# Patient Record
Sex: FEMALE | Race: WHITE | NOT HISPANIC OR LATINO | Employment: FULL TIME | URBAN - METROPOLITAN AREA
[De-identification: names, ages, dates, MRNs, and addresses within clinical notes are randomized per-mention and may not be internally consistent; named-entity substitution may affect disease eponyms.]

---

## 2017-03-01 DIAGNOSIS — K21.9 GASTROESOPHAGEAL REFLUX DISEASE, ESOPHAGITIS PRESENCE NOT SPECIFIED: ICD-10-CM

## 2017-03-01 NOTE — TELEPHONE ENCOUNTER
omeprazole 20 MG tablet      Last Written Prescription Date: 8/26/16  Last Fill Quantity: 90,  # refills: 1   Last Office Visit with [facility], [facility] or [facility] prescribing provider: 3/22/16

## 2017-03-02 RX ORDER — NICOTINE POLACRILEX 4 MG/1
20 GUM, CHEWING ORAL DAILY
Qty: 30 TABLET | Refills: 0 | Status: SHIPPED
Start: 2017-03-02 | End: 2017-03-20

## 2017-03-02 NOTE — TELEPHONE ENCOUNTER
Medication is being filled for 1 time refill only due to:  Patient needs to be seen because it has been more than one year since last visit.     Signed Prescriptions:                        Disp   Refills    omeprazole 20 MG tablet                    30 tab*0        Sig: Take 1 tablet (20 mg) by mouth daily Take 30-60           minutes before a meal.  PLEASE SCHEDULE AN           APPOINTMENT TO RECEIVE FUTURE REFILLS           [phone]  Authorizing Provider: MEAGAN SANDOVAL  Ordering User: TOI GILES      Routing to  Reception - due for annual office visit      Thank you,  Toi Giles RN

## 2017-03-07 ENCOUNTER — OFFICE VISIT (OUTPATIENT)
Dept: FAMILY MEDICINE | Facility: CLINIC | Age: 62
End: 2017-03-07
Payer: COMMERCIAL

## 2017-03-07 VITALS
TEMPERATURE: 98.8 F | HEIGHT: 62 IN | SYSTOLIC BLOOD PRESSURE: 137 MMHG | OXYGEN SATURATION: 99 % | DIASTOLIC BLOOD PRESSURE: 72 MMHG | HEART RATE: 54 BPM | WEIGHT: 199.75 LBS | BODY MASS INDEX: 36.76 KG/M2

## 2017-03-07 DIAGNOSIS — J01.00 ACUTE NON-RECURRENT MAXILLARY SINUSITIS: Primary | ICD-10-CM

## 2017-03-07 PROCEDURE — 99213 OFFICE O/P EST LOW 20 MIN: CPT | Performed by: FAMILY MEDICINE

## 2017-03-07 RX ORDER — AMOXICILLIN AND CLAVULANATE POTASSIUM 500; 125 MG/1; MG/1
1 TABLET, FILM COATED ORAL 3 TIMES DAILY
Qty: 30 TABLET | Refills: 0 | Status: SHIPPED | OUTPATIENT
Start: 2017-03-07 | End: 2017-03-20

## 2017-03-07 NOTE — PATIENT INSTRUCTIONS
?augmentin  Try below , if not better 4 days try augmentin antibiotic given   Continue flonase 1 spray each nostril daily 2 weeks  Continue Zyrtec 10 mg daily 2 week  mucinex 600 mg twice a day 1 week  netti pot   Humidifier in room may help  supportive care as below  Go to the Er if worse  Follow up with primary if symptoms persist   Consider Hep c screen, colon cancer screen, BMP. Due for preventive visit with Primary Dr Graf   Your symptoms and exam today indicate that you have a viral upper respiratory illness.  This includes viral rhinosinusitis and viral bronchitis.  Antibiotics do not help viral illnesses; the best remedies treat the symptoms (see below).  The typical course of a viral illness is that you feel rather miserable for the first few days - with sore throat, runny nose/nasal congestion, cough, and sometimes fever and body aches.  You should start to feel better after about 5-7 days and much better by 10-14 days.  If you develop sudden worsening of symptoms or fever after the first 5-7 days, or if you have persistence of your symptoms beyond 14 days, let us know as you may have developed a secondary bacterial infection.      For symptom relief I suggest tryin. Steam.  Take a long, hot shower.  Or if you don't want to get in the shower just run it with the bathroom door shut for a few minutes and breathe the steam.  2. Drink hot liquids frequently such as tea or hot water with honey and lemon.  3. Acetaminophen (Tylenol) and ibuprofen (Motrin or Advil) as needed for headache, sore throat, body aches, or fever.  4. For loosening phlegm and sputum try guaifenesin (available in many combination products and alone as plain Robitussin or plain Mucinex) and for cough suppression you can try dextromethorphan (Delsym or combined in other products).  5. For nasal congestion try:    An oral decongestant.  The only decongestant I recommend is pseudoephedrine. Ask the pharmacist for the over the counter  (but real) pseudoephedrine - not phenylephrine.  This can raise your blood pressure and heart rate so do not use this if you have hypertension.       Afrin spray for 3 days.  (Never use afrin nasal spray for more than 3 days as there is a risk of developing tolerance and rebound/worsening nasal congestion if used longer than this.)    Nealmed sinus rinses.    Nasal steroid spray such as nasacort or flonase, which are over-the-counter.  6. And most importantly: plenty of rest and sleep  especially while you have a fever.     Stop smoking and avoid secondhand smoke    Drink lots of fluids such as water and clear soups. Fluids help loosen mucus. Fluids are also important because they help prevent dehydration.    Gargle with warm salt water a few times a day to relieve a sore throat. Throat sprays or lozenges may also help relieve the pain.    Avoid alcohol.    Use saline (salt water) nose drops to help loosen mucus and moisten the tender skin in your nose.  Encouraged mucinex, warm salt water gargles, cepacol spray, soothers/lozenges, sinus rinses (neilmed), flonase (2 sprays per nostril daily x 2 weeks), vitamin c, fluids and rest.  May alternate tylenol and NSAIDS (ibuprofen, advil, aleve type products) every 4-6 hours for the next few days as needed.   No need for oral antibiotic at this time.

## 2017-03-07 NOTE — MR AVS SNAPSHOT
After Visit Summary   3/7/2017    Adilia Camacho    MRN: [id number]           Patient Information     Date Of Birth          1955        Visit Information        Provider Department      3/7/2017 3:40 PM Minna Pardo MD [facility]        Today's Diagnoses     Acute sinusitis, recurrence not specified, unspecified location    -  1      Care Instructions    ?augmentin  Try below , if not better 4 days try augmentin antibiotic given   Continue flonase 1 spray each nostril daily 2 weeks  Continue Zyrtec 10 mg daily 2 week  mucinex 600 mg twice a day 1 week  netti pot   Humidifier in room may help  supportive care as below  Go to the Er if worse  Follow up with primary if symptoms persist   Consider Hep c screen, colon cancer screen, BMP. Due for preventive visit with Primary Dr Graf   Your symptoms and exam today indicate that you have a viral upper respiratory illness.  This includes viral rhinosinusitis and viral bronchitis.  Antibiotics do not help viral illnesses; the best remedies treat the symptoms (see below).  The typical course of a viral illness is that you feel rather miserable for the first few days - with sore throat, runny nose/nasal congestion, cough, and sometimes fever and body aches.  You should start to feel better after about 5-7 days and much better by 10-14 days.  If you develop sudden worsening of symptoms or fever after the first 5-7 days, or if you have persistence of your symptoms beyond 14 days, let us know as you may have developed a secondary bacterial infection.      For symptom relief I suggest tryin. Steam.  Take a long, hot shower.  Or if you don't want to get in the shower just run it with the bathroom door shut for a few minutes and breathe the steam.  2. Drink hot liquids frequently such as tea or hot water with honey and lemon.  3. Acetaminophen (Tylenol) and ibuprofen (Motrin or Advil) as needed for headache, sore throat, body aches, or  fever.  4. For loosening phlegm and sputum try guaifenesin (available in many combination products and alone as plain Robitussin or plain Mucinex) and for cough suppression you can try dextromethorphan (Delsym or combined in other products).  5. For nasal congestion try:    An oral decongestant.  The only decongestant I recommend is pseudoephedrine. Ask the pharmacist for the over the counter (but real) pseudoephedrine - not phenylephrine.  This can raise your blood pressure and heart rate so do not use this if you have hypertension.       Afrin spray for 3 days.  (Never use afrin nasal spray for more than 3 days as there is a risk of developing tolerance and rebound/worsening nasal congestion if used longer than this.)    Nealmed sinus rinses.    Nasal steroid spray such as nasacort or flonase, which are over-the-counter.  6. And most importantly: plenty of rest and sleep  especially while you have a fever.     Stop smoking and avoid secondhand smoke    Drink lots of fluids such as water and clear soups. Fluids help loosen mucus. Fluids are also important because they help prevent dehydration.    Gargle with warm salt water a few times a day to relieve a sore throat. Throat sprays or lozenges may also help relieve the pain.    Avoid alcohol.    Use saline (salt water) nose drops to help loosen mucus and moisten the tender skin in your nose.  Encouraged mucinex, warm salt water gargles, cepacol spray, soothers/lozenges, sinus rinses (neilmed), flonase (2 sprays per nostril daily x 2 weeks), vitamin c, fluids and rest.  May alternate tylenol and NSAIDS (ibuprofen, advil, aleve type products) every 4-6 hours for the next few days as needed.   No need for oral antibiotic at this time.          Follow-ups after your visit        Your next 10 appointments already scheduled     Mar 20, 2017  9:20 AM CDT   [place] [facility] with Sheree Graf MD   [facility] [place] [place] ([facility] [place]    [id number]  "[facility] [address] [phone]              Who to contact     If you have questions or need follow up information about today's clinic visit or your schedule please contact [facility] directly at [phone].  Normal or non-critical lab and imaging results will be communicated to you by MyChart, letter or phone within 4 business days after the clinic has received the results. If you do not hear from us within 7 days, please contact the clinic through Ya[employer] or phone. If you have a critical or abnormal lab result, we will notify you by phone as soon as possible.  Submit refill requests through Grow or call your pharmacy and they will forward the refill request to us. Please allow 3 business days for your refill to be completed.          Additional Information About Your Visit        [employer] Information     Grow gives you secure access to your electronic health record. If you see a primary care provider, you can also send messages to your care team and make appointments. If you have questions, please call your primary care clinic.  If you do not have a primary care provider, please call [phone] and they will assist you.        Care EveryWhere ID     This is your Care EveryWhere ID. This could be used by other organizations to access your [facility] medical records  [id number]        Your Vitals Were     Pulse Temperature Height Pulse Oximetry BMI (Body Mass Index)       54 98.8  F (37.1  C) (Tympanic) 5' 2\" (1.575 m) 99% 36.53 kg/m2        Blood Pressure from Last 3 Encounters:   03/07/17 137/72   03/22/16 117/82   03/08/16 116/78    Weight from Last 3 Encounters:   03/07/17 199 lb 12 oz (90.6 kg)   03/22/16 207 lb 8 oz (94.1 kg)   03/08/16 208 lb 12 oz (94.7 kg)              Today, you had the following     No orders found for display         Today's Medication Changes          These changes are accurate as of: 3/7/17  4:06 PM.  If you have any questions, ask your " nurse or doctor.               Start taking these medicines.        Dose/Directions    amoxicillin-clavulanate 500-125 MG per tablet   Commonly known as:  AUGMENTIN   Used for:  Acute sinusitis, recurrence not specified, unspecified location   Started by:  Minna Pardo MD        Dose:  1 tablet   Take 1 tablet by mouth 3 times daily   Quantity:  30 tablet   Refills:  0            Where to get your medicines      Some of these will need a paper prescription and others can be bought over the counter.  Ask your nurse if you have questions.     Bring a paper prescription for each of these medications     amoxicillin-clavulanate 500-125 MG per tablet                Primary Care Provider Office Phone # Fax #    Sheree Graf [phone]       [address] [address]        Thank you!     Thank you for choosing [facility]  for your care. Our goal is always to provide you with excellent care. Hearing back from our patients is one way we can continue to improve our services. Please take a few minutes to complete the written survey that you may receive in the mail after your visit with us. Thank you!             Your Updated Medication List - Protect others around you: Learn how to safely use, store and throw away your medicines at www.disposemymeds.org.          This list is accurate as of: 3/7/17  4:06 PM.  Always use your most recent med list.                   Brand Name Dispense Instructions for use    acyclovir 400 MG tablet    ZOVIRAX    30 tablet    Take 1 tablet (400 mg) by mouth 3 times daily       ADVIL PO      Take 200 mg by mouth as needed for moderate pain       amoxicillin-clavulanate 500-125 MG per tablet    AUGMENTIN    30 tablet    Take 1 tablet by mouth 3 times daily       atenolol 25 MG tablet    TENORMIN    90 tablet    Take 1 tablet (25 mg) by mouth daily       cetirizine 10 MG tablet    zyrTEC     Take 10 mg by mouth daily        diclofenac 1 % Gel topical gel    VOLTAREN    100 g    Apply 4 grams to left calf twice a day       fluticasone 50 MCG/ACT spray    FLONASE    48 g    Spray 2 sprays into both nostrils 2 times daily       loperamide 2 MG capsule    IMODIUM     Take 2 mg by mouth 4 times daily as needed for diarrhea       omeprazole 20 MG tablet     30 tablet    Take 1 tablet (20 mg) by mouth daily Take 30-60 minutes before a meal.  PLEASE SCHEDULE AN APPOINTMENT TO RECEIVE FUTURE REFILLS [phone]

## 2017-03-07 NOTE — PROGRESS NOTES
SUBJECTIVE:                                                    Adilia Camacho is a 61 year old female who presents to clinic today for the following health issues:    RESPIRATORY SYMPTOMS  Hx of allergy in winter but never get thi skind of pressure, left maxillary area and teeth up into eyes sensitive     Duration: x Sunday ( 3 days )     Description  facial pain/pressure  No fever or chills  Has runny nose from winter when furnace is on nothing ore than normal  Teeth sensitive then pressure in left cheek  No tooth issues goes to dentist regularly   Takes flonase daily and zyrtec too   Nose mildly sensitive on left felt a bit scabby  No nose bleed  No chest pain, trouble breathing or palpitations   No nausea vomiting, diarrhea constipation, bloody or black stool  Had low back pain briefly day before, no tingling, numbness or trouble walking  A week prior had returned from trip of snowmobiling but had no pain in week till yesterday or since yesterday  No urinary complaints.   Definitely not her teeth, knows it is her sinus    Severity: moderate    Accompanying signs and symptoms: patient does have allergies to dust and dogs; patient does take allergy medications to help with that. Patient does notice her nose gets drippy if she doesn't take her allergy medication     History (predisposing factors):  Allergies     Therapies tried and outcome:  Suphedrine , Zyrtec at night     Problem list and histories reviewed & adjusted, as indicated.  Additional history: as documented    Patient Active Problem List   Diagnosis     Hypertension goal BP (blood pressure) < 140/90     CARDIOVASCULAR SCREENING; LDL GOAL LESS THAN 130     Allergic rhinitis due to dogs     Contraceptive management     Recurrent herpes labialis     Rosacea     Female stress incontinence     GERD (gastroesophageal reflux disease)     Advanced directives, counseling/discussion     Past Surgical History   Procedure Laterality Date     No history of surgery          Social History   Substance Use Topics     Smoking status: Former Smoker     Years: 10.00     Types: Cigarettes     Smokeless tobacco: Never Used     Alcohol use 1.0 oz/week     2 Cans of beer per week      Comment: occassionally     Family History   Problem Relation Age of Onset     Hypertension Father      Hypertension Brother      Hypertension Sister      DIABETES Sister      and brother     KIDNEY DISEASE Sister      DIABETES Brother      Cancer - colorectal No family hx of      Breast Cancer No family hx of      Thyroid Disease No family hx of          Current Outpatient Prescriptions   Medication Sig Dispense Refill     omeprazole 20 MG tablet Take 1 tablet (20 mg) by mouth daily Take 30-60 minutes before a meal.  PLEASE SCHEDULE AN APPOINTMENT TO RECEIVE FUTURE REFILLS [phone] 30 tablet 0     fluticasone (FLONASE) 50 MCG/ACT nasal spray Spray 2 sprays into both nostrils 2 times daily 48 G 1     atenolol (TENORMIN) 25 MG tablet Take 1 tablet (25 mg) by mouth daily 90 tablet 1     acyclovir (ZOVIRAX) 400 MG tablet Take 1 tablet (400 mg) by mouth 3 times daily 30 tablet 2     Ibuprofen (ADVIL PO) Take 200 mg by mouth as needed for moderate pain       cetirizine (ZYRTEC) 10 MG tablet Take 10 mg by mouth daily       loperamide (IMODIUM) 2 MG capsule Take 2 mg by mouth 4 times daily as needed for diarrhea       diclofenac (VOLTAREN) 1 % GEL Apply 4 grams to left calf twice a day (Patient not taking: Reported on 3/7/2017) 100 G 1     No Known Allergies  Recent Labs   Lab Test  03/22/16   0924  07/27/15   1016   02/15/13   1135  10/28/11   0955  02/21/11   1644   LDL  107*   --    --   95  96   --    HDL  50   --    --   43*  46*   --    TRIG  182*   --    --   119  136   --    ALT   --    --    --   22   --    --    CR  0.84  0.77   < >  0.76   --    --    GFRESTIMATED  69  78   < >  79   --    --    GFRESTBLACK  84  >90   GFR Calc     < >  >90   --    --    POTASSIUM  4.2  4.5   < >  4.2   " --    --    TSH   --    --    --    --   1.71  1.13    < > = values in this interval not displayed.      BP Readings from Last 3 Encounters:   03/07/17 137/72   03/22/16 117/82   03/08/16 116/78    Wt Readings from Last 3 Encounters:   03/07/17 199 lb 12 oz (90.6 kg)   03/22/16 207 lb 8 oz (94.1 kg)   03/08/16 208 lb 12 oz (94.7 kg)                  Labs reviewed in EPIC    Reviewed and updated as needed this visit by clinical staff  Tobacco  Allergies  Meds  Med Hx  Surg Hx  Fam Hx  Soc Hx      Reviewed and updated as needed this visit by Provider         ROS:  Constitutional, HEENT, cardiovascular, pulmonary, GI, , musculoskeletal, neuro, skin, endocrine and psych systems are negative, except as otherwise noted.    OBJECTIVE:                                                    /72  Pulse 54  Temp 98.8  F (37.1  C) (Tympanic)  Ht 5' 2\" (1.575 m)  Wt 199 lb 12 oz (90.6 kg)  SpO2 99%  BMI 36.53 kg/m2  Body mass index is 36.53 kg/(m^2).  GENERAL: healthy, alert and no distress  EYES: Eyes grossly normal to inspection, PERRL and conjunctivae and sclerae normal  HENT: normal cephalic/atraumatic, ear canals and TM's normal, nose and mouth without ulcers or lesions, oropharynx clear, oral mucous membranes moist and sinuses: maxillary tenderness on left  NECK: no adenopathy, no asymmetry, masses, or scars and thyroid normal to palpation  RESP: lungs clear to auscultation - no rales, rhonchi or wheezes  CV: regular rate and rhythm, normal S1 S2, no S3 or S4, no murmur, click or rub, no peripheral edema and peripheral pulses strong  ABDOMEN: soft, non tender, no hepatosplenomegaly, no masses and bowel sounds normal  MS: no gross musculoskeletal defects noted, no edema  SKIN: no suspicious lesions or rashes  NEURO: Normal strength and tone, mentation intact and speech normal  PSYCH: mentation appears normal, affect normal/bright    Diagnostic Test Results:  none      ASSESSMENT/PLAN:                              "                         1. Acute non-recurrent maxillary sinusitis  Hx of GERD on Prilosec, HTN on atenolol, Rosacea, stress incontinence, recurrent herpes labialis on zovirax, allergic rhinitis to dogs on Flonase & zyrtec, former smoker, with plantar fasciitis on Voltaren gel prn, seen by PCP Dr Graf for preventive 3/22/16. Here today for sinus infection. Likely viral. Advised to Try below , if not better in 4 days try Augmentin antibiotic given. Continue Flonase 1 spray each nostril daily 2 weeks. Continue Zyrtec 10 mg daily 2 week. mucinex 600 mg twice a day 1 week. eduard pot. Humidifier in room may help. supportive care as below  Go to the Er if worse. Follow up with primary if symptoms persist. Consider Hep C screen, colon cancer screen, BMP. Due for preventive visit with Primary Dr Graf   - amoxicillin-clavulanate (AUGMENTIN) 500-125 MG per tablet; Take 1 tablet by mouth 3 times daily  Dispense: 30 tablet; Refill: 0    See Patient Instructions  Patient Instructions   ?augmentin  Try below , if not better 4 days try augmentin antibiotic given   Continue flonase 1 spray each nostril daily 2 weeks  Continue Zyrtec 10 mg daily 2 week  mucinex 600 mg twice a day 1 week  netti pot   Humidifier in room may help  supportive care as below  Go to the Er if worse  Follow up with primary if symptoms persist   Consider Hep c screen, colon cancer screen, BMP. Due for preventive visit with Primary Dr Graf   Your symptoms and exam today indicate that you have a viral upper respiratory illness.  This includes viral rhinosinusitis and viral bronchitis.  Antibiotics do not help viral illnesses; the best remedies treat the symptoms (see below).  The typical course of a viral illness is that you feel rather miserable for the first few days - with sore throat, runny nose/nasal congestion, cough, and sometimes fever and body aches.  You should start to feel better after about 5-7 days and much better by 10-14 days.  If you  develop sudden worsening of symptoms or fever after the first 5-7 days, or if you have persistence of your symptoms beyond 14 days, let us know as you may have developed a secondary bacterial infection.      For symptom relief I suggest tryin. Steam.  Take a long, hot shower.  Or if you don't want to get in the shower just run it with the bathroom door shut for a few minutes and breathe the steam.  2. Drink hot liquids frequently such as tea or hot water with honey and lemon.  3. Acetaminophen (Tylenol) and ibuprofen (Motrin or Advil) as needed for headache, sore throat, body aches, or fever.  4. For loosening phlegm and sputum try guaifenesin (available in many combination products and alone as plain Robitussin or plain Mucinex) and for cough suppression you can try dextromethorphan (Delsym or combined in other products).  5. For nasal congestion try:    An oral decongestant.  The only decongestant I recommend is pseudoephedrine. Ask the pharmacist for the over the counter (but real) pseudoephedrine - not phenylephrine.  This can raise your blood pressure and heart rate so do not use this if you have hypertension.       Afrin spray for 3 days.  (Never use afrin nasal spray for more than 3 days as there is a risk of developing tolerance and rebound/worsening nasal congestion if used longer than this.)    Nealmed sinus rinses.    Nasal steroid spray such as nasacort or flonase, which are over-the-counter.  6. And most importantly: plenty of rest and sleep  especially while you have a fever.     Stop smoking and avoid secondhand smoke    Drink lots of fluids such as water and clear soups. Fluids help loosen mucus. Fluids are also important because they help prevent dehydration.    Gargle with warm salt water a few times a day to relieve a sore throat. Throat sprays or lozenges may also help relieve the pain.    Avoid alcohol.    Use saline (salt water) nose drops to help loosen mucus and moisten the tender skin  in your nose.  Encouraged mucinex, warm salt water gargles, cepacol spray, soothers/lozenges, sinus rinses (neilmed), flonase (2 sprays per nostril daily x 2 weeks), vitamin c, fluids and rest.  May alternate tylenol and NSAIDS (ibuprofen, advil, aleve type products) every 4-6 hours for the next few days as needed.   No need for oral antibiotic at this time.        Minna Pardo MD  [facility]

## 2017-03-17 DIAGNOSIS — I10 HYPERTENSION GOAL BP (BLOOD PRESSURE) < 140/90: ICD-10-CM

## 2017-03-17 RX ORDER — ATENOLOL 25 MG/1
25 TABLET ORAL DAILY
Qty: 90 TABLET | Refills: 0 | Status: SHIPPED | OUTPATIENT
Start: 2017-03-17 | End: 2017-03-20

## 2017-03-17 NOTE — TELEPHONE ENCOUNTER
Medication Detail      Disp Refills Start End DARRYL   atenolol (TENORMIN) 25 MG tablet 90 tablet 1 9/15/2016  No   Sig: Take 1 tablet (25 mg) by mouth daily   Class: E-Prescribe   Route: Oral        Last Office Visit with [facility], [facility] or [facility] prescribing provider:  3/7/2017   Future Office Visit:    Next 5 appointments (look out 90 days)     Mar 20, 2017  9:20 AM CDT   MyChart Physical Adult with Sheree Graf MD   [facility] ([facility])    [address] [place] [id number] [phone]                    BP Readings from Last 3 Encounters:   03/07/17 137/72   03/22/16 117/82   03/08/16 116/78

## 2017-03-20 ENCOUNTER — OFFICE VISIT (OUTPATIENT)
Dept: FAMILY MEDICINE | Facility: CLINIC | Age: 62
End: 2017-03-20
Payer: COMMERCIAL

## 2017-03-20 VITALS
WEIGHT: 196 LBS | DIASTOLIC BLOOD PRESSURE: 78 MMHG | SYSTOLIC BLOOD PRESSURE: 110 MMHG | HEART RATE: 72 BPM | RESPIRATION RATE: 14 BRPM | OXYGEN SATURATION: 98 % | TEMPERATURE: 97.8 F | HEIGHT: 62 IN | BODY MASS INDEX: 36.07 KG/M2

## 2017-03-20 DIAGNOSIS — I10 HYPERTENSION GOAL BP (BLOOD PRESSURE) < 140/90: ICD-10-CM

## 2017-03-20 DIAGNOSIS — Z13.220 LIPID SCREENING: ICD-10-CM

## 2017-03-20 DIAGNOSIS — Z12.31 VISIT FOR SCREENING MAMMOGRAM: ICD-10-CM

## 2017-03-20 DIAGNOSIS — Z11.59 NEED FOR HEPATITIS C SCREENING TEST: ICD-10-CM

## 2017-03-20 DIAGNOSIS — K21.9 GASTROESOPHAGEAL REFLUX DISEASE, ESOPHAGITIS PRESENCE NOT SPECIFIED: ICD-10-CM

## 2017-03-20 DIAGNOSIS — Z00.00 ENCOUNTER FOR ROUTINE ADULT HEALTH EXAMINATION WITHOUT ABNORMAL FINDINGS: Primary | ICD-10-CM

## 2017-03-20 DIAGNOSIS — Z12.11 SCREEN FOR COLON CANCER: ICD-10-CM

## 2017-03-20 DIAGNOSIS — Z13.6 CARDIOVASCULAR SCREENING; LDL GOAL LESS THAN 130: ICD-10-CM

## 2017-03-20 PROCEDURE — 99396 PREV VISIT EST AGE 40-64: CPT | Performed by: FAMILY MEDICINE

## 2017-03-20 RX ORDER — NICOTINE POLACRILEX 4 MG/1
20 GUM, CHEWING ORAL DAILY
Qty: 90 TABLET | Refills: 3 | Status: SHIPPED | OUTPATIENT
Start: 2017-03-20 | End: 2018-05-01

## 2017-03-20 RX ORDER — ATENOLOL 25 MG/1
25 TABLET ORAL DAILY
Qty: 90 TABLET | Refills: 3 | Status: SHIPPED | OUTPATIENT
Start: 2017-03-20 | End: 2018-02-13 | Stop reason: ALTCHOICE

## 2017-03-20 NOTE — PROGRESS NOTES
SUBJECTIVE:     CC: Adilia Camacho is a 61 year old woman who presents for preventive health visit.     Physical   Annual:     Getting at least 3 servings of Calcium per day::  Yes    Bi-annual eye exam::  Yes    Dental care twice a year::  Yes    Sleep apnea or symptoms of sleep apnea::  None    Diet::  Regular (no restrictions)    Frequency of exercise::  2-3 days/week    Duration of exercise::  Other    Taking medications regularly::  Yes    Medication side effects::  None    Additional concerns today::  YES        Today's PHQ-2 Score:   PHQ-2 ( 1999 Pfizer) 3/17/2017   Little interest or pleasure in doing things Not at all   Feeling down, depressed or hopeless Not at all   PHQ-2 Score 0       Abuse: Current or Past(Physical, Sexual or Emotional)- No  Do you feel safe in your environment - Yes    Social History   Substance Use Topics     Smoking status: Former Smoker     Years: 10.00     Types: Cigarettes     Smokeless tobacco: Never Used     Alcohol use 1.0 oz/week     2 Cans of beer per week      Comment: occassionally     The patient does not drink >3 drinks per day nor >7 drinks per week.    Recent Labs   Lab Test  03/22/16   0924  02/15/13   1135  10/28/11   0955   CHOL  193  161  170   HDL  50  43*  46*   LDL  107*  95  96   TRIG  182*  119  136   CHOLHDLRATIO   --   3.8  3.7   NHDL  143*   --    --        Reviewed orders with patient.  Reviewed health maintenance and updated orders accordingly - Yes    Mammo Decision Support:  Patient over age 50, mutual decision to screen reflected in health maintenance.    Pertinent mammograms are reviewed under the imaging tab.  History of abnormal Pap smear: NO - age 30-65 PAP every 5 years with negative HPV co-testing recommended    Reviewed and updated as needed this visit by clinical staff  Tobacco  Allergies  Med Hx  Surg Hx  Fam Hx  Soc Hx        Reviewed and updated as needed this visit by Provider        Past Medical History   Diagnosis Date     Allergic  rhinitis due to dogs      CARDIOVASCULAR SCREENING; LDL GOAL LESS THAN 130      Contraceptive management      Hypertension goal BP (blood pressure) < 140/90      Recurrent herpes labialis      Rosacea       Past Surgical History   Procedure Laterality Date     No history of surgery       Obstetric History     No data available          ROS:  10 point ROS is negative except as noted above.    BP Readings from Last 3 Encounters:   03/20/17 110/78   03/07/17 137/72   03/22/16 117/82    Wt Readings from Last 3 Encounters:   03/20/17 88.9 kg (196 lb)   03/07/17 90.6 kg (199 lb 12 oz)   03/22/16 94.1 kg (207 lb 8 oz)                  Patient Active Problem List   Diagnosis     Hypertension goal BP (blood pressure) < 140/90     CARDIOVASCULAR SCREENING; LDL GOAL LESS THAN 130     Allergic rhinitis due to dogs     Contraceptive management     Recurrent herpes labialis     Rosacea     Female stress incontinence     GERD (gastroesophageal reflux disease)     Advanced directives, counseling/discussion     Past Surgical History   Procedure Laterality Date     No history of surgery         Social History   Substance Use Topics     Smoking status: Former Smoker     Years: 10.00     Types: Cigarettes     Smokeless tobacco: Never Used     Alcohol use 1.0 oz/week     2 Cans of beer per week      Comment: occassionally     Family History   Problem Relation Age of Onset     Hypertension Father      Hypertension Brother      Hypertension Sister      DIABETES Sister      and brother     KIDNEY DISEASE Sister      DIABETES Brother      Cancer - colorectal No family hx of      Breast Cancer No family hx of      Thyroid Disease No family hx of          Current Outpatient Prescriptions   Medication Sig Dispense Refill     atenolol (TENORMIN) 25 MG tablet Take 1 tablet (25 mg) by mouth daily 90 tablet 0     omeprazole 20 MG tablet Take 1 tablet (20 mg) by mouth daily Take 30-60 minutes before a meal.  PLEASE SCHEDULE AN APPOINTMENT TO  "RECEIVE FUTURE REFILLS [phone] 30 tablet 0     fluticasone (FLONASE) 50 MCG/ACT nasal spray Spray 2 sprays into both nostrils 2 times daily 48 g 1     acyclovir (ZOVIRAX) 400 MG tablet Take 1 tablet (400 mg) by mouth 3 times daily 30 tablet 2     Ibuprofen (ADVIL PO) Take 200 mg by mouth as needed for moderate pain       cetirizine (ZYRTEC) 10 MG tablet Take 10 mg by mouth daily       loperamide (IMODIUM) 2 MG capsule Take 2 mg by mouth 4 times daily as needed for diarrhea       No Known Allergies  Recent Labs   Lab Test  03/22/16   0924  07/27/15   1016   02/15/13   1135  10/28/11   0955  02/21/11   1644   LDL  107*   --    --   95  96   --    HDL  50   --    --   43*  46*   --    TRIG  182*   --    --   119  136   --    ALT   --    --    --   22   --    --    CR  0.84  0.77   < >  0.76   --    --    GFRESTIMATED  69  78   < >  79   --    --    GFRESTBLACK  84  >90   GFR Calc     < >  >90   --    --    POTASSIUM  4.2  4.5   < >  4.2   --    --    TSH   --    --    --    --   1.71  1.13    < > = values in this interval not displayed.      This document serves as a record of the services and decisions personally performed and made by Sheree Graf MD. It was created on her behalf by Hiral Ca, a trained medical scribe. The creation of this document is based on the provider's statements to the medical scribe.  Hiral Ca March 20, 2017 9:48 AM    OBJECTIVE:     /78  Pulse 72  Temp 97.8  F (36.6  C) (Oral)  Resp 14  Ht 1.575 m (5' 2\")  Wt 88.9 kg (196 lb)  SpO2 98%  BMI 35.85 kg/m2  EXAM:  GENERAL: healthy, alert and no distress  EYES: Eyes grossly normal to inspection, PERRL and conjunctivae and sclerae normal  HENT: ear canals and TM's normal, nose and mouth without ulcers or lesions  NECK: no adenopathy, no asymmetry, masses, or scars and thyroid normal to palpation  RESP: lungs clear to auscultation - no rales, rhonchi or wheezes  BREAST: normal without masses, tenderness or " "nipple discharge and no palpable axillary masses or adenopathy  CV: regular rate and rhythm, normal S1 S2, no S3 or S4, no murmur, click or rub, no peripheral edema and peripheral pulses strong  ABDOMEN: soft, nontender, no hepatosplenomegaly, no masses and bowel sounds normal  NEURO: Normal strength and tone, mentation intact and speech normal  PSYCH: mentation appears normal, affect normal/bright    ASSESSMENT/PLAN:     1. Encounter for routine adult health examination without abnormal findings  Pap due in 2019    2. Screen for colon cancer  Will schedule colonoscopy this year.  - GASTROENTEROLOGY ADULT REF PROCEDURE ONLY    3. Visit for screening mammogram  - MA SCREENING DIGITAL BILAT - Future  (s+30); Future    4. Need for hepatitis C screening test  - Hepatitis C Screen Reflex to HCV RNA Quant and Genotype; Future    5. Hypertension goal BP (blood pressure) < 140/90  Controlled on current regimen.  - BASIC METABOLIC PANEL; Future  - atenolol (TENORMIN) 25 MG tablet; Take 1 tablet (25 mg) by mouth daily  Dispense: 90 tablet; Refill: 3    6. CARDIOVASCULAR SCREENING; LDL GOAL LESS THAN 130  Labs scheduled.    7. Gastroesophageal reflux disease, esophagitis presence not specified  Controlled on current regimen.  - omeprazole 20 MG tablet; Take 1 tablet (20 mg) by mouth daily Take 30-60 minutes before a meal.  Dispense: 90 tablet; Refill: 3         COUNSELING:  Reviewed preventive health counseling, as reflected in patient instructions         reports that she has quit smoking. Her smoking use included Cigarettes. She quit after 10.00 years of use. She has never used smokeless tobacco.    Estimated body mass index is 35.85 kg/(m^2) as calculated from the following:    Height as of this encounter: 1.575 m (5' 2\").    Weight as of this encounter: 88.9 kg (196 lb).   Weight management plan: Diet and exercise    Counseling Resources:  ATP IV Guidelines  Pooled Cohorts Equation Calculator  Breast Cancer Risk " Calculator  FRAX Risk Assessment  ICSI Preventive Guidelines  Dietary Guidelines for Americans, 2010  USDA's MyPlate  ASA Prophylaxis  Lung CA Screening    The information in this document, created by the medical scribe for me, accurately reflects the services I personally performed and the decisions made by me. I have reviewed and approved this document for accuracy prior to leaving the patient care area.  3/20/2017 9:48 AM           Sheree Graf MD  Formerly named [facility]  Answers for HPI/ROS submitted by the patient on 3/17/2017   Q1: Little interest or pleasure in doing things: 0=Not at all  Q2: Feeling down, depressed or hopeless: 0=Not at all  PHQ-2 Score: 0

## 2017-03-20 NOTE — MR AVS SNAPSHOT
After Visit Summary   3/20/2017    Adilia Camacho    MRN: [id number]           Patient Information     Date Of Birth          1955        Visit Information        Provider Department      3/20/2017 9:20 AM Sheree Graf MD [facility]        Today's Diagnoses     Encounter for routine adult health examination without abnormal findings    -  1    Screen for colon cancer        Visit for screening mammogram        Need for hepatitis C screening test        Hypertension goal BP (blood pressure) < 140/90        CARDIOVASCULAR SCREENING; LDL GOAL LESS THAN 130        Gastroesophageal reflux disease, esophagitis presence not specified        Lipid screening          Care Instructions      Preventive Health Recommendations  Female Ages 50 - 64    Yearly exam: See your health care provider every year in order to  o Review health changes.   o Discuss preventive care.    o Review your medicines if your doctor has prescribed any.      Get a Pap test every three years (unless you have an abnormal result and your provider advises testing more often).    If you get Pap tests with HPV test, you only need to test every 5 years, unless you have an abnormal result.     You do not need a Pap test if your uterus was removed (hysterectomy) and you have not had cancer.    You should be tested each year for STDs (sexually transmitted diseases) if you're at risk.     Have a mammogram every 1 to 2 years.    Have a colonoscopy at age 50, or have a yearly FIT test (stool test). These exams screen for colon cancer.      Have a cholesterol test every 5 years, or more often if advised.    Have a diabetes test (fasting glucose) every three years. If you are at risk for diabetes, you should have this test more often.     If you are at risk for osteoporosis (brittle bone disease), think about having a bone density scan (DEXA).    Shots: Get a flu shot each year. Get a tetanus shot every 10 years.    Nutrition:      Eat at least 5 servings of fruits and vegetables each day.    Eat whole-grain bread, whole-wheat pasta and brown rice instead of white grains and rice.    Talk to your provider about Calcium and Vitamin D.     Lifestyle    Exercise at least 150 minutes a week (30 minutes a day, 5 days a week). This will help you control your weight and prevent disease.    Limit alcohol to one drink per day.    No smoking.     Wear sunscreen to prevent skin cancer.     See your dentist every six months for an exam and cleaning.    See your eye doctor every 1 to 2 years.          Follow-ups after your visit        Additional Services     GASTROENTEROLOGY ADULT REF PROCEDURE ONLY       Last Lab Result: Creatinine (mg/dL)       Date                     Value                 03/22/2016               0.84             ----------  Body mass index is 35.85 kg/(m^2).     Needed:  No  Language:  English    Patient will be contacted to schedule procedure.     Please be aware that coverage of these services is subject to the terms and limitations of your health insurance plan.  Call member services at your health plan with any benefit or coverage questions.  Any procedures must be performed at a [facility] OR coordinated by your clinic's referral office.    Please bring the following with you to your appointment:    (1) Any X-Rays, CTs or MRIs which have been performed.  Contact the facility where they were done to arrange for  prior to your scheduled appointment.    (2) List of current medications   (3) This referral request   (4) Any documents/labs given to you for this referral                  Your next 10 appointments already scheduled     Mar 24, 2017  7:30 AM CDT   LAB with  LAB   [facility] ([facility])    [address] [place] [address] [phone]           Patient must bring picture ID.  Patient should be prepared to give a urine specimen  Please do not  eat 10-12 hours before your appointment if you are coming in fasting for labs on lipids, cholesterol, or glucose (sugar).  Pregnant women should follow their Care Team instructions. Water with medications is okay. Do not drink coffee or other fluids.   If you have concerns about taking  your medications, please ask at office or if scheduling via [employer], send a message by clicking on Secure Messaging, Message Your Care Team.            Mar 28, 2017  8:00 AM CDT   MA SCREENING DIGITAL BILATERAL with [id number]   [facility] ([facility])    [address] [place] [address], [address]  [facility] [address] [phone]           Do not use any powder, lotion or deodorant under your arms or on your breast. If you do, we will ask you to remove it before your exam.  Wear comfortable, two-piece clothing.  If you have any allergies, tell your care team.  Bring any previous mammograms from other facilities or have them mailed to the breast center.              Future tests that were ordered for you today     Open Future Orders        Priority Expected Expires Ordered    MA SCREENING DIGITAL BILAT - Future  (s+30) Routine  3/20/2018 3/20/2017    Hepatitis C Screen Reflex to HCV RNA Quant and Genotype Routine  3/20/2018 3/20/2017    BASIC METABOLIC PANEL Routine  3/20/2018 3/20/2017    Lipid panel reflex to direct LDL Routine  3/20/2018 3/20/2017            Who to contact     If you have questions or need follow up information about today's clinic visit or your schedule please contact [facility] directly at [phone].  Normal or non-critical lab and imaging results will be communicated to you by MyChart, letter or phone within 4 business days after the clinic has received the results. If you do not hear from us within 7 days, please contact the clinic through MyChart or phone. If you have a critical or abnormal lab result, we will notify you by phone as soon as possible.  Submit refill requests  "through TRAFI or call your pharmacy and they will forward the refill request to us. Please allow 3 business days for your refill to be completed.          Additional Information About Your Visit        [employer] Information     [employer] gives you secure access to your electronic health record. If you see a primary care provider, you can also send messages to your care team and make appointments. If you have questions, please call your primary care clinic.  If you do not have a primary care provider, please call [phone] and they will assist you.        Care EveryWhere ID     This is your Care EveryWhere ID. This could be used by other organizations to access your [place] medical records  [id number]        Your Vitals Were     Pulse Temperature Respirations Height Pulse Oximetry BMI (Body Mass Index)    72 97.8  F (36.6  C) (Oral) 14 5' 2\" (1.575 m) 98% 35.85 kg/m2       Blood Pressure from Last 3 Encounters:   03/20/17 110/78   03/07/17 137/72   03/22/16 117/82    Weight from Last 3 Encounters:   03/20/17 196 lb (88.9 kg)   03/07/17 199 lb 12 oz (90.6 kg)   03/22/16 207 lb 8 oz (94.1 kg)              We Performed the Following     GASTROENTEROLOGY ADULT REF PROCEDURE ONLY          Today's Medication Changes          These changes are accurate as of: 3/20/17 10:22 AM.  If you have any questions, ask your nurse or doctor.               These medicines have changed or have updated prescriptions.        Dose/Directions    omeprazole 20 MG tablet   This may have changed:  additional instructions   Used for:  Gastroesophageal reflux disease, esophagitis presence not specified   Changed by:  Sheree Graf MD        Dose:  20 mg   Take 1 tablet (20 mg) by mouth daily Take 30-60 minutes before a meal.   Quantity:  90 tablet   Refills:  3            Where to get your medicines      These medications were sent to [facility] [address] [place] [address] AVE AT [place] [address] " [facility] [address]    Hours:  24-hours Phone:  [phone]     atenolol 25 MG tablet    omeprazole 20 MG tablet                Primary Care Provider Office Phone # Fax #    Sheree Graf [phone]       [facility] [address]  [place] [address]        Thank you!     Thank you for choosing [facility]  for your care. Our goal is always to provide you with excellent care. Hearing back from our patients is one way we can continue to improve our services. Please take a few minutes to complete the written survey that you may receive in the mail after your visit with us. Thank you!             Your Updated Medication List - Protect others around you: Learn how to safely use, store and throw away your medicines at www.disposemymeds.org.          This list is accurate as of: 3/20/17 10:22 AM.  Always use your most recent med list.                   Brand Name Dispense Instructions for use    acyclovir 400 MG tablet    ZOVIRAX    30 tablet    Take 1 tablet (400 mg) by mouth 3 times daily       ADVIL PO      Take 200 mg by mouth as needed for moderate pain       atenolol 25 MG tablet    TENORMIN    90 tablet    Take 1 tablet (25 mg) by mouth daily       cetirizine 10 MG tablet    zyrTEC     Take 10 mg by mouth daily       fluticasone 50 MCG/ACT spray    FLONASE    48 g    Spray 2 sprays into both nostrils 2 times daily       loperamide 2 MG capsule    IMODIUM     Take 2 mg by mouth 4 times daily as needed for diarrhea       omeprazole 20 MG tablet     90 tablet    Take 1 tablet (20 mg) by mouth daily Take 30-60 minutes before a meal.

## 2017-03-20 NOTE — PROGRESS NOTES
"   SUBJECTIVE:     CC: Adilia Camacho is an 61 year old woman who presents for preventive health visit.     Physical   Annual:     Getting at least 3 servings of Calcium per day::  Yes    Bi-annual eye exam::  Yes    Dental care twice a year::  Yes    Sleep apnea or symptoms of sleep apnea::  None    Diet::  Regular (no restrictions)    Frequency of exercise::  2-3 days/week    Duration of exercise::  Other    Taking medications regularly::  Yes    Medication side effects::  None    Additional concerns today::  YES    {Outside tests to abstract? :250702}    {additional problems to add:219626}    Today's PHQ-2 Score:   PHQ-2 ( 1999 Pfizer) 3/17/2017   Little interest or pleasure in doing things Not at all   Feeling down, depressed or hopeless Not at all   PHQ-2 Score 0       Abuse: Current or Past(Physical, Sexual or Emotional)- {YES/NO/NA:333969}  Do you feel safe in your environment - {YES/NO/NA:434060}    Social History   Substance Use Topics     Smoking status: Former Smoker     Years: 10.00     Types: Cigarettes     Smokeless tobacco: Never Used     Alcohol use 1.0 oz/week     2 Cans of beer per week      Comment: occassionally     {ETOH AUDIT:357440}    Recent Labs   Lab Test  03/22/16   0924  02/15/13   1135  10/28/11   0955   CHOL  193  161  170   HDL  50  43*  46*   LDL  107*  95  96   TRIG  182*  119  136   CHOLHDLRATIO   --   3.8  3.7   NHDL  143*   --    --        Reviewed orders with patient.  Reviewed health maintenance and updated orders accordingly - {Yes/No:483082::\"Yes\"}    Mammo Decision Support:  {Mammo:415185}    Pertinent mammograms are reviewed under the imaging tab.  History of abnormal Pap smear: {PAP HX:610257}    Reviewed and updated as needed this visit by clinical staff         Reviewed and updated as needed this visit by Provider        {HISTORY OPTIONS:999481}    ROS:  {FEMALE PREVENTATIVE ROS:974093}    {CHRONICPROBDATA:512980}  OBJECTIVE:     There were no vitals taken for this " "visit.  EXAM:  {Exam Choices:155432}    ASSESSMENT/PLAN:     {Diag Picklist:009937}    COUNSELING:  {FEMALE COUNSELING MESSAGES:831755::\"Reviewed preventive health counseling, as reflected in patient instructions\"}    {Blood Pressure Screenin}     reports that she has quit smoking. Her smoking use included Cigarettes. She quit after 10.00 years of use. She has never used smokeless tobacco.  {Tobacco Cessation needed for ACO -- Delete if patient is a non-smoker:123083}  Estimated body mass index is 36.53 kg/(m^2) as calculated from the following:    Height as of 3/7/17: 5' 2\" (1.575 m).    Weight as of 3/7/17: 199 lb 12 oz (90.6 kg).   {Weight Management Plan needed for ACO:312079}    Counseling Resources:  ATP IV Guidelines  Pooled Cohorts Equation Calculator  Breast Cancer Risk Calculator  FRAX Risk Assessment  ICSI Preventive Guidelines  Dietary Guidelines for Americans,   [employer]'s MyPlate  ASA Prophylaxis  Lung CA Screening    Sheree Graf MD, MD  [facility]  Answers for HPI/ROS submitted by the patient on 3/17/2017   Q1: Little interest or pleasure in doing things: 0=Not at all  Q2: Feeling down, depressed or hopeless: 0=Not at all  PHQ-2 Score: 0    "

## 2017-03-24 DIAGNOSIS — I10 HYPERTENSION GOAL BP (BLOOD PRESSURE) < 140/90: ICD-10-CM

## 2017-03-24 DIAGNOSIS — R73.9 ELEVATED BLOOD SUGAR: ICD-10-CM

## 2017-03-24 DIAGNOSIS — Z11.59 NEED FOR HEPATITIS C SCREENING TEST: ICD-10-CM

## 2017-03-24 DIAGNOSIS — Z13.220 LIPID SCREENING: ICD-10-CM

## 2017-03-24 LAB
ANION GAP SERPL CALCULATED.3IONS-SCNC: 6 MMOL/L (ref 3–14)
BUN SERPL-MCNC: 16 MG/DL (ref 7–30)
CALCIUM SERPL-MCNC: 8.9 MG/DL (ref 8.5–10.1)
CHLORIDE SERPL-SCNC: 108 MMOL/L (ref 94–109)
CHOLEST SERPL-MCNC: 156 MG/DL
CO2 SERPL-SCNC: 26 MMOL/L (ref 20–32)
CREAT SERPL-MCNC: 0.88 MG/DL (ref 0.52–1.04)
GFR SERPL CREATININE-BSD FRML MDRD: 66 ML/MIN/1.7M2
GLUCOSE SERPL-MCNC: 120 MG/DL (ref 70–99)
HBA1C MFR BLD: 6.3 % (ref 4.3–6)
HCV AB SERPL QL IA: NORMAL
HDLC SERPL-MCNC: 42 MG/DL
LDLC SERPL CALC-MCNC: 91 MG/DL
NONHDLC SERPL-MCNC: 114 MG/DL
POTASSIUM SERPL-SCNC: 3.9 MMOL/L (ref 3.4–5.3)
SODIUM SERPL-SCNC: 140 MMOL/L (ref 133–144)
TRIGL SERPL-MCNC: 115 MG/DL

## 2017-03-24 PROCEDURE — 80061 LIPID PANEL: CPT | Performed by: FAMILY MEDICINE

## 2017-03-24 PROCEDURE — 36415 COLL VENOUS BLD VENIPUNCTURE: CPT | Performed by: FAMILY MEDICINE

## 2017-03-24 PROCEDURE — 86803 HEPATITIS C AB TEST: CPT | Performed by: FAMILY MEDICINE

## 2017-03-24 PROCEDURE — 80048 BASIC METABOLIC PNL TOTAL CA: CPT | Performed by: FAMILY MEDICINE

## 2017-03-24 PROCEDURE — 83036 HEMOGLOBIN GLYCOSYLATED A1C: CPT | Performed by: FAMILY MEDICINE

## 2017-03-28 ENCOUNTER — RADIANT APPOINTMENT (OUTPATIENT)
Dept: MAMMOGRAPHY | Facility: CLINIC | Age: 62
End: 2017-03-28
Attending: FAMILY MEDICINE
Payer: COMMERCIAL

## 2017-03-28 DIAGNOSIS — Z12.31 VISIT FOR SCREENING MAMMOGRAM: ICD-10-CM

## 2017-03-28 PROCEDURE — G0202 SCR MAMMO BI INCL CAD: HCPCS

## 2017-04-05 NOTE — PROGRESS NOTES
The results of your recent lipid (cholesterol) profile were abnormal.    Here are the results:  Lab Results       Component                Value               Date                       CHOL                     156                 03/24/2017            Lab Results       Component                Value               Date                       HDL                      42                  03/24/2017            Lab Results       Component                Value               Date                       LDL                      91                  03/24/2017            Lab Results       Component                Value               Date                       TRIG                     115                 03/24/2017            Lab Results       Component                Value               Date                       CHOLHDLRDEBO             3.8                 02/15/2013              Desired or goal levels are:  CHOLESTEROL: Desirable is less than 200.   HDL (Good Cholesterol): Desirable is greater than 40 (for men) greater than 50 (for women).  LDL (Bad Cholesterol): Desirable is less than 130 (or less than 100 if you have heart disease or diabetes). Borderline 130-160.  TRIGLYCERIDES: Desirable is less than 150.  Borderline is 150-200.    Your HDL (the good cholesterol) level is low, no medication is required at this point. Reducing your total fat intake, increasing exercise level, reducing weight, adding olive oil to your diet, etc, may help to increase this level..    As you may know, an elevated cholesterol is one factor that increases your risk for heart disease and stroke. You can improve your cholesterol by controlling the amount and type of fat you eat and by increasing your daily activity level.    Here are some ways to improve your nutrition:  Eat less fat (especially butter, Crisco and other saturated fats)  Buy lean cuts of meat, reduce your portions of red meat or substitute poultry or fish  Use skim milk and  low-fat dairy products  Eat no more than 4 egg yolks per week  Avoid fried or fast foods that are high in fat  Eat more fruits and vegetables      Also consider starting or increasing your aerobic activity. Aerobic activity is the best way to improve HDL (good) cholesterol. If this would be new to you, please talk with me first about what activities are safe for you.      Other lab results:    Your blood glucose (blood sugar) and hemoglobin A1C (three month measure of your blood sugar) were also slightly elevated. You do not have diabetes yet but we consider this a pre-diabetic state.  I would recommend rechecking your blood glucose in one year. Improving lifestyle habits such as regular exercise, good diet and weight loss will hopefully slow or stop the progression towards diabetes.     The testing of your kidney function, and electrolytes was otherwise normal.      Your hepatitis C test was normal.     Please feel free to contact us with any questions or if you would like more information.        Sheree Graf M.D.

## 2017-05-10 DIAGNOSIS — J30.81 ALLERGIC RHINITIS DUE TO DOGS: ICD-10-CM

## 2017-05-10 NOTE — TELEPHONE ENCOUNTER
Medication Detail      Disp Refills Start End DARRYL   fluticasone (FLONASE) 50 MCG/ACT nasal spray 48 g 1 10/6/2016  No   Sig: Spray 2 sprays into both nostrils 2 times daily       Last Office Visit with [facility], [facility] or [facility] prescribing provider: 3/20/17

## 2017-05-11 RX ORDER — FLUTICASONE PROPIONATE 50 MCG
2 SPRAY, SUSPENSION (ML) NASAL 2 TIMES DAILY
Qty: 48 G | Refills: 1 | Status: SHIPPED | OUTPATIENT
Start: 2017-05-11 | End: 2018-02-20

## 2017-05-23 DIAGNOSIS — K21.9 GASTROESOPHAGEAL REFLUX DISEASE, ESOPHAGITIS PRESENCE NOT SPECIFIED: ICD-10-CM

## 2017-06-19 ENCOUNTER — OFFICE VISIT (OUTPATIENT)
Dept: FAMILY MEDICINE | Facility: CLINIC | Age: 62
End: 2017-06-19
Payer: COMMERCIAL

## 2017-06-19 VITALS
BODY MASS INDEX: 33.26 KG/M2 | HEART RATE: 65 BPM | DIASTOLIC BLOOD PRESSURE: 70 MMHG | SYSTOLIC BLOOD PRESSURE: 132 MMHG | RESPIRATION RATE: 16 BRPM | HEIGHT: 62 IN | TEMPERATURE: 98.8 F | WEIGHT: 180.75 LBS | OXYGEN SATURATION: 99 %

## 2017-06-19 DIAGNOSIS — M54.6 ACUTE RIGHT-SIDED THORACIC BACK PAIN: Primary | ICD-10-CM

## 2017-06-19 DIAGNOSIS — J06.9 ACUTE URI: ICD-10-CM

## 2017-06-19 PROCEDURE — 99213 OFFICE O/P EST LOW 20 MIN: CPT | Performed by: FAMILY MEDICINE

## 2017-06-19 NOTE — MR AVS SNAPSHOT
After Visit Summary   6/19/2017    Adilia Camacho    MRN: [id number]           Patient Information     Date Of Birth          1955        Visit Information        Provider Department      6/19/2017 10:00 AM Sheree Martines MD [facility]        Today's Diagnoses     Acute right-sided thoracic back pain    -  1      Care Instructions    Check into ergonomic assessment of your workstation.    Try ice/heat to the painful area.    Try chiropractic treatment.          Follow-ups after your visit        Additional Services     [facility] PT, HAND, AND CHIROPRACTIC REFERRAL       **This order will print in the [facility] Scheduling Office**    Physical Therapy, Hand Therapy and Chiropractic Care are available through:    *[facility]  *[facility]  *[facility]    Call one number to schedule at any of the above locations: [phone].    Your provider has referred you to: Chiropractic at [facility] or [facility] [place] [place]    Indication/Reason for Referral: right thoracic (scapular) back pain  Onset of Illness: 3 weeks  Therapy Orders: Evaluate and Treat  Special Programs: None  Special Request: None    Alec Michael      Additional Comments for the Therapist or Chiropractor: none    Please be aware that coverage of these services is subject to the terms and limitations of your health insurance plan.  Call member services at your health plan with any benefit or coverage questions.      Please bring the following to your appointment:    *Your personal calendar for scheduling future appointments  *Comfortable clothing                  Who to contact     If you have questions or need follow up information about today's clinic visit or your schedule please contact [facility] directly at [phone].  Normal or non-critical lab and imaging results will be communicated to you by MyChart, letter or phone within 4 business days after the clinic has received the  "results. If you do not hear from us within 7 days, please contact the clinic through [employer] or phone. If you have a critical or abnormal lab result, we will notify you by phone as soon as possible.  Submit refill requests through [employer] or call your pharmacy and they will forward the refill request to us. Please allow 3 business days for your refill to be completed.          Additional Information About Your Visit        [email or web address] company Information     [employer] gives you secure access to your electronic health record. If you see a primary care provider, you can also send messages to your care team and make appointments. If you have questions, please call your primary care clinic.  If you do not have a primary care provider, please call [phone] and they will assist you.        Care EveryWhere ID     This is your Care EveryWhere ID. This could be used by other organizations to access your [facility] medical records  [id number]        Your Vitals Were     Pulse Temperature Respirations Height Pulse Oximetry Breastfeeding?    65 98.8  F (37.1  C) (Oral) 16 5' 2\" (1.575 m) 99% No    BMI (Body Mass Index)                   33.06 kg/m2            Blood Pressure from Last 3 Encounters:   06/19/17 132/70   03/20/17 110/78   03/07/17 137/72    Weight from Last 3 Encounters:   06/19/17 180 lb 12 oz (82 kg)   03/20/17 196 lb (88.9 kg)   03/07/17 199 lb 12 oz (90.6 kg)              We Performed the Following     [facility] PT, HAND, AND CHIROPRACTIC REFERRAL        Primary Care Provider Office Phone # Fax #    Sheree Graf [phone]       [facility] [address]  [facility] [address]        Thank you!     Thank you for choosing [facility]  for your care. Our goal is always to provide you with excellent care. Hearing back from our patients is one way we can continue to improve our services. Please take a few minutes to complete the written survey that you may receive in the mail after your " visit with us. Thank you!             Your Updated Medication List - Protect others around you: Learn how to safely use, store and throw away your medicines at www.disposemymeds.org.          This list is accurate as of: 6/19/17 10:37 AM.  Always use your most recent med list.                   Brand Name Dispense Instructions for use    acyclovir 400 MG tablet    ZOVIRAX    30 tablet    Take 1 tablet (400 mg) by mouth 3 times daily       ADVIL PO      Take 200 mg by mouth as needed for moderate pain       atenolol 25 MG tablet    TENORMIN    90 tablet    Take 1 tablet (25 mg) by mouth daily       cetirizine 10 MG tablet    zyrTEC     Take 10 mg by mouth daily       fluticasone 50 MCG/ACT spray    FLONASE    48 g    Spray 2 sprays into both nostrils 2 times daily       loperamide 2 MG capsule    IMODIUM     Take 2 mg by mouth 4 times daily as needed for diarrhea       omeprazole 20 MG tablet     90 tablet    Take 1 tablet (20 mg) by mouth daily Take 30-60 minutes before a meal.

## 2017-06-19 NOTE — PATIENT INSTRUCTIONS
Check into ergonomic assessment of your workstation.    Try ice/heat to the painful area.    Try chiropractic treatment.

## 2017-06-19 NOTE — PROGRESS NOTES
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 (both prior to onset of pain)    Description:   Location of pain: upper back right - above bra-line on right  Character of pain: dull ache and constant  Pain radiation: none  New numbness or weakness in legs or arms, not attributed to pain:  no     Intensity: Currently 5/10    History:   Pain interferes with job: YES  History of back problems: no prior back problems  Any previous MRI or X-rays: None  Sees a specialist for back pain:  No  Therapies tried without relief: none    Alleviating factors:   Improved by: hasn't tried anything      Precipitating factors:  Worsened by: at work reaching out to the keyboard and worse to sleep on her right side.    Functional and Psychosocial Screen (Alec STarT Back):      Not performed today        Got a chest cold with cough last week.  No fever or runny nose.   Coughs a lot at night.      Problem list and histories reviewed & adjusted, as indicated.  Additional history: as documented    Patient Active Problem List   Diagnosis     Hypertension goal BP (blood pressure) < 140/90     CARDIOVASCULAR SCREENING; LDL GOAL LESS THAN 130     Allergic rhinitis due to dogs     Contraceptive management     Recurrent herpes labialis     Rosacea     Female stress incontinence     GERD (gastroesophageal reflux disease)     Advanced directives, counseling/discussion     Past Surgical History:   Procedure Laterality Date     NO HISTORY OF SURGERY         Social History   Substance Use Topics     Smoking status: Former Smoker     Years: 10.00     Types: Cigarettes     Smokeless tobacco: Never Used     Alcohol use 1.0 oz/week     2 Cans of beer per week      Comment: occassionally     Family History   Problem Relation Age  "of Onset     Hypertension Father      Hypertension Brother      Hypertension Sister      DIABETES Sister      and brother     KIDNEY DISEASE Sister      DIABETES Brother      Cancer - colorectal No family hx of      Breast Cancer No family hx of      Thyroid Disease No family hx of          BP Readings from Last 3 Encounters:   06/19/17 132/70   03/20/17 110/78   03/07/17 137/72    Wt Readings from Last 3 Encounters:   06/19/17 180 lb 12 oz (82 kg)   03/20/17 196 lb (88.9 kg)   03/07/17 199 lb 12 oz (90.6 kg)               Reviewed and updated as needed this visit by clinical staff  Tobacco  Allergies  Meds  Med Hx  Surg Hx  Fam Hx  Soc Hx      Reviewed and updated as needed this visit by Provider  Meds         ROS:  CONST: NEGATIVE for fever   MS: NEGATIVE for neck pain or low back pain  SKIN: NEGATIVE for rashes     OBJECTIVE:                                                    /70 (BP Location: Right arm, Patient Position: Chair, Cuff Size: Adult Large)  Pulse 65  Temp 98.8  F (37.1  C) (Oral)  Resp 16  Ht 5' 2\" (1.575 m)  Wt 180 lb 12 oz (82 kg)  SpO2 99%  Breastfeeding? No  BMI 33.06 kg/m2  Body mass index is 33.06 kg/(m^2).  GEN:  no apparent distress   NECK:  No tenderness to palpation over the spinous processes or paraspinal muscles  LUNGS:  normal respiratory effort, and lungs clear to auscultation bilaterally - no rales, rhonchi or wheezes  CV: regular rate and rhythm, normal S1 S2, no S3 or S4 and no murmur, click or rub  BACK:  No focal tenderness to palpation over the thoracic spinous processes.  There is focal tenderness to palpation over the right rhomboids and along medial border of right scapula  SHOULDER:ROM is full and painless.    SKIN:  normal to inspection and palpation, no rashes or abnormal-appearing lesions on back     ASSESSMENT/PLAN:                                                        1. Acute right-sided thoracic back pain  Likely muscular in etiology.  Recommended " ergonomic eval of workspace, ice/heat, and physical modality such as Physical Therapy, massage, or chiropractic.  She'd prefer to see chirporactor and I referred her for that.   - [facility] PT, HAND, AND CHIROPRACTIC REFERRAL    2. Acute URI  Continue symptomatic cares.  Recommended Robitussin DM OTC.       Patient instructed to contact clinic if symptoms persist, worsen, or do not resolve as anticipated.      Sheree Martines MD  [facility]

## 2017-06-19 NOTE — NURSING NOTE
"Chief Complaint   Patient presents with     Musculoskeletal Problem       Initial /70 (BP Location: Right arm, Patient Position: Chair, Cuff Size: Adult Large)  Pulse 65  Temp 98.8  F (37.1  C) (Oral)  Resp 16  Ht 5' 2\" (1.575 m)  Wt 180 lb 12 oz (82 kg)  SpO2 99%  Breastfeeding? No  BMI 33.06 kg/m2 Estimated body mass index is 33.06 kg/(m^2) as calculated from the following:    Height as of this encounter: 5' 2\" (1.575 m).    Weight as of this encounter: 180 lb 12 oz (82 kg).  Medication Reconciliation: complete     Lorena Fields MA      "

## 2017-06-23 DIAGNOSIS — B00.1 RECURRENT HERPES LABIALIS: ICD-10-CM

## 2017-06-23 NOTE — TELEPHONE ENCOUNTER
Medication Detail      Disp Refills Start End DARRYL   acyclovir (ZOVIRAX) 400 MG tablet 30 tablet 2 6/16/2016  No   Sig: Take 1 tablet (400 mg) by mouth 3 times daily   Class: E-Prescribe   Route: Oral   Order: [id number]       Last Office Visit with [facility], [facility] or [facility] prescribing provider: 6/19/2017        Creatinine   Date Value Ref Range Status   03/24/2017 0.88 0.52 - 1.04 mg/dL Final

## 2017-06-26 RX ORDER — ACYCLOVIR 400 MG/1
TABLET ORAL
Qty: 30 TABLET | Refills: 0 | Status: SHIPPED | OUTPATIENT
Start: 2017-06-26 | End: 2017-08-28

## 2017-06-26 NOTE — TELEPHONE ENCOUNTER
Prescription approved per [facility] Refill Protocol.  KIKA Schafer, BSN, RN

## 2017-08-28 DIAGNOSIS — B00.1 RECURRENT HERPES LABIALIS: ICD-10-CM

## 2017-08-28 RX ORDER — ACYCLOVIR 400 MG/1
TABLET ORAL
Qty: 30 TABLET | Refills: 5 | Status: SHIPPED | OUTPATIENT
Start: 2017-08-28 | End: 2018-12-27

## 2017-09-21 NOTE — TELEPHONE ENCOUNTER
omeprazole 20 MG tablet      Last Written Prescription Date: 3/20/2017  Last Fill Quantity: 90,  # refills: 3   Last Office Visit with [facility], P or [facility] prescribing provider: 6/19/2017                                             DUPLICATE    Refused Prescriptions:                       Disp   Refills    omeprazole (PRILOSEC) 20 MG CR capsule [Ph*0.01 c*0        Sig: TAKE 1 CAPSULE BY MOUTH EVERY DAY 30 TO 60 MINUTES           BEFORE A MEAL  Refused By: TOI GILES  Reason for Refusal: Duplicate      Closing encounter - no further actions needed at this time    Toi Giles RN

## 2017-09-26 ENCOUNTER — TELEPHONE (OUTPATIENT)
Dept: FAMILY MEDICINE | Facility: CLINIC | Age: 62
End: 2017-09-26

## 2017-09-26 DIAGNOSIS — I10 HYPERTENSION GOAL BP (BLOOD PRESSURE) < 140/90: Primary | ICD-10-CM

## 2017-09-26 RX ORDER — METOPROLOL SUCCINATE 25 MG/1
25 TABLET, EXTENDED RELEASE ORAL DAILY
Qty: 30 TABLET | Refills: 0 | Status: SHIPPED | OUTPATIENT
Start: 2017-09-26 | End: 2017-10-18

## 2017-09-26 NOTE — TELEPHONE ENCOUNTER
Return call to patient - discussed provider plan    Signed Prescriptions:                        Disp   Refills    metoprolol (TOPROL-XL) 25 MG 24 hr tablet  30 tab*0        Sig: Take 1 tablet (25 mg) by mouth daily  Authorizing Provider: MEAGAN SANDOVAL  Ordering User: TOI GILES    Patient agrees    Closing encounter - no further actions needed at this time    Toi Giles RN

## 2017-09-26 NOTE — TELEPHONE ENCOUNTER
RN -- please call pt to let her know we can try switching to metoprolol 25mg daily. Med pended. She should schedule a follow up visit with me in 2 weeks to see how her blood pressure and heart rate are responding to the new medication and make adjustment if necessary.  Sheree Graf M.D.

## 2017-09-26 NOTE — TELEPHONE ENCOUNTER
Reason for Call:  Medication or medication refill:    Do you use a [facility]?  Name of the pharmacy and phone number for the current request:  [facility] [address] - [phone]    Name of the medication requested: Needs substitute for her atenolol (nationwide shortage)    Other request: will be going out of town this Thursday, 9-28-17.  Please call when done or if you have any questions    Can we leave a detailed message on this number? YES    Phone number patient can be reached at: Work number on file:  [phone] (work)    Best Time: asap    Call taken on 9/26/2017 at 9:12 AM by Sandra Caldera

## 2017-10-18 ENCOUNTER — MYC MEDICAL ADVICE (OUTPATIENT)
Dept: FAMILY MEDICINE | Facility: CLINIC | Age: 62
End: 2017-10-18

## 2017-10-18 DIAGNOSIS — I10 HYPERTENSION GOAL BP (BLOOD PRESSURE) < 140/90: ICD-10-CM

## 2017-10-18 NOTE — TELEPHONE ENCOUNTER
--Please see patient's MyChart msg in this encounter.  --I responded as below.  --I loaded two week supply for you to authorize if ok.      Last office visit : 6/19/17.    BP Readings from Last 3 Encounters:   06/19/17 132/70   03/20/17 110/78   03/07/17 137/72

## 2017-10-19 RX ORDER — METOPROLOL SUCCINATE 25 MG/1
25 TABLET, EXTENDED RELEASE ORAL DAILY
Qty: 14 TABLET | Refills: 0 | Status: SHIPPED | OUTPATIENT
Start: 2017-10-19 | End: 2017-10-23

## 2017-10-20 NOTE — PROGRESS NOTES
"  SUBJECTIVE:   Adilia Camacho is a 62 year old female who presents to clinic today for the following health issues:    Hyperlipidemia Follow-Up      Rate your low fat/cholesterol diet?: good    Taking statin?  No    Other lipid medications/supplements?:  none    Hypertension Follow-up      Outpatient blood pressures are not being checked.    Low Salt Diet: no added salt    Clinic visit on 3/20/17:  \"1. Encounter for routine adult health examination without abnormal findings  Pap due in 2019   5. Hypertension goal BP (blood pressure) < 140/90  Controlled on current regimen.  - BASIC METABOLIC PANEL; Future  - atenolol (TENORMIN) 25 MG tablet; Take 1 tablet (25 mg) by mouth daily  Dispense: 90 tablet; Refill: 3  7. Gastroesophageal reflux disease, esophagitis presence not specified  Controlled on current regimen.\"      She states that she is responding well to the metoprolol and her blood pressure is currently stable. She would like to continue on metoprolol.    She also states that she is having hair loss general thinning mostly frontal and also states that she has a family hx of hair loss. Her mom and sister had hair loss.         Problem list and histories reviewed & adjusted, as indicated.  Additional history: as documented    Patient Active Problem List   Diagnosis     Hypertension goal BP (blood pressure) < 140/90     CARDIOVASCULAR SCREENING; LDL GOAL LESS THAN 130     Allergic rhinitis due to dogs     Contraceptive management     Recurrent herpes labialis     Rosacea     Female stress incontinence     GERD (gastroesophageal reflux disease)     Advanced directives, counseling/discussion     Past Surgical History:   Procedure Laterality Date     NO HISTORY OF SURGERY         Social History   Substance Use Topics     Smoking status: Former Smoker     Years: 10.00     Types: Cigarettes     Smokeless tobacco: Never Used     Alcohol use 1.0 oz/week     2 Cans of beer per week      Comment: occassionally     Family " History   Problem Relation Age of Onset     Hypertension Father      Hypertension Brother      Hypertension Sister      DIABETES Sister      and brother     KIDNEY DISEASE Sister      DIABETES Brother      Cancer - colorectal No family hx of      Breast Cancer No family hx of      Thyroid Disease No family hx of          Current Outpatient Prescriptions   Medication Sig Dispense Refill     metoprolol (TOPROL-XL) 25 MG 24 hr tablet Take 1 tablet (25 mg) by mouth daily 14 tablet 0     acyclovir (ZOVIRAX) 400 MG tablet TAKE 1 TABLET BY MOUTH THREE TIMES DAILY 30 tablet 5     fluticasone (FLONASE) 50 MCG/ACT spray Spray 2 sprays into both nostrils 2 times daily 48 g 1     omeprazole 20 MG tablet Take 1 tablet (20 mg) by mouth daily Take 30-60 minutes before a meal. 90 tablet 3     Ibuprofen (ADVIL PO) Take 200 mg by mouth as needed for moderate pain       cetirizine (ZYRTEC) 10 MG tablet Take 10 mg by mouth daily       loperamide (IMODIUM) 2 MG capsule Take 2 mg by mouth 4 times daily as needed for diarrhea       atenolol (TENORMIN) 25 MG tablet Take 1 tablet (25 mg) by mouth daily (Patient not taking: Reported on 10/23/2017) 90 tablet 3     No Known Allergies  Recent Labs   Lab Test  03/24/17   0739  03/22/16   0924   02/15/13   1135  10/28/11   0955   02/21/11   1644   A1C  6.3*   --    --    --    --    --    --    LDL  91  107*   --   95  96   < >   --    HDL  42*  50   --   43*  46*   < >   --    TRIG  115  182*   --   119  136   < >   --    ALT   --    --    --   22   --    --    --    CR  0.88  0.84   < >  0.76   --    --    --    GFRESTIMATED  66  69   < >  79   --    --    --    GFRESTBLACK  80  84   < >  >90   --    --    --    POTASSIUM  3.9  4.2   < >  4.2   --    --    --    TSH   --    --    --    --   1.71   --   1.13    < > = values in this interval not displayed.      BP Readings from Last 3 Encounters:   10/23/17 124/86   06/19/17 132/70   03/20/17 110/78    Wt Readings from Last 3 Encounters:  "  10/23/17 83.3 kg (183 lb 12 oz)   06/19/17 82 kg (180 lb 12 oz)   03/20/17 88.9 kg (196 lb)      Reviewed and updated as needed this visit by clinical staffTobacco  Allergies  Meds  Med Hx  Surg Hx  Fam Hx  Soc Hx      Reviewed and updated as needed this visit by Provider       ROS:  No complaints of chest pain or SOB.    This document serves as a record of the services and decisions personally performed and made by Sheree Graf MD. It was created on his/her behalf by Aaron Guido, trained medical scribe. The creation of this document is based the provider's statements to the medical scribes.    Scribe Aaron Guido, October 23, 2017    OBJECTIVE:     /86 (Cuff Size: Adult Regular)  Pulse 64  Temp 97.9  F (36.6  C) (Oral)  Ht 1.575 m (5' 2\")  Wt 83.3 kg (183 lb 12 oz)  SpO2 99%  BMI 33.61 kg/m2  Body mass index is 33.61 kg/(m^2).  GENERAL: healthy, alert and no distress  Skin/hair: frontal hair thinning      Diagnostic Test Results:  none     ASSESSMENT/PLAN:   1. Hypertension goal BP (blood pressure) < 140/90  Controlled, will continue metoprolol 25mg daily (changed from atenolol due to unavailability of atenolol)  - metoprolol (TOPROL-XL) 25 MG 24 hr tablet; Take 1 tablet (25 mg) by mouth daily  Dispense: 90 tablet; Refill: 3    2. Need for prophylactic vaccination and inoculation against influenza     - FLU VAC, SPLIT VIRUS IM > 3 YO (QUADRIVALENT) [21225]  - Vaccine Administration, Initial [42828]    3. Hair loss   family history of hair loss, including sister at around her age. She requests testing today.   - TSH with free T4 reflex  - Ferritin    Patient Instructions   For informational purposes only. Not to replace the advice of your health care provider.  Copyright   2006 [employer]. All rights reserved. [employer] [id number] - REV 12/15.  Coping with Hair Loss  What may happen during hair loss?  Radiation and some medicines, like chemotherapy, can cause hair loss. " "You may start to lose your hair two to three weeks after treatment begins.  Your hair may become brittle and break off at the surface of the scalp, or it may simply fall out from the hair follicles. For many people, the head starts to itch or may be tender to the touch as the hair falls out.  Loss of eyebrows, eyelashes, pubic hair and other body hair may also happen, but this is often less severe. Hair growth is less active in these places than in the scalp.  Some people will lose all their hair. Others have only thinning of the hair. Often it depends on the dose and length of your treatment.  Will my hair grow back?  Hair loss caused by medicine will almost always grow back after treatment ends--and sometimes sooner. It may have a different color or texture than before.  Your hair may not grow back if you receive radiation to the head.  What can I do to cope with hair loss?    For some people, hair loss can cause depression or loss of self-confidence. Talk to your loved ones and care team if you are concerned about your hair loss.    Cut your hair short. A shorter style will make your hair look thicker and ramon. And if hair loss occurs, it will be easier to manage.    Use mild shampoo. You may not need to wash your hair every day.    Use a soft hairbrush.    Avoid the hair dryer, or use only the lowest heat setting.    Don't use brush rollers to set your hair.    Don't dye your hair or get a permanent.    Change your linens and pillowcases often. Some people prefer satin.    Cover your head or use sunscreen (SPF 30) when in sunlight.    Cover your head in the winter to prevent heat loss.  If you choose to wear a wig or hairpiece:    You may want to get fit for one before you lose a lot of hair. This way you can match your hair color or style.    Check with your care team to see if there is a \"Look Good, Feel Better\" program in your area. They are a resource for hats, turbans, scarves, hairpieces, wigs and " make-up.    Your hairpiece may be tax deductible, and insurance may cover part of the cost. Check your policy and get a prescription from your doctor.  When should I call my care team?  Call your care team if:    Emotional distress gets in the way of normal daily living.    You have a rash or small, open sores on your scalp.    You have dry, flaky skin that does not improve with the use of lotion. (Choose alcohol-free lotion.)  Comments:  __________________________________________  __________________________________________  __________________________________________  __________________________________________  __________________________________________  __________________________________________  __________________________________________        The information in this document, created by the medical scribe for me, accurately reflects the services I personally performed and the decisions made by me. I have reviewed and approved this document for accuracy. 10/23/17    Sheree Graf MD  [facility]

## 2017-10-23 ENCOUNTER — OFFICE VISIT (OUTPATIENT)
Dept: FAMILY MEDICINE | Facility: CLINIC | Age: 62
End: 2017-10-23
Payer: COMMERCIAL

## 2017-10-23 VITALS
TEMPERATURE: 97.9 F | WEIGHT: 183.75 LBS | DIASTOLIC BLOOD PRESSURE: 86 MMHG | HEART RATE: 64 BPM | SYSTOLIC BLOOD PRESSURE: 124 MMHG | BODY MASS INDEX: 33.81 KG/M2 | OXYGEN SATURATION: 99 % | HEIGHT: 62 IN

## 2017-10-23 DIAGNOSIS — Z23 NEED FOR PROPHYLACTIC VACCINATION AND INOCULATION AGAINST INFLUENZA: ICD-10-CM

## 2017-10-23 DIAGNOSIS — L65.9 HAIR LOSS: ICD-10-CM

## 2017-10-23 DIAGNOSIS — I10 HYPERTENSION GOAL BP (BLOOD PRESSURE) < 140/90: Primary | ICD-10-CM

## 2017-10-23 PROCEDURE — 90686 IIV4 VACC NO PRSV 0.5 ML IM: CPT | Performed by: FAMILY MEDICINE

## 2017-10-23 PROCEDURE — 90471 IMMUNIZATION ADMIN: CPT | Performed by: FAMILY MEDICINE

## 2017-10-23 PROCEDURE — 84443 ASSAY THYROID STIM HORMONE: CPT | Performed by: FAMILY MEDICINE

## 2017-10-23 PROCEDURE — 36415 COLL VENOUS BLD VENIPUNCTURE: CPT | Performed by: FAMILY MEDICINE

## 2017-10-23 PROCEDURE — 82728 ASSAY OF FERRITIN: CPT | Performed by: FAMILY MEDICINE

## 2017-10-23 PROCEDURE — 99214 OFFICE O/P EST MOD 30 MIN: CPT | Mod: 25 | Performed by: FAMILY MEDICINE

## 2017-10-23 RX ORDER — METOPROLOL SUCCINATE 25 MG/1
25 TABLET, EXTENDED RELEASE ORAL DAILY
Qty: 90 TABLET | Refills: 3 | Status: SHIPPED | OUTPATIENT
Start: 2017-10-23 | End: 2018-11-02

## 2017-10-23 NOTE — MR AVS SNAPSHOT
After Visit Summary   10/23/2017    Adilia Camacho    MRN: [id number]           Patient Information     Date Of Birth          1955        Visit Information        Provider Department      10/23/2017 3:20 PM Sheree Graf MD [facility]        Today's Diagnoses     Hypertension goal BP (blood pressure) < 140/90    -  1    Need for prophylactic vaccination and inoculation against influenza        Hair loss          Care Instructions    For informational purposes only. Not to replace the advice of your health care provider.  Copyright   2006 [facility]. All rights reserved. [employer] 185434 - REV 12/15.  Coping with Hair Loss  What may happen during hair loss?  Radiation and some medicines, like chemotherapy, can cause hair loss. You may start to lose your hair two to three weeks after treatment begins.  Your hair may become brittle and break off at the surface of the scalp, or it may simply fall out from the hair follicles. For many people, the head starts to itch or may be tender to the touch as the hair falls out.  Loss of eyebrows, eyelashes, pubic hair and other body hair may also happen, but this is often less severe. Hair growth is less active in these places than in the scalp.  Some people will lose all their hair. Others have only thinning of the hair. Often it depends on the dose and length of your treatment.  Will my hair grow back?  Hair loss caused by medicine will almost always grow back after treatment ends--and sometimes sooner. It may have a different color or texture than before.  Your hair may not grow back if you receive radiation to the head.  What can I do to cope with hair loss?    For some people, hair loss can cause depression or loss of self-confidence. Talk to your loved ones and care team if you are concerned about your hair loss.    Cut your hair short. A shorter style will make your hair look thicker and ramon. And if hair loss occurs,  "it will be easier to manage.    Use mild shampoo. You may not need to wash your hair every day.    Use a soft hairbrush.    Avoid the hair dryer, or use only the lowest heat setting.    Don't use brush rollers to set your hair.    Don't dye your hair or get a permanent.    Change your linens and pillowcases often. Some people prefer satin.    Cover your head or use sunscreen (SPF 30) when in sunlight.    Cover your head in the winter to prevent heat loss.  If you choose to wear a wig or hairpiece:    You may want to get fit for one before you lose a lot of hair. This way you can match your hair color or style.    Check with your care team to see if there is a \"Look Good, Feel Better\" program in your area. They are a resource for hats, turbans, scarves, hairpieces, wigs and make-up.    Your hairpiece may be tax deductible, and insurance may cover part of the cost. Check your policy and get a prescription from your doctor.  When should I call my care team?  Call your care team if:    Emotional distress gets in the way of normal daily living.    You have a rash or small, open sores on your scalp.    You have dry, flaky skin that does not improve with the use of lotion. (Choose alcohol-free lotion.)  Comments:  __________________________________________  __________________________________________  __________________________________________  __________________________________________  __________________________________________  __________________________________________  __________________________________________            Follow-ups after your visit        Who to contact     If you have questions or need follow up information about today's clinic visit or your schedule please contact [facility] directly at [phone].  Normal or non-critical lab and imaging results will be communicated to you by MyChart, letter or phone within 4 business days after the clinic has received the results. If you do not " "hear from us within 7 days, please contact the clinic through [employer] or phone. If you have a critical or abnormal lab result, we will notify you by phone as soon as possible.  Submit refill requests through [employer] or call your pharmacy and they will forward the refill request to us. Please allow 3 business days for your refill to be completed.          Additional Information About Your Visit        kabukuhart Information     [employer] gives you secure access to your electronic health record. If you see a primary care provider, you can also send messages to your care team and make appointments. If you have questions, please call your primary care clinic.  If you do not have a primary care provider, please call [phone] and they will assist you.        Care EveryWhere ID     This is your Care EveryWhere ID. This could be used by other organizations to access your [place] medical records  [id number]        Your Vitals Were     Pulse Temperature Height Pulse Oximetry BMI (Body Mass Index)       64 97.9  F (36.6  C) (Oral) 5' 2\" (1.575 m) 99% 33.61 kg/m2        Blood Pressure from Last 3 Encounters:   10/23/17 124/86   06/19/17 132/70   03/20/17 110/78    Weight from Last 3 Encounters:   10/23/17 183 lb 12 oz (83.3 kg)   06/19/17 180 lb 12 oz (82 kg)   03/20/17 196 lb (88.9 kg)              We Performed the Following     Ferritin     FLU VAC, SPLIT VIRUS IM > 3 YO (QUADRIVALENT) [02689]     TSH with free T4 reflex     Vaccine Administration, Initial [96884]          Where to get your medicines      These medications were sent to [facility] [address] [facility] [address] AT [facility] [address] [facility] [address]  [address] [place] [id number]    Hours:  24-hours Phone:  [phone]     metoprolol 25 MG 24 hr tablet          Primary Care Provider Office Phone # Fax #    Sheree Graf [phone] [address] [facility] [address]        Equal Access to Services  "    [facility] Hadii aad ku martin Brasher, waaxda luqadaha, qaybta kaalmada adewhit, geovanna angie garocherie wills kearajacinto hernández . So [facility] [phone].    ATENCIÓN: Si habla español, tiene a cheng disposición servicios gratuitos de asistencia lingüística. Llame al [phone].    We comply with applicable federal civil rights laws and [place] laws. We do not discriminate on the basis of race, color, national origin, age, disability, sex, sexual orientation, or gender identity.            Thank you!     Thank you for choosing [facility]  for your care. Our goal is always to provide you with excellent care. Hearing back from our patients is one way we can continue to improve our services. Please take a few minutes to complete the written survey that you may receive in the mail after your visit with us. Thank you!             Your Updated Medication List - Protect others around you: Learn how to safely use, store and throw away your medicines at www.disposemymeds.org.          This list is accurate as of: 10/23/17  3:53 PM.  Always use your most recent med list.                   Brand Name Dispense Instructions for use Diagnosis    acyclovir 400 MG tablet    ZOVIRAX    30 tablet    TAKE 1 TABLET BY MOUTH THREE TIMES DAILY    Recurrent herpes labialis       ADVIL PO      Take 200 mg by mouth as needed for moderate pain        atenolol 25 MG tablet    TENORMIN    90 tablet    Take 1 tablet (25 mg) by mouth daily    Hypertension goal BP (blood pressure) < 140/90       cetirizine 10 MG tablet    zyrTEC     Take 10 mg by mouth daily        fluticasone 50 MCG/ACT spray    FLONASE    48 g    Spray 2 sprays into both nostrils 2 times daily    Allergic rhinitis due to dogs       loperamide 2 MG capsule    IMODIUM     Take 2 mg by mouth 4 times daily as needed for diarrhea        metoprolol 25 MG 24 hr tablet    TOPROL-XL    90 tablet    Take 1 tablet (25 mg) by mouth daily    Hypertension goal BP (blood  pressure) < 140/90       omeprazole 20 MG tablet     90 tablet    Take 1 tablet (20 mg) by mouth daily Take 30-60 minutes before a meal.    Gastroesophageal reflux disease, esophagitis presence not specified

## 2017-10-23 NOTE — NURSING NOTE
"No chief complaint on file.      Initial /86 (Cuff Size: Adult Regular)  Pulse 64  Temp 97.9  F (36.6  C) (Oral)  Ht 5' 2\" (1.575 m)  Wt 183 lb 12 oz (83.3 kg)  SpO2 99%  BMI 33.61 kg/m2 Estimated body mass index is 33.61 kg/(m^2) as calculated from the following:    Height as of this encounter: 5' 2\" (1.575 m).    Weight as of this encounter: 183 lb 12 oz (83.3 kg).  Medication Reconciliation: complete     Cassidy Myers, CMA      "

## 2017-10-23 NOTE — NURSING NOTE
Injectable Influenza Immunization Documentation    1.  Is the person to be vaccinated sick today?   No    2. Does the person to be vaccinated have an allergy to a component   of the vaccine?   No    3. Has the person to be vaccinated ever had a serious reaction   to influenza vaccine in the past?   No    4. Has the person to be vaccinated ever had Guillain-Barré syndrome?   No    Per orders of Dr. Graf, injection of Flu vaccine given by Jared Chawla. Patient instructed to remain in clinic for 15 minutes afterwards, and to report any adverse reaction to me immediately.  Jared Chawla CMA     Prior to injection verified patient identity using patient's name and date of birth.  Jared Chawla CMA       Form completed by Jared Chawla CMA

## 2017-10-23 NOTE — PATIENT INSTRUCTIONS
For informational purposes only. Not to replace the advice of your health care provider.  Copyright   2006 [facility]. All rights reserved. [employer] 426664 - REV 12/15.  Coping with Hair Loss  What may happen during hair loss?  Radiation and some medicines, like chemotherapy, can cause hair loss. You may start to lose your hair two to three weeks after treatment begins.  Your hair may become brittle and break off at the surface of the scalp, or it may simply fall out from the hair follicles. For many people, the head starts to itch or may be tender to the touch as the hair falls out.  Loss of eyebrows, eyelashes, pubic hair and other body hair may also happen, but this is often less severe. Hair growth is less active in these places than in the scalp.  Some people will lose all their hair. Others have only thinning of the hair. Often it depends on the dose and length of your treatment.  Will my hair grow back?  Hair loss caused by medicine will almost always grow back after treatment ends--and sometimes sooner. It may have a different color or texture than before.  Your hair may not grow back if you receive radiation to the head.  What can I do to cope with hair loss?    For some people, hair loss can cause depression or loss of self-confidence. Talk to your loved ones and care team if you are concerned about your hair loss.    Cut your hair short. A shorter style will make your hair look thicker and ramon. And if hair loss occurs, it will be easier to manage.    Use mild shampoo. You may not need to wash your hair every day.    Use a soft hairbrush.    Avoid the hair dryer, or use only the lowest heat setting.    Don't use brush rollers to set your hair.    Don't dye your hair or get a permanent.    Change your linens and pillowcases often. Some people prefer satin.    Cover your head or use sunscreen (SPF 30) when in sunlight.    Cover your head in the winter to prevent heat loss.  If you choose  "to wear a wig or hairpiece:    You may want to get fit for one before you lose a lot of hair. This way you can match your hair color or style.    Check with your care team to see if there is a \"Look Good, Feel Better\" program in your area. They are a resource for hats, turbans, scarves, hairpieces, wigs and make-up.    Your hairpiece may be tax deductible, and insurance may cover part of the cost. Check your policy and get a prescription from your doctor.  When should I call my care team?  Call your care team if:    Emotional distress gets in the way of normal daily living.    You have a rash or small, open sores on your scalp.    You have dry, flaky skin that does not improve with the use of lotion. (Choose alcohol-free lotion.)  Comments:  __________________________________________  __________________________________________  __________________________________________  __________________________________________  __________________________________________  __________________________________________  __________________________________________    "

## 2017-10-23 NOTE — PROGRESS NOTES

## 2017-10-24 LAB
FERRITIN SERPL-MCNC: 44 NG/ML (ref 8–252)
TSH SERPL DL<=0.005 MIU/L-ACNC: 1.92 MU/L (ref 0.4–4)

## 2017-10-24 NOTE — PROGRESS NOTES
Excellent! Please call or sent a [employer] message if you have any questions. Sheree Graf M.D.

## 2018-02-13 ENCOUNTER — OFFICE VISIT (OUTPATIENT)
Dept: FAMILY MEDICINE | Facility: CLINIC | Age: 63
End: 2018-02-13
Payer: COMMERCIAL

## 2018-02-13 VITALS
WEIGHT: 190.5 LBS | OXYGEN SATURATION: 100 % | HEART RATE: 77 BPM | SYSTOLIC BLOOD PRESSURE: 133 MMHG | HEIGHT: 62 IN | RESPIRATION RATE: 14 BRPM | DIASTOLIC BLOOD PRESSURE: 70 MMHG | BODY MASS INDEX: 35.06 KG/M2 | TEMPERATURE: 98.3 F

## 2018-02-13 DIAGNOSIS — M25.512 ACUTE PAIN OF LEFT SHOULDER: Primary | ICD-10-CM

## 2018-02-13 DIAGNOSIS — I10 HYPERTENSION GOAL BP (BLOOD PRESSURE) < 140/90: ICD-10-CM

## 2018-02-13 DIAGNOSIS — Z12.11 SCREENING FOR MALIGNANT NEOPLASM OF COLON: ICD-10-CM

## 2018-02-13 DIAGNOSIS — M79.662 PAIN OF LEFT CALF: ICD-10-CM

## 2018-02-13 DIAGNOSIS — M54.2 NECK PAIN ON LEFT SIDE: ICD-10-CM

## 2018-02-13 LAB
ANION GAP SERPL CALCULATED.3IONS-SCNC: 4 MMOL/L (ref 3–14)
BUN SERPL-MCNC: 15 MG/DL (ref 7–30)
CALCIUM SERPL-MCNC: 8.8 MG/DL (ref 8.5–10.1)
CHLORIDE SERPL-SCNC: 107 MMOL/L (ref 94–109)
CO2 SERPL-SCNC: 30 MMOL/L (ref 20–32)
CREAT SERPL-MCNC: 0.84 MG/DL (ref 0.52–1.04)
GFR SERPL CREATININE-BSD FRML MDRD: 69 ML/MIN/1.7M2
GLUCOSE SERPL-MCNC: 115 MG/DL (ref 70–99)
POTASSIUM SERPL-SCNC: 4.1 MMOL/L (ref 3.4–5.3)
SODIUM SERPL-SCNC: 141 MMOL/L (ref 133–144)

## 2018-02-13 PROCEDURE — 36415 COLL VENOUS BLD VENIPUNCTURE: CPT | Performed by: FAMILY MEDICINE

## 2018-02-13 PROCEDURE — 99214 OFFICE O/P EST MOD 30 MIN: CPT | Performed by: FAMILY MEDICINE

## 2018-02-13 PROCEDURE — 80048 BASIC METABOLIC PNL TOTAL CA: CPT | Performed by: FAMILY MEDICINE

## 2018-02-13 NOTE — MR AVS SNAPSHOT
After Visit Summary   2/13/2018    Adilia Camacho    MRN: [id number]           Patient Information     Date Of Birth          1955        Visit Information        Provider Department      2/13/2018 9:20 AM Sheree Graf MD [facility]        Today's Diagnoses     Acute pain of left shoulder    -  1    Neck pain on left side        Pain of left calf        Hypertension goal BP (blood pressure) < 140/90        Screening for malignant neoplasm of colon          Care Instructions    1. Schedule with sports medicine to further evaluate your left shoulder, neck, and calf pain.   2. Schedule your colonoscopy   3. We will do your metabolic panel today           Follow-ups after your visit        Additional Services     GASTROENTEROLOGY ADULT REF PROCEDURE ONLY [facility] [phone]       Last Lab Result: Creatinine (mg/dL)       Date                     Value                 03/24/2017               0.88             ----------  Body mass index is 35.13 kg/(m^2).      Patient will be contacted to schedule procedure.     Please be aware that coverage of these services is subject to the terms and limitations of your health insurance plan.  Call member services at your health plan with any benefit or coverage questions.  Any procedures must be performed at a [facility] facility OR coordinated by your clinic's referral office.    Please bring the following with you to your appointment:    (1) Any X-Rays, CTs or MRIs which have been performed.  Contact the facility where they were done to arrange for  prior to your scheduled appointment.    (2) List of current medications   (3) This referral request   (4) Any documents/labs given to you for this referral            ORTHO  REFERRAL       [facility] is referring you to the Orthopedic  Services at [facility].       The  Representative will assist you in the  coordination of your Orthopedic and Musculoskeletal Care as prescribed by your physician.    The  Representative will call you within 1 business day to help schedule your appointment, or you may contact the  Representative at:    All areas ~ [phone]     Type of Referral : Non Surgical       Timeframe requested: Routine    Coverage of these services is subject to the terms and limitations of your health insurance plan.  Please call member services at your health plan with any benefit or coverage questions.      If X-rays, CT or MRI's have been performed, please contact the facility where they were done to arrange for , prior to your scheduled appointment.  Please bring this referral request to your appointment and present it to your specialist.                  Who to contact     If you have questions or need follow up information about today's clinic visit or your schedule please contact [facility] directly at [phone].  Normal or non-critical lab and imaging results will be communicated to you by MyChart, letter or phone within 4 business days after the clinic has received the results. If you do not hear from us within 7 days, please contact the clinic through [employer] or phone. If you have a critical or abnormal lab result, we will notify you by phone as soon as possible.  Submit refill requests through [employer] or call your pharmacy and they will forward the refill request to us. Please allow 3 business days for your refill to be completed.          Additional Information About Your Visit        MyChart Information     [employer] gives you secure access to your electronic health record. If you see a primary care provider, you can also send messages to your care team and make appointments. If you have questions, please call your primary care clinic.  If you do not have a primary care provider, please call [phone] and they will assist you.        Care EveryWhere ID  "    This is your Care EveryWhere ID. This could be used by other organizations to access your [facility] medical records  [id number]        Your Vitals Were     Pulse Temperature Respirations Height Pulse Oximetry BMI (Body Mass Index)    77 98.3  F (36.8  C) (Oral) 14 5' 1.75\" (1.568 m) 100% 35.13 kg/m2       Blood Pressure from Last 3 Encounters:   02/13/18 133/70   10/23/17 124/86   06/19/17 132/70    Weight from Last 3 Encounters:   02/13/18 190 lb 8 oz (86.4 kg)   10/23/17 183 lb 12 oz (83.3 kg)   06/19/17 180 lb 12 oz (82 kg)              We Performed the Following     Basic metabolic panel  (Ca, Cl, CO2, Creat, Gluc, K, Na, BUN)     GASTROENTEROLOGY ADULT REF PROCEDURE ONLY [facility] [phone]     ORTHO  REFERRAL          Today's Medication Changes          These changes are accurate as of 2/13/18  9:45 AM.  If you have any questions, ask your nurse or doctor.               Stop taking these medicines if you haven't already. Please contact your care team if you have questions.     atenolol 25 MG tablet   Commonly known as:  TENORMIN   Stopped by:  Sheree Graf MD                    Primary Care Provider Office Phone # Fax #    Sheree Graf [phone] [address]  [facility] [address]        Equal Access to Services     [facility] AH: Hadii sharlene Brasher, waaxda luqadaha, qaybta kaalmada prosper, geovanna crystal. So [facility] [phone].    ATENCIÓN: Si habla español, tiene a cheng disposición servicios gratuitos de asistencia lingüística. Llame al [phone].    We comply with applicable federal civil rights laws and [place] laws. We do not discriminate on the basis of race, color, national origin, age, disability, sex, sexual orientation, or gender identity.            Thank you!     Thank you for choosing [facility]  for your care. Our goal is always to provide you with excellent care. Hearing back " from our patients is one way we can continue to improve our services. Please take a few minutes to complete the written survey that you may receive in the mail after your visit with us. Thank you!             Your Updated Medication List - Protect others around you: Learn how to safely use, store and throw away your medicines at www.disposemymeds.org.          This list is accurate as of 2/13/18  9:45 AM.  Always use your most recent med list.                   Brand Name Dispense Instructions for use Diagnosis    acyclovir 400 MG tablet    ZOVIRAX    30 tablet    TAKE 1 TABLET BY MOUTH THREE TIMES DAILY    Recurrent herpes labialis       ADVIL PO      Take 200 mg by mouth as needed for moderate pain        cetirizine 10 MG tablet    zyrTEC     Take 10 mg by mouth daily        fluticasone 50 MCG/ACT spray    FLONASE    48 g    Spray 2 sprays into both nostrils 2 times daily    Allergic rhinitis due to dogs       loperamide 2 MG capsule    IMODIUM     Take 2 mg by mouth 4 times daily as needed for diarrhea        metoprolol succinate 25 MG 24 hr tablet    TOPROL-XL    90 tablet    Take 1 tablet (25 mg) by mouth daily    Hypertension goal BP (blood pressure) < 140/90       omeprazole 20 MG tablet     90 tablet    Take 1 tablet (20 mg) by mouth daily Take 30-60 minutes before a meal.    Gastroesophageal reflux disease, esophagitis presence not specified

## 2018-02-13 NOTE — PROGRESS NOTES
"  SUBJECTIVE:   Adilia Camacho is a 62 year old female who presents to clinic today for the following health issues:    Left Shoulder Pain     Onset: 4 months     Description:   Location: Left side   Character: Dull ache, numbness and tingling in hand     Intensity: moderate    Progression of Symptoms: worse    Accompanying Signs & Symptoms:  Other symptoms: numbness and tingling    History:   Previous similar pain: no       Precipitating factors:   Trauma or overuse: no     Alleviating factors:  Improved by: nothing  Therapies Tried and outcome: Lidocaine patches, ice       After being seen in clinic on 6/19/17 for right-sided back pain she started seeing a chiropractor, and her pain has resolved.     She was holding her grandchild over he holidays and her left arm felt weak. There was not pain at the time. Patient has been experiencing a lot of pain in her left arm with numbness in her hand for the past four months. Reaching behind her back or tucking in her shirt she has pain. Her left shoulder is even tender to the touch. When driving she has difficulty turning her head and feels like the area around her collarbone is swollen on the left. Her neck feels stiff. She has been seeing her chiropractor for the shoulder pain as well. He recommended she have an MRI if they could not resolve the pain. She has been icing her neck/shoulder and applying lidocaine patches to the shoulder for some relief.     She also has ongoing left calf pain. There is a bulge in her lower calf that feels like \"it is going to explode\" at times. Yesterday after work her calf was very painful. In March 2016 she had a lower extremity US and it was negative for DVT.     At [place] Thursday night her left foot felt heavy and her neck felt like it was going to lock up on the left side.      Problem list and histories reviewed & adjusted, as indicated.  Additional history: as documented  Patient Active Problem List   Diagnosis     Hypertension goal " BP (blood pressure) < 140/90     CARDIOVASCULAR SCREENING; LDL GOAL LESS THAN 130     Allergic rhinitis due to dogs     Contraceptive management     Recurrent herpes labialis     Rosacea     Female stress incontinence     GERD (gastroesophageal reflux disease)     Advanced directives, counseling/discussion     Past Surgical History:   Procedure Laterality Date     NO HISTORY OF SURGERY         Social History   Substance Use Topics     Smoking status: Former Smoker     Years: 10.00     Types: Cigarettes     Smokeless tobacco: Never Used     Alcohol use 1.0 oz/week     2 Cans of beer per week      Comment: occassionally     Family History   Problem Relation Age of Onset     Hypertension Father      Hypertension Brother      Hypertension Sister      DIABETES Sister      and brother     KIDNEY DISEASE Sister      DIABETES Brother      Cancer - colorectal No family hx of      Breast Cancer No family hx of      Thyroid Disease No family hx of          Current Outpatient Prescriptions   Medication Sig Dispense Refill     metoprolol (TOPROL-XL) 25 MG 24 hr tablet Take 1 tablet (25 mg) by mouth daily 90 tablet 3     acyclovir (ZOVIRAX) 400 MG tablet TAKE 1 TABLET BY MOUTH THREE TIMES DAILY 30 tablet 5     fluticasone (FLONASE) 50 MCG/ACT spray Spray 2 sprays into both nostrils 2 times daily 48 g 1     omeprazole 20 MG tablet Take 1 tablet (20 mg) by mouth daily Take 30-60 minutes before a meal. 90 tablet 3     Ibuprofen (ADVIL PO) Take 200 mg by mouth as needed for moderate pain       cetirizine (ZYRTEC) 10 MG tablet Take 10 mg by mouth daily       loperamide (IMODIUM) 2 MG capsule Take 2 mg by mouth 4 times daily as needed for diarrhea       No Known Allergies  Recent Labs   Lab Test  10/23/17   1604  03/24/17   0739  03/22/16   0924   02/15/13   1135  10/28/11   0955   A1C   --   6.3*   --    --    --    --    LDL   --   91  107*   --   95  96   HDL   --   42*  50   --   43*  46*   TRIG   --   115  182*   --   119  136  "  ALT   --    --    --    --   22   --    CR   --   0.88  0.84   < >  0.76   --    GFRESTIMATED   --   66  69   < >  79   --    GFRESTBLACK   --   80  84   < >  >90   --    POTASSIUM   --   3.9  4.2   < >  4.2   --    TSH  1.92   --    --    --    --   1.71    < > = values in this interval not displayed.      BP Readings from Last 3 Encounters:   02/13/18 133/70   10/23/17 124/86   06/19/17 132/70    Wt Readings from Last 3 Encounters:   02/13/18 86.4 kg (190 lb 8 oz)   10/23/17 83.3 kg (183 lb 12 oz)   06/19/17 82 kg (180 lb 12 oz)        Reviewed and updated as needed this visit by clinical staff  Tobacco  Allergies  Meds  Med Hx  Surg Hx  Fam Hx  Soc Hx      Reviewed and updated as needed this visit by Provider         ROS:  See above.    This document serves as a record of the services and decisions personally performed and made by Sheree Graf MD. It was created on his/her behalf by Sheila Moore, trained medical scribe. The creation of this document is based the provider's statements to the medical scribes.    Scribbina Moore, February 13, 2018  OBJECTIVE:     /70  Pulse 77  Temp 98.3  F (36.8  C) (Oral)  Resp 14  Ht 1.568 m (5' 1.75\")  Wt 86.4 kg (190 lb 8 oz)  SpO2 100%  BMI 35.13 kg/m2  Body mass index is 35.13 kg/(m^2).  GENERAL: healthy, alert and no distress  NECK: no adenopathy, no asymmetry, masses, or scars  MS: no gross musculoskeletal defects noted, no edema.  There is no tenderness to palpation of the neck or left shoulder today on exam. ROM of the neck is limited by stiffness turning to the right. ROM is otherwise normal. ROM of the left shoulder is normal, but there is some discomfort throughout range.   Left calf is normal in appearance. No swelling or deformity or bruising or erythema. Gait is normal.   Neuro: BUE and BLE strength is normal and sensation is intact to light touch. DTRs are symmetric.   PSYCH: mentation appears normal, affect " normal/bright    Diagnostic Test Results:  No results found for this or any previous visit (from the past 24 hour(s)).     ASSESSMENT/PLAN:   1. Acute pain of left shoulder  2. Neck pain on left side   Onset 4 months ago. Pt has been seeing a chiropractor with no relief. She will schedule with sports medicine for further eval and treatment.   - ORTHO  REFERRAL    3. Pain of left calf  Unclear etiology. Ongoing intermittent left calf stiffness and intermittent bulge per pt. Left lower extremity US for this issue was negative for DVT  in March 2016.   - ORTHO  REFERRAL    4. Hypertension goal BP (blood pressure) < 140/90  Controlled. Continues on metoprolol 25 mg daily.  - Basic metabolic panel  (Ca, Cl, CO2, Creat, Gluc, K, Na, BUN)    5. Screening for malignant neoplasm of colon    - GASTROENTEROLOGY ADULT REF PROCEDURE ONLY [facility] [phone]      Patient Instructions   1. Schedule with sports medicine to further evaluate your left shoulder, neck, and calf pain.   2. Schedule your colonoscopy   3. We will do your metabolic panel today       The information in this document, created by the medical scribe for me, accurately reflects the services I personally performed and the decisions made by me. I have reviewed and approved this document for accuracy. 02/13/18    Sheree Graf MD  [facility] [place][note 2]

## 2018-02-13 NOTE — PATIENT INSTRUCTIONS
1. Schedule with sports medicine to further evaluate your left shoulder, neck, and calf pain.   2. Schedule your colonoscopy   3. We will do your metabolic panel today

## 2018-02-15 DIAGNOSIS — M25.512 CHRONIC LEFT SHOULDER PAIN: Primary | ICD-10-CM

## 2018-02-15 DIAGNOSIS — G89.29 CHRONIC LEFT SHOULDER PAIN: Primary | ICD-10-CM

## 2018-02-19 ENCOUNTER — OFFICE VISIT (OUTPATIENT)
Dept: ORTHOPEDICS | Facility: CLINIC | Age: 63
End: 2018-02-19
Payer: COMMERCIAL

## 2018-02-19 ENCOUNTER — RADIANT APPOINTMENT (OUTPATIENT)
Dept: GENERAL RADIOLOGY | Facility: CLINIC | Age: 63
End: 2018-02-19
Payer: COMMERCIAL

## 2018-02-19 VITALS
HEART RATE: 81 BPM | BODY MASS INDEX: 35.05 KG/M2 | DIASTOLIC BLOOD PRESSURE: 93 MMHG | HEIGHT: 62 IN | SYSTOLIC BLOOD PRESSURE: 158 MMHG | WEIGHT: 190.48 LBS

## 2018-02-19 DIAGNOSIS — M25.512 LEFT SHOULDER PAIN, UNSPECIFIED CHRONICITY: Primary | ICD-10-CM

## 2018-02-19 DIAGNOSIS — M79.662 PAIN OF LEFT CALF: ICD-10-CM

## 2018-02-19 DIAGNOSIS — M54.12 CERVICAL RADICULOPATHY: Primary | ICD-10-CM

## 2018-02-19 DIAGNOSIS — M25.512 PAIN IN JOINT OF LEFT SHOULDER: ICD-10-CM

## 2018-02-19 DIAGNOSIS — M25.512 LEFT SHOULDER PAIN, UNSPECIFIED CHRONICITY: ICD-10-CM

## 2018-02-19 NOTE — LETTER
2/19/2018      RE: Adilia Camacho  [address]  [facility] [address]        Subjective:   Adilia Camacho is a 62 year old female who is being seen for left Shoulder/arm pain and numbness X 3-4 Months.   She does not recall injury. Worse with turning head in traffic or lifting her granddaughter. Also putting her arm on.  Pain radiates from the L side of the neck to the shoulder and into the forearm and hand. It feels throbbing.  Her neck feels like it could lock up. Light touching will hurt the shoulder and arm.    Also L calf pain for a year, feels tight at the end of the day. She wonders if she has a varicose vein. No injury. Previous U/S 2/16 neg for DVT      Background:   Date of injury: None   Duration of symptoms: 4 months  Mechanism of Injury: Acute; Unknown N/A  Aggravating factors: Movements, turning of neck and holding granddaughter/lifting   Relieving Factors: rest, ice and NSAIDs  Prior Evaluation: Prior Physician Evalutation: Dr. Graf, X-rays, NSAIDS and Chiropractor.     PAST MEDICAL, SOCIAL, SURGICAL AND FAMILY HISTORY: She  has a past medical history of Allergic rhinitis due to dogs; CARDIOVASCULAR SCREENING; LDL GOAL LESS THAN 130; Contraceptive management; Hypertension goal BP (blood pressure) < 140/90; Recurrent herpes labialis; and Rosacea.  She  has a past surgical history that includes no history of surgery.  Her family history includes DIABETES in her brother and sister; Hypertension in her brother, father, and sister; KIDNEY DISEASE in her sister. There is no history of Cancer - colorectal, Breast Cancer, or Thyroid Disease.  She reports that she has quit smoking. Her smoking use included Cigarettes. She quit after 10.00 years of use. She has never used smokeless tobacco. She reports that she drinks about 1.0 oz of alcohol per week  She reports that she does not use illicit drugs.    ALLERGIES: She has No Known Allergies.    CURRENT MEDICATIONS: She has a current medication list  "which includes the following prescription(s): metoprolol succinate, acyclovir, fluticasone, omeprazole, ibuprofen, cetirizine, and loperamide.     REVIEW OF SYSTEMS: 3 point review of systems is negative except as noted above.     Exam:   BP (!) 158/93  Pulse 81  Ht 5' 1.75\" (1.568 m)  Wt 190 lb 7.6 oz (86.4 kg)  BMI 35.12 kg/m2       CONSTITUTIONAL: healthy, alert and no distress  HEAD: Normocephalic. No masses, lesions, tenderness or abnormalities  SKIN: no suspicious lesions or rashes  GAIT: normal  NEUROLOGIC: Non-focal  PSYCHIATRIC: affect normal/bright and mentation appears normal.    MUSCULOSKELETAL:   Cervical spine-L paraspinal tenderness AROM limited L rotation with pain, + spurlings with pain down the L arm  L shoulder nontender ac, tender lateral shoulder to light touch  AROM full with painful arc no scap dyskinesia noted.  5/5 abduction with pain in the forearm and hand. 55 ER at 0 and 90 deg with pain at shoulder forearm and hand. Neg neers, neg lazo, neg yergasons, neg speeds    L leg   nontender gastroc, nontender achilles, heel raises without pain no joint line tenderenss AROM is full, neg varus or valgus stress neg mcmurrays.  1- 2+ LE depedent edema    XR L shoulder shows mild changes at the gh joint. Some calcification of shoulder at cuff insertion.   Assessment/Plan:   L neck, shoulder and arm pain 4 months  --symptoms most consistent with cervical radiculopathy, one could consider NeurogenicTOS independent neck shoulder and peripheral neuropathy. No weakness noted on exam. We discissed there could be co-committent neck and shoulder pathology, but the neck and arm pain below the elbow seemed most bothersome today.  --we discussed PT referral including trial of traction and strengthening  --we discussed options including imaging with cervical radiographs and perhaps MRI to evaluate for DAIN or surgical referral if not improved   --patient prefers to continue chiropractic for now, but will " f/u if considering these options, we reviewed signs of cervical myelopathy and neurologic impariment and if occurring she should f/u immediately for that.    Chronic L calf pain  --we discussed this was not aggravated with calf mm testing today, no radicular symptoms, we discussed repeating an U/S she declined. We reviewed calf stretching for pain relief. Given her 1-2+ depedent edma, we discussed stockings and foot elevation may be helpful. F/u if not improved with these measures.    Dwain Flannery MD CAQ

## 2018-02-19 NOTE — PROGRESS NOTES
Subjective:   Adilia Camacho is a 62 year old female who is being seen for left Shoulder/arm pain and numbness X 3-4 Months.   She does not recall injury. Worse with turning head in traffic or lifting her granddaughter. Also putting her arm on.  Pain radiates from the L side of the neck to the shoulder and into the forearm and hand. It feels throbbing.  Her neck feels like it could lock up. Light touching will hurt the shoulder and arm.    Also L calf pain for a year, feels tight at the end of the day. She wonders if she has a varicose vein. No injury. Previous U/S 2/16 neg for DVT      Background:   Date of injury: None   Duration of symptoms: 4 months  Mechanism of Injury: Acute; Unknown N/A  Aggravating factors: Movements, turning of neck and holding granddaughter/lifting   Relieving Factors: rest, ice and NSAIDs  Prior Evaluation: Prior Physician Evalutation: Dr. Graf, X-rays, NSAIDS and Chiropractor.     PAST MEDICAL, SOCIAL, SURGICAL AND FAMILY HISTORY: She  has a past medical history of Allergic rhinitis due to dogs; CARDIOVASCULAR SCREENING; LDL GOAL LESS THAN 130; Contraceptive management; Hypertension goal BP (blood pressure) < 140/90; Recurrent herpes labialis; and Rosacea.  She  has a past surgical history that includes no history of surgery.  Her family history includes DIABETES in her brother and sister; Hypertension in her brother, father, and sister; KIDNEY DISEASE in her sister. There is no history of Cancer - colorectal, Breast Cancer, or Thyroid Disease.  She reports that she has quit smoking. Her smoking use included Cigarettes. She quit after 10.00 years of use. She has never used smokeless tobacco. She reports that she drinks about 1.0 oz of alcohol per week  She reports that she does not use illicit drugs.    ALLERGIES: She has No Known Allergies.    CURRENT MEDICATIONS: She has a current medication list which includes the following prescription(s): metoprolol succinate, acyclovir,  "fluticasone, omeprazole, ibuprofen, cetirizine, and loperamide.     REVIEW OF SYSTEMS: 3 point review of systems is negative except as noted above.     Exam:   BP (!) 158/93  Pulse 81  Ht 5' 1.75\" (1.568 m)  Wt 190 lb 7.6 oz (86.4 kg)  BMI 35.12 kg/m2       CONSTITUTIONAL: healthy, alert and no distress  HEAD: Normocephalic. No masses, lesions, tenderness or abnormalities  SKIN: no suspicious lesions or rashes  GAIT: normal  NEUROLOGIC: Non-focal  PSYCHIATRIC: affect normal/bright and mentation appears normal.    MUSCULOSKELETAL:   Cervical spine-L paraspinal tenderness AROM limited L rotation with pain, + spurlings with pain down the L arm  L shoulder nontender ac, tender lateral shoulder to light touch  AROM full with painful arc no scap dyskinesia noted.  5/5 abduction with pain in the forearm and hand. 55 ER at 0 and 90 deg with pain at shoulder forearm and hand. Neg neers, neg lazo, neg yergasons, neg speeds    L leg   nontender gastroc, nontender achilles, heel raises without pain no joint line tenderenss AROM is full, neg varus or valgus stress neg mcmurrays.  1- 2+ LE depedent edema    XR L shoulder shows mild changes at the gh joint. Some calcification of shoulder at cuff insertion.   Assessment/Plan:   L neck, shoulder and arm pain 4 months  --symptoms most consistent with cervical radiculopathy, one could consider NeurogenicTOS independent neck shoulder and peripheral neuropathy. No weakness noted on exam. We discissed there could be co-committent neck and shoulder pathology, but the neck and arm pain below the elbow seemed most bothersome today.  --we discussed PT referral including trial of traction and strengthening  --we discussed options including imaging with cervical radiographs and perhaps MRI to evaluate for DAIN or surgical referral if not improved   --patient prefers to continue chiropractic for now, but will f/u if considering these options, we reviewed signs of cervical myelopathy and " neurologic impariment and if occurring she should f/u immediately for that.    Chronic L calf pain  --we discussed this was not aggravated with calf mm testing today, no radicular symptoms, we discussed repeating an U/S she declined. We reviewed calf stretching for pain relief. Given her 1-2+ depedent edma, we discussed stockings and foot elevation may be helpful. F/u if not improved with these measures.    Dwain Flannery MD CAQ

## 2018-02-19 NOTE — MR AVS SNAPSHOT
"              After Visit Summary   2/19/2018    Adilia Camacho    MRN: [id number]           Patient Information     Date Of Birth          1955        Visit Information        Provider Department      2/19/2018 10:00 AM Dwain Flannery MD [facility]        Today's Diagnoses     Cervical radiculopathy    -  1    Pain in joint of left shoulder        Pain of left calf           Follow-ups after your visit        Who to contact     Please call your clinic at [phone] to:    Ask questions about your health    Make or cancel appointments    Discuss your medicines    Learn about your test results    Speak to your doctor            Additional Information About Your Visit        MyChart Information     P[employer]ago gives you secure access to your electronic health record. If you see a primary care provider, you can also send messages to your care team and make appointments. If you have questions, please call your primary care clinic.  If you do not have a primary care provider, please call [phone] and they will assist you.      Pelago is an electronic gateway that provides easy, online access to your medical records. With [facility]ago, you can request a clinic appointment, read your test results, renew a prescription or communicate with your care team.     To access your existing account, please contact your [facility] or call [phone] for assistance.        Care EveryWhere ID     This is your Care EveryWhere ID. This could be used by other organizations to access your [place] medical records  [id number]        Your Vitals Were     Pulse Height BMI (Body Mass Index)             81 5' 1.75\" (1.568 m) 35.12 kg/m2          Blood Pressure from Last 3 Encounters:   02/19/18 (!) 158/93   02/13/18 133/70   10/23/17 124/86    Weight from Last 3 Encounters:   02/19/18 190 lb 7.6 oz (86.4 kg)   02/13/18 190 lb 8 oz (86.4 kg)   10/23/17 183 lb 12 oz (83.3 kg)            "   Today, you had the following     No orders found for display       Primary Care Provider Office Phone # Fax #    Sheree Graf [phone] [address]  [facility] [address]        Equal Access to Services     EMMA SAM : Hadii sharlene ku hadzaidao Soomaali, waaxda luqadaha, qaybta kaalmada adeliborioyada, geovanna wymann johann batista laLexiicammy crystal. So [facility] [phone].    ATENCIÓN: Si habla español, tiene a cheng disposición servicios gratuitos de asistencia lingüística. [facility] [phone].    We comply with applicable federal civil rights laws and [place] laws. We do not discriminate on the basis of race, color, national origin, age, disability, sex, sexual orientation, or gender identity.            Thank you!     Thank you for choosing [facility]  for your care. Our goal is always to provide you with excellent care. Hearing back from our patients is one way we can continue to improve our services. Please take a few minutes to complete the written survey that you may receive in the mail after your visit with us. Thank you!             Your Updated Medication List - Protect others around you: Learn how to safely use, store and throw away your medicines at www.disposemymeds.org.          This list is accurate as of 2/19/18  3:45 PM.  Always use your most recent med list.                   Brand Name Dispense Instructions for use Diagnosis    acyclovir 400 MG tablet    ZOVIRAX    30 tablet    TAKE 1 TABLET BY MOUTH THREE TIMES DAILY    Recurrent herpes labialis       ADVIL PO      Take 200 mg by mouth as needed for moderate pain        cetirizine 10 MG tablet    zyrTEC     Take 10 mg by mouth daily        fluticasone 50 MCG/ACT spray    FLONASE    48 g    Spray 2 sprays into both nostrils 2 times daily    Allergic rhinitis due to dogs       loperamide 2 MG capsule    IMODIUM     Take 2 mg by mouth 4 times daily as needed for diarrhea        metoprolol succinate 25 MG 24 hr  tablet    TOPROL-XL    90 tablet    Take 1 tablet (25 mg) by mouth daily    Hypertension goal BP (blood pressure) < 140/90       omeprazole 20 MG tablet     90 tablet    Take 1 tablet (20 mg) by mouth daily Take 30-60 minutes before a meal.    Gastroesophageal reflux disease, esophagitis presence not specified

## 2018-02-20 DIAGNOSIS — J30.81 ALLERGIC RHINITIS DUE TO DOGS: ICD-10-CM

## 2018-02-21 RX ORDER — FLUTICASONE PROPIONATE 50 MCG
SPRAY, SUSPENSION (ML) NASAL
Qty: 48 ML | Refills: 3 | Status: SHIPPED | OUTPATIENT
Start: 2018-02-21 | End: 2019-05-20

## 2018-02-21 NOTE — TELEPHONE ENCOUNTER
"Routing refill request to provider for review/approval because:  Associated diagnosis of allergic rhinitis due to dogs not and indication for use on refill protocol    Covering providers-Please sign if agree.    Thank you!  ROCK Dobbs, RN            Last Written Prescription Date:  5/11/17  Last Fill Quantity: 48 g,  # refills: 1   Last office visit: 2/13/2018 with prescribing provider:  Dr. Graf   Future Office Visit:        Requested Prescriptions   Pending Prescriptions Disp Refills     fluticasone (FLONASE) 50 MCG/ACT spray [Pharmacy Med Name: FLUTICASONE 50MCG NASAL SP (120) RX] 48 mL 0     Sig: SHAKE LIQUID AND USE 2 SPRAYS IN EACH NOSTRIL TWICE DAILY    Inhaled Steroids Protocol Passed    2/20/2018  5:33 PM       Passed - Patient is age 12 or older       Passed - Recent or future visit with authorizing provider's specialty    Patient had office visit in the last year or has a visit in the next 30 days with authorizing provider.  See \"Patient Info\" tab in inbasket, or \"Choose Columns\" in Meds & Orders section of the refill encounter.               "

## 2018-03-02 ENCOUNTER — MYC MEDICAL ADVICE (OUTPATIENT)
Dept: FAMILY MEDICINE | Facility: CLINIC | Age: 63
End: 2018-03-02

## 2018-03-02 DIAGNOSIS — M79.89 CALF SWELLING: Primary | ICD-10-CM

## 2018-03-02 NOTE — TELEPHONE ENCOUNTER
Dr. Graf -- please review pt message/update regarding calf swelling. This was discussed at last OV 2/13/18.     I've pended vascular referral. Please revise & sign or advise.    Thank you  Brooklynn Dean, KORINN, RN  [facility]

## 2018-03-05 ENCOUNTER — TELEPHONE (OUTPATIENT)
Dept: OTHER | Facility: CLINIC | Age: 63
End: 2018-03-05

## 2018-03-05 NOTE — TELEPHONE ENCOUNTER
Pt referred to [facility] by Dr. Graf for left foot/calf pain/swelling.    Pt needs to be scheduled for a consult with vascular medicine.  Will route to scheduling to coordinate an appointment next available.  Of note, pt was advised by referring to go the ED if acute calf pain, erythema and swelling.    Thania Butts RN BSN

## 2018-03-05 NOTE — TELEPHONE ENCOUNTER
Called and spoke with the patient, stated she hasn't had a chance to talk with Dr. Graf anymore about this yet so she would like to do that and then she will give us a call back later.

## 2018-03-05 NOTE — TELEPHONE ENCOUNTER
If acute calf pain, erythema and swelling, should go to ER to evaluate for possible DVT. I did place vascular referral per her request. Sheree Graf M.D.

## 2018-03-06 NOTE — TELEPHONE ENCOUNTER
Patient called back and said that she is going to check with th [facility] on availability for this first because that is a more convenient location for her.

## 2018-03-26 ASSESSMENT — ENCOUNTER SYMPTOMS
HYPERTENSION: 1
HYPOTENSION: 0
ORTHOPNEA: 0
MUSCLE WEAKNESS: 1
LEG PAIN: 1
NECK PAIN: 1
EXERCISE INTOLERANCE: 1
SLEEP DISTURBANCES DUE TO BREATHING: 0
PALPITATIONS: 0
STIFFNESS: 1
LIGHT-HEADEDNESS: 0
SYNCOPE: 0
JOINT SWELLING: 1
MYALGIAS: 1
BACK PAIN: 0
ARTHRALGIAS: 1
MUSCLE CRAMPS: 1

## 2018-03-27 ENCOUNTER — OFFICE VISIT (OUTPATIENT)
Dept: CARDIOLOGY | Facility: CLINIC | Age: 63
End: 2018-03-27
Attending: INTERNAL MEDICINE
Payer: COMMERCIAL

## 2018-03-27 VITALS
OXYGEN SATURATION: 98 % | DIASTOLIC BLOOD PRESSURE: 84 MMHG | SYSTOLIC BLOOD PRESSURE: 134 MMHG | HEART RATE: 77 BPM | BODY MASS INDEX: 34.96 KG/M2 | HEIGHT: 62 IN | WEIGHT: 190 LBS

## 2018-03-27 DIAGNOSIS — I87.2 VENOUS (PERIPHERAL) INSUFFICIENCY: ICD-10-CM

## 2018-03-27 DIAGNOSIS — I83.90 ASYMPTOMATIC VARICOSE VEINS: Primary | ICD-10-CM

## 2018-03-27 DIAGNOSIS — M79.89 CALF SWELLING: ICD-10-CM

## 2018-03-27 PROCEDURE — G0463 HOSPITAL OUTPT CLINIC VISIT: HCPCS | Mod: ZF

## 2018-03-27 PROCEDURE — 99204 OFFICE O/P NEW MOD 45 MIN: CPT | Mod: GC | Performed by: INTERNAL MEDICINE

## 2018-03-27 ASSESSMENT — PAIN SCALES - GENERAL: PAINLEVEL: NO PAIN (0)

## 2018-03-27 NOTE — NURSING NOTE
Vascular Imaging: Patient given instructions regarding bilateral LE venous competency duplex. Discussed purpose, preparation, procedure and when to expect results reported back to the patient. Patient demonstrated understanding of this information and agreed to call with further questions or concerns.  Med Reconcile: Reviewed and verified all current medications with the patient. The updated medication list was printed and given to the patient.  New Medication: 20-30 mm Hg knee length compression stockings.  Patient was educated regarding newly prescribed medication, including discussion of  the indication, administration, side effects, and when to report to MD or RN. Patient demonstrated understanding of this information and agreed to call with further questions or concerns.  Return Appointment: 1 month with Dr. Obrien.  Patient given instructions regarding scheduling next clinic visit. Patient demonstrated understanding of this information and agreed to call with further questions or concerns.  Patient stated she understood all health information given and agreed to call with further questions or concerns.

## 2018-03-27 NOTE — PROGRESS NOTES
Vascular Cardiology Consultation      CC- left calf pain and swelling    HPI: This is a 61 yo F with a history of HTN who is here for left calf pain and swelling.     She has had intermittent L calf stiffness and an intermittent bulge? She had an ultrasound in 3/16 that was negative for DVT. The pain has been on and off for the past 2 years but significantly worse during the last month. No history of clotting in her family no miscarriages. She is a banker and sits all day. She does notice some varicose veins on that LLE she has only used non Rx compression stockings irregularly.     ROS is positive for L shoulder pain as well she is seeing a chiropractor and will start physical therapy. The pain worsens when she holds her grandchild. No h/o blood clots in family and no miscarriage history.    Cardiac meds  Metoprolol XL 25mg OD         PAST MEDICAL HISTORY:  Past Medical History:   Diagnosis Date     Allergic rhinitis due to dogs      CARDIOVASCULAR SCREENING; LDL GOAL LESS THAN 130      Contraceptive management      Hypertension goal BP (blood pressure) < 140/90      Recurrent herpes labialis      Rosacea        CURRENT MEDICATIONS:  Current Outpatient Prescriptions   Medication Sig Dispense Refill     fluticasone (FLONASE) 50 MCG/ACT spray SHAKE LIQUID AND USE 2 SPRAYS IN EACH NOSTRIL TWICE DAILY 48 mL 3     metoprolol (TOPROL-XL) 25 MG 24 hr tablet Take 1 tablet (25 mg) by mouth daily 90 tablet 3     acyclovir (ZOVIRAX) 400 MG tablet TAKE 1 TABLET BY MOUTH THREE TIMES DAILY 30 tablet 5     omeprazole 20 MG tablet Take 1 tablet (20 mg) by mouth daily Take 30-60 minutes before a meal. 90 tablet 3     Ibuprofen (ADVIL PO) Take 200 mg by mouth as needed for moderate pain       cetirizine (ZYRTEC) 10 MG tablet Take 10 mg by mouth daily       loperamide (IMODIUM) 2 MG capsule Take 2 mg by mouth 4 times daily as needed for diarrhea         PAST SURGICAL HISTORY:  Past Surgical History:   Procedure Laterality  "Date     NO HISTORY OF SURGERY         ALLERGIES   No Known Allergies    FAMILY HISTORY:  Family History   Problem Relation Age of Onset     Hypertension Father      Hypertension Brother      Hypertension Sister      DIABETES Sister      and brother     KIDNEY DISEASE Sister      DIABETES Brother      Cancer - colorectal No family hx of      Breast Cancer No family hx of      Thyroid Disease No family hx of        SOCIAL HISTORY:  Social History     Social History     Marital status:      Spouse name: Harry     Number of children: 1     Years of education: N/A     Occupational History     Business [employer]     Works in [employer]     Social History Main Topics     Smoking status: Former Smoker     Years: 10.00     Types: Cigarettes     Smokeless tobacco: Never Used     Alcohol use 1.0 oz/week     2 Cans of beer per week      Comment: occassionally     Drug use: No     Sexual activity: Yes     Partners: Male     Other Topics Concern     Parent/Sibling W/ Cabg, Mi Or Angioplasty Before 65f 55m? No     Social History Narrative       ROS: As per HPI  Constitutional: No fever, chills, or sweats. No weight gain/loss   ENT: No visual disturbance, ear ache, epistaxis, sore throat  Allergies/Immunologic: Negative.   Respiratory: No cough, hemoptysia  Cardiovascular: As per HPI  GI: No nausea, vomiting, hematemesis, melena, or hematochezia  : No urinary frequency, dysuria, or hematuria  Integument: Negative  Psychiatric: Negative  Neuro: Negative  Endocrinology: Negative   Musculoskeletal: see HPI    EXAM:  /84  Pulse 77  Ht 1.575 m (5' 2\")  Wt 86.2 kg (190 lb)  SpO2 98%  BMI 34.75 kg/m2  In general, the patient is a pleasant female in no apparent distress.    HEENT: NC/AT.  PERRLA.  EOMI.     Neck:  Carotids +4/4 bilaterally without bruits.  No jugular venous distension.   Heart: RRR. Normal S1, S2 splits physiologically. No murmur, rub, click, or gallop.   Lungs: CTA.  No ronchi, wheezes, " rales.  No dullness to percussion.   Abdomen: Soft, nontender, nondistended. No organomegaly.  No bruits.   Extremities:   The pulses are +4/4 at the radial,  DP, and PT sites bilaterally.  No bruits are noted.She has varicose veins in both legs, well hydrated skin with no ulcers  Neurologic: Alert and oriented to person/place/time, normal speech, gait and affect  Skin: No petechiae, purpura or rash.    Labs:  LIPID RESULTS:  Lab Results   Component Value Date    CHOL 156 03/24/2017    HDL 42 (L) 03/24/2017    LDL 91 03/24/2017    TRIG 115 03/24/2017    CHOLHDLRATIO 3.8 02/15/2013    NHDL 114 03/24/2017       LIVER ENZYME RESULTS:  Lab Results   Component Value Date    AST 21 02/15/2013    ALT 22 02/15/2013       CBC RESULTS:  Lab Results   Component Value Date    WBC 14.4 (H) 05/13/2014    RBC 4.71 05/13/2014    HGB 13.5 05/13/2014    HCT 40.4 05/13/2014    MCV 86 05/13/2014    MCH 28.7 05/13/2014    MCHC 33.4 05/13/2014    RDW 12.9 05/13/2014     05/13/2014       BMP RESULTS:  Lab Results   Component Value Date     02/13/2018    POTASSIUM 4.1 02/13/2018    CHLORIDE 107 02/13/2018    CO2 30 02/13/2018    ANIONGAP 4 02/13/2018     (H) 02/13/2018    BUN 15 02/13/2018    CR 0.84 02/13/2018    GFRESTIMATED 69 02/13/2018    GFRESTBLACK 83 02/13/2018    BRODY 8.8 02/13/2018        A1C RESULTS:  Lab Results   Component Value Date    A1C 6.3 (H) 03/24/2017       INR RESULTS:  No results found for: INR    Procedures:  LLE U/S  Findings:     Right leg:  CFV: Thrombus: No, Phasic: Yes     Left leg:  CFV: Thrombus: No, Phasic: Yes  Femoral vein, proximal: Thrombus: No, Phasic: Yes  Femoral vein, mid: Thrombus: No, Phasic: Yes  Femoral vein, distal: Thrombus: No, Phasic: Yes  Popliteal vein: Thrombus: No, Phasic: Yes  PTV: Thrombus: No  Peroneal vein: Thrombus: No  GSV: Thrombus: No         Impression:  Left leg: No DVT of the left lower extremity        Assessment and Plan: 63 yo F with a history of HTN who  is here for left calf pain and swelling.    She has had chronic pain and has some evidence of chronic venous insufficiency with some superficial varicocities. Other possibility is a baker cyst with secondary vein compression and concomitant insufficiency. Do not suspect May Thurners syndrome given normal phasic flow of her left common femoral vein in 2016.    Will do a venous competency study and evaluate for chronic thrombosis or cyst and proximal obstruction.  In the mean time we encouraged compression stockings and lower extremity elevation.     Will follow up the results. If amenable to ablation, will refer to my colleague Dr. Valenzuela in IR for consideration after review of competency studies. She would like addressed prior to summer.    Nadine Ramos  Cardiology fellow, PGY-4    CC  Patient Care Team:  Sheree Sandoval MD as PCP - General (Family Practice)  Dwain Flannery MD as MD (Family Medicine - Sports Medicine)  Tracie Obrien MD as MD (Cardiology)  SHEREE SANDOVAL    Total time spent 60 minutes, of which >50% was spent in face-to-face patient evaluation, reviewing data with patient, and coordination of care.       Tracie Obrien MD MSc  Staff Cardiologist  Vascular Medicine Section  [facility]        Answers for HPI/ROS submitted by the patient on 3/26/2018   General Symptoms: No  Skin Symptoms: No  HENT Symptoms: No  EYE SYMPTOMS: No  HEART SYMPTOMS: Yes  LUNG SYMPTOMS: No  INTESTINAL SYMPTOMS: No  URINARY SYMPTOMS: No  GYNECOLOGIC SYMPTOMS: No  BREAST SYMPTOMS: No  SKELETAL SYMPTOMS: Yes  BLOOD SYMPTOMS: No  NERVOUS SYSTEM SYMPTOMS: No  MENTAL HEALTH SYMPTOMS: No  Chest pain or pressure: No  Fast or irregular heartbeat: No  Pain in legs with walking: Yes  Trouble breathing while lying down: No  Fingers or toes appear blue: No  High blood pressure: Yes  Low blood pressure: No  Fainting: No  Murmurs: No  Pacemaker: No  Varicose veins: Yes  Edema or swelling:  Yes  Wake up at night with shortness of breath: No  Light-headedness: No  Exercise intolerance: Yes  Back pain: No  Muscle aches: Yes  Neck pain: Yes  Swollen joints: Yes  Joint pain: Yes  Bone pain: Yes  Muscle cramps: Yes  Muscle weakness: Yes  Joint stiffness: Yes  Bone fracture: No

## 2018-03-27 NOTE — LETTER
3/27/2018      RE: Adilia Camacho  [address]  [facility] [address]       Dear Colleague,    Thank you for the opportunity to participate in the care of your patient, Adilia Camacho, at the [facility]. Please see a copy of my visit note below.           Vascular Cardiology Consultation      CC- left calf pain and swelling    HPI: This is a 61 yo F with a history of HTN who is here for left calf pain and swelling.     She has had intermittent L calf stiffness and an intermittent bulge? She had an ultrasound in 3/16 that was negative for DVT. The pain has been on and off for the past 2 years but significantly worse during the last month. No history of clotting in her family no miscarriages. She is a banker and sits all day. She does notice some varicose veins on that LLE she has only used non Rx compression stockings irregularly.     ROS is positive for L shoulder pain as well she is seeing a chiropractor and will start physical therapy. The pain worsens when she holds her grandchild. No h/o blood clots in family and no miscarriage history.    Cardiac meds  Metoprolol XL 25mg OD         PAST MEDICAL HISTORY:  Past Medical History:   Diagnosis Date     Allergic rhinitis due to dogs      CARDIOVASCULAR SCREENING; LDL GOAL LESS THAN 130      Contraceptive management      Hypertension goal BP (blood pressure) < 140/90      Recurrent herpes labialis      Rosacea        CURRENT MEDICATIONS:  Current Outpatient Prescriptions   Medication Sig Dispense Refill     fluticasone (FLONASE) 50 MCG/ACT spray SHAKE LIQUID AND USE 2 SPRAYS IN EACH NOSTRIL TWICE DAILY 48 mL 3     metoprolol (TOPROL-XL) 25 MG 24 hr tablet Take 1 tablet (25 mg) by mouth daily 90 tablet 3     acyclovir (ZOVIRAX) 400 MG tablet TAKE 1 TABLET BY MOUTH THREE TIMES DAILY 30 tablet 5     omeprazole 20 MG tablet Take 1 tablet (20 mg) by mouth daily Take 30-60 minutes before a meal. 90 tablet 3  "    Ibuprofen (ADVIL PO) Take 200 mg by mouth as needed for moderate pain       cetirizine (ZYRTEC) 10 MG tablet Take 10 mg by mouth daily       loperamide (IMODIUM) 2 MG capsule Take 2 mg by mouth 4 times daily as needed for diarrhea         PAST SURGICAL HISTORY:  Past Surgical History:   Procedure Laterality Date     NO HISTORY OF SURGERY         ALLERGIES   No Known Allergies    FAMILY HISTORY:  Family History   Problem Relation Age of Onset     Hypertension Father      Hypertension Brother      Hypertension Sister      DIABETES Sister      and brother     KIDNEY DISEASE Sister      DIABETES Brother      Cancer - colorectal No family hx of      Breast Cancer No family hx of      Thyroid Disease No family hx of        SOCIAL HISTORY:  Social History     Social History     Marital status:      Spouse name: Harry     Number of children: 1     Years of education: N/A     Occupational History     Business [employer]     Works in [employer]     Social History Main Topics     Smoking status: Former Smoker     Years: 10.00     Types: Cigarettes     Smokeless tobacco: Never Used     Alcohol use 1.0 oz/week     2 Cans of beer per week      Comment: occassionally     Drug use: No     Sexual activity: Yes     Partners: Male     Other Topics Concern     Parent/Sibling W/ Cabg, Mi Or Angioplasty Before 65f 55m? No     Social History Narrative       ROS: As per HPI  Constitutional: No fever, chills, or sweats. No weight gain/loss   ENT: No visual disturbance, ear ache, epistaxis, sore throat  Allergies/Immunologic: Negative.   Respiratory: No cough, hemoptysia  Cardiovascular: As per HPI  GI: No nausea, vomiting, hematemesis, melena, or hematochezia  : No urinary frequency, dysuria, or hematuria  Integument: Negative  Psychiatric: Negative  Neuro: Negative  Endocrinology: Negative   Musculoskeletal: see HPI    EXAM:  /84  Pulse 77  Ht 1.575 m (5' 2\")  Wt 86.2 kg (190 lb)  SpO2 98%  BMI 34.75 " kg/m2  In general, the patient is a pleasant female in no apparent distress.    HEENT: NC/AT.  CARMINA.  EOMI.     Neck:  Carotids +4/4 bilaterally without bruits.  No jugular venous distension.   Heart: RRR. Normal S1, S2 splits physiologically. No murmur, rub, click, or gallop.   Lungs: CTA.  No ronchi, wheezes, rales.  No dullness to percussion.   Abdomen: Soft, nontender, nondistended. No organomegaly.  No bruits.   Extremities:   The pulses are +4/4 at the radial,  DP, and PT sites bilaterally.  No bruits are noted.She has varicose veins in both legs, well hydrated skin with no ulcers  Neurologic: Alert and oriented to person/place/time, normal speech, gait and affect  Skin: No petechiae, purpura or rash.    Labs:  LIPID RESULTS:  Lab Results   Component Value Date    CHOL 156 03/24/2017    HDL 42 (L) 03/24/2017    LDL 91 03/24/2017    TRIG 115 03/24/2017    CHOLHDLRATIO 3.8 02/15/2013    NHDL 114 03/24/2017       LIVER ENZYME RESULTS:  Lab Results   Component Value Date    AST 21 02/15/2013    ALT 22 02/15/2013       CBC RESULTS:  Lab Results   Component Value Date    WBC 14.4 (H) 05/13/2014    RBC 4.71 05/13/2014    HGB 13.5 05/13/2014    HCT 40.4 05/13/2014    MCV 86 05/13/2014    MCH 28.7 05/13/2014    MCHC 33.4 05/13/2014    RDW 12.9 05/13/2014     05/13/2014       BMP RESULTS:  Lab Results   Component Value Date     02/13/2018    POTASSIUM 4.1 02/13/2018    CHLORIDE 107 02/13/2018    CO2 30 02/13/2018    ANIONGAP 4 02/13/2018     (H) 02/13/2018    BUN 15 02/13/2018    CR 0.84 02/13/2018    GFRESTIMATED 69 02/13/2018    GFRESTBLACK 83 02/13/2018    BRODY 8.8 02/13/2018        A1C RESULTS:  Lab Results   Component Value Date    A1C 6.3 (H) 03/24/2017       INR RESULTS:  No results found for: INR    Procedures:  LLE U/S  Findings:     Right leg:  CFV: Thrombus: No, Phasic: Yes     Left leg:  CFV: Thrombus: No, Phasic: Yes  Femoral vein, proximal: Thrombus: No, Phasic: Yes  Femoral vein, mid:  Thrombus: No, Phasic: Yes  Femoral vein, distal: Thrombus: No, Phasic: Yes  Popliteal vein: Thrombus: No, Phasic: Yes  PTV: Thrombus: No  Peroneal vein: Thrombus: No  GSV: Thrombus: No         Impression:  Left leg: No DVT of the left lower extremity        Assessment and Plan: 63 yo F with a history of HTN who is here for left calf pain and swelling.    She has had chronic pain and has some evidence of chronic venous insufficiency with some superficial varicocities. Other possibility is a baker cyst with secondary vein compression and concomitant insufficiency. Do not suspect May Thurners syndrome given normal phasic flow of her left common femoral vein in 2016.    Will do a venous competency study and evaluate for chronic thrombosis or cyst and proximal obstruction.  In the mean time we encouraged compression stockings and lower extremity elevation.     Will follow up the results. If amenable to ablation, will refer to my colleague Dr. Valenzuela in IR for consideration after review of competency studies. She would like addressed prior to summer.    Nadine Ramos  Cardiology fellow, PGY-4    CC  Patient Care Team:  Sheree Sandoval MD as PCP - General (Family Practice)  Dwain Flannery MD as MD (Family Medicine - Sports Medicine)  Tracie Obrien MD as MD (Cardiology)  SHEREE SANDOVAL    Total time spent 60 minutes, of which >50% was spent in face-to-face patient evaluation, reviewing data with patient, and coordination of care.       Tracie Obrien MD MSc  Staff Cardiologist  Vascular Medicine Section  [facility]

## 2018-03-27 NOTE — NURSING NOTE
Chief Complaint   Patient presents with     New Patient     63 y/o female for evaluation of left calf pain and swelling     Vitals were taken and medications were reconciled.     Eloisa BENJAMINA  2:03 PM

## 2018-03-27 NOTE — PATIENT INSTRUCTIONS
You were seen today in the Cardiovascular Clinic at the [facility].      Cardiology Providers you saw during your visit:  Dr. Obrien    Diagnosis:  Left calf swelling and pain    Results:  None today    Recommendations:   Bilateral lower extremity venous competency duplex at your convenience.     Compression stockings daily.  Can fill at [facility] in [place] if not at your friends store.    Follow-up:  1 month with Dr. Obrien.      For emergencies call 911.    For any scheduling needs, please call [phone]. Option 1 then option 3    Thank you for your visit today!     Please call if you have any questions or concerns.  Bebo Jacques RN

## 2018-03-27 NOTE — MR AVS SNAPSHOT
After Visit Summary   3/27/2018    Adilia Camacho    MRN: [id number]           Patient Information     Date Of Birth          1955        Visit Information        Provider Department      3/27/2018 2:30 PM Tracie Obrien MD [facility] CARDIOVASCULAR      Today's Diagnoses     Asymptomatic varicose veins    -  1    Calf swelling        Venous (peripheral) insufficiency          Care Instructions    You were seen today in the Cardiovascular Clinic at the [facility].      Cardiology Providers you saw during your visit:  Dr. Obrien    Diagnosis:  Left calf swelling and pain    Results:  None today    Recommendations:   Bilateral lower extremity venous competency duplex at your convenience.     Compression stockings daily.  Can fill at [facility] in [place] if not at your friends store.    Follow-up:  1 month with Dr. Obrien.      For emergencies call 911.    For any scheduling needs, please call [phone]. Option 1 then option 3    Thank you for your visit today!     Please call if you have any questions or concerns.  Bebo Jacques RN              Follow-ups after your visit        Your next 10 appointments already scheduled     Apr 06, 2018  7:00 AM CDT   (Arrive by 6:45 AM)   US LOWER EXTREMITY VENOUS COMPETENCY BILATERAL with UCUSV1   [facility] US ([facility])    [address] [place] [address] [phone]           Please bring a list of your medicines (including vitamins, minerals and over-the-counter drugs). Also, tell your doctor about any allergies you may have. Wear comfortable clothes and leave your valuables at home.  You do not need to do anything special to prepare for your exam.  Please call the Imaging Department at your exam site with any questions.            Apr 24, 2018  5:30 PM CDT   (Arrive by 5:15 PM)   Return Vascular Visit with Tracie Obrien MD   [facility]  "Care ([facility])    [address]  [address]  [facility] [address] [phone]              Future tests that were ordered for you today     Open Future Orders        Priority Expected Expires Ordered    US Venous Competency Bilateral Routine 3/27/2018 3/27/2019 3/27/2018            Who to contact     If you have questions or need follow up information about today's clinic visit or your schedule please contact [facility] directly at [phone].  Normal or non-critical lab and imaging results will be communicated to you by Senseghart, letter or phone within 4 business days after the clinic has received the results. If you do not hear from us within 7 days, please contact the clinic through VaxCaret or phone. If you have a critical or abnormal lab result, we will notify you by phone as soon as possible.  Submit refill requests through 8digits or call your pharmacy and they will forward the refill request to us. Please allow 3 business days for your refill to be completed.          Additional Information About Your Visit        8digits Information     8digits gives you secure access to your electronic health record. If you see a primary care provider, you can also send messages to your care team and make appointments. If you have questions, please call your primary care clinic.  If you do not have a primary care provider, please call [phone] and they will assist you.        Care EveryWhere ID     This is your Care EveryWhere ID. This could be used by other organizations to access your [place] medical records  [id number]        Your Vitals Were     Pulse Height Pulse Oximetry BMI (Body Mass Index)          77 1.575 m (5' 2\") 98% 34.75 kg/m2         Blood Pressure from Last 3 Encounters:   03/27/18 134/84   02/19/18 (!) 158/93   02/13/18 133/70    Weight from Last 3 Encounters:   03/27/18 86.2 kg (190 lb)   02/19/18 86.4 kg (190 lb 7.6 oz)   02/13/18 86.4 kg (190 lb " 8 oz)                 Today's Medication Changes          These changes are accurate as of 3/27/18  3:01 PM.  If you have any questions, ask your nurse or doctor.               Start taking these medicines.        Dose/Directions    COMPRESSION STOCKINGS   Used for:  Calf swelling   Started by:  Tracie Obrien MD        Dose:  1 each   1 each daily Lower Extremity:   Knee High;  bilateral;  20-30 mm Hg.  Measure and fit.  Style and color per patient preference.  Doff n Catie per patient need.   Quantity:  6 each   Refills:  11            Where to get your medicines      Some of these will need a paper prescription and others can be bought over the counter.  Ask your nurse if you have questions.     Bring a paper prescription for each of these medications     COMPRESSION STOCKINGS                Primary Care Provider Office Phone # Fax #    Sheree Graf [phone] [address]  [facility] [address]        Equal Access to Services     [facility]: Hadii sharlene coker hadasho Soomaali, waaxda luqadaha, qaybta kaalmada adeegyada, geovanna adams haycammy hernández . So [facility] [phone].    ATENCIÓN: Si habla español, tiene a cheng disposición servicios gratuitos de asistencia lingüística. Llame al [phone].    We comply with applicable federal civil rights laws and [place] laws. We do not discriminate on the basis of race, color, national origin, age, disability, sex, sexual orientation, or gender identity.            Thank you!     Thank you for choosing [facility]  for your care. Our goal is always to provide you with excellent care. Hearing back from our patients is one way we can continue to improve our services. Please take a few minutes to complete the written survey that you may receive in the mail after your visit with us. Thank you!             Your Updated Medication List - Protect others around you: Learn how to safely use, store and throw away your medicines  at www.disposemymeds.org.          This list is accurate as of 3/27/18  3:01 PM.  Always use your most recent med list.                   Brand Name Dispense Instructions for use Diagnosis    acyclovir 400 MG tablet    ZOVIRAX    30 tablet    TAKE 1 TABLET BY MOUTH THREE TIMES DAILY    Recurrent herpes labialis       ADVIL PO      Take 200 mg by mouth as needed for moderate pain        cetirizine 10 MG tablet    zyrTEC     Take 10 mg by mouth daily        COMPRESSION STOCKINGS     6 each    1 each daily Lower Extremity:   Knee High;  bilateral;  20-30 mm Hg.  Measure and fit.  Style and color per patient preference.  Doff n Catie per patient need.    Calf swelling       fluticasone 50 MCG/ACT spray    FLONASE    48 mL    SHAKE LIQUID AND USE 2 SPRAYS IN EACH NOSTRIL TWICE DAILY    Allergic rhinitis due to dogs       loperamide 2 MG capsule    IMODIUM     Take 2 mg by mouth 4 times daily as needed for diarrhea        metoprolol succinate 25 MG 24 hr tablet    TOPROL-XL    90 tablet    Take 1 tablet (25 mg) by mouth daily    Hypertension goal BP (blood pressure) < 140/90       omeprazole 20 MG tablet     90 tablet    Take 1 tablet (20 mg) by mouth daily Take 30-60 minutes before a meal.    Gastroesophageal reflux disease, esophagitis presence not specified

## 2018-04-06 ENCOUNTER — RADIANT APPOINTMENT (OUTPATIENT)
Dept: ULTRASOUND IMAGING | Facility: CLINIC | Age: 63
End: 2018-04-06
Payer: COMMERCIAL

## 2018-04-06 DIAGNOSIS — M79.89 CALF SWELLING: ICD-10-CM

## 2018-04-10 ENCOUNTER — THERAPY VISIT (OUTPATIENT)
Dept: PHYSICAL THERAPY | Facility: CLINIC | Age: 63
End: 2018-04-10
Payer: COMMERCIAL

## 2018-04-10 DIAGNOSIS — M54.12 LEFT CERVICAL RADICULOPATHY: Primary | ICD-10-CM

## 2018-04-10 PROCEDURE — 97112 NEUROMUSCULAR REEDUCATION: CPT | Mod: GP | Performed by: PHYSICAL THERAPIST

## 2018-04-10 PROCEDURE — 97530 THERAPEUTIC ACTIVITIES: CPT | Mod: GP | Performed by: PHYSICAL THERAPIST

## 2018-04-10 PROCEDURE — 97161 PT EVAL LOW COMPLEX 20 MIN: CPT | Mod: GP | Performed by: PHYSICAL THERAPIST

## 2018-04-10 PROCEDURE — 97110 THERAPEUTIC EXERCISES: CPT | Mod: GP | Performed by: PHYSICAL THERAPIST

## 2018-04-10 NOTE — MR AVS SNAPSHOT
After Visit Summary   4/10/2018    Adilia Camacho    MRN: [id number]           Patient Information     Date Of Birth          1955        Visit Information        Provider Department      4/10/2018 8:10 AM Marcio Dee, [facility]        Today's Diagnoses     Left cervical radiculopathy    -  1       Follow-ups after your visit        Your next 10 appointments already scheduled     Apr 17, 2018  7:40 AM CDT   [facility] Spine with [facility] Torrez [facility] ([facility])    [address]  [facility] [address] [phone]            Apr 24, 2018  5:30 PM CDT   (Arrive by 5:15 PM)   Return Vascular Visit with Tracie Obrien MD   [facility] ([facility])    [address] [place] [address]  [address]  [facility] [id number] [phone]              Who to contact     If you have questions or need follow up information about today's clinic visit or your schedule please contact [facility] directly at [phone].  Normal or non-critical lab and imaging results will be communicated to you by Live Current Mediahart, letter or phone within 4 business days after the clinic has received the results. If you do not hear from us within 7 days, please contact the clinic through Live Current Mediahart or phone. If you have a critical or abnormal lab result, we will notify you by phone as soon as possible.  Submit refill requests through [employer] or call your pharmacy and they will forward the refill request to us. Please allow 3 business days for your refill to be completed.          Additional Information About Your Visit        Live Current Mediahart Information     [employer] gives you secure access to your electronic health record. If you see a primary care provider, you can also send messages to your care team and make appointments. If you have questions, please call your primary care clinic.  If you do not  have a primary care provider, please call [phone] and they will assist you.        Care EveryWhere ID     This is your Care EveryWhere ID. This could be used by other organizations to access your [place] medical records  [id number]         Blood Pressure from Last 3 Encounters:   03/27/18 134/84   02/19/18 (!) 158/93   02/13/18 133/70    Weight from Last 3 Encounters:   03/27/18 86.2 kg (190 lb)   02/19/18 86.4 kg (190 lb 7.6 oz)   02/13/18 86.4 kg (190 lb 8 oz)              We Performed the Following     Neuromuscular Re-Education     PT Eval, Low Complexity (70047)     Therapeutic Activities     Therapeutic Exercises        Primary Care Provider Office Phone # Fax #    Sheree Graf [phone] [address]  [facility] [address]        Equal Access to Services     [facility]: Hadii aad ku hadasho Sojose luisali, waaxda luqadaha, qaybta kaalmada adeegyada, waxay tylerin hayaan johann hernández . So [facility] [phone].    ATENCIÓN: Si habla español, tiene a cheng disposición servicios gratuitos de asistencia lingüística. Frederic al [phone].    We comply with applicable federal civil rights laws and [place] laws. We do not discriminate on the basis of race, color, national origin, age, disability, sex, sexual orientation, or gender identity.            Thank you!     Thank you for choosing [facility] PHYSICAL THERAPY [facility]  for your care. Our goal is always to provide you with excellent care. Hearing back from our patients is one way we can continue to improve our services. Please take a few minutes to complete the written survey that you may receive in the mail after your visit with us. Thank you!             Your Updated Medication List - Protect others around you: Learn how to safely use, store and throw away your medicines at www.disposemymeds.org.          This list is accurate as of 4/10/18 11:59 PM.  Always use your most recent med list.                   Brand Name Dispense  Instructions for use Diagnosis    acyclovir 400 MG tablet    ZOVIRAX    30 tablet    TAKE 1 TABLET BY MOUTH THREE TIMES DAILY    Recurrent herpes labialis       ADVIL PO      Take 200 mg by mouth as needed for moderate pain        cetirizine 10 MG tablet    zyrTEC     Take 10 mg by mouth daily        COMPRESSION STOCKINGS     6 each    1 each daily Lower Extremity:   Knee High;  bilateral;  20-30 mm Hg.  Measure and fit.  Style and color per patient preference.  Doff n Catie per patient need.    Calf swelling       fluticasone 50 MCG/ACT spray    FLONASE    48 mL    SHAKE LIQUID AND USE 2 SPRAYS IN EACH NOSTRIL TWICE DAILY    Allergic rhinitis due to dogs       loperamide 2 MG capsule    IMODIUM     Take 2 mg by mouth 4 times daily as needed for diarrhea        metoprolol succinate 25 MG 24 hr tablet    TOPROL-XL    90 tablet    Take 1 tablet (25 mg) by mouth daily    Hypertension goal BP (blood pressure) < 140/90       omeprazole 20 MG tablet     90 tablet    Take 1 tablet (20 mg) by mouth daily Take 30-60 minutes before a meal.    Gastroesophageal reflux disease, esophagitis presence not specified

## 2018-04-10 NOTE — PROGRESS NOTES
[facility] Initial Evaluation  Subjective:   Adilia Camacho is a 62 year old right handed female presenting with complaints of left neck and shoulder pain. She is referred to PT by Dr Flannery. Her symptoms started in November of 2017 as a result of lifting her grandchild.  She complains of pain, weakness, numbness and activity limitations  She has been seeing a Chiropractor 2 x week with her last visit 2 weeks ago. She does plan to continue that course of care as she feels it has been of benefit. She reports her symptoms have eased over the last couple weeks, mostly due to changes in activity levels.  At home she uses ibuprofen and ice packs.  Her pain today is 4/10. At best it is 3/10 and at worst, 10/10. Pain is aggravated by lifting, Amy    She is employed full time in the banking profession. She reports her job is extremely stressful    Patient is a 62 year old female presenting with rehab cervical spine hpi.   She exhibits FHON, internal rotation of shoulders, and scapular protraction.                    Objective:  AROM flex WNL, extension 35%, R Rot 50%, L Rot 35%, B SB 50%  Cervical MMT 5/5 throughout except flexion (3+/5)  Upper traps 5/5, mid traps 4-/5, lower traps 3/5  U/E myotomal testing unremarkable  U/E dermatomal testing unremarkable  System    Physical Exam    General     ROS    Assessment/Plan:    Patient is a 62 year old female with cervical complaints.    Patient has the following significant findings with corresponding treatment plan.                Diagnosis 1:  Cervical radiculopathy left, possible underlying rotator cuff component    Pain -  hot/cold therapy, self management, education and home program  Decreased ROM/flexibility - therapeutic exercise  Decreased joint mobility - manual therapy  Decreased strength - therapeutic exercise  Decreased function - therapeutic activities  Impaired posture - neuro re-education    Therapy Evaluation Codes:   1) History comprised  of:   Personal factors that impact the plan of care:      None.    Comorbidity factors that impact the plan of care are:      None.     Medications impacting care: None.  2) Examination of Body Systems comprised of:   Body structures and functions that impact the plan of care:      Cervical spine.   Activity limitations that impact the plan of care are:      Lifting, Reading/Computer work, Working and Sleeping.  3) Clinical presentation characteristics are:   Stable/Uncomplicated.  4) Decision-Making    Low complexity using standardized patient assessment instrument and/or measureable assessment of functional outcome.  Cumulative Therapy Evaluation is: Low complexity.    Previous and current functional limitations:  (See Goal Flow Sheet for this information)    Short term and Long term goals: (See Goal Flow Sheet for this information)     Communication ability:  Patient appears to be able to clearly communicate and understand verbal and written communication and follow directions correctly.  Treatment Explanation - The following has been discussed with the patient:   RX ordered/plan of care  Anticipated outcomes  Possible risks and side effects  This patient would benefit from PT intervention to resume normal activities.   Rehab potential is good.    Frequency:  1 X week, once daily  Duration:  for 6 weeks  Discharge Plan:  Achieve all LTG.  Independent in home treatment program.  Reach maximal therapeutic benefit.    Please refer to the daily flowsheet for treatment today, total treatment time and time spent performing 1:1 timed codes.

## 2018-04-15 PROBLEM — M54.12 LEFT CERVICAL RADICULOPATHY: Status: ACTIVE | Noted: 2018-04-15

## 2018-04-17 ENCOUNTER — THERAPY VISIT (OUTPATIENT)
Dept: PHYSICAL THERAPY | Facility: CLINIC | Age: 63
End: 2018-04-17
Payer: COMMERCIAL

## 2018-04-17 DIAGNOSIS — M54.12 LEFT CERVICAL RADICULOPATHY: ICD-10-CM

## 2018-04-17 PROCEDURE — 97110 THERAPEUTIC EXERCISES: CPT | Mod: GP | Performed by: PHYSICAL THERAPIST

## 2018-04-17 PROCEDURE — 97140 MANUAL THERAPY 1/> REGIONS: CPT | Mod: GP | Performed by: PHYSICAL THERAPIST

## 2018-04-24 ENCOUNTER — OFFICE VISIT (OUTPATIENT)
Dept: CARDIOLOGY | Facility: CLINIC | Age: 63
End: 2018-04-24
Attending: INTERNAL MEDICINE
Payer: COMMERCIAL

## 2018-04-24 VITALS
DIASTOLIC BLOOD PRESSURE: 83 MMHG | SYSTOLIC BLOOD PRESSURE: 137 MMHG | OXYGEN SATURATION: 98 % | BODY MASS INDEX: 35.41 KG/M2 | HEIGHT: 62 IN | WEIGHT: 192.4 LBS | HEART RATE: 66 BPM

## 2018-04-24 DIAGNOSIS — I83.92 VARICOSE VEINS OF LEFT LOWER EXTREMITY: Primary | ICD-10-CM

## 2018-04-24 PROCEDURE — 99215 OFFICE O/P EST HI 40 MIN: CPT | Mod: ZP | Performed by: INTERNAL MEDICINE

## 2018-04-24 PROCEDURE — G0463 HOSPITAL OUTPT CLINIC VISIT: HCPCS

## 2018-04-24 ASSESSMENT — PAIN SCALES - GENERAL: PAINLEVEL: NO PAIN (0)

## 2018-04-24 NOTE — NURSING NOTE
Med Reconcile: Reviewed and verified all current medications with the patient. The updated medication list was printed and given to the patient.  Return Appointment: as needed with Dr. Obrien. Patient given instructions regarding scheduling next clinic visit. Patient demonstrated understanding of this information and agreed to call with further questions or concerns.  Patient stated she understood all health information given and agreed to call with further questions or concerns.

## 2018-04-24 NOTE — LETTER
4/24/2018      RE: Adilia Camacho  [address]  [facility] [address]       Dear Colleague,    Thank you for the opportunity to participate in the care of your patient, Adilia Camacho, at the [facility]. Please see a copy of my visit note below.             Vascular Cardiology Consultation Follow Up      CC- left calf pain and swelling    HPI: This is a 61 yo F with a history of HTN who is here for left calf pain and swelling.     She has had intermittent L calf stiffness and an intermittent bulge? She had an ultrasound in 3/16 that was negative for DVT. The pain has been on and off for the past 2 years but significantly worse during the last month. No history of clotting in her family no miscarriages. She is a banker and sits all day. She does notice some varicose veins on that LLE she has only used non Rx compression stockings irregularly.     ROS is positive for L shoulder pain as well she is seeing a chiropractor and will start physical therapy. The pain worsens when she holds her grandchild. No h/o blood clots in family and no miscarriage history.    Cardiac meds  Metoprolol XL 25mg OD         PAST MEDICAL HISTORY:  Past Medical History:   Diagnosis Date     Allergic rhinitis due to dogs      CARDIOVASCULAR SCREENING; LDL GOAL LESS THAN 130      Contraceptive management      Hypertension goal BP (blood pressure) < 140/90      Recurrent herpes labialis      Rosacea        CURRENT MEDICATIONS:  Current Outpatient Prescriptions   Medication Sig Dispense Refill     acyclovir (ZOVIRAX) 400 MG tablet TAKE 1 TABLET BY MOUTH THREE TIMES DAILY 30 tablet 5     cetirizine (ZYRTEC) 10 MG tablet Take 10 mg by mouth daily       COMPRESSION STOCKINGS 1 each daily Lower Extremity:   Knee High;  bilateral;  20-30 mm Hg.  Measure and fit.  Style and color per patient preference.  Michael Haddad per patient need. 6 each 11     fluticasone (FLONASE) 50 MCG/ACT  spray SHAKE LIQUID AND USE 2 SPRAYS IN EACH NOSTRIL TWICE DAILY 48 mL 3     Ibuprofen (ADVIL PO) Take 200 mg by mouth as needed for moderate pain       loperamide (IMODIUM) 2 MG capsule Take 2 mg by mouth 4 times daily as needed for diarrhea       metoprolol (TOPROL-XL) 25 MG 24 hr tablet Take 1 tablet (25 mg) by mouth daily 90 tablet 3     omeprazole 20 MG tablet Take 1 tablet (20 mg) by mouth daily Take 30-60 minutes before a meal. 90 tablet 3       PAST SURGICAL HISTORY:  Past Surgical History:   Procedure Laterality Date     NO HISTORY OF SURGERY         ALLERGIES   No Known Allergies    FAMILY HISTORY:  Family History   Problem Relation Age of Onset     Hypertension Father      Hypertension Brother      Hypertension Sister      DIABETES Sister      and brother     KIDNEY DISEASE Sister      DIABETES Brother      Cancer - colorectal No family hx of      Breast Cancer No family hx of      Thyroid Disease No family hx of        SOCIAL HISTORY:  Social History     Social History     Marital status:      Spouse name: Harry     Number of children: 1     Years of education: N/A     Occupational History     [employer]     Works in [employer]     Social History Main Topics     Smoking status: Former Smoker     Years: 10.00     Types: Cigarettes     Smokeless tobacco: Never Used     Alcohol use 1.0 oz/week     2 Cans of beer per week      Comment: occassionally     Drug use: No     Sexual activity: Yes     Partners: Male     Other Topics Concern     Parent/Sibling W/ Cabg, Mi Or Angioplasty Before 65f 55m? No     Social History Narrative       ROS: As per HPI  Constitutional: No fever, chills, or sweats. No weight gain/loss   ENT: No visual disturbance, ear ache, epistaxis, sore throat  Allergies/Immunologic: Negative.   Respiratory: No cough, hemoptysia  Cardiovascular: As per HPI  GI: No nausea, vomiting, hematemesis, melena, or hematochezia  : No urinary frequency, dysuria, or  "hematuria  Integument: Negative  Psychiatric: Negative  Neuro: Negative  Endocrinology: Negative   Musculoskeletal: see HPI    EXAM:  /83  Pulse 66  Ht 1.562 m (5' 1.5\")  Wt 87.3 kg (192 lb 6.4 oz)  SpO2 98%  BMI 35.76 kg/m2  In general, the patient is a pleasant female in no apparent distress.    HEENT: NC/AT.  PERRLA.  EOMI.     Neck:  Carotids +4/4 bilaterally without bruits.  No jugular venous distension.   Heart: RRR. Normal S1, S2 splits physiologically. No murmur, rub, click, or gallop.   Lungs: CTA.  No ronchi, wheezes, rales.  No dullness to percussion.   Abdomen: Soft, nontender, nondistended. No organomegaly.  No bruits.   Extremities:   The pulses are +4/4 at the radial,  DP, and PT sites bilaterally.  No bruits are noted.She has varicose veins in both legs, well hydrated skin with no ulcers  Neurologic: Alert and oriented to person/place/time, normal speech, gait and affect  Skin: No petechiae, purpura or rash.    Labs:  LIPID RESULTS:  Lab Results   Component Value Date    CHOL 156 03/24/2017    HDL 42 (L) 03/24/2017    LDL 91 03/24/2017    TRIG 115 03/24/2017    CHOLHDLRATIO 3.8 02/15/2013    NHDL 114 03/24/2017       LIVER ENZYME RESULTS:  Lab Results   Component Value Date    AST 21 02/15/2013    ALT 22 02/15/2013       CBC RESULTS:  Lab Results   Component Value Date    WBC 14.4 (H) 05/13/2014    RBC 4.71 05/13/2014    HGB 13.5 05/13/2014    HCT 40.4 05/13/2014    MCV 86 05/13/2014    MCH 28.7 05/13/2014    MCHC 33.4 05/13/2014    RDW 12.9 05/13/2014     05/13/2014       BMP RESULTS:  Lab Results   Component Value Date     02/13/2018    POTASSIUM 4.1 02/13/2018    CHLORIDE 107 02/13/2018    CO2 30 02/13/2018    ANIONGAP 4 02/13/2018     (H) 02/13/2018    BUN 15 02/13/2018    CR 0.84 02/13/2018    GFRESTIMATED 69 02/13/2018    GFRESTBLACK 83 02/13/2018    BRODY 8.8 02/13/2018        A1C RESULTS:  Lab Results   Component Value Date    A1C 6.3 (H) 03/24/2017       INR " RESULTS:  No results found for: INR    Procedures:  LLE U/S  Findings:     Right leg:  CFV: Thrombus: No, Phasic: Yes     Left leg:  CFV: Thrombus: No, Phasic: Yes  Femoral vein, proximal: Thrombus: No, Phasic: Yes  Femoral vein, mid: Thrombus: No, Phasic: Yes  Femoral vein, distal: Thrombus: No, Phasic: Yes  Popliteal vein: Thrombus: No, Phasic: Yes  PTV: Thrombus: No  Peroneal vein: Thrombus: No  GSV: Thrombus: No         Impression:  Left leg: No DVT of the left lower extremity          Venous Competency     Impression:  1. Right leg: No venous incompetence, incompetent perforators, or  incompetent varicosities identified. No deep or superficial venous  thrombosis.     2. Left leg: No venous incompetence, incompetent perforators, or  incompetent varicosities identified. No deep or superficial venous  thrombosis. There is a incompetent  vein arising off the great  saphenous vein in the mid calf which corresponds to an area that the  patient describes discomfort.     3. No popliteal cyst identified.      Assessment and Plan: 63 yo F with a history of HTN who is here for left calf pain and swelling. Her venous studies reveal a mid-calf  incompetence in the location of her pain. She will be on a 3 month GCS trial and so far no change in symptoms, and prefers definitive treatment therefore will send to my colleague Dr. Valenzuela.     Total time spent 45 minutes, of which >50% was spent in face-to-face patient evaluation, reviewing data with patient, and coordination of care.       Tracie Obrien MD MSc  Staff Cardiologist  Vascular Medicine Section  [facility]

## 2018-04-24 NOTE — MR AVS SNAPSHOT
After Visit Summary   4/24/2018    Adilia Camacho    MRN: [id number]           Patient Information     Date Of Birth          1955        Visit Information        Provider Department      4/24/2018 5:30 PM Tracie Obrien MD [facility]        Today's Diagnoses     Varicose veins of left lower extremity    -  1      Care Instructions     You were seen today in the Cardiovascular Clinic at the [facility].      Cardiology Providers you saw during your visit:  Dr. Obrien    Diagnosis:  Varicose veins    Results:  None today    Recommendations:      -Refer to Dr. Valenzuela for  vein therapy    Follow-up:  -F/U PRN      For emergencies call 911.    For any scheduling needs, please call [phone]. Option 1 then option 3    Thank you for your visit today!     Please call if you have any questions or concerns.  Bebo Jacques RN                Follow-ups after your visit        Additional Services     IR REFERRAL       [facility] IR REFERRAL  Call [phone] to schedule.    IR ARTERIAL/VENOUS/PAD referral  Procedure requested: single varicose vein ablation.  Dr. Valenzuela  Associated Diagnosis: varicose veins  Date preferred: first available    The Interventional Radiologist will consult patients for these procedures;   Peripheral arterial disease,   Angiomyolipoma   Claudication,   Varicose veins,   TIPS procedure,   lowers extremity swelling suspected venous disease   Arterial venous malformations (AVM),   venous malformation,   venous insufficiency   pelvic varicosities   Prostate artery embolization (BPH only)    If the procedure requested is not in the list above, please place this referral and call [phone].    The purpose of this referral is to make sure that   You are a candidate for this procedure  Adequate imaging is available to perform necessary procedures if indicated.    Please be aware that coverage of these services is subject to the terms and limitations of  your health insurance plan.  Call member services at your health plan with any benefit or coverage questions.       Please bring the following to your appointment:  >>   >>   Any x-rays, CTs or MRIs which have been performed related to this condition.  Please contact the facility where they were done to arrange for  prior to your scheduled appointment.   We will need both images as well as reports.  Any new CT, MRI or other procedures ordered by your specialist must be performed at a [facility] facility or coordinated by your clinic's referral office to ensure proper imaging can be obtained.     >>   List of current medications doses and, schedules.  >>   This referral request   >>   Any documents/labs given to you for this referral                  Your next 10 appointments already scheduled     May 02, 2018  7:30 AM CDT   [facility] with [facility]   [facility] ([facility])    [address] [place] [id number] [phone]              Who to contact     If you have questions or need follow up information about today's clinic visit or your schedule please contact [facility] directly at [phone].  Normal or non-critical lab and imaging results will be communicated to you by [employer], letter or phone within 4 business days after the clinic has received the results. If you do not hear from us within 7 days, please contact the clinic through [employer] or phone. If you have a critical or abnormal lab result, we will notify you by phone as soon as possible.  Submit refill requests through [employer] or call your pharmacy and they will forward the refill request to us. Please allow 3 business days for your refill to be completed.          Additional Information About Your Visit        [employer] Information     [employer] gives you secure access to your electronic health record. If you see a primary care provider, you can also send  "messages to your care team and make appointments. If you have questions, please call your primary care clinic.  If you do not have a primary care provider, please call [phone] and they will assist you.        Care EveryWhere ID     This is your Care EveryWhere ID. This could be used by other organizations to access your [facility] medical records  [id number]        Your Vitals Were     Pulse Height Pulse Oximetry BMI (Body Mass Index)          66 1.562 m (5' 1.5\") 98% 35.76 kg/m2         Blood Pressure from Last 3 Encounters:   04/24/18 137/83   03/27/18 134/84   02/19/18 (!) 158/93    Weight from Last 3 Encounters:   04/24/18 87.3 kg (192 lb 6.4 oz)   03/27/18 86.2 kg (190 lb)   02/19/18 86.4 kg (190 lb 7.6 oz)              We Performed the Following     IR REFERRAL        Primary Care Provider Office Phone # Fax #    Sheree Graf [phone] [address] [facility] [address]        Equal Access to Services     [facility] : Hadii sharlene ku martin Sovanessa, waaxda raymon, qaybta kaalgamal cheng, geovanna hernández . So [facility] [phone].    ATENCIÓN: Si habla español, tiene a cheng disposición servicios gratuitos de asistencia lingüística. [facility] [phone].    We comply with applicable federal civil rights laws and [place] laws. We do not discriminate on the basis of race, color, national origin, age, disability, sex, sexual orientation, or gender identity.            Thank you!     Thank you for choosing [facility]  for your care. Our goal is always to provide you with excellent care. Hearing back from our patients is one way we can continue to improve our services. Please take a few minutes to complete the written survey that you may receive in the mail after your visit with us. Thank you!             Your Updated Medication List - Protect others around you: Learn how to safely use, store and throw away your medicines at " www.disposemymeds.org.          This list is accurate as of 4/24/18  6:12 PM.  Always use your most recent med list.                   Brand Name Dispense Instructions for use Diagnosis    acyclovir 400 MG tablet    ZOVIRAX    30 tablet    TAKE 1 TABLET BY MOUTH THREE TIMES DAILY    Recurrent herpes labialis       ADVIL PO      Take 200 mg by mouth as needed for moderate pain        cetirizine 10 MG tablet    zyrTEC     Take 10 mg by mouth daily        COMPRESSION STOCKINGS     6 each    1 each daily Lower Extremity:   Knee High;  bilateral;  20-30 mm Hg.  Measure and fit.  Style and color per patient preference.  Doff n Catie per patient need.    Calf swelling       fluticasone 50 MCG/ACT spray    FLONASE    48 mL    SHAKE LIQUID AND USE 2 SPRAYS IN EACH NOSTRIL TWICE DAILY    Allergic rhinitis due to dogs       loperamide 2 MG capsule    IMODIUM     Take 2 mg by mouth 4 times daily as needed for diarrhea        metoprolol succinate 25 MG 24 hr tablet    TOPROL-XL    90 tablet    Take 1 tablet (25 mg) by mouth daily    Hypertension goal BP (blood pressure) < 140/90       omeprazole 20 MG tablet     90 tablet    Take 1 tablet (20 mg) by mouth daily Take 30-60 minutes before a meal.    Gastroesophageal reflux disease, esophagitis presence not specified

## 2018-04-24 NOTE — NURSING NOTE
Chief Complaint   Patient presents with     Follow Up For      63 y/o female for 1 month f/u of varicose veins. Evaluate for possible ablation     Vitals were taken and medications were reconciled.     Eloisa Nicolas RMA  5:15 PM

## 2018-04-24 NOTE — PATIENT INSTRUCTIONS
You were seen today in the Cardiovascular Clinic at the [facility].      Cardiology Providers you saw during your visit:  Dr. Obrien    Diagnosis:  Varicose veins    Results:  None today    Recommendations:      -Refer to Dr. Valenzuela for  vein therapy    Follow-up:  -F/U PRN      For emergencies call 911.    For any scheduling needs, please call [phone]. Option 1 then option 3    Thank you for your visit today!     Please call if you have any questions or concerns.  Bebo Jacques RN

## 2018-04-24 NOTE — PROGRESS NOTES
Vascular Cardiology Consultation Follow Up      CC- left calf pain and swelling    HPI: This is a 61 yo F with a history of HTN who is here for left calf pain and swelling.     She has had intermittent L calf stiffness and an intermittent bulge? She had an ultrasound in 3/16 that was negative for DVT. The pain has been on and off for the past 2 years but significantly worse during the last month. No history of clotting in her family no miscarriages. She is a banker and sits all day. She does notice some varicose veins on that LLE she has only used non Rx compression stockings irregularly.     ROS is positive for L shoulder pain as well she is seeing a chiropractor and will start physical therapy. The pain worsens when she holds her grandchild. No h/o blood clots in family and no miscarriage history.    Cardiac meds  Metoprolol XL 25mg OD         PAST MEDICAL HISTORY:  Past Medical History:   Diagnosis Date     Allergic rhinitis due to dogs      CARDIOVASCULAR SCREENING; LDL GOAL LESS THAN 130      Contraceptive management      Hypertension goal BP (blood pressure) < 140/90      Recurrent herpes labialis      Rosacea        CURRENT MEDICATIONS:  Current Outpatient Prescriptions   Medication Sig Dispense Refill     acyclovir (ZOVIRAX) 400 MG tablet TAKE 1 TABLET BY MOUTH THREE TIMES DAILY 30 tablet 5     cetirizine (ZYRTEC) 10 MG tablet Take 10 mg by mouth daily       COMPRESSION STOCKINGS 1 each daily Lower Extremity:   Knee High;  bilateral;  20-30 mm Hg.  Measure and fit.  Style and color per patient preference.  Michael Haddad per patient need. 6 each 11     fluticasone (FLONASE) 50 MCG/ACT spray SHAKE LIQUID AND USE 2 SPRAYS IN EACH NOSTRIL TWICE DAILY 48 mL 3     Ibuprofen (ADVIL PO) Take 200 mg by mouth as needed for moderate pain       loperamide (IMODIUM) 2 MG capsule Take 2 mg by mouth 4 times daily as needed for diarrhea       metoprolol (TOPROL-XL) 25 MG 24 hr tablet Take 1 tablet (25 mg) by mouth  "daily 90 tablet 3     omeprazole 20 MG tablet Take 1 tablet (20 mg) by mouth daily Take 30-60 minutes before a meal. 90 tablet 3       PAST SURGICAL HISTORY:  Past Surgical History:   Procedure Laterality Date     NO HISTORY OF SURGERY         ALLERGIES   No Known Allergies    FAMILY HISTORY:  Family History   Problem Relation Age of Onset     Hypertension Father      Hypertension Brother      Hypertension Sister      DIABETES Sister      and brother     KIDNEY DISEASE Sister      DIABETES Brother      Cancer - colorectal No family hx of      Breast Cancer No family hx of      Thyroid Disease No family hx of        SOCIAL HISTORY:  Social History     Social History     Marital status:      Spouse name: Harry     Number of children: 1     Years of education: N/A     Occupational History     [employer]     Works in [employer]     Social History Main Topics     Smoking status: Former Smoker     Years: 10.00     Types: Cigarettes     Smokeless tobacco: Never Used     Alcohol use 1.0 oz/week     2 Cans of beer per week      Comment: occassionally     Drug use: No     Sexual activity: Yes     Partners: Male     Other Topics Concern     Parent/Sibling W/ Cabg, Mi Or Angioplasty Before 65f 55m? No     Social History Narrative       ROS: As per HPI  Constitutional: No fever, chills, or sweats. No weight gain/loss   ENT: No visual disturbance, ear ache, epistaxis, sore throat  Allergies/Immunologic: Negative.   Respiratory: No cough, hemoptysia  Cardiovascular: As per HPI  GI: No nausea, vomiting, hematemesis, melena, or hematochezia  : No urinary frequency, dysuria, or hematuria  Integument: Negative  Psychiatric: Negative  Neuro: Negative  Endocrinology: Negative   Musculoskeletal: see HPI    EXAM:  /83  Pulse 66  Ht 1.562 m (5' 1.5\")  Wt 87.3 kg (192 lb 6.4 oz)  SpO2 98%  BMI 35.76 kg/m2  In general, the patient is a pleasant female in no apparent distress.    HEENT: NC/AT.  " PERRLA.  EOMI.     Neck:  Carotids +4/4 bilaterally without bruits.  No jugular venous distension.   Heart: RRR. Normal S1, S2 splits physiologically. No murmur, rub, click, or gallop.   Lungs: CTA.  No ronchi, wheezes, rales.  No dullness to percussion.   Abdomen: Soft, nontender, nondistended. No organomegaly.  No bruits.   Extremities:   The pulses are +4/4 at the radial,  DP, and PT sites bilaterally.  No bruits are noted.She has varicose veins in both legs, well hydrated skin with no ulcers  Neurologic: Alert and oriented to person/place/time, normal speech, gait and affect  Skin: No petechiae, purpura or rash.    Labs:  LIPID RESULTS:  Lab Results   Component Value Date    CHOL 156 03/24/2017    HDL 42 (L) 03/24/2017    LDL 91 03/24/2017    TRIG 115 03/24/2017    CHOLHDLRATIO 3.8 02/15/2013    NHDL 114 03/24/2017       LIVER ENZYME RESULTS:  Lab Results   Component Value Date    AST 21 02/15/2013    ALT 22 02/15/2013       CBC RESULTS:  Lab Results   Component Value Date    WBC 14.4 (H) 05/13/2014    RBC 4.71 05/13/2014    HGB 13.5 05/13/2014    HCT 40.4 05/13/2014    MCV 86 05/13/2014    MCH 28.7 05/13/2014    MCHC 33.4 05/13/2014    RDW 12.9 05/13/2014     05/13/2014       BMP RESULTS:  Lab Results   Component Value Date     02/13/2018    POTASSIUM 4.1 02/13/2018    CHLORIDE 107 02/13/2018    CO2 30 02/13/2018    ANIONGAP 4 02/13/2018     (H) 02/13/2018    BUN 15 02/13/2018    CR 0.84 02/13/2018    GFRESTIMATED 69 02/13/2018    GFRESTBLACK 83 02/13/2018    BRODY 8.8 02/13/2018        A1C RESULTS:  Lab Results   Component Value Date    A1C 6.3 (H) 03/24/2017       INR RESULTS:  No results found for: INR    Procedures:  LLE U/S  Findings:     Right leg:  CFV: Thrombus: No, Phasic: Yes     Left leg:  CFV: Thrombus: No, Phasic: Yes  Femoral vein, proximal: Thrombus: No, Phasic: Yes  Femoral vein, mid: Thrombus: No, Phasic: Yes  Femoral vein, distal: Thrombus: No, Phasic: Yes  Popliteal vein:  Thrombus: No, Phasic: Yes  PTV: Thrombus: No  Peroneal vein: Thrombus: No  GSV: Thrombus: No         Impression:  Left leg: No DVT of the left lower extremity          Venous Competency     Impression:  1. Right leg: No venous incompetence, incompetent perforators, or  incompetent varicosities identified. No deep or superficial venous  thrombosis.     2. Left leg: No venous incompetence, incompetent perforators, or  incompetent varicosities identified. No deep or superficial venous  thrombosis. There is a incompetent  vein arising off the great  saphenous vein in the mid calf which corresponds to an area that the  patient describes discomfort.     3. No popliteal cyst identified.      Assessment and Plan: 61 yo F with a history of HTN who is here for left calf pain and swelling. Her venous studies reveal a mid-calf  incompetence in the location of her pain. She will be on a 3 month GCS trial and so far no change in symptoms, and prefers definitive treatment therefore will send to my colleague Dr. Valenzuela.     Total time spent 45 minutes, of which >50% was spent in face-to-face patient evaluation, reviewing data with patient, and coordination of care.       Tracie Obrien MD MSc  Staff Cardiologist  Vascular Medicine Section  [facility]

## 2018-04-25 ENCOUNTER — TELEPHONE (OUTPATIENT)
Dept: RADIOLOGY | Facility: CLINIC | Age: 63
End: 2018-04-25

## 2018-04-25 NOTE — TELEPHONE ENCOUNTER
Attempted to call pt to schedule an appt with Dr. Valenzuela (5/7/18) if pt is available, or at her convenience.

## 2018-05-01 DIAGNOSIS — K21.9 GASTROESOPHAGEAL REFLUX DISEASE, ESOPHAGITIS PRESENCE NOT SPECIFIED: ICD-10-CM

## 2018-05-02 ENCOUNTER — THERAPY VISIT (OUTPATIENT)
Dept: PHYSICAL THERAPY | Facility: CLINIC | Age: 63
End: 2018-05-02
Payer: COMMERCIAL

## 2018-05-02 DIAGNOSIS — M54.12 LEFT CERVICAL RADICULOPATHY: ICD-10-CM

## 2018-05-02 PROCEDURE — 97110 THERAPEUTIC EXERCISES: CPT | Mod: GP | Performed by: PHYSICAL THERAPIST

## 2018-05-02 PROCEDURE — 97140 MANUAL THERAPY 1/> REGIONS: CPT | Mod: GP | Performed by: PHYSICAL THERAPIST

## 2018-05-02 NOTE — TELEPHONE ENCOUNTER
"Requested Prescriptions   Pending Prescriptions Disp Refills     omeprazole (PRILOSEC) 20 MG CR capsule [Pharmacy Med Name: OMEPRAZOLE 20MG CAPSULES]  Last Written Prescription Date:  3/20/2017  Last Fill Quantity: 90 tablet,  # refills: 3   Last Office Visit: 2/13/2018   Future Office Visit:      90 capsule 0     Sig: TAKE 1 CAPSULE BY MOUTH DAILY 30 TO 60 MINUTES BEFORE A MEAL    PPI Protocol Passed    5/1/2018  9:23 PM       Passed - Not on Clopidogrel (unless Pantoprazole ordered)       Passed - No diagnosis of osteoporosis on record       Passed - Recent (12 mo) or future (30 days) visit within the authorizing provider's specialty    Patient had office visit in the last 12 months or has a visit in the next 30 days with authorizing provider or within the authorizing provider's specialty.  See \"Patient Info\" tab in inbasket, or \"Choose Columns\" in Meds & Orders section of the refill encounter.           Passed - Patient is age 18 or older       Passed - No active pregnacy on record       Passed - No positive pregnancy test in past 12 months          "

## 2018-05-03 ASSESSMENT — ENCOUNTER SYMPTOMS
MYALGIAS: 1
JOINT SWELLING: 0
ARTHRALGIAS: 1
MUSCLE CRAMPS: 0
MUSCLE WEAKNESS: 0
BACK PAIN: 0
NECK PAIN: 1
STIFFNESS: 0

## 2018-05-07 ENCOUNTER — OFFICE VISIT (OUTPATIENT)
Dept: RADIOLOGY | Facility: CLINIC | Age: 63
End: 2018-05-07
Payer: COMMERCIAL

## 2018-05-07 VITALS
OXYGEN SATURATION: 96 % | DIASTOLIC BLOOD PRESSURE: 79 MMHG | HEART RATE: 72 BPM | RESPIRATION RATE: 17 BRPM | SYSTOLIC BLOOD PRESSURE: 148 MMHG

## 2018-05-07 DIAGNOSIS — I83.92 VARICOSE VEINS OF LEFT LOWER EXTREMITY: Primary | ICD-10-CM

## 2018-05-07 ASSESSMENT — PAIN SCALES - GENERAL: PAINLEVEL: MILD PAIN (2)

## 2018-05-07 NOTE — NURSING NOTE
Chief Complaint   Patient presents with     Consult     Consult varicose veins (left leg)        Vitals:    05/07/18 0743   BP: 148/79   BP Location: Left arm   Pulse: 72   Resp: 17   SpO2: 96%       There is no height or weight on file to calculate BMI.           Madie Zarate LPN

## 2018-05-07 NOTE — PATIENT INSTRUCTIONS
1. We will have you return in 2 mos after your Compression stocking trial period. This appt is needed for documentation prior to insurance approval.       2. US competency test of the left leg.       It was a pleasure to see you today, please call with any other questions.    Merle Barton RN, BSN  Interventional Radiology Nurse Coordinator   Phone: [phone]

## 2018-05-07 NOTE — MR AVS SNAPSHOT
After Visit Summary   5/7/2018    Adilia Camacho    MRN: [id number]           Patient Information     Date Of Birth          1955        Visit Information        Provider Department      5/7/2018 7:40 AM Reyes Valenzuela MD [facility]        Today's Diagnoses     Varicose veins of left lower extremity    -  1      Care Instructions    1. We will have you return in 2 mos after your Compression stocking trial period. This appt is needed for documentation prior to insurance approval.       2. US competency test of the left leg.       It was a pleasure to see you today, please call with any other questions.    Merle Barton RN, BSN  Interventional Radiology Nurse Coordinator   Phone: [phone]            Follow-ups after your visit        Your next 10 appointments already scheduled     May 08, 2018  3:50 PM CDT   [facility] Spine with Mercedes Brambila PT   [facility] ([facility])    [address] [place] [id number] [phone]            May 23, 2018  4:00 PM CDT   MART Spine with Soco Moulton PT   [facility] ([facility])    [address] [place] [address] [phone]              Future tests that were ordered for you today     Open Future Orders        Priority Expected Expires Ordered    US Venous Competency Left Routine  5/7/2019 5/7/2018            Who to contact     Please call your clinic at [phone] to:    Ask questions about your health    Make or cancel appointments    Discuss your medicines    Learn about your test results    Speak to your doctor            Additional Information About Your Visit        MyChart Information     [employer] gives you secure access to your electronic health record. If you see a primary care provider, you can also send messages to your care team and make appointments. If you have  questions, please call your primary care clinic.  If you do not have a primary care provider, please call [phone] and they will assist you.      [employer] is an electronic gateway that provides easy, online access to your medical records. With [employer], you can request a clinic appointment, read your test results, renew a prescription or communicate with your care team.     To access your existing account, please contact your [facility] or call [phone] for assistance.        Care EveryWhere ID     This is your Care EveryWhere ID. This could be used by other organizations to access your [place] medical records  [id number]        Your Vitals Were     Pulse Respirations Pulse Oximetry Breastfeeding?          72 17 96% No         Blood Pressure from Last 3 Encounters:   05/07/18 148/79   04/24/18 137/83   03/27/18 134/84    Weight from Last 3 Encounters:   04/24/18 87.3 kg (192 lb 6.4 oz)   03/27/18 86.2 kg (190 lb)   02/19/18 86.4 kg (190 lb 7.6 oz)               Primary Care Provider Office Phone # Fax #    Sheree Graf [phone]       [address] [facility] [address]        Equal Access to Services     [facility] : Hadii aad ku hadasho Soomaali, waaxda luqadaha, qaybta kaalmada adeegyada, waxay idiin haysaigen johann crystal. So [facility] [phone].    ATENCIÓN: Si habla español, tiene a cheng disposición servicios gratuitos de asistencia lingüística. Llame al [phone].    We comply with applicable federal civil rights laws and [place] laws. We do not discriminate on the basis of race, color, national origin, age, disability, sex, sexual orientation, or gender identity.            Thank you!     Thank you for choosing [facility]  for your care. Our goal is always to provide you with excellent care. Hearing back from our patients is one way we can continue to improve our services. Please take a few minutes to complete the written  survey that you may receive in the mail after your visit with us. Thank you!             Your Updated Medication List - Protect others around you: Learn how to safely use, store and throw away your medicines at www.disposemymeds.org.          This list is accurate as of 5/7/18  8:35 AM.  Always use your most recent med list.                   Brand Name Dispense Instructions for use Diagnosis    acyclovir 400 MG tablet    ZOVIRAX    30 tablet    TAKE 1 TABLET BY MOUTH THREE TIMES DAILY    Recurrent herpes labialis       ADVIL PO      Take 200 mg by mouth as needed for moderate pain        cetirizine 10 MG tablet    zyrTEC     Take 10 mg by mouth daily        COMPRESSION STOCKINGS     6 each    1 each daily Lower Extremity:   Knee High;  bilateral;  20-30 mm Hg.  Measure and fit.  Style and color per patient preference.  Doff n Catie per patient need.    Calf swelling       fluticasone 50 MCG/ACT spray    FLONASE    48 mL    SHAKE LIQUID AND USE 2 SPRAYS IN EACH NOSTRIL TWICE DAILY    Allergic rhinitis due to dogs       loperamide 2 MG capsule    IMODIUM     Take 2 mg by mouth 4 times daily as needed for diarrhea        metoprolol succinate 25 MG 24 hr tablet    TOPROL-XL    90 tablet    Take 1 tablet (25 mg) by mouth daily    Hypertension goal BP (blood pressure) < 140/90       omeprazole 20 MG CR capsule    priLOSEC    90 capsule    TAKE 1 CAPSULE BY MOUTH DAILY 30 TO 60 MINUTES BEFORE A MEAL    Gastroesophageal reflux disease, esophagitis presence not specified

## 2018-05-07 NOTE — PROGRESS NOTES
INTERVENTIONAL RADIOLOGY CONSULTATION    Name: Adilia Camacho  Age: 62 year old   Referring Physician: Dr. Obrien   REASON FOR REFERRAL: Varicose Veins     HPI: 62 year old female with focal pain in her left calf presents for definitive treatment evaluation.  Compression stockings have not provided adequate relief.  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  She tries to walk and does ok with her compression stockings. She feels small bulging veins in the area.    PAST MEDICAL HISTORY:   Past Medical History:   Diagnosis Date     Allergic rhinitis due to dogs      CARDIOVASCULAR SCREENING; LDL GOAL LESS THAN 130      Contraceptive management      Hypertension goal BP (blood pressure) < 140/90      Recurrent herpes labialis      Rosacea        PAST SURGICAL HISTORY:   No prior venous intervention    FAMILY HISTORY:   No hx of venous incompetency or vein treatments that she knows of.    PROBLEM LIST:   HTN  Varicose Vein  LE swelling    MEDICATIONS:   Prescription Medications as of 5/7/2018             acyclovir (ZOVIRAX) 400 MG tablet TAKE 1 TABLET BY MOUTH THREE TIMES DAILY    cetirizine (ZYRTEC) 10 MG tablet Take 10 mg by mouth daily    COMPRESSION STOCKINGS 1 each daily Lower Extremity:   Knee High;  bilateral;  20-30 mm Hg.  Measure and fit.  Style and color per patient preference.  Doff n Catie per patient need.    fluticasone (FLONASE) 50 MCG/ACT spray SHAKE LIQUID AND USE 2 SPRAYS IN EACH NOSTRIL TWICE DAILY    Ibuprofen (ADVIL PO) Take 200 mg by mouth as needed for moderate pain    loperamide (IMODIUM) 2 MG capsule Take 2 mg by mouth 4 times daily as needed for diarrhea    metoprolol (TOPROL-XL) 25 MG 24 hr tablet Take 1 tablet (25 mg) by mouth daily    omeprazole (PRILOSEC) 20 MG CR capsule TAKE 1 CAPSULE BY MOUTH DAILY 30 TO 60 MINUTES BEFORE A MEAL          ALLERGIES:   Review of patient's allergies indicates no known  allergies.    ROS:  Answers for HPI/ROS submitted by the patient on 5/3/2018   General Symptoms: No  Skin Symptoms: No  HENT Symptoms: No  EYE SYMPTOMS: No  HEART SYMPTOMS: No  LUNG SYMPTOMS: No  INTESTINAL SYMPTOMS: No  URINARY SYMPTOMS: No  GYNECOLOGIC SYMPTOMS: No  BREAST SYMPTOMS: No  SKELETAL SYMPTOMS: Yes  BLOOD SYMPTOMS: No  NERVOUS SYSTEM SYMPTOMS: No  MENTAL HEALTH SYMPTOMS: No  Back pain: No  Muscle aches: Yes  Neck pain: Yes  Swollen joints: No  Joint pain: Yes  Bone pain: No  Muscle cramps: No  Muscle weakness: No  Joint stiffness: No  Bone fracture: No        Physical Examination:   Constitutional: healthy, alert and no distress  Cardiovascular: negative  Respiratory: negative  Skin: small telangiectasias on the posterior left calf medially  LYMPH: No significant LE edema.  Vascular: No bulging varicosities seen grossly. No skin changes     Diagnostic studies:   Venous incompetency study from 4/6/18 shows no venous insufficiency. A  is identfied from the SSV on the left, but it is competent.    Focused ultrasound in office today shows some relatively large superficial branching veins in the posterior left calf in the vicinity of her greatest symptoms.  The SSV  is identified and in addition, a large  medially from the GSV is identified.  It did not appear incompetent, but full evaluation was not possible in the office.    Assessment & Plan   Focal left posterior calf pain thought to be due to varicose veins is worse while sitting at work and with exercise. Partial relief from 3-4 weeks of 20-30 mmHg stockings.      Her formal venous incompetency study was negative however. A focused ultrasound in the office today revealed a large  medially from the GSV which as far as I can tell was not examined on the formal study.    I would like to have this specific area interrogated in our vascular lab again to be sure there is no incompetent  present.  In the mean  time, she will need to have another 2 months of conservative management with GCS. We will see her back in clinic in 2 months after ultrasound is completed.     I have seen and evaluated this patient with the interventional radiology fellow and agree with the above findings, assessment, and plan.     I spent a total of 30 minutes with this patient, >50% of which was spent counseling.       Jose Dale MD and Reyes Valenzuela MD  Vascular and Interventional Radiology Fellow  [facility]  Patient Care Team:  Sheree Graf MD as PCP - General (Family Practice)  Dwain Flannery MD as MD (Family Medicine - Sports Medicine)  Tracie Tran MD as MD (Cardiology)  TRACIE TRAN

## 2018-05-07 NOTE — LETTER
5/7/2018       RE: Adilia Camacho  [address]  [facility] [address]     Dear Colleague,    Thank you for referring your patient, Adilia Camacho, to the [facility] at [facility]. Please see a copy of my visit note below.        INTERVENTIONAL RADIOLOGY CONSULTATION    Name: Adilia Camacho  Age: 62 year old   Referring Physician: Dr. Obrien   REASON FOR REFERRAL: Varicose Veins     HPI: 62 year old female with focal pain in her left calf presents for definitive treatment evaluation.  Compression stockings have not provided adequate relief.  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  She tries to walk and does ok with her compression stockings. She feels small bulging veins in the area.    PAST MEDICAL HISTORY:   Past Medical History:   Diagnosis Date     Allergic rhinitis due to dogs      CARDIOVASCULAR SCREENING; LDL GOAL LESS THAN 130      Contraceptive management      Hypertension goal BP (blood pressure) < 140/90      Recurrent herpes labialis      Rosacea        PAST SURGICAL HISTORY:   No prior venous intervention    FAMILY HISTORY:   No hx of venous incompetency or vein treatments that she knows of.    PROBLEM LIST:   HTN  Varicose Vein  LE swelling    MEDICATIONS:   Prescription Medications as of 5/7/2018             acyclovir (ZOVIRAX) 400 MG tablet TAKE 1 TABLET BY MOUTH THREE TIMES DAILY    cetirizine (ZYRTEC) 10 MG tablet Take 10 mg by mouth daily    COMPRESSION STOCKINGS 1 each daily Lower Extremity:   Knee High;  bilateral;  20-30 mm Hg.  Measure and fit.  Style and color per patient preference.  Doamaya n Catie per patient need.    fluticasone (FLONASE) 50 MCG/ACT spray SHAKE LIQUID AND USE 2 SPRAYS IN EACH NOSTRIL TWICE DAILY    Ibuprofen (ADVIL PO) Take 200 mg by mouth as needed for moderate pain    loperamide (IMODIUM) 2 MG capsule Take 2 mg by mouth 4 times daily as needed for  diarrhea    metoprolol (TOPROL-XL) 25 MG 24 hr tablet Take 1 tablet (25 mg) by mouth daily    omeprazole (PRILOSEC) 20 MG CR capsule TAKE 1 CAPSULE BY MOUTH DAILY 30 TO 60 MINUTES BEFORE A MEAL          ALLERGIES:   Review of patient's allergies indicates no known allergies.    ROS:    Physical Examination:   Constitutional: healthy, alert and no distress  Cardiovascular: negative  Respiratory: negative  Skin: small telangiectasias on the posterior left calf medially  LYMPH: No significant LE edema.  Vascular: No bulging varicosities seen grossly. No skin changes     Diagnostic studies:   Venous incompetency study from 4/6/18 shows no venous insufficiency. A  is identfied from the SSV on the left, but it is competent.    Focused ultrasound in office today shows some relatively large superficial branching veins in the posterior left calf in the vicinity of her greatest symptoms.  The SSV  is identified and in addition, a large  medially from the GSV is identified.  It did not appear incompetent, but full evaluation was not possible in the office.    Assessment & Plan   Focal left posterior calf pain thought to be due to varicose veins is worse while sitting at work and with exercise. Partial relief from 3-4 weeks of 20-30 mmHg stockings.      Her formal venous incompetency study was negative however. A focused ultrasound in the office today revealed a large  medially from the GSV which as far as I can tell was not examined on the formal study.    I would like to have this specific area interrogated in our vascular lab again to be sure there is no incompetent  present.  In the mean time, she will need to have another 2 months of conservative management with GCS. We will see her back in clinic in 2 months after ultrasound is completed.     I have seen and evaluated this patient with the interventional radiology fellow and agree with the above findings, assessment, and  plan.     I spent a total of 30 minutes with this patient, >50% of which was spent counseling.       Jose Dale MD and Reyes Valenzuela MD  Vascular and Interventional Radiology Fellow  [facility]        Again, thank you for allowing me to participate in the care of your patient.      Sincerely,    Reyes Valenzuela MD      Patient Care Team:  Sheree Graf MD as PCP - General (Family Practice)  Dwain Flannery MD as MD (Family Medicine - Sports Medicine)  Tracie Tran MD as MD (Cardiology)  TRACIE TRAN

## 2018-05-11 ENCOUNTER — RADIANT APPOINTMENT (OUTPATIENT)
Dept: ULTRASOUND IMAGING | Facility: CLINIC | Age: 63
End: 2018-05-11
Attending: RADIOLOGY
Payer: COMMERCIAL

## 2018-05-11 DIAGNOSIS — I83.92 VARICOSE VEINS OF LEFT LOWER EXTREMITY: ICD-10-CM

## 2018-05-23 ENCOUNTER — THERAPY VISIT (OUTPATIENT)
Dept: PHYSICAL THERAPY | Facility: CLINIC | Age: 63
End: 2018-05-23
Payer: COMMERCIAL

## 2018-05-23 DIAGNOSIS — M54.12 LEFT CERVICAL RADICULOPATHY: ICD-10-CM

## 2018-05-23 PROCEDURE — 97530 THERAPEUTIC ACTIVITIES: CPT | Mod: GP | Performed by: PHYSICAL THERAPIST

## 2018-05-23 PROCEDURE — 97110 THERAPEUTIC EXERCISES: CPT | Mod: GP | Performed by: PHYSICAL THERAPIST

## 2018-05-23 PROCEDURE — 97140 MANUAL THERAPY 1/> REGIONS: CPT | Mod: GP | Performed by: PHYSICAL THERAPIST

## 2018-06-08 ENCOUNTER — THERAPY VISIT (OUTPATIENT)
Dept: PHYSICAL THERAPY | Facility: CLINIC | Age: 63
End: 2018-06-08
Payer: COMMERCIAL

## 2018-06-08 DIAGNOSIS — M54.12 LEFT CERVICAL RADICULOPATHY: ICD-10-CM

## 2018-06-08 PROCEDURE — 97530 THERAPEUTIC ACTIVITIES: CPT | Mod: GP | Performed by: PHYSICAL THERAPIST

## 2018-06-08 PROCEDURE — 97140 MANUAL THERAPY 1/> REGIONS: CPT | Mod: GP | Performed by: PHYSICAL THERAPIST

## 2018-06-08 PROCEDURE — 97110 THERAPEUTIC EXERCISES: CPT | Mod: GP | Performed by: PHYSICAL THERAPIST

## 2018-06-13 ENCOUNTER — THERAPY VISIT (OUTPATIENT)
Dept: PHYSICAL THERAPY | Facility: CLINIC | Age: 63
End: 2018-06-13
Payer: COMMERCIAL

## 2018-06-13 DIAGNOSIS — M54.12 LEFT CERVICAL RADICULOPATHY: ICD-10-CM

## 2018-06-13 PROCEDURE — 97110 THERAPEUTIC EXERCISES: CPT | Mod: GP | Performed by: PHYSICAL THERAPIST

## 2018-06-13 PROCEDURE — 97530 THERAPEUTIC ACTIVITIES: CPT | Mod: GP | Performed by: PHYSICAL THERAPIST

## 2018-06-13 PROCEDURE — 97140 MANUAL THERAPY 1/> REGIONS: CPT | Mod: GP | Performed by: PHYSICAL THERAPIST

## 2018-06-13 NOTE — MR AVS SNAPSHOT
After Visit Summary   6/13/2018    Adilia Camacho    MRN: [id number]           Patient Information     Date Of Birth          1955        Visit Information        Provider Department      6/13/2018 8:50 AM Amparo Sexton PT [facility]        Today's Diagnoses     Left cervical radiculopathy           Follow-ups after your visit        Your next 10 appointments already scheduled     Jun 25, 2018  4:30 PM CDT   [facility] Spine with Amparo Sexton PT   [facility]    [address] [facility] [id number] [phone]            Jul 23, 2018  7:40 AM CDT   (Arrive by 7:25 AM)   Return Vascular Visit with Reyes Valenzuela MD   [facility] ([facility])    [address] [facility]  [address] [facility] [id number] [phone]              Who to contact     If you have questions or need follow up information about today's clinic visit or your schedule please contact [facility] directly at [phone].  Normal or non-critical lab and imaging results will be communicated to you by [employer], letter or phone within 4 business days after the clinic has received the results. If you do not hear from us within 7 days, please contact the clinic through [employer] or phone. If you have a critical or abnormal lab result, we will notify you by phone as soon as possible.  Submit refill requests through [employer] or call your pharmacy and they will forward the refill request to us. Please allow 3 business days for your refill to be completed.          Additional Information About Your Visit        [employer] Information     [employer] gives you secure access to your electronic health record. If you see a primary care provider, you can also send messages to your care team and make appointments. If you have questions, please call your primary care clinic.   If you do not have a primary care provider, please call [phone] and they will assist you.        Care EveryWhere ID     This is your Care EveryWhere ID. This could be used by other organizations to access your [place] medical records  [id number]         Blood Pressure from Last 3 Encounters:   05/07/18 148/79   04/24/18 137/83   03/27/18 134/84    Weight from Last 3 Encounters:   04/24/18 87.3 kg (192 lb 6.4 oz)   03/27/18 86.2 kg (190 lb)   02/19/18 86.4 kg (190 lb 7.6 oz)              We Performed the Following     [facility] PROGRESS NOTES REPORT     MANUAL THER TECH,1+REGIONS,EA 15 MIN     THERAPEUTIC ACTIVITIES     THERAPEUTIC EXERCISES        Primary Care Provider Office Phone # Fax #    Sheree Graf [phone]       [address]  [facility] [address]        Equal Access to Services     [facility] : Hadii aad ku hadasho Soomaali, waaxda luqadaha, qaybta kaalmada adeegyada, waxay idiin hayaan adeliborio hernández . So [facility] [phone].    ATENCIÓN: Si habla español, tiene a cheng disposición servicios gratuitos de asistencia lingüística. Frederic al [phone].    We comply with applicable federal civil rights laws and [place] laws. We do not discriminate on the basis of race, color, national origin, age, disability, sex, sexual orientation, or gender identity.            Thank you!     Thank you for choosing [facility] [place]  for your care. Our goal is always to provide you with excellent care. Hearing back from our patients is one way we can continue to improve our services. Please take a few minutes to complete the written survey that you may receive in the mail after your visit with us. Thank you!             Your Updated Medication List - Protect others around you: Learn how to safely use, store and throw away your medicines at www.disposemymeds.org.          This list is accurate as of 6/13/18 10:28 AM.  Always use your most recent med list.                    Brand Name Dispense Instructions for use Diagnosis    acyclovir 400 MG tablet    ZOVIRAX    30 tablet    TAKE 1 TABLET BY MOUTH THREE TIMES DAILY    Recurrent herpes labialis       ADVIL PO      Take 200 mg by mouth as needed for moderate pain        cetirizine 10 MG tablet    zyrTEC     Take 10 mg by mouth daily        COMPRESSION STOCKINGS     6 each    1 each daily Lower Extremity:   Knee High;  bilateral;  20-30 mm Hg.  Measure and fit.  Style and color per patient preference.  Doff n Catie per patient need.    Calf swelling       fluticasone 50 MCG/ACT spray    FLONASE    48 mL    SHAKE LIQUID AND USE 2 SPRAYS IN EACH NOSTRIL TWICE DAILY    Allergic rhinitis due to dogs       loperamide 2 MG capsule    IMODIUM     Take 2 mg by mouth 4 times daily as needed for diarrhea        metoprolol succinate 25 MG 24 hr tablet    TOPROL-XL    90 tablet    Take 1 tablet (25 mg) by mouth daily    Hypertension goal BP (blood pressure) < 140/90       omeprazole 20 MG CR capsule    priLOSEC    90 capsule    TAKE 1 CAPSULE BY MOUTH DAILY 30 TO 60 MINUTES BEFORE A MEAL    Gastroesophageal reflux disease, esophagitis presence not specified

## 2018-06-13 NOTE — PROGRESS NOTES
"Subjective:  HPI                    Objective:  System    Physical Exam    General     ROS    Assessment/Plan:    PROGRESS  REPORT    Progress reporting period is from 41018 to 6/13/18.       SUBJECTIVE  Subjective: Pt stated that the sx comes and goes, strength is better, if she does too much then sx increase, working on posture, working on work station to have it be better for her, doing exercises, not doing retractions \"as much\", pain when SB L. pain is mostly left neck into shoulder, occasionally down arm.    Current Pain level: No change.      Initial Pain level: 4/10 (10/10 at worst).   Changes in function:  Yes (See Goal flowsheet attached for changes in current functional level)  Adverse reaction to treatment or activity: None    OBJECTIVE  Changes noted in objective findings:  Yes.  Objective: cervical flexion: 50%, extension 25%, side bend B 50%, rotation B 75%, sidebend L has pain, shoulder motion  WNL no increased pain, compression cervical (-), spurlings B (-), ulnar nerve and median nerve tension tests (+) on L, hypomobility of t-spine, point tenderness of UT, tightness of UT, pec minor. normal first rib mobility.      ASSESSMENT/PLAN  Updated problem list and treatment plan: Diagnosis 1:  L cervical radiculopathy  Pain -  self management, education and home program  Decreased ROM/flexibility - manual therapy, therapeutic exercise, therapeutic activity and home program  Decreased joint mobility - manual therapy, therapeutic exercise, therapeutic activity and home program  Decreased strength - therapeutic exercise, therapeutic activities and home program  Decreased proprioception - neuro re-education, therapeutic activities and home program  Impaired muscle performance - neuro re-education and home program  Decreased function - therapeutic activities and home program  Impaired posture - neuro re-education, therapeutic activities and home program  STG/LTGs have been met or progress has been made towards " goals:  Yes (See Goal flow sheet completed today.)  Assessment of Progress: Patient is meeting short term goals and is progressing towards long term goals.  Self Management Plans:  Patient has been instructed in a home treatment program.  Patient  has been instructed in self management of symptoms.  I have re-evaluated this patient and find that the nature, scope, duration and intensity of the therapy is appropriate for the medical condition of the patient.  Adilia continues to require the following intervention to meet STG and LTG's:  PT    Recommendations:  This patient would benefit from continued therapy.     Frequency:  1 X week, once daily  Duration:  for 4 weeks tapering to 2 X a month over 8 weeks        Please refer to the daily flowsheet for treatment today, total treatment time and time spent performing 1:1 timed codes.

## 2018-06-25 ENCOUNTER — THERAPY VISIT (OUTPATIENT)
Dept: PHYSICAL THERAPY | Facility: CLINIC | Age: 63
End: 2018-06-25
Payer: COMMERCIAL

## 2018-06-25 DIAGNOSIS — M54.12 LEFT CERVICAL RADICULOPATHY: ICD-10-CM

## 2018-06-25 PROCEDURE — 97110 THERAPEUTIC EXERCISES: CPT | Mod: GP | Performed by: PHYSICAL THERAPIST

## 2018-06-25 PROCEDURE — 97140 MANUAL THERAPY 1/> REGIONS: CPT | Mod: GP | Performed by: PHYSICAL THERAPIST

## 2018-06-25 PROCEDURE — 97112 NEUROMUSCULAR REEDUCATION: CPT | Mod: GP | Performed by: PHYSICAL THERAPIST

## 2018-07-18 ENCOUNTER — THERAPY VISIT (OUTPATIENT)
Dept: PHYSICAL THERAPY | Facility: CLINIC | Age: 63
End: 2018-07-18
Payer: COMMERCIAL

## 2018-07-18 DIAGNOSIS — M54.12 LEFT CERVICAL RADICULOPATHY: ICD-10-CM

## 2018-07-18 PROCEDURE — 97140 MANUAL THERAPY 1/> REGIONS: CPT | Mod: GP | Performed by: PHYSICAL THERAPIST

## 2018-07-18 PROCEDURE — 97110 THERAPEUTIC EXERCISES: CPT | Mod: GP | Performed by: PHYSICAL THERAPIST

## 2018-07-18 PROCEDURE — 97112 NEUROMUSCULAR REEDUCATION: CPT | Mod: GP | Performed by: PHYSICAL THERAPIST

## 2018-07-27 ASSESSMENT — ENCOUNTER SYMPTOMS
NECK PAIN: 1
MUSCLE WEAKNESS: 0
STIFFNESS: 0
MYALGIAS: 0
BACK PAIN: 0
MUSCLE CRAMPS: 0
ARTHRALGIAS: 0
JOINT SWELLING: 0

## 2018-07-30 ENCOUNTER — OFFICE VISIT (OUTPATIENT)
Dept: RADIOLOGY | Facility: CLINIC | Age: 63
End: 2018-07-30
Payer: COMMERCIAL

## 2018-07-30 VITALS
HEART RATE: 77 BPM | SYSTOLIC BLOOD PRESSURE: 132 MMHG | RESPIRATION RATE: 16 BRPM | OXYGEN SATURATION: 98 % | DIASTOLIC BLOOD PRESSURE: 65 MMHG

## 2018-07-30 DIAGNOSIS — I87.2 VENOUS (PERIPHERAL) INSUFFICIENCY: Primary | ICD-10-CM

## 2018-07-30 ASSESSMENT — PAIN SCALES - GENERAL: PAINLEVEL: MODERATE PAIN (5)

## 2018-07-30 NOTE — PATIENT INSTRUCTIONS
We will submit the Prior Auth today for varicose vein treatment. This should take 7-14 days.     Please contact me if you do not hear anything within a week.     Thank you,   Merle Anderson RN, BSN  Interventional Radiology Nurse Coordinator   Phone: [phone]

## 2018-07-30 NOTE — LETTER
7/30/2018       RE: Adilia Camacho  [address]  [facility] [address]     Dear Colleague,    Thank you for referring your patient, Adilia Camacho, to the [facility] at [facility]. Please see a copy of my visit note below.    Patient returns for follow up of left calf pain after 3 months of compression garment use.  She continues to have daily pain in the left calf which is improved but not resolved with compression stockings.  Pain remains lifestyle limiting requiring her to lay down with her feet up at night.  At the last visit, a large GSV associated  and associated branches were identified on a bedside exam performed by me. This area was interrogated formally in the vascular lab revealing incompetence of this  and a few GSV branches. She returns to discuss treatment options. No major changes to her health history in the interim.      /65 (BP Location: Left arm)  Pulse 77  Resp 16  SpO2 98%  Pleasant, no distress. Obese body habitus.    No LE edema, rashes, ulcers.   Slightly raised veins in the patients area of interest, posterior left calf.       Photos:                        L calf comp study 5/11/2018  Multiple competent SSV perforators. Incompetent GSV associated  in the proximal calf 20cm from the medial malleolus. Incompetent branches from the proximal GSV.      A/P: Failed compression therapy for this patient with painful posterior L calf varicosities.  There is an incompetent GSV associated  and incompetent GSV branches in her area of tenderness.      -Submit for approval for treatment of the incompetent left GSV  and left GSV branches with direct stick sclerotherapy.    -Continue stocking use.      I spent a total of 15 minutes with this patient, > 50% of which was spent counseling.     Again, thank you for allowing me to participate in the care of your patient.       Sincerely,    Reyes Valenzuela MD

## 2018-07-30 NOTE — PROGRESS NOTES
Patient returns for follow up of left calf pain after 3 months of compression garment use.  She continues to have daily pain in the left calf which is improved but not resolved with compression stockings.  Pain remains lifestyle limiting requiring her to lay down with her feet up at night.  At the last visit, a large GSV associated  and associated branches were identified on a bedside exam performed by me. This area was interrogated formally in the vascular lab revealing incompetence of this  and a few GSV branches. She returns to discuss treatment options. No major changes to her health history in the interim.      /65 (BP Location: Left arm)  Pulse 77  Resp 16  SpO2 98%  Pleasant, no distress. Obese body habitus.    No LE edema, rashes, ulcers.   Slightly raised veins in the patients area of interest, posterior left calf.       Photos:                        L calf comp study 5/11/2018  Multiple competent SSV perforators. Incompetent GSV associated  in the proximal calf 20cm from the medial malleolus. Incompetent branches from the proximal GSV.      A/P: Failed compression therapy for this patient with painful posterior L calf varicosities.  There is an incompetent GSV associated  and incompetent GSV branches in her area of tenderness.      -Submit for approval for treatment of the incompetent left GSV  and left GSV branches with direct stick sclerotherapy.    -Continue stocking use.      I spent a total of 15 minutes with this patient, > 50% of which was spent counseling.

## 2018-07-30 NOTE — MR AVS SNAPSHOT
After Visit Summary   7/30/2018    Adilia Camacho    MRN: [id number]           Patient Information     Date Of Birth          1955        Visit Information        Provider Department      7/30/2018 7:40 AM Reyes Valenzuela MD [facility]        Care Instructions    We will submit the Prior Auth today for varicose vein treatment. This should take 7-14 days.     Please contact me if you do not hear anything within a week.     Thank you,   Merle Anderson RN, BSN  Interventional Radiology Nurse Coordinator   Phone: [phone]            Follow-ups after your visit        Your next 10 appointments already scheduled     Aug 02, 2018  3:30 PM CDT   [facility] Spine with Amparo Sexton PT   [facility] [place] ([address] [address] [phone]              Who to contact     Please call your clinic at [phone] to:    Ask questions about your health    Make or cancel appointments    Discuss your medicines    Learn about your test results    Speak to your doctor            Additional Information About Your Visit        ASP64harcookdinner Information     [employer] gives you secure access to your electronic health record. If you see a primary care provider, you can also send messages to your care team and make appointments. If you have questions, please call your primary care clinic.  If you do not have a primary care provider, please call [phone] and they will assist you.      [employer] is an electronic gateway that provides easy, online access to your medical records. With [employer], you can request a clinic appointment, read your test results, renew a prescription or communicate with your care team.     To access your existing account, please contact your [facility] or call [phone] for assistance.        Care EveryWhere ID     This is your Care EveryWhere ID. This could be used by  other organizations to access your [facility] medical records  [id number]        Your Vitals Were     Pulse Respirations Pulse Oximetry             77 16 98%          Blood Pressure from Last 3 Encounters:   07/30/18 132/65   05/07/18 148/79   04/24/18 137/83    Weight from Last 3 Encounters:   04/24/18 192 lb 6.4 oz   03/27/18 190 lb   02/19/18 190 lb 7.6 oz              Today, you had the following     No orders found for display       Primary Care Provider Office Phone # Fax #    Sheree Graf [phone] [address]  [facility] [address]        Equal Access to Services     [facility] : Hadii sharlene paniaguao Anshu, waaxda lukarloadaha, qaybta kaalmada prosper, geovanna hernández . So [facility] [phone].    ATENCIÓN: Si habla español, tiene a cheng disposición servicios gratuitos de asistencia lingüística. [facility] [phone].    We comply with applicable federal civil rights laws and [place] laws. We do not discriminate on the basis of race, color, national origin, age, disability, sex, sexual orientation, or gender identity.            Thank you!     Thank you for choosing [facility]ASCULAR [facility]INIC  for your care. Our goal is always to provide you with excellent care. Hearing back from our patients is one way we can continue to improve our services. Please take a few minutes to complete the written survey that you may receive in the mail after your visit with us. Thank you!             Your Updated Medication List - Protect others around you: Learn how to safely use, store and throw away your medicines at www.disposemymeds.org.          This list is accurate as of 7/30/18  8:01 AM.  Always use your most recent med list.                   Brand Name Dispense Instructions for use Diagnosis    acyclovir 400 MG tablet    ZOVIRAX    30 tablet    TAKE 1 TABLET BY MOUTH THREE TIMES DAILY    Recurrent herpes labialis       ADVIL PO      Take 200 mg by mouth as needed  for moderate pain        cetirizine 10 MG tablet    zyrTEC     Take 10 mg by mouth daily        COMPRESSION STOCKINGS     6 each    1 each daily Lower Extremity:   Knee High;  bilateral;  20-30 mm Hg.  Measure and fit.  Style and color per patient preference.  Michael Haddad per patient need.    Calf swelling       fluticasone 50 MCG/ACT spray    FLONASE    48 mL    SHAKE LIQUID AND USE 2 SPRAYS IN EACH NOSTRIL TWICE DAILY    Allergic rhinitis due to dogs       loperamide 2 MG capsule    IMODIUM     Take 2 mg by mouth 4 times daily as needed for diarrhea        metoprolol succinate 25 MG 24 hr tablet    TOPROL-XL    90 tablet    Take 1 tablet (25 mg) by mouth daily    Hypertension goal BP (blood pressure) < 140/90       omeprazole 20 MG CR capsule    priLOSEC    90 capsule    TAKE 1 CAPSULE BY MOUTH DAILY 30 TO 60 MINUTES BEFORE A MEAL    Gastroesophageal reflux disease, esophagitis presence not specified

## 2018-07-30 NOTE — NURSING NOTE
Chief Complaint   Patient presents with     RECHECK     3 month follow up varicose veins to left leg        Vitals:    07/30/18 0740   BP: 132/65   BP Location: Left arm   Pulse: 77   Resp: 16   SpO2: 98%       There is no height or weight on file to calculate BMI.         Madie Zarate LPN

## 2018-08-02 ENCOUNTER — THERAPY VISIT (OUTPATIENT)
Dept: PHYSICAL THERAPY | Facility: CLINIC | Age: 63
End: 2018-08-02
Payer: COMMERCIAL

## 2018-08-02 DIAGNOSIS — M54.12 LEFT CERVICAL RADICULOPATHY: ICD-10-CM

## 2018-08-02 PROCEDURE — 97112 NEUROMUSCULAR REEDUCATION: CPT | Mod: GP | Performed by: PHYSICAL THERAPIST

## 2018-08-02 PROCEDURE — 97140 MANUAL THERAPY 1/> REGIONS: CPT | Mod: GP | Performed by: PHYSICAL THERAPIST

## 2018-08-02 PROCEDURE — 97530 THERAPEUTIC ACTIVITIES: CPT | Mod: GP | Performed by: PHYSICAL THERAPIST

## 2018-08-22 ENCOUNTER — TELEPHONE (OUTPATIENT)
Dept: INTERVENTIONAL RADIOLOGY/VASCULAR | Facility: CLINIC | Age: 63
End: 2018-08-22

## 2018-08-22 ENCOUNTER — THERAPY VISIT (OUTPATIENT)
Dept: PHYSICAL THERAPY | Facility: CLINIC | Age: 63
End: 2018-08-22
Payer: COMMERCIAL

## 2018-08-22 DIAGNOSIS — M54.12 LEFT CERVICAL RADICULOPATHY: ICD-10-CM

## 2018-08-22 PROCEDURE — 97110 THERAPEUTIC EXERCISES: CPT | Mod: GP | Performed by: PHYSICAL THERAPIST

## 2018-08-22 PROCEDURE — 97140 MANUAL THERAPY 1/> REGIONS: CPT | Mod: GP | Performed by: PHYSICAL THERAPIST

## 2018-08-22 PROCEDURE — 97112 NEUROMUSCULAR REEDUCATION: CPT | Mod: GP | Performed by: PHYSICAL THERAPIST

## 2018-08-22 NOTE — TELEPHONE ENCOUNTER
Per PA on 8/8: PA submitted via email for this patient. Per Oj at [facility]: pt has met deductible of $1,750.00 but only $2,042.76 of out of pocket costs. Coverage will be at 80% with 20% pt responsibility coinsurance. Ref #[id number]    I informed pt that my [employer] person is still checking on this.     She verbalized understanding and that we will continue to keep in touch.    Merle Anderson RN, BSN  Interventional Radiology Nurse Coordinator   Phone: [phone]

## 2018-08-28 ENCOUNTER — TELEPHONE (OUTPATIENT)
Dept: INTERVENTIONAL RADIOLOGY/VASCULAR | Facility: CLINIC | Age: 63
End: 2018-08-28

## 2018-08-28 NOTE — TELEPHONE ENCOUNTER
Called pt and informed her that we have received a msg   IN regards to a denial regarding her varicose vein treatment.   Informed her that I will consult with Dr Hebert in regards to an appeal which can take up to 1-2 months.     She verbalized understanding and I will f/u with her once I hear back from Dr Hebert. She agrees to plan.        Unfortunately the request for this patient was denied. I called [employer] to have them clarify some things on their letter which I have attached for you and he stated it states  not covered  .   Informed her that I'll review with Dr Hebert and then call her back.     *informed her that we will see what we can do to appeal the denial.    She verbalized understanding.     Merle Anderson RN, BSN  Interventional Radiology Nurse Coordinator   Phone: [phone]

## 2018-09-10 ENCOUNTER — THERAPY VISIT (OUTPATIENT)
Dept: PHYSICAL THERAPY | Facility: CLINIC | Age: 63
End: 2018-09-10
Payer: COMMERCIAL

## 2018-09-10 DIAGNOSIS — M54.12 LEFT CERVICAL RADICULOPATHY: ICD-10-CM

## 2018-09-10 PROCEDURE — 97110 THERAPEUTIC EXERCISES: CPT | Mod: GP | Performed by: PHYSICAL THERAPIST

## 2018-09-10 PROCEDURE — 97140 MANUAL THERAPY 1/> REGIONS: CPT | Mod: GP | Performed by: PHYSICAL THERAPIST

## 2018-09-10 PROCEDURE — 97112 NEUROMUSCULAR REEDUCATION: CPT | Mod: GP | Performed by: PHYSICAL THERAPIST

## 2018-09-10 NOTE — MR AVS SNAPSHOT
After Visit Summary   9/10/2018    Adilia Camacho    MRN: [id number]           Patient Information     Date Of Birth          1955        Visit Information        Provider Department      9/10/2018 4:30 PM Amparo Sexton PT [facility]        Today's Diagnoses     Left cervical radiculopathy           Follow-ups after your visit        Your next 10 appointments already scheduled     Sep 18, 2018  7:40 AM CDT   [facility] Spine with Amparo Sexton PT   [facility] ([facility])    [address] [place] [address]   [phone]            Oct 01, 2018  5:10 PM CDT   [facility] Spine with Amparo Sexton PT   [facility] ([facility])    [address] [place] [id number] [phone]            Oct 15, 2018  4:30 PM CDT   [facility] Spine with Amparo Sexton PT   [facility] ([facility])    [address] [place] [address] [phone]              Who to contact     If you have questions or need follow up information about today's clinic visit or your schedule please contact [facility] directly at [phone].  Normal or non-critical lab and imaging results will be communicated to you by MyChart, letter or phone within 4 business days after the clinic has received the results. If you do not hear from us within 7 days, please contact the clinic through MyChart or phone. If you have a critical or abnormal lab result, we will notify you by phone as soon as possible.  Submit refill requests through [employer] or call your pharmacy and they will forward the refill request to us. Please allow 3 business days for your refill to be completed.          Additional Information About Your Visit        [employer] Information     [employer] gives you secure access to your electronic health  record. If you see a primary care provider, you can also send messages to your care team and make appointments. If you have questions, please call your primary care clinic.  If you do not have a primary care provider, please call [phone] and they will assist you.        Care EveryWhere ID     This is your Care EveryWhere ID. This could be used by other organizations to access your [facility] medical records  [id number]         Blood Pressure from Last 3 Encounters:   07/30/18 132/65   05/07/18 148/79   04/24/18 137/83    Weight from Last 3 Encounters:   04/24/18 87.3 kg (192 lb 6.4 oz)   03/27/18 86.2 kg (190 lb)   02/19/18 86.4 kg (190 lb 7.6 oz)              We Performed the Following     [facility]ART PROGRESS NOTES REPORT     MANUAL THER TECH,1+REGIONS,EA 15 MIN     NEUROMUSCULAR RE-EDUCATION     THERAPEUTIC EXERCISES        Primary Care Provider Office Phone # Fax #    Sheree Graf [phone] [address] [place] [address]        Equal Access to Services     [facility]: Hadii aad ku hadasho Soomaali, waaxda luqadaha, qaybta kaalmada adeliborioyajerrod, geovanna hernández . So [facility] [phone].    ATENCIÓN: Si habla español, tiene a cheng disposición servicios gratuitos de asistencia lingüística. [facility] [phone].    We comply with applicable federal civil rights laws and [place] laws. We do not discriminate on the basis of race, color, national origin, age, disability, sex, sexual orientation, or gender identity.            Thank you!     Thank you for choosing [facility]  for your care. Our goal is always to provide you with excellent care. Hearing back from our patients is one way we can continue to improve our services. Please take a few minutes to complete the written survey that you may receive in the mail after your visit with us. Thank you!             Your Updated Medication List - Protect others around you: Learn how to  safely use, store and throw away your medicines at www.disposemymeds.org.          This list is accurate as of 9/10/18  5:31 PM.  Always use your most recent med list.                   Brand Name Dispense Instructions for use Diagnosis    acyclovir 400 MG tablet    ZOVIRAX    30 tablet    TAKE 1 TABLET BY MOUTH THREE TIMES DAILY    Recurrent herpes labialis       ADVIL PO      Take 200 mg by mouth as needed for moderate pain        cetirizine 10 MG tablet    zyrTEC     Take 10 mg by mouth daily        COMPRESSION STOCKINGS     6 each    1 each daily Lower Extremity:   Knee High;  bilateral;  20-30 mm Hg.  Measure and fit.  Style and color per patient preference.  Doff n Catie per patient need.    Calf swelling       fluticasone 50 MCG/ACT spray    FLONASE    48 mL    SHAKE LIQUID AND USE 2 SPRAYS IN EACH NOSTRIL TWICE DAILY    Allergic rhinitis due to dogs       loperamide 2 MG capsule    IMODIUM     Take 2 mg by mouth 4 times daily as needed for diarrhea        metoprolol succinate 25 MG 24 hr tablet    TOPROL-XL    90 tablet    Take 1 tablet (25 mg) by mouth daily    Hypertension goal BP (blood pressure) < 140/90       omeprazole 20 MG CR capsule    priLOSEC    90 capsule    TAKE 1 CAPSULE BY MOUTH DAILY 30 TO 60 MINUTES BEFORE A MEAL    Gastroesophageal reflux disease, esophagitis presence not specified

## 2018-09-10 NOTE — PROGRESS NOTES
Subjective:  HPI                    Objective:  System    Physical Exam    General     ROS    Assessment/Plan:    PROGRESS  REPORT    Progress reporting period is from 6/14/18 to 9/10/18.       SUBJECTIVE  Subjective: pt reports still gets soreness at left upper trap area, some upper arm. does feel it is there most of the time, fluctuates 1-4/10     Current Pain level: 4/10.      Initial Pain level: 4/10 (10/10 at worst).   Changes in function:  Yes (See Goal flowsheet attached for changes in current functional level)  Adverse reaction to treatment or activity: None    OBJECTIVE  Changes noted in objective findings:  Yes  Objective: CROM: flexion WNL, extension 60%, R SB 50% with contralat stretch, L SB 50%. R rot 80%, L 60%. tenderness at left pec minor, scalene, UT - no referall into upper arm with any. hypomobility of left 1st rib - incr extension ROM after mobility work. pt reports she feels less restricted into L rotation when performing passively with towel. mild hypomobility upper T spine     ASSESSMENT/PLAN  Updated problem list and treatment plan: Diagnosis 1:  left cervical radiculopathy/tightness    Pain -  manual therapy, self management, education and home program  Decreased ROM/flexibility - manual therapy, therapeutic exercise and home program  Decreased joint mobility - manual therapy  Decreased strength - therapeutic exercise, therapeutic activities and home program  Impaired muscle performance - neuro re-education and home program  Decreased function - therapeutic activities and home program  Impaired posture - neuro re-education, therapeutic activities and home program  STG/LTGs have been met or progress has been made towards goals:  Yes (See Goal flow sheet completed today.)  Assessment of Progress: The patient's progress has slowed.  Self Management Plans:  Patient has been instructed in a home treatment program.  Patient  has been instructed in self management of symptoms.  I have re-evaluated  this patient and find that the nature, scope, duration and intensity of the therapy is appropriate for the medical condition of the patient.  Adilia continues to require the following intervention to meet STG and LTG's:  PT    Recommendations:  This patient would benefit from continued therapy.     Frequency:  2 X a month, once daily  Duration:  for 2 months        Please refer to the daily flowsheet for treatment today, total treatment time and time spent performing 1:1 timed codes.

## 2018-09-18 ENCOUNTER — THERAPY VISIT (OUTPATIENT)
Dept: PHYSICAL THERAPY | Facility: CLINIC | Age: 63
End: 2018-09-18
Payer: COMMERCIAL

## 2018-09-18 DIAGNOSIS — M54.12 LEFT CERVICAL RADICULOPATHY: ICD-10-CM

## 2018-09-18 PROCEDURE — 97140 MANUAL THERAPY 1/> REGIONS: CPT | Mod: GP | Performed by: PHYSICAL THERAPIST

## 2018-09-18 PROCEDURE — 97110 THERAPEUTIC EXERCISES: CPT | Mod: GP | Performed by: PHYSICAL THERAPIST

## 2018-09-18 PROCEDURE — 97112 NEUROMUSCULAR REEDUCATION: CPT | Mod: GP | Performed by: PHYSICAL THERAPIST

## 2018-09-20 ENCOUNTER — TEAM CONFERENCE (OUTPATIENT)
Dept: INTERVENTIONAL RADIOLOGY/VASCULAR | Facility: CLINIC | Age: 63
End: 2018-09-20

## 2018-09-20 NOTE — TELEPHONE ENCOUNTER
Called [employer] again to inquire as their medical director did review the information and had contacted Dr Valenzuela to further discuss however the message was not transcribed clearly on VM    Calling [employer] to further inquire on status of appeal.     Called and spoke to Riccardo. Who states that there was no documentation of the medical director calling and leaving a msg.   He did tfr me over to the Prior Auth line.   Call Ref: [id number]    Prior Auth dept and spoke to Juliana. Informed and inquired once again on an appeal for a denial regarding a Prior Auth that was submitted for varicose vein treatment that was denied.     Was tfr to the National team for provider services  Spoke with Zelda GARCIA   REITERATED once again why I am calling. Call Ref: [id number]  Direct number for provider services: [phone]  We will go ahead and r/s the peer to peer review.     This has been scheduled for tomorrow Fri 9/21 @ 1pm. To call provider's direct line     Case ref # [id number]

## 2018-09-24 ENCOUNTER — TELEPHONE (OUTPATIENT)
Dept: INTERVENTIONAL RADIOLOGY/VASCULAR | Facility: CLINIC | Age: 63
End: 2018-09-24

## 2018-09-24 NOTE — TELEPHONE ENCOUNTER
Called and left a msg for pt to return my call regarding prior authorization status.     Left direct line.     Merle Anderson RN, BSN  Interventional Radiology Nurse Coordinator   Phone: [phone]

## 2018-09-25 NOTE — TELEPHONE ENCOUNTER
Pt did return my call. Informed her that Dr Valenzuela did have a conversation with the [facility] Medical Director.     Informed her that the request for treatment was discussed and explained however the size for the varicose vein was measured at under a certain number which will not let [facility] approve the service.     Pt is disappointed In this information. . She doesn't understand She states that when she does exercises, the pain does go down to her foot.    I informed her that since insurance will not pay for this procedure then, she can try the vein clinics where they will also consult and treat her veins but this would be out of pocket pay. SHe verbalized understanding.     Also, Dr Valenzuela will be leaving the practice.     She states that she would like to follow him in which we will work on getting all medical records transferred over so that she can see him at his new practice per pt preference.     She agrees to plan     Merle Anderson RN, BSN  Interventional Radiology Nurse Coordinator   Phone: [phone]

## 2018-10-12 ENCOUNTER — OFFICE VISIT (OUTPATIENT)
Dept: FAMILY MEDICINE | Facility: CLINIC | Age: 63
End: 2018-10-12
Payer: COMMERCIAL

## 2018-10-12 ENCOUNTER — RADIANT APPOINTMENT (OUTPATIENT)
Dept: GENERAL RADIOLOGY | Facility: CLINIC | Age: 63
End: 2018-10-12
Attending: PHYSICIAN ASSISTANT
Payer: COMMERCIAL

## 2018-10-12 VITALS
SYSTOLIC BLOOD PRESSURE: 132 MMHG | DIASTOLIC BLOOD PRESSURE: 84 MMHG | OXYGEN SATURATION: 98 % | HEART RATE: 76 BPM | WEIGHT: 193 LBS | TEMPERATURE: 98.9 F | BODY MASS INDEX: 35.88 KG/M2 | RESPIRATION RATE: 17 BRPM

## 2018-10-12 DIAGNOSIS — R05.9 COUGH: ICD-10-CM

## 2018-10-12 DIAGNOSIS — J20.9 ACUTE BRONCHITIS, UNSPECIFIED ORGANISM: Primary | ICD-10-CM

## 2018-10-12 DIAGNOSIS — J20.9 ACUTE BRONCHITIS, UNSPECIFIED ORGANISM: ICD-10-CM

## 2018-10-12 DIAGNOSIS — Z23 NEED FOR PROPHYLACTIC VACCINATION AND INOCULATION AGAINST INFLUENZA: ICD-10-CM

## 2018-10-12 PROCEDURE — 99213 OFFICE O/P EST LOW 20 MIN: CPT | Mod: 25 | Performed by: PHYSICIAN ASSISTANT

## 2018-10-12 PROCEDURE — 90471 IMMUNIZATION ADMIN: CPT | Performed by: PHYSICIAN ASSISTANT

## 2018-10-12 PROCEDURE — 90682 RIV4 VACC RECOMBINANT DNA IM: CPT | Performed by: PHYSICIAN ASSISTANT

## 2018-10-12 PROCEDURE — 71046 X-RAY EXAM CHEST 2 VIEWS: CPT

## 2018-10-12 RX ORDER — ALBUTEROL SULFATE 90 UG/1
2 AEROSOL, METERED RESPIRATORY (INHALATION) EVERY 6 HOURS PRN
Qty: 1 INHALER | Refills: 0 | Status: SHIPPED | OUTPATIENT
Start: 2018-10-12 | End: 2018-11-01

## 2018-10-12 RX ORDER — BENZONATATE 200 MG/1
200 CAPSULE ORAL
Qty: 21 CAPSULE | Refills: 0 | Status: SHIPPED | OUTPATIENT
Start: 2018-10-12 | End: 2018-11-01

## 2018-10-12 RX ORDER — PREDNISONE 20 MG/1
40 TABLET ORAL DAILY
Qty: 10 TABLET | Refills: 0 | Status: SHIPPED | OUTPATIENT
Start: 2018-10-12 | End: 2019-03-04

## 2018-10-12 NOTE — PROGRESS NOTES

## 2018-10-12 NOTE — PATIENT INSTRUCTIONS
Chest xray updated today.  Encouraged mucinex/guafenisin, warm salt water gargles, cepacol spray, soothers/lozenges, sinus rinses (neilmed), flonase (2 sprays per nostril daily x 2 weeks), vitamin c, fluids and rest.  Prescription for tessalon perles for bedtime sent to pharmacy.  Prescription for rescue inhaler (albuterol) - 2 puffs every 4-6 hours as needed, especially first thing in am.  May alternate tylenol and NSAIDS (ibuprofen, advil, aleve type products) every 4-6 hours for the next few days as needed.    Prescription for oral prednisone (steroid) given just in case symptoms not improving x 5 days.  Return to clinic with any worsening or changes in symptoms.

## 2018-10-12 NOTE — PROGRESS NOTES
SUBJECTIVE:   Adilia Camacho is a 63 year old female who presents to clinic today for the following health issues:    RESPIRATORY SYMPTOMS      Duration: worsened over the past 3 days but initially since end of September    Description  sore throat and cough    Severity: mild to moderate    Accompanying signs and symptoms: None    History (predisposing factors):  none    Therapies tried and outcome:  OTC cough syrups, cough drops- does not help     History of seeing pulmonology with allergies; acid reflux may have been related as well.    Patient taking zyrtec and flonase with omeprazole daily.          Problem list and histories reviewed & adjusted, as indicated.  Additional history: as documented    Patient Active Problem List   Diagnosis     Hypertension goal BP (blood pressure) < 140/90     CARDIOVASCULAR SCREENING; LDL GOAL LESS THAN 130     Allergic rhinitis due to dogs     Contraceptive management     Recurrent herpes labialis     Rosacea     Female stress incontinence     GERD (gastroesophageal reflux disease)     Advanced directives, counseling/discussion     Venous (peripheral) insufficiency     Left cervical radiculopathy     Past Surgical History:   Procedure Laterality Date     NO HISTORY OF SURGERY         Social History   Substance Use Topics     Smoking status: Former Smoker     Years: 10.00     Types: Cigarettes     Smokeless tobacco: Never Used     Alcohol use 1.0 oz/week     2 Cans of beer per week      Comment: occassionally     Family History   Problem Relation Age of Onset     Hypertension Father      Hypertension Brother      Hypertension Sister      Diabetes Sister      and brother     KIDNEY DISEASE Sister      Diabetes Brother      Cancer - colorectal No family hx of      Breast Cancer No family hx of      Thyroid Disease No family hx of          Current Outpatient Prescriptions   Medication Sig Dispense Refill     acyclovir (ZOVIRAX) 400 MG tablet TAKE 1 TABLET BY MOUTH THREE TIMES DAILY  30 tablet 5     albuterol (PROAIR HFA/PROVENTIL HFA/VENTOLIN HFA) 108 (90 Base) MCG/ACT inhaler Inhale 2 puffs into the lungs every 6 hours as needed for shortness of breath / dyspnea or wheezing 1 Inhaler 0     benzonatate (TESSALON) 200 MG capsule Take 1 capsule (200 mg) by mouth nightly as needed for cough 21 capsule 0     cetirizine (ZYRTEC) 10 MG tablet Take 10 mg by mouth daily       COMPRESSION STOCKINGS 1 each daily Lower Extremity:   Knee High;  bilateral;  20-30 mm Hg.  Measure and fit.  Style and color per patient preference.  Doff n Catie per patient need. 6 each 11     fluticasone (FLONASE) 50 MCG/ACT spray SHAKE LIQUID AND USE 2 SPRAYS IN EACH NOSTRIL TWICE DAILY 48 mL 3     Ibuprofen (ADVIL PO) Take 200 mg by mouth as needed for moderate pain       loperamide (IMODIUM) 2 MG capsule Take 2 mg by mouth 4 times daily as needed for diarrhea       metoprolol (TOPROL-XL) 25 MG 24 hr tablet Take 1 tablet (25 mg) by mouth daily 90 tablet 3     omeprazole (PRILOSEC) 20 MG CR capsule TAKE 1 CAPSULE BY MOUTH DAILY 30 TO 60 MINUTES BEFORE A MEAL 90 capsule 2     predniSONE (DELTASONE) 20 MG tablet Take 2 tablets (40 mg) by mouth daily for 5 days 10 tablet 0     No Known Allergies    Reviewed and updated as needed this visit by clinical staff  Tobacco  Allergies  Meds  Problems  Med Hx  Surg Hx  Fam Hx  Soc Hx        Reviewed and updated as needed this visit by Provider  Allergies  Meds  Problems         ROS:  Constitutional, HEENT, cardiovascular, pulmonary, GI, , musculoskeletal, neuro, skin, endocrine and psych systems are negative, except as otherwise noted.    OBJECTIVE:     /84 (BP Location: Left arm, Patient Position: Sitting, Cuff Size: Adult Large)  Pulse 76  Temp 98.9  F (37.2  C) (Oral)  Resp 17  Wt 193 lb (87.5 kg)  SpO2 98%  BMI 35.88 kg/m2  Body mass index is 35.88 kg/(m^2).  GENERAL: healthy, alert and no distress  EYES: Eyes grossly normal to inspection, PERRL and  conjunctivae and sclerae normal  HENT: ear canals and TM's normal, nose and mouth without ulcers or lesions  NECK: no adenopathy, no asymmetry, masses, or scars and thyroid normal to palpation  RESP: lungs clear to auscultation - no rales, rhonchi; slight wheeze with forced expiration in lower lungs  CV: regular rate and rhythm, normal S1 S2, no S3 or S4, no murmur, click or rub, no peripheral edema and peripheral pulses strong  PSYCH: mentation appears normal, affect normal/bright    Diagnostic Test Results:  Chest xray - results pending official radiologist reading.     ASSESSMENT/PLAN:       ICD-10-CM    1. Acute bronchitis, unspecified organism J20.9 benzonatate (TESSALON) 200 MG capsule     XR Chest 2 Views     albuterol (PROAIR HFA/PROVENTIL HFA/VENTOLIN HFA) 108 (90 Base) MCG/ACT inhaler     predniSONE (DELTASONE) 20 MG tablet   2. Cough R05 benzonatate (TESSALON) 200 MG capsule     XR Chest 2 Views     albuterol (PROAIR HFA/PROVENTIL HFA/VENTOLIN HFA) 108 (90 Base) MCG/ACT inhaler     predniSONE (DELTASONE) 20 MG tablet       Patient Instructions   Chest xray updated today.  Encouraged mucinex/guafenisin, warm salt water gargles, cepacol spray, soothers/lozenges, sinus rinses (neilmed), flonase (2 sprays per nostril daily x 2 weeks), vitamin c, fluids and rest.  Prescription for tessalon perles for bedtime sent to pharmacy.  Prescription for rescue inhaler (albuterol) - 2 puffs every 4-6 hours as needed, especially first thing in am.  May alternate tylenol and NSAIDS (ibuprofen, advil, aleve type products) every 4-6 hours for the next few days as needed.    Prescription for oral prednisone (steroid) given just in case symptoms not improving x 5 days.  Return to clinic with any worsening or changes in symptoms.       Lexi Rg PA-C  [facility]

## 2018-10-12 NOTE — MR AVS SNAPSHOT
After Visit Summary   10/12/2018    Adilia Camacho    MRN: [id number]           Patient Information     Date Of Birth          1955        Visit Information        Provider Department      10/12/2018 3:20 PM Lexi Rg PA-C [facility]        Today's Diagnoses     Acute bronchitis, unspecified organism    -  1    Cough          Care Instructions    Chest xray updated today.  Encouraged mucinex/guafenisin, warm salt water gargles, cepacol spray, soothers/lozenges, sinus rinses (neilmed), flonase (2 sprays per nostril daily x 2 weeks), vitamin c, fluids and rest.  Prescription for tessalon perles for bedtime sent to pharmacy.  Prescription for rescue inhaler (albuterol) - 2 puffs every 4-6 hours as needed, especially first thing in am.  May alternate tylenol and NSAIDS (ibuprofen, advil, aleve type products) every 4-6 hours for the next few days as needed.    Prescription for oral prednisone (steroid) given just in case symptoms not improving x 5 days.  Return to clinic with any worsening or changes in symptoms.           Follow-ups after your visit        Follow-up notes from your care team     Return for Routine visit, worsening symptoms.      Your next 10 appointments already scheduled     Oct 15, 2018  4:30 PM CDT   [facility] with Amparo Sexton PT   [facility] [place] ([address] [phone]              Future tests that were ordered for you today     Open Future Orders        Priority Expected Expires Ordered    XR Chest 2 Views Routine 10/12/2018 10/12/2019 10/12/2018            Who to contact     If you have questions or need follow up information about today's clinic visit or your schedule please contact [facility] directly at [phone].  Normal or non-critical lab and imaging results will be communicated to you by MyChart, letter or phone within 4  business days after the clinic has received the results. If you do not hear from us within 7 days, please contact the clinic through CorvisaCloud or phone. If you have a critical or abnormal lab result, we will notify you by phone as soon as possible.  Submit refill requests through CorvisaCloud or call your pharmacy and they will forward the refill request to us. Please allow 3 business days for your refill to be completed.          Additional Information About Your Visit        CorvisaCloud Information     CorvisaCloud gives you secure access to your electronic health record. If you see a primary care provider, you can also send messages to your care team and make appointments. If you have questions, please call your primary care clinic.  If you do not have a primary care provider, please call [phone] and they will assist you.        Care EveryWhere ID     This is your Care EveryWhere ID. This could be used by other organizations to access your [place] medical records  [id number]        Your Vitals Were     Pulse Temperature Respirations Pulse Oximetry BMI (Body Mass Index)       76 98.9  F (37.2  C) (Oral) 17 98% 35.88 kg/m2        Blood Pressure from Last 3 Encounters:   10/12/18 132/84   07/30/18 132/65   05/07/18 148/79    Weight from Last 3 Encounters:   10/12/18 193 lb (87.5 kg)   04/24/18 192 lb 6.4 oz (87.3 kg)   03/27/18 190 lb (86.2 kg)                 Today's Medication Changes          These changes are accurate as of 10/12/18  3:41 PM.  If you have any questions, ask your nurse or doctor.               Start taking these medicines.        Dose/Directions    albuterol 108 (90 Base) MCG/ACT inhaler   Commonly known as:  PROAIR HFA/PROVENTIL HFA/VENTOLIN HFA   Used for:  Acute bronchitis, unspecified organism, Cough   Started by:  Lexi Rg PA-C        Dose:  2 puff   Inhale 2 puffs into the lungs every 6 hours as needed for shortness of breath / dyspnea or wheezing   Quantity:  1 Inhaler   Refills:   0       benzonatate 200 MG capsule   Commonly known as:  TESSALON   Used for:  Acute bronchitis, unspecified organism, Cough   Started by:  Lexi Rg PA-C        Dose:  200 mg   Take 1 capsule (200 mg) by mouth nightly as needed for cough   Quantity:  21 capsule   Refills:  0       predniSONE 20 MG tablet   Commonly known as:  DELTASONE   Used for:  Acute bronchitis, unspecified organism, Cough   Started by:  Lexi Rg PA-C        Dose:  40 mg   Take 2 tablets (40 mg) by mouth daily for 5 days   Quantity:  10 tablet   Refills:  0            Where to get your medicines      These medications were sent to [facility] [address] [place] [address] AT [address] [address]     Phone:  [phone]     albuterol 108 (90 Base) MCG/ACT inhaler    benzonatate 200 MG capsule         Some of these will need a paper prescription and others can be bought over the counter.  Ask your nurse if you have questions.     Bring a paper prescription for each of these medications     predniSONE 20 MG tablet                Primary Care Provider Office Phone # Fax #    Sheree Graf [phone] [address]  [facility] [address]        Equal Access to Services     EMMA SAM AH: Meghana paniaguao Sojose luisali, waaxda luqadaha, qaybta kaalmada adeegyada, geovanna crystal. So [facility] [phone].    ATENCIÓN: Si habla español, tiene a cheng disposición servicios gratuitos de asistencia lingüística. Llame al [phone].    We comply with applicable federal civil rights laws and [place] laws. We do not discriminate on the basis of race, color, national origin, age, disability, sex, sexual orientation, or gender identity.            Thank you!     Thank you for choosing [facility]  for your care. Our goal is always to provide you with excellent care. Hearing back from our  patients is one way we can continue to improve our services. Please take a few minutes to complete the written survey that you may receive in the mail after your visit with us. Thank you!             Your Updated Medication List - Protect others around you: Learn how to safely use, store and throw away your medicines at www.disposemymeds.org.          This list is accurate as of 10/12/18  3:41 PM.  Always use your most recent med list.                   Brand Name Dispense Instructions for use Diagnosis    acyclovir 400 MG tablet    ZOVIRAX    30 tablet    TAKE 1 TABLET BY MOUTH THREE TIMES DAILY    Recurrent herpes labialis       ADVIL PO      Take 200 mg by mouth as needed for moderate pain        albuterol 108 (90 Base) MCG/ACT inhaler    PROAIR HFA/PROVENTIL HFA/VENTOLIN HFA    1 Inhaler    Inhale 2 puffs into the lungs every 6 hours as needed for shortness of breath / dyspnea or wheezing    Acute bronchitis, unspecified organism, Cough       benzonatate 200 MG capsule    TESSALON    21 capsule    Take 1 capsule (200 mg) by mouth nightly as needed for cough    Acute bronchitis, unspecified organism, Cough       cetirizine 10 MG tablet    zyrTEC     Take 10 mg by mouth daily        COMPRESSION STOCKINGS     6 each    1 each daily Lower Extremity:   Knee High;  bilateral;  20-30 mm Hg.  Measure and fit.  Style and color per patient preference.  Doff cherie Haddad per patient need.    Calf swelling       fluticasone 50 MCG/ACT spray    FLONASE    48 mL    SHAKE LIQUID AND USE 2 SPRAYS IN EACH NOSTRIL TWICE DAILY    Allergic rhinitis due to dogs       loperamide 2 MG capsule    IMODIUM     Take 2 mg by mouth 4 times daily as needed for diarrhea        metoprolol succinate 25 MG 24 hr tablet    TOPROL-XL    90 tablet    Take 1 tablet (25 mg) by mouth daily    Hypertension goal BP (blood pressure) < 140/90       omeprazole 20 MG CR capsule    priLOSEC    90 capsule    TAKE 1 CAPSULE BY MOUTH DAILY 30 TO 60 MINUTES BEFORE A  MEAL    Gastroesophageal reflux disease, esophagitis presence not specified       predniSONE 20 MG tablet    DELTASONE    10 tablet    Take 2 tablets (40 mg) by mouth daily for 5 days    Acute bronchitis, unspecified organism, Cough

## 2018-10-15 ENCOUNTER — THERAPY VISIT (OUTPATIENT)
Dept: PHYSICAL THERAPY | Facility: CLINIC | Age: 63
End: 2018-10-15
Payer: COMMERCIAL

## 2018-10-15 DIAGNOSIS — M54.12 LEFT CERVICAL RADICULOPATHY: ICD-10-CM

## 2018-10-15 PROCEDURE — 97112 NEUROMUSCULAR REEDUCATION: CPT | Mod: GP | Performed by: PHYSICAL THERAPIST

## 2018-10-15 PROCEDURE — 97140 MANUAL THERAPY 1/> REGIONS: CPT | Mod: GP | Performed by: PHYSICAL THERAPIST

## 2018-10-15 PROCEDURE — 97110 THERAPEUTIC EXERCISES: CPT | Mod: GP | Performed by: PHYSICAL THERAPIST

## 2018-10-15 NOTE — PROGRESS NOTES
"Adriana Pat  Your attached chest xray is negative.  Please contact the office with any questions or concerns.    Lexi Patten \"Joe\" APPLE Rg  "

## 2018-10-23 ENCOUNTER — MYC MEDICAL ADVICE (OUTPATIENT)
Dept: FAMILY MEDICINE | Facility: CLINIC | Age: 63
End: 2018-10-23

## 2018-10-23 DIAGNOSIS — R05.9 COUGH: Primary | ICD-10-CM

## 2018-10-23 DIAGNOSIS — J20.9 ACUTE BRONCHITIS WITH SYMPTOMS > 10 DAYS: ICD-10-CM

## 2018-10-23 RX ORDER — AZITHROMYCIN 250 MG/1
TABLET, FILM COATED ORAL
Qty: 6 TABLET | Refills: 0 | Status: SHIPPED | OUTPATIENT
Start: 2018-10-23 | End: 2018-11-01

## 2018-10-23 NOTE — TELEPHONE ENCOUNTER
"Ok for oral antibiotic, glad she tried/is taking the steroid; follow up with pulmonology if not improving - referral placed.  Thanks  Lexi \"Joe\" APPLE Rg   "

## 2018-10-31 ENCOUNTER — MYC MEDICAL ADVICE (OUTPATIENT)
Dept: FAMILY MEDICINE | Facility: CLINIC | Age: 63
End: 2018-10-31

## 2018-11-01 ENCOUNTER — OFFICE VISIT (OUTPATIENT)
Dept: FAMILY MEDICINE | Facility: CLINIC | Age: 63
End: 2018-11-01
Payer: COMMERCIAL

## 2018-11-01 VITALS
SYSTOLIC BLOOD PRESSURE: 127 MMHG | BODY MASS INDEX: 35.88 KG/M2 | HEART RATE: 70 BPM | TEMPERATURE: 98.2 F | DIASTOLIC BLOOD PRESSURE: 73 MMHG | WEIGHT: 193 LBS | RESPIRATION RATE: 16 BRPM | OXYGEN SATURATION: 98 %

## 2018-11-01 DIAGNOSIS — R05.9 COUGH: ICD-10-CM

## 2018-11-01 DIAGNOSIS — J32.0 CHRONIC MAXILLARY SINUSITIS: Primary | ICD-10-CM

## 2018-11-01 DIAGNOSIS — J30.81 ALLERGIC RHINITIS DUE TO DOGS: ICD-10-CM

## 2018-11-01 PROCEDURE — 99213 OFFICE O/P EST LOW 20 MIN: CPT | Performed by: PHYSICIAN ASSISTANT

## 2018-11-01 RX ORDER — MONTELUKAST SODIUM 10 MG/1
10 TABLET ORAL AT BEDTIME
Qty: 90 TABLET | Refills: 1 | Status: SHIPPED | OUTPATIENT
Start: 2018-11-01 | End: 2019-10-17

## 2018-11-01 RX ORDER — ALBUTEROL SULFATE 90 UG/1
2 AEROSOL, METERED RESPIRATORY (INHALATION) EVERY 6 HOURS PRN
Qty: 1 INHALER | Refills: 3 | Status: SHIPPED | OUTPATIENT
Start: 2018-11-01 | End: 2019-10-17

## 2018-11-01 NOTE — PATIENT INSTRUCTIONS
Repeat oral antibiotic - Augmentin - instead of previous zpack.  Add over the counter sudafed daily, in am (cuation with increased BP though).  Change zyrtec to other over the counter antihistamines- Allegra or Claritin during the day.  Add Singulair at night for the new few weeks-months.  Return to clinic with any worsening or changes in symptoms and follow up with PCP for routine care.

## 2018-11-01 NOTE — MR AVS SNAPSHOT
After Visit Summary   11/1/2018    Adilia Camacho    MRN: [id number]           Patient Information     Date Of Birth          1955        Visit Information        Provider Department      11/1/2018 2:20 PM Lexi Rg PA-C [facility]        Today's Diagnoses     Chronic maxillary sinusitis    -  1    Cough        Allergic rhinitis due to dogs          Care Instructions    Repeat oral antibiotic - Augmentin - instead of previous zpack.  Add over the counter sudafed daily, in am (cuation with increased BP though).  Change zyrtec to other over the counter antihistamines- Allegra or Claritin during the day.  Add Singulair at night for the new few weeks-months.  Return to clinic with any worsening or changes in symptoms and follow up with PCP for routine care.           Follow-ups after your visit        Who to contact     If you have questions or need follow up information about today's clinic visit or your schedule please contact [facility] directly at [phone].  Normal or non-critical lab and imaging results will be communicated to you by Campanistohart, letter or phone within 4 business days after the clinic has received the results. If you do not hear from us within 7 days, please contact the clinic through [employer] or phone. If you have a critical or abnormal lab result, we will notify you by phone as soon as possible.  Submit refill requests through Safello or call your pharmacy and they will forward the refill request to us. Please allow 3 business days for your refill to be completed.          Additional Information About Your Visit        Campanistohart Information     [employer] gives you secure access to your electronic health record. If you see a primary care provider, you can also send messages to your care team and make appointments. If you have questions, please call your primary care clinic.  If you do not have a primary care provider, please call  [phone] and they will assist you.        Care EveryWhere ID     This is your Care EveryWhere ID. This could be used by other organizations to access your [place] medical records  [id number]        Your Vitals Were     Pulse Temperature Respirations Pulse Oximetry BMI (Body Mass Index)       70 98.2  F (36.8  C) (Oral) 16 98% 35.88 kg/m2        Blood Pressure from Last 3 Encounters:   11/01/18 127/73   10/12/18 132/84   07/30/18 132/65    Weight from Last 3 Encounters:   11/01/18 193 lb (87.5 kg)   10/12/18 193 lb (87.5 kg)   04/24/18 192 lb 6.4 oz (87.3 kg)              Today, you had the following     No orders found for display         Today's Medication Changes          These changes are accurate as of 11/1/18  2:56 PM.  If you have any questions, ask your nurse or doctor.               Start taking these medicines.        Dose/Directions    amoxicillin-clavulanate 875-125 MG per tablet   Commonly known as:  AUGMENTIN   Used for:  Chronic maxillary sinusitis, Cough        Dose:  1 tablet   Take 1 tablet by mouth 2 times daily   Quantity:  20 tablet   Refills:  0       montelukast 10 MG tablet   Commonly known as:  SINGULAIR   Used for:  Cough, Allergic rhinitis due to dogs        Dose:  10 mg   Take 1 tablet (10 mg) by mouth At Bedtime   Quantity:  90 tablet   Refills:  1            Where to get your medicines      These medications were sent to [facility] [address] [place] [address] AT [address] [address]     Phone:  [phone]     amoxicillin-clavulanate 875-125 MG per tablet    montelukast 10 MG tablet                Primary Care Provider Office Phone # Fax #    Sheree Graf [phone] [address] [facility] [address]        Equal Access to Services     EMMA SAM AH: Hadii aad ku hadasho Sovanessa, waaxda luqadaha, qaybta kaalmajerrod cheng, geovanna crystal. So  [facility] [phone].    ATENCIÓN: Si caesar parkinson, tiene a cheng disposición servicios gratuitos de asistencia lingüística. Frederic michelle [phone].    We comply with applicable federal civil rights laws and [place] laws. We do not discriminate on the basis of race, color, national origin, age, disability, sex, sexual orientation, or gender identity.            Thank you!     Thank you for choosing [facility]  for your care. Our goal is always to provide you with excellent care. Hearing back from our patients is one way we can continue to improve our services. Please take a few minutes to complete the written survey that you may receive in the mail after your visit with us. Thank you!             Your Updated Medication List - Protect others around you: Learn how to safely use, store and throw away your medicines at www.disposemymeds.org.          This list is accurate as of 11/1/18  2:56 PM.  Always use your most recent med list.                   Brand Name Dispense Instructions for use Diagnosis    acyclovir 400 MG tablet    ZOVIRAX    30 tablet    TAKE 1 TABLET BY MOUTH THREE TIMES DAILY    Recurrent herpes labialis       ADVIL PO      Take 200 mg by mouth as needed for moderate pain        albuterol 108 (90 Base) MCG/ACT inhaler    PROAIR HFA/PROVENTIL HFA/VENTOLIN HFA    1 Inhaler    Inhale 2 puffs into the lungs every 6 hours as needed for shortness of breath / dyspnea or wheezing    Acute bronchitis, unspecified organism, Cough       amoxicillin-clavulanate 875-125 MG per tablet    AUGMENTIN    20 tablet    Take 1 tablet by mouth 2 times daily    Chronic maxillary sinusitis, Cough       cetirizine 10 MG tablet    zyrTEC     Take 10 mg by mouth daily        COMPRESSION STOCKINGS     6 each    1 each daily Lower Extremity:   Knee High;  bilateral;  20-30 mm Hg.  Measure and fit.  Style and color per patient preference.  Doff n Catie per patient need.    Calf swelling       fluticasone 50 MCG/ACT  spray    FLONASE    48 mL    SHAKE LIQUID AND USE 2 SPRAYS IN EACH NOSTRIL TWICE DAILY    Allergic rhinitis due to dogs       loperamide 2 MG capsule    IMODIUM     Take 2 mg by mouth 4 times daily as needed for diarrhea        metoprolol succinate 25 MG 24 hr tablet    TOPROL-XL    90 tablet    Take 1 tablet (25 mg) by mouth daily    Hypertension goal BP (blood pressure) < 140/90       montelukast 10 MG tablet    SINGULAIR    90 tablet    Take 1 tablet (10 mg) by mouth At Bedtime    Cough, Allergic rhinitis due to dogs       omeprazole 20 MG CR capsule    priLOSEC    90 capsule    TAKE 1 CAPSULE BY MOUTH DAILY 30 TO 60 MINUTES BEFORE A MEAL    Gastroesophageal reflux disease, esophagitis presence not specified

## 2018-11-01 NOTE — PROGRESS NOTES
SUBJECTIVE:   Adilia Camacho is a 63 year old female who presents to clinic today for the following health issues:    RESPIRATORY SYMPTOMS      Duration: Off and on since September, cough came back the day after finishing zpack, x 1 week     Description  cough    Severity: moderate     Accompanying signs and symptoms: dry mouth     History (predisposing factors):  none    Therapies tried and outcome:  OTC cough syrups, cough drops- does not help     History of seeing pulmonology with allergies (scheduled for Jan); acid reflux may have been related as well.    Patient felt like zpack helped things to finally start to move. Symptoms came back though a day after completed antibiotic. Patient initially started just the oral prednisone,but then felt the antibiotic started afterwards was more helpful.  Chest xray done 10/12/2018 - within normal limits/negative.    Patient has increased omeprazole 20 mg two times a day since the past few months.  Patient also still using flonase - 2 sprays two times a day per pulmonology as well.  Patient taking zyrtec every day as well,wondering if not working as well anymore.  She still also using albuterol, rescue inhaler, feels like it make be helping as well.        Problem list and histories reviewed & adjusted, as indicated.  Additional history: as documented    Patient Active Problem List   Diagnosis     Hypertension goal BP (blood pressure) < 140/90     CARDIOVASCULAR SCREENING; LDL GOAL LESS THAN 130     Allergic rhinitis due to dogs     Contraceptive management     Recurrent herpes labialis     Rosacea     Female stress incontinence     GERD (gastroesophageal reflux disease)     Advanced directives, counseling/discussion     Venous (peripheral) insufficiency     Left cervical radiculopathy     Past Surgical History:   Procedure Laterality Date     NO HISTORY OF SURGERY         Social History   Substance Use Topics     Smoking status: Former Smoker     Years: 10.00     Types:  Cigarettes     Smokeless tobacco: Never Used     Alcohol use 1.0 oz/week     2 Cans of beer per week      Comment: occassionally     Family History   Problem Relation Age of Onset     Hypertension Father      Hypertension Brother      Hypertension Sister      Diabetes Sister      and brother     KIDNEY DISEASE Sister      Diabetes Brother      Cancer - colorectal No family hx of      Breast Cancer No family hx of      Thyroid Disease No family hx of          Current Outpatient Prescriptions   Medication Sig Dispense Refill     acyclovir (ZOVIRAX) 400 MG tablet TAKE 1 TABLET BY MOUTH THREE TIMES DAILY 30 tablet 5     albuterol (PROAIR HFA/PROVENTIL HFA/VENTOLIN HFA) 108 (90 Base) MCG/ACT inhaler Inhale 2 puffs into the lungs every 6 hours as needed for shortness of breath / dyspnea or wheezing 1 Inhaler 3     amoxicillin-clavulanate (AUGMENTIN) 875-125 MG per tablet Take 1 tablet by mouth 2 times daily 20 tablet 0     cetirizine (ZYRTEC) 10 MG tablet Take 10 mg by mouth daily       COMPRESSION STOCKINGS 1 each daily Lower Extremity:   Knee High;  bilateral;  20-30 mm Hg.  Measure and fit.  Style and color per patient preference.  Michael Haddad per patient need. 6 each 11     fluticasone (FLONASE) 50 MCG/ACT spray SHAKE LIQUID AND USE 2 SPRAYS IN EACH NOSTRIL TWICE DAILY 48 mL 3     Ibuprofen (ADVIL PO) Take 200 mg by mouth as needed for moderate pain       loperamide (IMODIUM) 2 MG capsule Take 2 mg by mouth 4 times daily as needed for diarrhea       metoprolol (TOPROL-XL) 25 MG 24 hr tablet Take 1 tablet (25 mg) by mouth daily 90 tablet 3     montelukast (SINGULAIR) 10 MG tablet Take 1 tablet (10 mg) by mouth At Bedtime 90 tablet 1     omeprazole (PRILOSEC) 20 MG CR capsule TAKE 1 CAPSULE BY MOUTH DAILY 30 TO 60 MINUTES BEFORE A MEAL 90 capsule 2     [DISCONTINUED] albuterol (PROAIR HFA/PROVENTIL HFA/VENTOLIN HFA) 108 (90 Base) MCG/ACT inhaler Inhale 2 puffs into the lungs every 6 hours as needed for shortness of  breath / dyspnea or wheezing 1 Inhaler 0     No Known Allergies    Reviewed and updated as needed this visit by clinical staff  Allergies  Meds  Problems       Reviewed and updated as needed this visit by Provider  Allergies  Meds  Problems         ROS:  Constitutional, HEENT, cardiovascular, pulmonary, GI, , musculoskeletal, neuro, skin, endocrine and psych systems are negative, except as otherwise noted.    OBJECTIVE:     /73 (BP Location: Left arm, Patient Position: Sitting, Cuff Size: Adult Large)  Pulse 70  Temp 98.2  F (36.8  C) (Oral)  Resp 16  Wt 193 lb (87.5 kg)  SpO2 98%  BMI 35.88 kg/m2  Body mass index is 35.88 kg/(m^2).  GENERAL: healthy, alert and no distress  EYES: Eyes grossly normal to inspection, PERRL and conjunctivae and sclerae normal  HENT: ear canals normal but clear fluid behind bilateral TM's; nose and mouth without ulcers or lesions; post nasal drip noted in back of OP as well.  NECK: no adenopathy, no asymmetry, masses, or scars and thyroid normal to palpation  RESP: lungs clear to auscultation - no rales, rhonchi; slight wheeze with forced expiration in lower lungs  CV: regular rate and rhythm, normal S1 S2, no S3 or S4, no murmur, click or rub, no peripheral edema and peripheral pulses strong  PSYCH: mentation appears normal, affect normal/bright    Diagnostic Test Results:  Chest xray - results pending official radiologist reading.     ASSESSMENT/PLAN:       ICD-10-CM    1. Chronic maxillary sinusitis J32.0 amoxicillin-clavulanate (AUGMENTIN) 875-125 MG per tablet   2. Cough R05 montelukast (SINGULAIR) 10 MG tablet     amoxicillin-clavulanate (AUGMENTIN) 875-125 MG per tablet     albuterol (PROAIR HFA/PROVENTIL HFA/VENTOLIN HFA) 108 (90 Base) MCG/ACT inhaler   3. Allergic rhinitis due to dogs J30.81 montelukast (SINGULAIR) 10 MG tablet       Patient Instructions   Repeat oral antibiotic - Augmentin - instead of previous zpack.  Add over the counter sudafed daily, in  am (cuation with increased BP though).  Change zyrtec to other over the counter antihistamines- Allegra or Claritin during the day.  Add Singulair at night for the new few weeks-months.  Return to clinic with any worsening or changes in symptoms and follow up with PCP for routine care.       Lexi Rg PA-C  [facility]

## 2018-11-02 DIAGNOSIS — I10 HYPERTENSION GOAL BP (BLOOD PRESSURE) < 140/90: ICD-10-CM

## 2018-11-02 NOTE — TELEPHONE ENCOUNTER
"Requested Prescriptions   Pending Prescriptions Disp Refills     metoprolol succinate (TOPROL-XL) 25 MG 24 hr tablet [Pharmacy Med Name: METOPROLOL ER SUCCINATE 25MG TABS]  Last Written Prescription Date:  10/23/2017  Last Fill Quantity: 90 tabs,  # refills: 3   Last office visit: 11/1/2018 with prescribing provider:  Ifrah   Future Office Visit:     90 tablet 0     Sig: TAKE 1 TABLET(25 MG) BY MOUTH DAILY    Beta-Blockers Protocol Passed    11/2/2018  1:15 PM       Passed - Blood pressure under 140/90 in past 12 months    BP Readings from Last 3 Encounters:   11/01/18 127/73   10/12/18 132/84   07/30/18 132/65                Passed - Patient is age 6 or older       Passed - Recent (12 mo) or future (30 days) visit within the authorizing provider's specialty    Patient had office visit in the last 12 months or has a visit in the next 30 days with authorizing provider or within the authorizing provider's specialty.  See \"Patient Info\" tab in inbasket, or \"Choose Columns\" in Meds & Orders section of the refill encounter.                "

## 2018-11-05 ENCOUNTER — MYC REFILL (OUTPATIENT)
Dept: FAMILY MEDICINE | Facility: CLINIC | Age: 63
End: 2018-11-05

## 2018-11-05 DIAGNOSIS — I10 HYPERTENSION GOAL BP (BLOOD PRESSURE) < 140/90: ICD-10-CM

## 2018-11-05 RX ORDER — METOPROLOL SUCCINATE 25 MG/1
25 TABLET, EXTENDED RELEASE ORAL DAILY
Qty: 90 TABLET | Refills: 3 | Status: CANCELLED | OUTPATIENT
Start: 2018-11-05

## 2018-11-05 NOTE — TELEPHONE ENCOUNTER
Message from [employer]:  Original authorizing provider: Sheree Graf MD, MD Adilia Camacho would like a refill of the following medications:  metoprolol (TOPROL-XL) 25 MG 24 hr tablet [Sheree Graf MD, MD]    Preferred pharmacy: [facility] [address] [place] [address] [address] AT [facility] & [address] [place] [address]    Comment:  My pharmacy at [facility] at [address] [place] [address] is waiting for a refill request from Friday for Metoprolol Succinate 25MG, I am out so I need to get that filled if you could approve please.

## 2018-11-05 NOTE — TELEPHONE ENCOUNTER
"Requested Prescriptions   Pending Prescriptions Disp Refills     metoprolol succinate (TOPROL-XL) 25 MG 24 hr tablet  Last Written Prescription Date:  10/23/2017  Last Fill Quantity: 90 tablet,  # refills: 3   Last Office Visit: 11/1/2018   Future Office Visit:      90 tablet 3     Sig: Take 1 tablet (25 mg) by mouth daily    Beta-Blockers Protocol Passed    11/5/2018  3:29 PM       Passed - Blood pressure under 140/90 in past 12 months    BP Readings from Last 3 Encounters:   11/01/18 127/73   10/12/18 132/84   07/30/18 132/65          Passed - Patient is age 6 or older       Passed - Recent (12 mo) or future (30 days) visit within the authorizing provider's specialty    Patient had office visit in the last 12 months or has a visit in the next 30 days with authorizing provider or within the authorizing provider's specialty.  See \"Patient Info\" tab in inbasket, or \"Choose Columns\" in Meds & Orders section of the refill encounter.                "

## 2018-11-06 RX ORDER — METOPROLOL SUCCINATE 25 MG/1
TABLET, EXTENDED RELEASE ORAL
Qty: 90 TABLET | Refills: 3 | Status: SHIPPED | OUTPATIENT
Start: 2018-11-06 | End: 2019-10-17

## 2018-12-04 ENCOUNTER — OFFICE VISIT (OUTPATIENT)
Dept: FAMILY MEDICINE | Facility: CLINIC | Age: 63
End: 2018-12-04
Payer: COMMERCIAL

## 2018-12-04 VITALS
DIASTOLIC BLOOD PRESSURE: 84 MMHG | OXYGEN SATURATION: 100 % | HEART RATE: 75 BPM | BODY MASS INDEX: 36.06 KG/M2 | SYSTOLIC BLOOD PRESSURE: 125 MMHG | RESPIRATION RATE: 17 BRPM | WEIGHT: 194 LBS | TEMPERATURE: 97 F

## 2018-12-04 DIAGNOSIS — J01.90 ACUTE SINUSITIS WITH SYMPTOMS > 10 DAYS: ICD-10-CM

## 2018-12-04 DIAGNOSIS — R05.9 COUGH: Primary | ICD-10-CM

## 2018-12-04 PROCEDURE — 99213 OFFICE O/P EST LOW 20 MIN: CPT | Performed by: PHYSICIAN ASSISTANT

## 2018-12-04 NOTE — PATIENT INSTRUCTIONS
Continue current medicine regimen.  CT scan of sinuses scheduled.  Consider taper off omeprazole pending above and then at home stool testing for Hpylori.  Patient has pulmonology appointment scheduled for end of Jan 2019 already.  Return to clinic with any worsening or changes in symptoms and follow up with PCP for routine care.

## 2018-12-04 NOTE — MR AVS SNAPSHOT
After Visit Summary   12/4/2018    Adilia Camacho    MRN: [id number]           Patient Information     Date Of Birth          1955        Visit Information        Provider Department      12/4/2018 3:40 PM Lexi Rg PA-C [facility]        Today's Diagnoses     Cough    -  1    Acute sinusitis with symptoms > 10 days          Care Instructions    Continue current medicine regimen.  CT scan of sinuses scheduled.  Consider taper off omeprazole pending above and then at home stool testing for Hpylori.  Patient has pulmonology appointment scheduled for end of Jan 2019 already.  Return to clinic with any worsening or changes in symptoms and follow up with PCP for routine care.           Follow-ups after your visit        Your next 10 appointments already scheduled     Dec 07, 2018 10:00 AM CST   CT SINUS W/O CONTRAST with UCCT1   [facility] ([facility])    [address] [address] [phone]           How do I prepare for my exam? (Food and drink instructions) No Food and Drink Restrictions.  How do I prepare for my exam? (Other instructions) You do not need to do anything special to prepare for this exam. For a sinus scan: Use your nose spray (nasal decongestant spray) as directed.  What should I wear: Please wear loose clothing, such as a sweat suit or jogging clothes. Avoid snaps, zippers and other metal. We may ask you to undress and put on a hospital gown.  How long does the exam take: Most scans take less than 20 minutes.  What should I bring: Please bring any scans or X-rays taken at other hospitals, if similar tests were done. Also bring a list of your medicines, including vitamins, minerals and over-the-counter drugs. It is safest to leave personal items at home.  Do I need a : No  is needed.  What do I need to tell my doctor? Be sure to tell your doctor: * If you have any  allergies. * If there s any chance you are pregnant. * If you are breastfeeding.  What should I do after the exam: No restrictions, You may resume normal activities.  What is this test: A CT (computed tomography) scan is a series of pictures that allows us to look inside your body. The scanner creates images of the body in cross sections, much like slices of bread. This helps us see any problems more clearly.  Who should I call with questions: If you have any questions, please call the Imaging Department where you will have your exam. Directions, parking instructions, and other information is available on our website, [email or web address].            Jan 07, 2019  6:00 AM CST   FULL PULMONARY FUNCTION with DARIA PFL LATOSHA   [facility] ([facility])    [address] [facility]  3rd Floor  [facility] [id number] [phone]            Jan 07, 2019  7:00 AM CST   (Arrive by 6:45 AM)   New Patient Visit with Lorri Salazar MD   [facility] ([facility])    [address] [place] [address]  [address] [place] [id number] [phone]              Future tests that were ordered for you today     Open Future Orders        Priority Expected Expires Ordered    H Pylori antigen, stool Routine  1/3/2019 12/4/2018    CT Sinus w/o Contrast Routine  12/4/2019 12/4/2018            Who to contact     If you have questions or need follow up information about today's clinic visit or your schedule please contact [facility] directly at [phone].  Normal or non-critical lab and imaging results will be communicated to you by MyChart, letter or phone within 4 business days after the clinic has received the results. If you do not hear from us within 7 days, please contact the clinic through MyChart or phone. If you have a critical or abnormal lab result, we will notify you by phone as soon as possible.  Submit refill  requests through Tagged or call your pharmacy and they will forward the refill request to us. Please allow 3 business days for your refill to be completed.          Additional Information About Your Visit        My Health Directhart Information     Tagged gives you secure access to your electronic health record. If you see a primary care provider, you can also send messages to your care team and make appointments. If you have questions, please call your primary care clinic.  If you do not have a primary care provider, please call [phone] and they will assist you.        Care EveryWhere ID     This is your Care EveryWhere ID. This could be used by other organizations to access your [place] medical records  [id number]        Your Vitals Were     Pulse Temperature Respirations Pulse Oximetry BMI (Body Mass Index)       75 97  F (36.1  C) (Tympanic) 17 100% 36.06 kg/m2        Blood Pressure from Last 3 Encounters:   12/04/18 125/84   11/01/18 127/73   10/12/18 132/84    Weight from Last 3 Encounters:   12/04/18 194 lb (88 kg)   11/01/18 193 lb (87.5 kg)   10/12/18 193 lb (87.5 kg)               Primary Care Provider Office Phone # Fax #    Sheree Graf [phone] [address] [facility] [address]        Equal Access to Services     EMMA SAM AH: Hadii aad ku hadasho Soomaali, waaxda luqadaha, qaybta kaalmada adeegyada, waxay idiin hayaan johann hernández . So [facility] [phone].    ATENCIÓN: Si habla español, tiene a cheng disposición servicios gratuitos de asistencia lingüística. Llame al [phone].    We comply with applicable federal civil rights laws and [place] laws. We do not discriminate on the basis of race, color, national origin, age, disability, sex, sexual orientation, or gender identity.            Thank you!     Thank you for choosing [facility]  for your care. Our goal is always to provide you with excellent care. Hearing back from our patients is one way  we can continue to improve our services. Please take a few minutes to complete the written survey that you may receive in the mail after your visit with us. Thank you!             Your Updated Medication List - Protect others around you: Learn how to safely use, store and throw away your medicines at www.disposemymeds.org.          This list is accurate as of 12/4/18  4:21 PM.  Always use your most recent med list.                   Brand Name Dispense Instructions for use Diagnosis    acyclovir 400 MG tablet    ZOVIRAX    30 tablet    TAKE 1 TABLET BY MOUTH THREE TIMES DAILY    Recurrent herpes labialis       ADVIL PO      Take 200 mg by mouth as needed for moderate pain        albuterol 108 (90 Base) MCG/ACT inhaler    PROAIR HFA/PROVENTIL HFA/VENTOLIN HFA    1 Inhaler    Inhale 2 puffs into the lungs every 6 hours as needed for shortness of breath / dyspnea or wheezing    Cough       cetirizine 10 MG tablet    zyrTEC     Take 10 mg by mouth daily        COMPRESSION STOCKINGS     6 each    1 each daily Lower Extremity:   Knee High;  bilateral;  20-30 mm Hg.  Measure and fit.  Style and color per patient preference.  Doff n Catie per patient need.    Calf swelling       fluticasone 50 MCG/ACT nasal spray    FLONASE    48 mL    SHAKE LIQUID AND USE 2 SPRAYS IN EACH NOSTRIL TWICE DAILY    Allergic rhinitis due to dogs       loperamide 2 MG capsule    IMODIUM     Take 2 mg by mouth 4 times daily as needed for diarrhea        metoprolol succinate ER 25 MG 24 hr tablet    TOPROL-XL    90 tablet    TAKE 1 TABLET(25 MG) BY MOUTH DAILY    Hypertension goal BP (blood pressure) < 140/90       montelukast 10 MG tablet    SINGULAIR    90 tablet    Take 1 tablet (10 mg) by mouth At Bedtime    Cough, Allergic rhinitis due to dogs       omeprazole 20 MG DR capsule    priLOSEC    90 capsule    TAKE 1 CAPSULE BY MOUTH DAILY 30 TO 60 MINUTES BEFORE A MEAL    Gastroesophageal reflux disease, esophagitis presence not specified

## 2018-12-04 NOTE — PROGRESS NOTES
SUBJECTIVE:   Adilia Camacho is a 63 year old female who presents to clinic today for the following health issues:    RESPIRATORY SYMPTOMS      Duration: Off and on since September 20th, 2018     Description  cough    Severity: moderate     Accompanying signs and symptoms: dry mouth, pain in cheeks and jaw, pressure on font of head and back of neck      History (predisposing factors):  none    Therapies tried and outcome:  OTC cough syrups, cough drops, Augmentin, albuterol- does not help     History of seeing pulmonology with allergies (scheduled for Jan); acid reflux may have been related as well.    Patient felt like zpack initially helped things to finally start to move. Symptoms came back though a day after completed antibiotic. Patient initially started just the oral prednisone,but then felt the antibiotic started afterwards was more helpful. Was given prescription for Augmentin most recently (~ 1 month ago).  Chest xray done 10/12/2018 - within normal limits/negative.    Patient has increased omeprazole 20 mg two times a day since the past few months.  Patient also still using flonase - 2 sprays two times a day per pulmonology as well.  Patient taking zyrtec vs generic option every day as well,wondering if not working as well anymore.  She took sudafed for 9 days but hesitant to take it too long.  Added Singulair nightly last month as well with no real changes.  She still also using albuterol, rescue inhaler, feels like it make be helping as well.    Patient feels like she has only felt somewhat better while on antibiotic - primarily zpack from initially.  Patient feels like symptoms progress throughout the day.  She still feels lunch starts to make symptoms worse - gerd related?  She still feels super sinus congestion/ facial pressure with post nasal drip still though.      Problem list and histories reviewed & adjusted, as indicated.  Additional history: as documented    Patient Active Problem List    Diagnosis     Hypertension goal BP (blood pressure) < 140/90     CARDIOVASCULAR SCREENING; LDL GOAL LESS THAN 130     Allergic rhinitis due to dogs     Contraceptive management     Recurrent herpes labialis     Rosacea     Female stress incontinence     GERD (gastroesophageal reflux disease)     Advanced directives, counseling/discussion     Venous (peripheral) insufficiency     Left cervical radiculopathy     Past Surgical History:   Procedure Laterality Date     NO HISTORY OF SURGERY         Social History   Substance Use Topics     Smoking status: Former Smoker     Years: 10.00     Types: Cigarettes     Smokeless tobacco: Never Used     Alcohol use 1.0 oz/week     2 Cans of beer per week      Comment: occassionally     Family History   Problem Relation Age of Onset     Hypertension Father      Hypertension Brother      Hypertension Sister      Diabetes Sister      and brother     KIDNEY DISEASE Sister      Diabetes Brother      Cancer - colorectal No family hx of      Breast Cancer No family hx of      Thyroid Disease No family hx of          Current Outpatient Prescriptions   Medication Sig Dispense Refill     acyclovir (ZOVIRAX) 400 MG tablet TAKE 1 TABLET BY MOUTH THREE TIMES DAILY 30 tablet 5     albuterol (PROAIR HFA/PROVENTIL HFA/VENTOLIN HFA) 108 (90 Base) MCG/ACT inhaler Inhale 2 puffs into the lungs every 6 hours as needed for shortness of breath / dyspnea or wheezing 1 Inhaler 3     cetirizine (ZYRTEC) 10 MG tablet Take 10 mg by mouth daily       COMPRESSION STOCKINGS 1 each daily Lower Extremity:   Knee High;  bilateral;  20-30 mm Hg.  Measure and fit.  Style and color per patient preference.  Michael Haddad per patient need. 6 each 11     fluticasone (FLONASE) 50 MCG/ACT spray SHAKE LIQUID AND USE 2 SPRAYS IN EACH NOSTRIL TWICE DAILY 48 mL 3     Ibuprofen (ADVIL PO) Take 200 mg by mouth as needed for moderate pain       loperamide (IMODIUM) 2 MG capsule Take 2 mg by mouth 4 times daily as needed for  diarrhea       metoprolol succinate (TOPROL-XL) 25 MG 24 hr tablet TAKE 1 TABLET(25 MG) BY MOUTH DAILY 90 tablet 3     montelukast (SINGULAIR) 10 MG tablet Take 1 tablet (10 mg) by mouth At Bedtime 90 tablet 1     omeprazole (PRILOSEC) 20 MG CR capsule TAKE 1 CAPSULE BY MOUTH DAILY 30 TO 60 MINUTES BEFORE A MEAL 90 capsule 2     No Known Allergies    Reviewed and updated as needed this visit by clinical staff  Tobacco  Allergies  Meds  Problems  Med Hx  Surg Hx  Fam Hx  Soc Hx        Reviewed and updated as needed this visit by Provider  Allergies  Meds  Problems         ROS:  Constitutional, HEENT, cardiovascular, pulmonary, GI, , musculoskeletal, neuro, skin, endocrine and psych systems are negative, except as otherwise noted.    OBJECTIVE:     /84 (BP Location: Left arm, Patient Position: Sitting, Cuff Size: Adult Large)  Pulse 75  Temp 97  F (36.1  C) (Tympanic)  Resp 17  Wt 194 lb (88 kg)  SpO2 100%  BMI 36.06 kg/m2  Body mass index is 36.06 kg/(m^2).  GENERAL: healthy, alert and no distress  EYES: Eyes grossly normal to inspection, PERRL and conjunctivae and sclerae normal  HENT: ear canals normal and normal bilateral TM's; nose and mouth without ulcers or lesions; post nasal drip noted in back of OP as well.  NECK: no adenopathy, no asymmetry, masses, or scars and thyroid normal to palpation  RESP: lungs clear to auscultation - no rales, rhonchi; slight wheeze with forced expiration in lower lungs  CV: regular rate and rhythm, normal S1 S2, no S3 or S4, no murmur, click or rub, no peripheral edema and peripheral pulses strong  PSYCH: mentation appears normal, affect normal/bright    Diagnostic Test Results:  none    ASSESSMENT/PLAN:       ICD-10-CM    1. Cough R05 H Pylori antigen, stool     CT Sinus w/o Contrast   2. Acute sinusitis with symptoms > 10 days J01.90 H Pylori antigen, stool     CT Sinus w/o Contrast       Patient Instructions   Continue current medicine regimen.  CT scan  of sinuses scheduled.  Consider taper off omeprazole pending above and then at home stool testing for Hpylori.  Patient has pulmonology appointment scheduled for end of Jan 2019 already.  Return to clinic with any worsening or changes in symptoms and follow up with PCP for routine care.       Lexi Rg PA-C  [facility]

## 2018-12-07 ENCOUNTER — RADIANT APPOINTMENT (OUTPATIENT)
Dept: CT IMAGING | Facility: CLINIC | Age: 63
End: 2018-12-07
Attending: PHYSICIAN ASSISTANT
Payer: COMMERCIAL

## 2018-12-07 DIAGNOSIS — J01.90 ACUTE SINUSITIS WITH SYMPTOMS > 10 DAYS: ICD-10-CM

## 2018-12-07 DIAGNOSIS — R05.9 COUGH: ICD-10-CM

## 2018-12-07 NOTE — PROGRESS NOTES
"Adriana Pat,  Your sinus CT scan is within normal limits - no evidence of a sinus infection - this is (annoyingly?) reassuring.  Let's try to move forward with tapering off the omeprazole in order to get a stool sample for Hpylori - take extra note though if your symptoms worsen as you are coming off this medicine and that may help point us in the next direction as well.    Please contact the office with any questions or concerns.    Lexi Patten \"Joe\" APPLE Rg  "

## 2018-12-12 ENCOUNTER — MYC MEDICAL ADVICE (OUTPATIENT)
Dept: FAMILY MEDICINE | Facility: CLINIC | Age: 63
End: 2018-12-12

## 2018-12-12 DIAGNOSIS — R05.9 COUGH: Primary | ICD-10-CM

## 2018-12-24 DIAGNOSIS — K21.9 GASTROESOPHAGEAL REFLUX DISEASE, ESOPHAGITIS PRESENCE NOT SPECIFIED: ICD-10-CM

## 2018-12-24 DIAGNOSIS — R05.3 CHRONIC COUGH: Primary | ICD-10-CM

## 2018-12-24 NOTE — TELEPHONE ENCOUNTER
"Requested Prescriptions   Pending Prescriptions Disp Refills     omeprazole (PRILOSEC) 20 MG DR capsule [Pharmacy Med Name: OMEPRAZOLE 20MG CAPSULES]  Last Written Prescription Date:  5/4/2018  Last Fill Quantity: 90 capsule,  # refills: 2   Last Office Visit: 12/4/2018   Future Office Visit:      90 capsule 0     Sig: TAKE 1 CAPSULE BY MOUTH DAILY 30 TO 60 MINUTES BEFORE A MEAL    PPI Protocol Passed - 12/24/2018 10:54 AM       Passed - Not on Clopidogrel (unless Pantoprazole ordered)       Passed - No diagnosis of osteoporosis on record       Passed - Recent (12 mo) or future (30 days) visit within the authorizing provider's specialty    Patient had office visit in the last 12 months or has a visit in the next 30 days with authorizing provider or within the authorizing provider's specialty.  See \"Patient Info\" tab in inbasket, or \"Choose Columns\" in Meds & Orders section of the refill encounter.           Passed - Patient is age 18 or older       Passed - No active pregnacy on record       Passed - No positive pregnancy test in past 12 months          "

## 2018-12-27 DIAGNOSIS — B00.1 RECURRENT HERPES LABIALIS: ICD-10-CM

## 2018-12-27 NOTE — TELEPHONE ENCOUNTER
"Requested Prescriptions   Pending Prescriptions Disp Refills     acyclovir (ZOVIRAX) 400 MG tablet [Pharmacy Med Name: ACYCLOVIR 400MG TABLETS]  Last Written Prescription Date:  8/28/2017  Last Fill Quantity: 30 tablet,  # refills: 5   Last Office Visit: 12/4/2018   Future Office Visit:      30 tablet 0     Sig: TAKE 1 TABLET BY MOUTH THREE TIMES DAILY    Antivirals for Herpes Protocol Passed - 12/27/2018  2:50 PM       Passed - Patient is age 12 or older       Passed - Recent (12 mo) or future (30 days) visit within the authorizing provider's specialty    Patient had office visit in the last 12 months or has a visit in the next 30 days with authorizing provider or within the authorizing provider's specialty.  See \"Patient Info\" tab in inbasket, or \"Choose Columns\" in Meds & Orders section of the refill encounter.           Passed - Normal serum creatinine on file in past 12 months    Recent Labs   Lab Test 02/13/18  0954   CR 0.84               "

## 2018-12-28 ENCOUNTER — DOCUMENTATION ONLY (OUTPATIENT)
Dept: GASTROENTEROLOGY | Facility: CLINIC | Age: 63
End: 2018-12-28

## 2018-12-28 RX ORDER — ACYCLOVIR 400 MG/1
TABLET ORAL
Qty: 30 TABLET | Refills: 0 | Status: SHIPPED | OUTPATIENT
Start: 2018-12-28 | End: 2019-05-08

## 2018-12-28 NOTE — PROGRESS NOTES
GI notes or primary provider notes related to GI problem:   PCP note - Internal     Pathology reports: N    Recent Lab  Reports: Y    Radiology Reports (CT?MRI) : N    Endoscopy:  N    Colonoscopy: N    Referring GI Physician Name: N/A    Referring PCP Name: Chloe Valerio PA-C        Referral Date: 12/24/18 - Cough - Per Michelle put through referral process because patient is being referred for a cough    Date Complete Records Received and sent for review: 12/28/18    Date records scanned into epic: 12/28/18    Provider Review Date:     Date review routed back to sender:     Letter sent:       Notes:

## 2018-12-28 NOTE — TELEPHONE ENCOUNTER
Routing refill request to provider for review/approval because:  A break in medication - as needed or daily?

## 2019-01-22 NOTE — TELEPHONE ENCOUNTER
FUTURE VISIT INFORMATION      FUTURE VISIT INFORMATION:    Date: 1.28.19    Time: 8:00 AM    [facility]  REFERRAL INFORMATION:    Referring provider:  Dr. Rg    Referring providers clinic:      Reason for visit/diagnosis  Cough acute bronchitis     RECORDS REQUESTED FROM:       Clinic name Comments Records Status Imaging Status   [facility] Referral placed 10.23.18 EPIC                                      RECORDS RECEIVED FROM:    DATE RECEIVED: 1.28.19   NOTES STATUS DETAILS   OFFICE NOTE from referring provider Internal 10.23.18 Referral  12.4.18  10.12.18   OFFICE NOTE from other specialist N/A    DISCHARGE SUMMARY from hospital N/A    DISCHARGE REPORT from the ER N/A    OPERATIVE REPORT N/A    MEDICATION LIST Internal    IMAGING  (NEED IMAGES AND REPORTS)     CT SCAN N/A    CHEST XRAY (CXR) Internal 10.12.18    Scheduled for 1.28.19   TESTS     PULMONARY FUNCTION TESTING (PFT) In process Scheduled for 1.28.19   FLOW LOOP VOLUME (FVL) In process Scheduled for 1.28.19   CYSTIC FIBROSIS     CF SPUTUM CULTURE N/A

## 2019-01-25 ASSESSMENT — ENCOUNTER SYMPTOMS
NECK PAIN: 1
DYSURIA: 0
ARTHRALGIAS: 1
BACK PAIN: 0
SORE THROAT: 1
JOINT SWELLING: 0
MYALGIAS: 1
DYSPNEA ON EXERTION: 0
SINUS PAIN: 1
MUSCLE CRAMPS: 0
COUGH: 1
SNORES LOUDLY: 1
SHORTNESS OF BREATH: 0
SINUS CONGESTION: 1
FLANK PAIN: 0
MUSCLE WEAKNESS: 0
TASTE DISTURBANCE: 0
NECK MASS: 0
SMELL DISTURBANCE: 0
HEMATURIA: 0
STIFFNESS: 0
HOARSE VOICE: 1
DIFFICULTY URINATING: 0
HEMOPTYSIS: 0
POSTURAL DYSPNEA: 0
TROUBLE SWALLOWING: 0
COUGH DISTURBING SLEEP: 1
WHEEZING: 1
SPUTUM PRODUCTION: 1

## 2019-01-28 ENCOUNTER — PRE VISIT (OUTPATIENT)
Dept: PULMONOLOGY | Facility: CLINIC | Age: 64
End: 2019-01-28

## 2019-01-28 ENCOUNTER — OFFICE VISIT (OUTPATIENT)
Dept: PULMONOLOGY | Facility: CLINIC | Age: 64
End: 2019-01-28
Attending: INTERNAL MEDICINE
Payer: COMMERCIAL

## 2019-01-28 ENCOUNTER — ANCILLARY PROCEDURE (OUTPATIENT)
Dept: GENERAL RADIOLOGY | Facility: CLINIC | Age: 64
End: 2019-01-28
Attending: INTERNAL MEDICINE
Payer: COMMERCIAL

## 2019-01-28 VITALS
DIASTOLIC BLOOD PRESSURE: 90 MMHG | WEIGHT: 194.4 LBS | HEART RATE: 77 BPM | HEIGHT: 61 IN | OXYGEN SATURATION: 95 % | BODY MASS INDEX: 36.7 KG/M2 | SYSTOLIC BLOOD PRESSURE: 146 MMHG

## 2019-01-28 DIAGNOSIS — R05.3 CHRONIC COUGH: ICD-10-CM

## 2019-01-28 DIAGNOSIS — R05.9 COUGH: Primary | ICD-10-CM

## 2019-01-28 DIAGNOSIS — K21.9 GASTROESOPHAGEAL REFLUX DISEASE, ESOPHAGITIS PRESENCE NOT SPECIFIED: Primary | ICD-10-CM

## 2019-01-28 DIAGNOSIS — Z87.891 PERSONAL HISTORY OF TOBACCO USE: ICD-10-CM

## 2019-01-28 DIAGNOSIS — R05.9 COUGH: ICD-10-CM

## 2019-01-28 DIAGNOSIS — J30.81 ALLERGIC RHINITIS DUE TO DOGS: ICD-10-CM

## 2019-01-28 DIAGNOSIS — R05.8 POST-VIRAL COUGH SYNDROME: ICD-10-CM

## 2019-01-28 DIAGNOSIS — J20.9 ACUTE BRONCHITIS WITH SYMPTOMS > 10 DAYS: ICD-10-CM

## 2019-01-28 PROCEDURE — G0463 HOSPITAL OUTPT CLINIC VISIT: HCPCS | Mod: ZF

## 2019-01-28 ASSESSMENT — MIFFLIN-ST. JEOR: SCORE: 1384.17

## 2019-01-28 ASSESSMENT — PAIN SCALES - GENERAL: PAINLEVEL: NO PAIN (0)

## 2019-01-28 NOTE — NURSING NOTE
Chief Complaint   Patient presents with     New Patient     ough acute bronchitis with symptoms     Vitals were taken and medications were reconciled.     Gerald Stone, RMA  7:54 AM

## 2019-01-28 NOTE — LETTER
1/28/2019       RE: Adilia Camacho  [address]  [facility] [address]     Dear Colleague,    Thank you for referring your patient, Adilia Camacho, to the [facility] at [facility]. Please see a copy of my visit note below.    Pulmonary Clinic New Patient Consult  Reason for Consult: chronic cough  History of Present Illness    Ms. Camacho is a 63-year-old female with a history of allergic rhinitis and hypertension who presents to pulmonary clinic today for further evaluation of chronic cough.  She states that her cough started on September 20 in the setting of having a viral upper respiratory infection.  At this infection was accompanied by a very bad cough that never fully went away.  She notes that her cough is better than it was at the time she is sick but has lingered and is very bothersome to her.  For the most part her cough is dry.  It is occasionally productive of whitish colored phlegm.  Drinking liquids particularly coffee seems to help the cough.  She also notes some improvement with use of albuterol as needed.  The cough is worse with activity such as taking long walks or abrupt temperature changes from hot to cold.  She also notes the cough is worse with certain foods, particularly chocolate as well as lying down at night.  She also notes that she coughs when becoming semirecumbent in her recliner.  She denies any shortness of breath, fevers, chills, or hemoptysis.  She has no known personal history of lung disease or asthma.  She has not had problems with recurrent bronchitis in the past.  She is only had one episode of pneumonia before in her life.  Currently she is taking Flonase, Zyrtec, and Singulair as well as albuterol as needed and finds these to be mildly helpful.  She also takes as needed cough drops and cough syrup.  She tells me she was previously tested for allergies and noted to be allergic to dogs and  dust.    She is a previous social smoker of less than 1/2 pack/day for 10 years.  She quit altogether 30 years ago.  She lives in [place], [place] in a house that is about 100 years old.  She has 1 dog at home.  She has never been exposed to asbestos.  She is currently employed as a banker.  She denies any significant exposure to birds, pillows or comfort or stuff with down feathers, hot tub or Jacuzzi she is on a regular basis, or recent travel outside the area.    She notes a long family history of acid reflux and is wondering if this is what could be causing her cough.  She is on omeprazole 20 mg daily which she has used off and on.  She denies any classic heartburning symptoms but is suspicious due to her cough worsening in the setting of certain foods.    Notably she was seen by 1 of my colleagues back in 2011 for chronic cough.  At that time cough was thought secondary to GERD and diffuse sinus disease.  That cough also started in the setting of a viral upper respiratory infection and then resolved with time and treatment with Flonase and omeprazole.        Review of Systems:  10 of 14 systems reviewed and are negative unless otherwise stated in HPI.    Past Medical History:   Diagnosis Date     Allergic rhinitis due to dogs      CARDIOVASCULAR SCREENING; LDL GOAL LESS THAN 130      Contraceptive management      Hypertension goal BP (blood pressure) < 140/90      Recurrent herpes labialis      Rosacea        Past Surgical History:   Procedure Laterality Date     NO HISTORY OF SURGERY         Family History   Problem Relation Age of Onset     Hypertension Father      Hypertension Brother      Hypertension Sister      Diabetes Sister         and brother     Kidney Disease Sister      Diabetes Brother      Cancer - colorectal No family hx of      Breast Cancer No family hx of      Thyroid Disease No family hx of        Social History     Socioeconomic History     Marital status:      Spouse name:  Harry     Number of children: 1     Years of education: None     Highest education level: None   Social Needs     Financial resource strain: None     Food insecurity - worry: None     Food insecurity - inability: None     Transportation needs - medical: None     Transportation needs - non-medical: None   Occupational History     Occupation: Business Banking      Employer: [employer]     Comment: Works in [employer]   Tobacco Use     Smoking status: Former Smoker     Years: 10.00     Types: Cigarettes     Smokeless tobacco: Never Used   Substance and Sexual Activity     Alcohol use: Yes     Alcohol/week: 1.0 oz     Types: 2 Cans of beer per week     Comment: occassionally     Drug use: No     Sexual activity: Yes     Partners: Male   Other Topics Concern     Parent/sibling w/ CABG, MI or angioplasty before 65F 55M? No   Social History Narrative     None         Allergies   Allergen Reactions     Dogs      Dust Mites          Current Outpatient Medications:      acyclovir (ZOVIRAX) 400 MG tablet, TAKE 1 TABLET BY MOUTH THREE TIMES DAILY, Disp: 30 tablet, Rfl: 0     albuterol (PROAIR HFA/PROVENTIL HFA/VENTOLIN HFA) 108 (90 Base) MCG/ACT inhaler, Inhale 2 puffs into the lungs every 6 hours as needed for shortness of breath / dyspnea or wheezing, Disp: 1 Inhaler, Rfl: 3     cetirizine (ZYRTEC) 10 MG tablet, Take 10 mg by mouth daily, Disp: , Rfl:      COMPRESSION STOCKINGS, 1 each daily Lower Extremity:   Knee High;  bilateral;  20-30 mm Hg.  Measure and fit.  Style and color per patient preference.  Doff n Catie per patient need., Disp: 6 each, Rfl: 11     fluticasone (FLONASE) 50 MCG/ACT spray, SHAKE LIQUID AND USE 2 SPRAYS IN EACH NOSTRIL TWICE DAILY, Disp: 48 mL, Rfl: 3     Ibuprofen (ADVIL PO), Take 200 mg by mouth as needed for moderate pain, Disp: , Rfl:      ipratropium (ATROVENT HFA) 17 MCG/ACT inhaler, Inhale 2 puffs into the lungs 4 times daily, Disp: 12.9 g, Rfl: 3     loperamide (IMODIUM) 2 MG capsule,  "Take 2 mg by mouth 4 times daily as needed for diarrhea, Disp: , Rfl:      metoprolol succinate (TOPROL-XL) 25 MG 24 hr tablet, TAKE 1 TABLET(25 MG) BY MOUTH DAILY, Disp: 90 tablet, Rfl: 3     montelukast (SINGULAIR) 10 MG tablet, Take 1 tablet (10 mg) by mouth At Bedtime, Disp: 90 tablet, Rfl: 1     omeprazole (PRILOSEC) 20 MG DR capsule, TAKE 1 CAPSULE BY MOUTH DAILY 30 TO 60 MINUTES BEFORE A MEAL, Disp: 90 capsule, Rfl: 0      Physical Exam:  /90 (BP Location: Right arm, Cuff Size: Adult Regular)   Pulse 77   Ht 1.549 m (5' 1\")   Wt 88.2 kg (194 lb 6.4 oz)   SpO2 95%   BMI 36.73 kg/m     GENERAL: Well developed, well nourished, alert, and in no apparent distress.  HEENT: Normocephalic, atraumatic. PERRL, EOMI. Oral mucosa is moist. No perioral cyanosis.  NECK: supple, no masses, no thyromegaly.  RESP:  Normal respiratory effort.  CTAB.  No rales, wheezes, rhonchi.  No cyanosis or clubbing.  CV: Normal S1, S2, regular rhythm, normal rate. No murmur.  No LE edema.   ABDOMEN:  Soft, non-tender, non-distended.   SKIN: warm and dry. No rash.  NEURO: AAOx3.  Normal gait.  No focal neuro deficits.  PSYCH: mentation appears normal. and affect normal/bright    Results:  PFTs: Ratio 84%, FVC 86%, FEV1 90%, TLC 91%, RV 96%, DLCO 106%.  Study demonstrates normal pulmonary mechanics with normal gas exchange.  Imaging (personally reviewed in clinic today):  CXR 10/12/18: negative of acute cardiopulmonary process.  CXR today: negative for acute cardiopulmonary process.  CT Sinuses 12/7: negative for acute or chronic sinusitis.    Assessment and Plan:   Adilia Camacho is a 63 year old female with a history of allergic rhinitis who presents to pulmonary clinic today for further evaluation of chronic cough.  I reviewed with her chest x-rays from October as well as today which did not reveal any acute cardiopulmonary process.  Further reviewed with her pulmonary function testing which shows normal spirometry, normal lung " volumes, normal diffusion capacity.  In the setting of normal chest imaging and pulmonary function testing the 3 most common causes of chronic cough in adults are acid reflux, postnasal drip, and asthma/reactive airways disease.  Presently she is on adequate treatment for postnasal drip.  With regard to asthma/reactive airways disease, there are not really any features of her history which are particularly suggestive of asthma.  Seeing as how this cough started with a viral upper respiratory infection, I am more suspicious that there is some component of post viral cough syndrome here.  I have recommended Atrovent inhaler to be used 4 times a day for this in hopes that we will decrease cough and airway hypersensitivity.  Ms. Camacho is rather concerned about the possibility of acid reflux, especially since she has a number of family members who have acid reflux and other GI problems.  We discussed the risks and benefits of empiric treatment with lifestyle modifications and Prilosec versus referral to GI.  She strongly preferred referral to GI for definitive evaluation of this.  In the interim, we did review lifestyle modifications aimed at reducing reflux including head of bed elevation, avoidance of trigger foods, and avoidance of eating or drinking anything within 2 hours of bedtime.  She was otherwise encouraged to continue taking omeprazole as she is  until she is seen by GI.    I expect with the above therapies and a tincture of time that her cough should improve.  Particularly, as this was the case with the last time she had a cough similar to this in 2011.  I would plan to see her back in clinic in approximately 3-4 months for follow-up.  If, at that time cough is not sufficiently improved we could consider obtaining CT chest and/or methacholine challenge testing for further evaluation.  The above findings and plan were discussed with Ms. Camacho who voiced understanding and agreement.  Questions and concerns  were answered to her satisfaction.  she was provided with my contact information should new questions or concerns arise in the interim.  she is up to date on a seasonal influenza vaccine.    Madie Salazar MD  Pulmonary and Critical Care Medicine    The above note was dictated using voice recognition software and may include typographical errors. Please contact the author for any clarifications.  I spent a total of 60 minutes face to face with Adilia Camacho during today's office visit. Over 50% of this time was spent counseling the patient and/or coordinating care regarding their pulmonary disease.        Lung Cancer Screening Shared Decision Making Visit     Adilia Camacho is not eligible for lung cancer screening on the basis of the information provided in my signed lung cancer screening order. Adilia's smoking history is below the threshold and so it is not recommended.    Patient is not currently a smoker and so we did not discuss that the only way to prevent lung cancer is to not smoke. Smoking cessation assistance was not offered.    ShouldIScreen      Again, thank you for allowing me to participate in the care of your patient.      Sincerely,    Lorri Salazar MD

## 2019-01-28 NOTE — PROGRESS NOTES
Pulmonary Clinic New Patient Consult  Reason for Consult: chronic cough  History of Present Illness    Ms. Camacho is a 63-year-old female with a history of allergic rhinitis and hypertension who presents to pulmonary clinic today for further evaluation of chronic cough.  She states that her cough started on September 20 in the setting of having a viral upper respiratory infection.  At this infection was accompanied by a very bad cough that never fully went away.  She notes that her cough is better than it was at the time she is sick but has lingered and is very bothersome to her.  For the most part her cough is dry.  It is occasionally productive of whitish colored phlegm.  Drinking liquids particularly coffee seems to help the cough.  She also notes some improvement with use of albuterol as needed.  The cough is worse with activity such as taking long walks or abrupt temperature changes from hot to cold.  She also notes the cough is worse with certain foods, particularly chocolate as well as lying down at night.  She also notes that she coughs when becoming semirecumbent in her recliner.  She denies any shortness of breath, fevers, chills, or hemoptysis.  She has no known personal history of lung disease or asthma.  She has not had problems with recurrent bronchitis in the past.  She is only had one episode of pneumonia before in her life.  Currently she is taking Flonase, Zyrtec, and Singulair as well as albuterol as needed and finds these to be mildly helpful.  She also takes as needed cough drops and cough syrup.  She tells me she was previously tested for allergies and noted to be allergic to dogs and dust.    She is a previous social smoker of less than 1/2 pack/day for 10 years.  She quit altogether 30 years ago.  She lives in [place], [place] in a house that is about 100 years old.  She has 1 dog at home.  She has never been exposed to asbestos.  She is currently employed as a banker.  She denies any  significant exposure to birds, pillows or comfort or stuff with down feathers, hot tub or Jacuzzi she is on a regular basis, or recent travel outside the area.    She notes a long family history of acid reflux and is wondering if this is what could be causing her cough.  She is on omeprazole 20 mg daily which she has used off and on.  She denies any classic heartburning symptoms but is suspicious due to her cough worsening in the setting of certain foods.    Notably she was seen by 1 of my colleagues back in 2011 for chronic cough.  At that time cough was thought secondary to GERD and diffuse sinus disease.  That cough also started in the setting of a viral upper respiratory infection and then resolved with time and treatment with Flonase and omeprazole.        Review of Systems:  10 of 14 systems reviewed and are negative unless otherwise stated in HPI.    Past Medical History:   Diagnosis Date     Allergic rhinitis due to dogs      CARDIOVASCULAR SCREENING; LDL GOAL LESS THAN 130      Contraceptive management      Hypertension goal BP (blood pressure) < 140/90      Recurrent herpes labialis      Rosacea        Past Surgical History:   Procedure Laterality Date     NO HISTORY OF SURGERY         Family History   Problem Relation Age of Onset     Hypertension Father      Hypertension Brother      Hypertension Sister      Diabetes Sister         and brother     Kidney Disease Sister      Diabetes Brother      Cancer - colorectal No family hx of      Breast Cancer No family hx of      Thyroid Disease No family hx of        Social History     Socioeconomic History     Marital status:      Spouse name: Harry     Number of children: 1     Years of education: None     Highest education level: None   Social Needs     Financial resource strain: None     Food insecurity - worry: None     Food insecurity - inability: None     Transportation needs - medical: None     Transportation needs - non-medical: None    Occupational History     Occupation: Business Banking      Employer: [employer]     Comment: Works in [employer]!   Tobacco Use     Smoking status: Former Smoker     Years: 10.00     Types: Cigarettes     Smokeless tobacco: Never Used   Substance and Sexual Activity     Alcohol use: Yes     Alcohol/week: 1.0 oz     Types: 2 Cans of beer per week     Comment: occassionally     Drug use: No     Sexual activity: Yes     Partners: Male   Other Topics Concern     Parent/sibling w/ CABG, MI or angioplasty before 65F 55M? No   Social History Narrative     None         Allergies   Allergen Reactions     Dogs      Dust Mites          Current Outpatient Medications:      acyclovir (ZOVIRAX) 400 MG tablet, TAKE 1 TABLET BY MOUTH THREE TIMES DAILY, Disp: 30 tablet, Rfl: 0     albuterol (PROAIR HFA/PROVENTIL HFA/VENTOLIN HFA) 108 (90 Base) MCG/ACT inhaler, Inhale 2 puffs into the lungs every 6 hours as needed for shortness of breath / dyspnea or wheezing, Disp: 1 Inhaler, Rfl: 3     cetirizine (ZYRTEC) 10 MG tablet, Take 10 mg by mouth daily, Disp: , Rfl:      COMPRESSION STOCKINGS, 1 each daily Lower Extremity:   Knee High;  bilateral;  20-30 mm Hg.  Measure and fit.  Style and color per patient preference.  Doff n Catie per patient need., Disp: 6 each, Rfl: 11     fluticasone (FLONASE) 50 MCG/ACT spray, SHAKE LIQUID AND USE 2 SPRAYS IN EACH NOSTRIL TWICE DAILY, Disp: 48 mL, Rfl: 3     Ibuprofen (ADVIL PO), Take 200 mg by mouth as needed for moderate pain, Disp: , Rfl:      ipratropium (ATROVENT HFA) 17 MCG/ACT inhaler, Inhale 2 puffs into the lungs 4 times daily, Disp: 12.9 g, Rfl: 3     loperamide (IMODIUM) 2 MG capsule, Take 2 mg by mouth 4 times daily as needed for diarrhea, Disp: , Rfl:      metoprolol succinate (TOPROL-XL) 25 MG 24 hr tablet, TAKE 1 TABLET(25 MG) BY MOUTH DAILY, Disp: 90 tablet, Rfl: 3     montelukast (SINGULAIR) 10 MG tablet, Take 1 tablet (10 mg) by mouth At Bedtime, Disp: 90 tablet, Rfl: 1      "omeprazole (PRILOSEC) 20 MG DR capsule, TAKE 1 CAPSULE BY MOUTH DAILY 30 TO 60 MINUTES BEFORE A MEAL, Disp: 90 capsule, Rfl: 0      Physical Exam:  /90 (BP Location: Right arm, Cuff Size: Adult Regular)   Pulse 77   Ht 1.549 m (5' 1\")   Wt 88.2 kg (194 lb 6.4 oz)   SpO2 95%   BMI 36.73 kg/m    GENERAL: Well developed, well nourished, alert, and in no apparent distress.  HEENT: Normocephalic, atraumatic. PERRL, EOMI. Oral mucosa is moist. No perioral cyanosis.  NECK: supple, no masses, no thyromegaly.  RESP:  Normal respiratory effort.  CTAB.  No rales, wheezes, rhonchi.  No cyanosis or clubbing.  CV: Normal S1, S2, regular rhythm, normal rate. No murmur.  No LE edema.   ABDOMEN:  Soft, non-tender, non-distended.   SKIN: warm and dry. No rash.  NEURO: AAOx3.  Normal gait.  No focal neuro deficits.  PSYCH: mentation appears normal. and affect normal/bright    Results:  PFTs: Ratio 84%, FVC 86%, FEV1 90%, TLC 91%, RV 96%, DLCO 106%.  Study demonstrates normal pulmonary mechanics with normal gas exchange.  Imaging (personally reviewed in clinic today):  CXR 10/12/18: negative of acute cardiopulmonary process.  CXR today: negative for acute cardiopulmonary process.  CT Sinuses 12/7: negative for acute or chronic sinusitis.    Assessment and Plan:   Adilia Camacho is a 63 year old female with a history of allergic rhinitis who presents to pulmonary clinic today for further evaluation of chronic cough.  I reviewed with her chest x-rays from October as well as today which did not reveal any acute cardiopulmonary process.  Further reviewed with her pulmonary function testing which shows normal spirometry, normal lung volumes, normal diffusion capacity.  In the setting of normal chest imaging and pulmonary function testing the 3 most common causes of chronic cough in adults are acid reflux, postnasal drip, and asthma/reactive airways disease.  Presently she is on adequate treatment for postnasal drip.  With regard " to asthma/reactive airways disease, there are not really any features of her history which are particularly suggestive of asthma.  Seeing as how this cough started with a viral upper respiratory infection, I am more suspicious that there is some component of post viral cough syndrome here.  I have recommended Atrovent inhaler to be used 4 times a day for this in hopes that we will decrease cough and airway hypersensitivity.  Ms. Camacho is rather concerned about the possibility of acid reflux, especially since she has a number of family members who have acid reflux and other GI problems.  We discussed the risks and benefits of empiric treatment with lifestyle modifications and Prilosec versus referral to GI.  She strongly preferred referral to GI for definitive evaluation of this.  In the interim, we did review lifestyle modifications aimed at reducing reflux including head of bed elevation, avoidance of trigger foods, and avoidance of eating or drinking anything within 2 hours of bedtime.  She was otherwise encouraged to continue taking omeprazole as she is  until she is seen by GI.    I expect with the above therapies and a tincture of time that her cough should improve.  Particularly, as this was the case with the last time she had a cough similar to this in 2011.  I would plan to see her back in clinic in approximately 3-4 months for follow-up.  If, at that time cough is not sufficiently improved we could consider obtaining CT chest and/or methacholine challenge testing for further evaluation.  The above findings and plan were discussed with Ms. Camacho who voiced understanding and agreement.  Questions and concerns were answered to her satisfaction.  she was provided with my contact information should new questions or concerns arise in the interim.  she is up to date on a seasonal influenza vaccine.    Madie Salazar MD  Pulmonary and Critical Care Medicine    The above note was dictated using voice recognition  software and may include typographical errors. Please contact the author for any clarifications.  I spent a total of 60 minutes face to face with Adilia Camacho during today's office visit. Over 50% of this time was spent counseling the patient and/or coordinating care regarding their pulmonary disease.        Lung Cancer Screening Shared Decision Making Visit     Adilia Camacho is not eligible for lung cancer screening on the basis of the information provided in my signed lung cancer screening order. Adilia's smoking history is below the threshold and so it is not recommended.    Patient is not currently a smoker and so we did not discuss that the only way to prevent lung cancer is to not smoke. Smoking cessation assistance was not offered.    ShouldIScreen

## 2019-01-29 LAB
DLCOUNC-%PRED-PRE: 106 %
DLCOUNC-PRE: 21.79 ML/MIN/MMHG
DLCOUNC-PRED: 20.5 ML/MIN/MMHG
ERV-%PRED-PRE: 136 %
ERV-PRE: 0.48 L
ERV-PRED: 0.35 L
EXPTIME-PRE: 6.29 SEC
FEF2575-%PRED-PRE: 117 %
FEF2575-PRE: 2.34 L/SEC
FEF2575-PRED: 2 L/SEC
FEFMAX-%PRED-PRE: 105 %
FEFMAX-PRE: 6.21 L/SEC
FEFMAX-PRED: 5.86 L/SEC
FEV1-%PRED-PRE: 90 %
FEV1-PRE: 1.92 L
FEV1FEV6-PRE: 85 %
FEV1FEV6-PRED: 81 %
FEV1FVC-PRE: 84 %
FEV1FVC-PRED: 78 %
FEV1SVC-PRE: 81 %
FEV1SVC-PRED: 72 %
FIFMAX-PRE: 3.95 L/SEC
FRCPLETH-%PRED-PRE: 86 %
FRCPLETH-PRE: 2.21 L
FRCPLETH-PRED: 2.56 L
FVC-%PRED-PRE: 86 %
FVC-PRE: 2.28 L
FVC-PRED: 2.64 L
IC-%PRED-PRE: 73 %
IC-PRE: 1.91 L
IC-PRED: 2.59 L
RVPLETH-%PRED-PRE: 96 %
RVPLETH-PRE: 1.74 L
RVPLETH-PRED: 1.8 L
TLCPLETH-%PRED-PRE: 91 %
TLCPLETH-PRE: 4.12 L
TLCPLETH-PRED: 4.52 L
VA-%PRED-PRE: 80 %
VA-PRE: 3.73 L
VC-%PRED-PRE: 81 %
VC-PRE: 2.39 L
VC-PRED: 2.93 L

## 2019-02-18 ENCOUNTER — DOCUMENTATION ONLY (OUTPATIENT)
Dept: CARE COORDINATION | Facility: CLINIC | Age: 64
End: 2019-02-18

## 2019-02-28 ENCOUNTER — TELEPHONE (OUTPATIENT)
Dept: GASTROENTEROLOGY | Facility: CLINIC | Age: 64
End: 2019-02-28

## 2019-03-01 ASSESSMENT — ENCOUNTER SYMPTOMS
SORE THROAT: 0
WHEEZING: 0
BACK PAIN: 0
SINUS PAIN: 1
NECK PAIN: 1
SHORTNESS OF BREATH: 0
MUSCLE CRAMPS: 1
JOINT SWELLING: 0
SMELL DISTURBANCE: 0
STIFFNESS: 1
NECK MASS: 0
SPUTUM PRODUCTION: 1
COUGH DISTURBING SLEEP: 1
HOARSE VOICE: 1
HEMOPTYSIS: 0
DYSPNEA ON EXERTION: 0
TASTE DISTURBANCE: 0
TROUBLE SWALLOWING: 0
COUGH: 1
SINUS CONGESTION: 1
MYALGIAS: 0
POSTURAL DYSPNEA: 0
ARTHRALGIAS: 0
SNORES LOUDLY: 1
MUSCLE WEAKNESS: 0

## 2019-03-01 NOTE — TELEPHONE ENCOUNTER
FUTURE VISIT INFORMATION      FUTURE VISIT INFORMATION:    Date: 3/4/19    Time: 11:20AM    [place] [facility] [place]:    Referring provider:  Dr. Lorri Salazar    Referring providers clinic:  [facility]    Reason for visit/diagnosis:  GERD    NOTES STATUS DETAILS   OFFICE NOTE from referring provider Internal 1/28/19   OFFICE NOTE from other specialist Internal 7/31/12, 11/9/11   DISCHARGE SUMMARY from hospital N/A    OPERATIVE REPORT N/A    MEDICATION LIST Internal         ENDOSCOPY  N/A    COLONOSCOPY N/A    ERCP N/A    EUS N/A    STOOL TESTING Internal    PERTINENT LABS Internal    PATHOLOGY REPORTS (RELATED) N/A    IMAGING (CT, MRI, EGD) Internal PACS

## 2019-03-04 ENCOUNTER — PRE VISIT (OUTPATIENT)
Dept: GASTROENTEROLOGY | Facility: CLINIC | Age: 64
End: 2019-03-04

## 2019-03-04 ENCOUNTER — OFFICE VISIT (OUTPATIENT)
Dept: GASTROENTEROLOGY | Facility: CLINIC | Age: 64
End: 2019-03-04
Attending: INTERNAL MEDICINE
Payer: COMMERCIAL

## 2019-03-04 VITALS
DIASTOLIC BLOOD PRESSURE: 67 MMHG | TEMPERATURE: 97.8 F | HEART RATE: 79 BPM | WEIGHT: 193.4 LBS | HEIGHT: 61 IN | OXYGEN SATURATION: 98 % | BODY MASS INDEX: 36.51 KG/M2 | SYSTOLIC BLOOD PRESSURE: 149 MMHG

## 2019-03-04 DIAGNOSIS — R05.3 CHRONIC COUGH: Primary | ICD-10-CM

## 2019-03-04 DIAGNOSIS — K21.9 GASTROESOPHAGEAL REFLUX DISEASE, ESOPHAGITIS PRESENCE NOT SPECIFIED: ICD-10-CM

## 2019-03-04 ASSESSMENT — PAIN SCALES - GENERAL: PAINLEVEL: NO PAIN (0)

## 2019-03-04 ASSESSMENT — MIFFLIN-ST. JEOR: SCORE: 1379.64

## 2019-03-04 NOTE — PROGRESS NOTES
GI CLINIC VISIT - NEW PATIENT    CC/REFERRING PROVIDER: Lorri Salazar  REASON FOR CONSULTATION: GERD    HPI:   Adilia Camacho is a 63 year old female with past medical history of allergic rhinitis, hypertension, venous insufficiency, rosacea and GERD.  Patient presents to the GI clinic today for an initial consultation for GERD.    Patient presents with persistent, and ongoing cough that started in September 2018. Her symptoms include hoarseness at onset of the cough, occasional acidic taste in her mouth at night when in bed, and some chest congestion which she attributes to her allergic rhinitis. She states the cough is worsen with sugar free gum and chocolate. She was treated with abx and prednisone and had some improvement in her symptoms, but after the treatment the cough continued and has not gotten better.She denies retrosternal heartburn, regurgitation, globus sensation and difficulty/pain with swallowing.  Her bowel pattern is daily soft, and form bowel movement.   She reports significant family history of GERD.         Denies any tenesmus or insecurity passing flatus, no fecal incontinence or new perianal/nocturnal sxs noted. Denies any N/V/F/C/HA/NS or other const/syst/cardiopulmonary sxs, no BRBPR/melena/urinary changes, no unintentional wt loss or appetite/satiety changes. No other bowel/bladder habit changes, no dysphagia/odynophagia. No jaundice/icterus/pruritus, no acholic stools/steatorrhea, no new lumps/bumps, no jt pain/oral ulcer/rash/eye sxs noted.    ROS: 10pt ROS performed and otherwise negative.    PERTINENT PAST MEDICAL HISTORY:  As noted above.    PREVIOUS ABDOMINAL/GYNECOLOGIC SURGERIES:  As noted above.none     PREVIOUS ENDOSCOPY:  None     PERTINENT MEDICATIONS:    Medications reviewed with patient today, see Medication List/Assessment for details.  NSAID use as needed  No anticoagulation reported by patient.  No other OTC/herbal/supplements reported by patient.    SOCIAL  HISTORY:   and lives alone. She works as a banker     FAMILY HISTORY:  No colon/panc/esophageal/other GI CA, no other Paulson or other HPS-related Val. No IBD/celiac, no other AI/liver/thyroid disease.    PHYSICAL EXAMINATION:  Vitals reviewed, AFVSS  Wt 193# today (stable)  Gen: aaox3, cooperative, pleasant, not diaphoretic, nad  HEENT: ncat, neck supple, no clad/sclad, normal op w/o ulcer/exudate, anicteric, mmm  Resp/CV without acute findings, not dyspneic/tachycardic  Ext: no c/c/e  Skin: warm, perfused, no jaundice  Neuro: grossly intact, no asterixis noted    ASSESSMENT/PLAN:  1. GERD:   Patient presents with ongoing and persistent cough, and has a significant family history of GERD. The etiology of her GERD is likely related to a combination of her chronic sinusitis/allergic rhinitis, family history, and prolong fasting between meals and a stressful job. Discussed with patient  recommendations to continue with acid suppression with low dose conservative management and lifestyle modifications- elevating the HOB when sleeping, consider using wedge pillow, refrain from eating or drinking within 2-3 hours of bedtime. Also recommended further evaluations including an upper endoscopy to evaluate structural or inflammatory involvement that can cause or mimic symptoms of GERD. Depending on her goals to further understand the severity and frequency of her GERD we recommend Esophageal Manometry + pH Study + Gastric Emptying Study to further characterize pending the anatomic evaluation.    -- Recommend Upper GI endoscopy - would need to discuss with PCP  -- Continue low dose PPI and monitor response   --Keep a food and symptom diary for 3-5 days tracking meals and symptoms   -- Life style modification    Thank you for this consultation. It was a pleasure to participate in the care of this patient; please contact us with any further questions.    Dank Lema MSN, APRN, CNP  Division of Gastroenterology, Hepatology &  "Nutrition  [facility]    ATTENDING ATTESTATION:    REFERRING PROVIDER: House  DATE SEEN: 3/4/19    1. It was wonderful getting a chance to meet with you to discuss your Chronic Cough, likely multifactorial in nature - discussed next steps in evaluation and management together today.  - There are features in your clinical and family history which do suggest that there could be a contributing component of Chronic GERD (reflux) playing a role in your symptoms, but I suspect that this is not likely the *only* cause (suspecting inputs from chronic rhinitis/sinusitis/URI symptoms as well).  - Recommend moving forward with an Upper GI Endoscopy to evaluate for structural/inflammatory involvement that can cause/mimic GERD symptoms. Discussed procedure today, and your preference to hold off for now until further discussion with your PCP. Please let us know if you'd like to proceed with this.  - If your goal is to better define the frequency/severity of GERD at play, can discuss role for Esophageal Manometry + pH Study + Gastric Emptying Study to further characterize pending the anatomic evaluation.  - Discussed role for consistent adherence to a acid suppression regimen (possibly low-dose PPI daily if you desire a conservative approach), and observing your response. Role/risks for long-term acid suppression discussed today, would require \"doubling-down\" on dietary/lifestyle modifications reviewed today to see if non-pharmacologic approach (or taper off completely) is possible for you. No immediate surgical indication.    2. Recommend dietary/lifestyle modifications for possible Chronic GERD component of Chronic Cough as we discussed today, including:  - I would recommend keeping a food/symptom diary to review at next visit, particularly as this may help identify other dietary/stress/volume triggers for your symptoms. Try to keep this for at least 3-5 days tracking meals and symptoms, will be helpful for when you " "meet with the Nutrition team.  - Smaller-volume but frequent meals and snacks spread throughout the day, adjusting consistencies and dietary choices based on if it's a \"good\" or \"bad\" day, avoiding prolonged fasting, etc.  - Regular mild/moderate cardio exercise as tolerated, ensuring adequate hydration daily.  - Gradual/sustainable weight management can also be beneficial from the standpoint of a holistic care plan. We can discuss some \"healthy-at-any-size\" strategies for this in your ongoing care.  - Increasing the elevation of your head while you sleep (consider using a foam wedge pillow along with raising the head of the bed), regular mild/moderate cardio exercise as tolerated (particularly in the evening after dinner), not eating or drinking within 2-3 hours of bedtime, avoiding prolonged fasting, etc.  - Consider role for low-FODMAP diet (+/- empiric gluten or dairy restriction, particularly if Celiac Disease has already been excluded), best implemented in conjunction with a Registered Dietician as part of a Nutrition referral. Please let us know if you'd like us to place a Nutrition referral for you today.    3. Continue to monitor for the danger signs/symptoms we reviewed together today:  worsening abdominal pain, worsening diarrhea, bowel/bladder obstructive symptoms (nausea/vomiting, abdominal distention, difficulty passing stool/flatus/urine), blood mixed into stools, persistent fevers/chills, progressive anemia (particularly with iron deficiency), difficulty with swallowing, perianal/rectal discomfort (particularly with defecation), unexpected weight loss, etc.  - Contact us via MyChart or phone should these symptoms occur, particularly as we may advise further evaluation accordingly.    4. Return to GI Clinic with my GI Physician Assistants (Char) in 3-4 months to review your progress, sooner if symptomatic.     Thank you for this consultation. It was a pleasure to participate in the care of this " patient; please contact us with any further questions.    Patient was discussed, seen, and examined by me, Bradley Wolfe. The plan of care and pertinent data/imaging were also reviewed with the GI NP (Pita) in clinic today. Agree with the joint assessment and plan as delineated above.    Please contact me with any further questions.    Bradley Wolfe MD    [facility] - Department of Medicine  Division of Gastroenterology

## 2019-03-04 NOTE — NURSING NOTE
Printed after visit summary given to pt along with verbal instructions.   Pt will let us know if she wants to go ahead with egd.

## 2019-03-04 NOTE — PATIENT INSTRUCTIONS
"I've included a brief summary of our discussion and care plan from today's visit below.  Please review this information with your primary care provider.  _______________________________________________________________________      1. It was wonderful getting a chance to meet with you to discuss your Chronic Cough, likely multifactorial in nature - discussed next steps in evaluation and management together today.  - There are features in your clinical and family history which do suggest that there could be a contributing component of Chronic GERD (reflux) playing a role in your symptoms, but I suspect that this is not likely the *only* cause (suspecting inputs from chronic rhinitis/sinusitis/URI symptoms as well).  - Recommend moving forward with an Upper GI Endoscopy to evaluate for structural/inflammatory involvement that can cause/mimic GERD symptoms. Discussed procedure today, and your preference to hold off for now until further discussion with your PCP. Please let us know if you'd like to proceed with this.  - If your goal is to better define the frequency/severity of GERD at play, can discuss role for Esophageal Manometry + pH Study + Gastric Emptying Study to further characterize pending the anatomic evaluation.  - Discussed role for consistent adherence to a acid suppression regimen (possibly low-dose PPI daily if you desire a conservative approach), and observing your response. Role/risks for long-term acid suppression discussed today, would require \"doubling-down\" on dietary/lifestyle modifications reviewed today to see if non-pharmacologic approach (or taper off completely) is possible for you. No immediate surgical indication.    2. Recommend dietary/lifestyle modifications for possible Chronic GERD component of Chronic Cough as we discussed today, including:  - I would recommend keeping a food/symptom diary to review at next visit, particularly as this may help identify other dietary/stress/volume " "triggers for your symptoms. Try to keep this for at least 3-5 days tracking meals and symptoms, will be helpful for when you meet with the Nutrition team.  - Smaller-volume but frequent meals and snacks spread throughout the day, adjusting consistencies and dietary choices based on if it's a \"good\" or \"bad\" day, avoiding prolonged fasting, etc.  - Regular mild/moderate cardio exercise as tolerated, ensuring adequate hydration daily.  - Gradual/sustainable weight management can also be beneficial from the standpoint of a holistic care plan. We can discuss some \"healthy-at-any-size\" strategies for this in your ongoing care.  - Increasing the elevation of your head while you sleep (consider using a foam wedge pillow along with raising the head of the bed), regular mild/moderate cardio exercise as tolerated (particularly in the evening after dinner), not eating or drinking within 2-3 hours of bedtime, avoiding prolonged fasting, etc.  - Consider role for low-FODMAP diet (+/- empiric gluten or dairy restriction, particularly if Celiac Disease has already been excluded), best implemented in conjunction with a Registered Dietician as part of a Nutrition referral. Please let us know if you'd like us to place a Nutrition referral for you today.    3. Continue to monitor for the danger signs/symptoms we reviewed together today:  worsening abdominal pain, worsening diarrhea, bowel/bladder obstructive symptoms (nausea/vomiting, abdominal distention, difficulty passing stool/flatus/urine), blood mixed into stools, persistent fevers/chills, progressive anemia (particularly with iron deficiency), difficulty with swallowing, perianal/rectal discomfort (particularly with defecation), unexpected weight loss, etc.  - Contact us via MyChart or phone should these symptoms occur, particularly as we may advise further evaluation accordingly.    4. Return to GI Clinic with my GI Physician Assistants (Char) in 3-4 months to review your " progress, sooner if symptomatic.   - If you are unable to schedule this follow-up appointment today, please contact our  at [phone] within the next week to help set up this necessary appointment.    _______________________________________________________________________    It was a pleasure seeing you in clinic today - please be in touch if there are any further questions that arise following today's visit.  During business hours, you may reach Clinic Nurse Triage Line at [phone].  For urgent/emergent questions after business hours, you may reach the on-call GI Fellow by contacting the [facility]  at [phone].    Any benign/non-urgent test results are usually communicated via letter or [employer] message within 1-2 weeks after completion.  Urgent results (those that require a change in the previously-discussed care plan) are usually communicated via a phone call once available from our clinic staff to discuss the results and the next steps in your evaluation.    I recommend signing up for [employer] access if you have not already done so and are comfortable with using a computer.  This allows for online access to your lab results and also helps you communicate efficiently with my clinic should any questions arise in your care.    We have Financial Counseling services available through our clinic.  If you have questions about your insurance coverage or payment responsibilities, particularly before you undergo any tests or procedures, please let us know and we can arrange a consultation accordingly to help you make informed decisions about your healthcare.    Sincerely,    Bradley Wolfe MD    [facility] - Department of Medicine  Division of Gastroenterology        Patient Education     GERD (Adult)    The esophagus is a tube that carries food from the mouth to the stomach. A valve (the LES, lower esophageal sphincter) at the lower end of the  "esophagus prevents stomach acid from flowing upward. When this valve doesn't work properly, stomach contents may repeatedly flow back up (reflux) into the esophagus. This is called gastroesophageal reflux disease (GERD). GERD can irritate the esophagus. It can cause problems with pain, swallowing or breathing. In severe cases, GERD can cause recurrent pneumonia (from aspiration or breathing in particles) or other serious problems.  Symptoms of reflux include burning, pressure or sharp pain in the upper abdomen or mid to lower chest. The pain can spread to the neck, back, or shoulder. There may be belching, an acid taste in the back of the throat, chronic cough, or sore throat, or hoarseness. GERD symptoms often occur during the day after a big meal. They can also occur at night when lying down.   Home care  Lifestyle changes can help reduce symptoms. If needed, your healthcare provider may prescribe medicines. Symptoms often improve with treatment, but if treatment is stopped, the symptoms often return after a few months. So most persons with GERD will need to continue treatment or get treatment on and off.  Lifestyle changes    Limit or avoid fatty, fried, and spicy foods, as well as coffee, chocolate, mint, and foods with high acid content such as tomatoes and citrus fruit and juices (orange, grapefruit, lemon).    Don t eat large meals, especially at night. Frequent, smaller meals are best. Don't lie down right after eating. And don t eat anything 3 hours before going to bed.    Don't drink alcohol or smoke. As much as possible, stay away from second hand smoke.    If you are overweight, losing weight will reduce symptoms.     Don't wear tight clothing around your stomach area.    If your symptoms occur during sleep, use a foam wedge to elevate your upper body (not just your head.) Or, place 4\" blocks under the head of your bed. Or use 2 bed risers under your bedframe.  Medicines  If needed, medicines can help " relieve the symptoms of GERD and prevent damage to the esophagus. Discuss a medicine plan with your healthcare provider. This may include one or more of the following medicines:    Antacids to help neutralize the normal acids in your stomach.    Acid blockers (Histamine or H2 blockers) to decrease acid production.    Acid inhibitors (proton pump inhibitors PPIs) to decrease acid production in a different way than the blockers. They may work better, but can take a little longer to take effect.  Take an antacid 30 to 60 minutes after eating and at bedtime, but not at the same time as an acid blocker.  Try not to take medicines such as ibuprofen and aspirin. If you are taking aspirin for your heart or other medical reasons, talk to your healthcare provider about stopping it.  Follow-up care  Follow up with your healthcare provider or as advised by our staff.  When to seek medical advice  Call your healthcare provider if any of the following occur:    Stomach pain gets worse or moves to the lower right abdomen (appendix area)    Chest pain appears or gets worse, or spreads to the back, neck, shoulder, or arm    An over-the-counter trial of medicine doesn't relieve your symptoms    Weight loss that can't be explained    Trouble or pain swallowing    Frequent vomiting (can t keep down liquids)    Blood in the stool or vomit (red or black in color)    Feeling weak or dizzy    Fever of 100.4 F (38 C) or higher, or as directed by your healthcare provider  Date Last Reviewed: 3/1/2018    [id number] [employer]. [address] [place] [address], [place] [address]. All rights reserved. This information is not intended as a substitute for professional medical care. Always follow your healthcare professional's instructions.

## 2019-03-04 NOTE — NURSING NOTE
"Chief Complaint   Patient presents with     New Patient     New consult       Vitals:    03/04/19 1131 03/04/19 1137   BP: 156/84 149/67   Pulse: 79    Temp: 97.8  F (36.6  C)    TempSrc: Oral    SpO2: 98%    Weight: 87.7 kg (193 lb 6.4 oz)    Height: 1.549 m (5' 1\")        Body mass index is 36.54 kg/m .    Lisa Anderson CMA      "

## 2019-03-04 NOTE — LETTER
3/4/2019       RE: Adilia Camacho  [address]  [facility] [address]     Dear Colleague,    Thank you for referring your patient, Adilia Camacho, to the [facility] at [facility]. Please see a copy of my visit note below.    GI CLINIC VISIT - NEW PATIENT    CC/REFERRING PROVIDER: Lorri Salazar  REASON FOR CONSULTATION: GERD    HPI:   Adilia Camacho is a 63 year old female with past medical history of allergic rhinitis, hypertension, venous insufficiency, rosacea and GERD.  Patient presents to the GI clinic today for an initial consultation for GERD.    Patient presents with persistent, and ongoing cough that started in September 2018. Her symptoms include hoarseness at onset of the cough, occasional acidic taste in her mouth at night when in bed, and some chest congestion which she attributes to her allergic rhinitis. She states the cough is worsen with sugar free gum and chocolate. She was treated with abx and prednisone and had some improvement in her symptoms, but after the treatment the cough continued and has not gotten better.She denies retrosternal heartburn, regurgitation, globus sensation and difficulty/pain with swallowing.  Her bowel pattern is daily soft, and form bowel movement.   She reports significant family history of GERD.     Denies any tenesmus or insecurity passing flatus, no fecal incontinence or new perianal/nocturnal sxs noted. Denies any N/V/F/C/HA/NS or other const/syst/cardiopulmonary sxs, no BRBPR/melena/urinary changes, no unintentional wt loss or appetite/satiety changes. No other bowel/bladder habit changes, no dysphagia/odynophagia. No jaundice/icterus/pruritus, no acholic stools/steatorrhea, no new lumps/bumps, no jt pain/oral ulcer/rash/eye sxs noted.    ROS: 10pt ROS performed and otherwise negative.    PERTINENT PAST MEDICAL HISTORY:  As noted above.    PREVIOUS ABDOMINAL/GYNECOLOGIC SURGERIES:  As  noted above.none     PREVIOUS ENDOSCOPY:  None     PERTINENT MEDICATIONS:    Medications reviewed with patient today, see Medication List/Assessment for details.  NSAID use as needed  No anticoagulation reported by patient.  No other OTC/herbal/supplements reported by patient.    SOCIAL HISTORY:   and lives alone. She works as a banker     FAMILY HISTORY:  No colon/panc/esophageal/other GI CA, no other [place] or other HPS-related Val. No IBD/celiac, no other AI/liver/thyroid disease.    PHYSICAL EXAMINATION:  Vitals reviewed, AFVSS  Wt 193# today (stable)  Gen: aaox3, cooperative, pleasant, not diaphoretic, nad  HEENT: ncat, neck supple, no clad/sclad, normal op w/o ulcer/exudate, anicteric, mmm  Resp/CV without acute findings, not dyspneic/tachycardic  Ext: no c/c/e  Skin: warm, perfused, no jaundice  Neuro: grossly intact, no asterixis noted    ASSESSMENT/PLAN:  1. GERD:   Patient presents with ongoing and persistent cough, and has a significant family history of GERD. The etiology of her GERD is likely related to a combination of her chronic sinusitis/allergic rhinitis, family history, and prolong fasting between meals and a stressful job. Discussed with patient  recommendations to continue with acid suppression with low dose conservative management and lifestyle modifications- elevating the HOB when sleeping, consider using wedge pillow, refrain from eating or drinking within 2-3 hours of bedtime. Also recommended further evaluations including an upper endoscopy to evaluate structural or inflammatory involvement that can cause or mimic symptoms of GERD. Depending on her goals to further understand the severity and frequency of her GERD we recommend Esophageal Manometry + pH Study + Gastric Emptying Study to further characterize pending the anatomic evaluation.    -- Recommend Upper GI endoscopy - would need to discuss with PCP  -- Continue low dose PPI and monitor response   --Keep a food and symptom diary  "for 3-5 days tracking meals and symptoms   -- Life style modification    Thank you for this consultation. It was a pleasure to participate in the care of this patient; please contact us with any further questions.    Dank Lema MSN, APRN, CNP  Division of Gastroenterology, Hepatology & Nutrition  [facility]    ATTENDING ATTESTATION:    REFERRING PROVIDER: House  DATE SEEN: 3/4/19    1. It was wonderful getting a chance to meet with you to discuss your Chronic Cough, likely multifactorial in nature - discussed next steps in evaluation and management together today.  - There are features in your clinical and family history which do suggest that there could be a contributing component of Chronic GERD (reflux) playing a role in your symptoms, but I suspect that this is not likely the *only* cause (suspecting inputs from chronic rhinitis/sinusitis/URI symptoms as well).  - Recommend moving forward with an Upper GI Endoscopy to evaluate for structural/inflammatory involvement that can cause/mimic GERD symptoms. Discussed procedure today, and your preference to hold off for now until further discussion with your PCP. Please let us know if you'd like to proceed with this.  - If your goal is to better define the frequency/severity of GERD at play, can discuss role for Esophageal Manometry + pH Study + Gastric Emptying Study to further characterize pending the anatomic evaluation.  - Discussed role for consistent adherence to a acid suppression regimen (possibly low-dose PPI daily if you desire a conservative approach), and observing your response. Role/risks for long-term acid suppression discussed today, would require \"doubling-down\" on dietary/lifestyle modifications reviewed today to see if non-pharmacologic approach (or taper off completely) is possible for you. No immediate surgical indication.    2. Recommend dietary/lifestyle modifications for possible Chronic GERD component of Chronic Cough as we " "discussed today, including:  - I would recommend keeping a food/symptom diary to review at next visit, particularly as this may help identify other dietary/stress/volume triggers for your symptoms. Try to keep this for at least 3-5 days tracking meals and symptoms, will be helpful for when you meet with the Nutrition team.  - Smaller-volume but frequent meals and snacks spread throughout the day, adjusting consistencies and dietary choices based on if it's a \"good\" or \"bad\" day, avoiding prolonged fasting, etc.  - Regular mild/moderate cardio exercise as tolerated, ensuring adequate hydration daily.  - Gradual/sustainable weight management can also be beneficial from the standpoint of a holistic care plan. We can discuss some \"healthy-at-any-size\" strategies for this in your ongoing care.  - Increasing the elevation of your head while you sleep (consider using a foam wedge pillow along with raising the head of the bed), regular mild/moderate cardio exercise as tolerated (particularly in the evening after dinner), not eating or drinking within 2-3 hours of bedtime, avoiding prolonged fasting, etc.  - Consider role for low-FODMAP diet (+/- empiric gluten or dairy restriction, particularly if Celiac Disease has already been excluded), best implemented in conjunction with a Registered Dietician as part of a Nutrition referral. Please let us know if you'd like us to place a Nutrition referral for you today.    3. Continue to monitor for the danger signs/symptoms we reviewed together today:  worsening abdominal pain, worsening diarrhea, bowel/bladder obstructive symptoms (nausea/vomiting, abdominal distention, difficulty passing stool/flatus/urine), blood mixed into stools, persistent fevers/chills, progressive anemia (particularly with iron deficiency), difficulty with swallowing, perianal/rectal discomfort (particularly with defecation), unexpected weight loss, etc.  - Contact us via [employer] or phone should these " symptoms occur, particularly as we may advise further evaluation accordingly.    4. Return to GI Clinic with my GI Physician Assistants (Char) in 3-4 months to review your progress, sooner if symptomatic.     Thank you for this consultation. It was a pleasure to participate in the care of this patient; please contact us with any further questions.    Patient was discussed, seen, and examined by me, Bradley Wolfe. The plan of care and pertinent data/imaging were also reviewed with the GI NP ([place]ita) in clinic today. Agree with the joint assessment and plan as delineated above.    Please contact me with any further questions.    Again, thank you for allowing me to participate in the care of your patient.      Sincerely,    Bradlye Wolfe MD

## 2019-04-04 PROBLEM — M54.12 LEFT CERVICAL RADICULOPATHY: Status: RESOLVED | Noted: 2018-04-15 | Resolved: 2019-04-04

## 2019-04-10 NOTE — PROGRESS NOTES
SUBJECTIVE:   Adilia Camacho is a 63 year old female who presents to clinic today for the following   health issues:        Mouth Problem      Duration: 2 weeks    Description (location/character/radiation): white coating on back of tongue    Intensity:  No pain    Accompanying signs and symptoms: none    History (similar episodes/previous evaluation): was on steroids, antibiotics, inhalers for cough    Precipitating or alleviating factors: None    Therapies tried and outcome: salt water gargle, brushing tongue     Pt also wanting to ask about stopping Singulair- feels like this may be making her feel anxious     White coat on tongue x 2 weeks, thinks her taste has changed.  Has tried salt gargle, no improvement.  Recent glucose 115 2/2018.  A1C 6.3% 3/2017.  No recent abx.    Prednisone for bronchitis 10/2018, augmentin for sinusitis 11/2018.  Saw pulm for chronic cough 1/2019, started on atrovent inhaler which has helped, no longer using it.  Thinks acid reflux is the cause, saw GI, plan for scope, she plans to do this.  Cough is much improved but still going on.  She plans to journal her symptoms, seems worse with eating.      Does think singulair helped at the time.  Thinks she has bene more anxious since starting it.  She takes zyrtec for allergies.      Has had a few episodes of dizziness when she stands up.  Thinks she got up too quickly.  Has occurred a few times in the last month.  No syncope or associated chest pain or shortness of breath.      Plugged sensation to ears.    Patient Active Problem List   Diagnosis     Hypertension goal BP (blood pressure) < 140/90     CARDIOVASCULAR SCREENING; LDL GOAL LESS THAN 130     Allergic rhinitis due to dogs     Contraceptive management     Recurrent herpes labialis     Rosacea     Female stress incontinence     GERD (gastroesophageal reflux disease)     Advanced directives, counseling/discussion     Venous (peripheral) insufficiency     Obesity (BMI 35.0-39.9)  with comorbidity (H)     Past Surgical History:   Procedure Laterality Date     NO HISTORY OF SURGERY         Social History     Tobacco Use     Smoking status: Former Smoker     Years: 10.00     Types: Cigarettes     Smokeless tobacco: Never Used   Substance Use Topics     Alcohol use: Yes     Alcohol/week: 1.0 oz     Types: 2 Cans of beer per week     Comment: occassionally     Family History   Problem Relation Age of Onset     Hypertension Father      Hypertension Brother      Hypertension Sister      Diabetes Sister         and brother     Kidney Disease Sister      Diabetes Brother      Cancer - colorectal No family hx of      Breast Cancer No family hx of      Thyroid Disease No family hx of          Current Outpatient Medications   Medication Sig Dispense Refill     acyclovir (ZOVIRAX) 400 MG tablet TAKE 1 TABLET BY MOUTH THREE TIMES DAILY 30 tablet 0     cetirizine (ZYRTEC) 10 MG tablet Take 10 mg by mouth daily       COMPRESSION STOCKINGS 1 each daily Lower Extremity:   Knee High;  bilateral;  20-30 mm Hg.  Measure and fit.  Style and color per patient preference.  Michael n Catie per patient need. 6 each 11     fluticasone (FLONASE) 50 MCG/ACT spray SHAKE LIQUID AND USE 2 SPRAYS IN EACH NOSTRIL TWICE DAILY 48 mL 3     Ibuprofen (ADVIL PO) Take 200 mg by mouth as needed for moderate pain       ipratropium (ATROVENT HFA) 17 MCG/ACT inhaler Inhale 2 puffs into the lungs 4 times daily 12.9 g 3     loperamide (IMODIUM) 2 MG capsule Take 2 mg by mouth 4 times daily as needed for diarrhea       metoprolol succinate (TOPROL-XL) 25 MG 24 hr tablet TAKE 1 TABLET(25 MG) BY MOUTH DAILY 90 tablet 3     montelukast (SINGULAIR) 10 MG tablet Take 1 tablet (10 mg) by mouth At Bedtime 90 tablet 1     nystatin (MYCOSTATIN) 901257 UNIT/ML suspension Take 5 mLs (500,000 Units) by mouth 4 times daily 400 mL 0     omeprazole (PRILOSEC) 20 MG DR capsule TAKE 1 CAPSULE BY MOUTH DAILY 30 TO 60 MINUTES BEFORE A MEAL 90 capsule 0      albuterol (PROAIR HFA/PROVENTIL HFA/VENTOLIN HFA) 108 (90 Base) MCG/ACT inhaler Inhale 2 puffs into the lungs every 6 hours as needed for shortness of breath / dyspnea or wheezing (Patient not taking: Reported on 3/4/2019) 1 Inhaler 3       ROS:  COnst, HEENT, Resp, CV, Neuro, GI as above, otherwise negative       OBJECTIVE:                                                    /72 (BP Location: Left arm, Patient Position: Chair, Cuff Size: Adult Large)   Pulse 75   Temp 98.4  F (36.9  C) (Oral)   Resp 16   Wt 87.5 kg (193 lb)   SpO2 100%   BMI 36.47 kg/m     GENERAL APPEARANCE: healthy, alert and no distress  EYES: Eyes grossly normal to inspection and conjunctivae and sclerae normal  HENT: ears with dull tympanic membranes bilaterally.  nose and mouth without ulcers or lesions, anterior aspect of tongue with whitish coating that does not scrape off.  No hairy tongue appearance or leukoplakia.    NECK: no adenopathy  RESP: lungs clear to auscultation - no rales, rhonchi or wheezes  CV: regular rates and rhythm, normal S1 S2, no S3 or S4 and no murmur, click or rub  PSYCH: mentation appears normal and affect normal/bright        ASSESSMENT/PLAN:                                                    (K14.8) Tongue discoloration  (primary encounter diagnosis)  Comment: mild white coating on exam not impressive enough for typical thrush but maybe mild case? No recent predisposing factors (ICS, abx) but does have prediabetes  Plan: nystatin (MYCOSTATIN) 978417 UNIT/ML suspension        We discussed unclear if thrush based on exam but due to low risk of treatment decided to start nystatin course, reviewed oral rinse.      (R05) Chronic cough  Comment: she has already undergone extensive workup, seen pulmonology and GI, plans for scope, it has improved  Plan: ongoing workup per GI    (R42) Dizziness  Comment: several times in the last month, associated with position change.  No focal neurological symptoms or resp/CV  symptoms   Plan: discussed possible dehydration as a cuse, recommend montiro symptoms, follow up if associated with chest pain, shortness of breath, syncope, or increased frequency    (H69.83) Dysfunction of both eustachian tubes  Comment:   Plan: discussed likely self resolution within 2-3 months, she I son flonase and antihistaime    (R73.01) IFG (impaired fasting glucose)  Comment:   Plan: Hemoglobin A1c, Basic metabolic panel, Lipid         panel reflex to direct LDL Fasting, CANCELED:         Lipid panel reflex to direct LDL Fasting        Recheck today    (E66.01) Morbid obesity (H)  Comment:   Plan:         See Patient Instructions    Monse Willis, [facility]    Patient Instructions   1.  Unsure if tongue changes are due to thrush but no risk to treatment, start nystatin switch and spit 4 times a day  2.  Ok to stop Singulair but monitor cough and anxiety, would want to restart if cough worsens  3.  Fluid behind ears should resolve within 2-3 months  4.  Update labs today  5.  Call Central Scheduling [phone] to schedule mammogram

## 2019-04-12 ENCOUNTER — OFFICE VISIT (OUTPATIENT)
Dept: FAMILY MEDICINE | Facility: CLINIC | Age: 64
End: 2019-04-12
Payer: COMMERCIAL

## 2019-04-12 VITALS
TEMPERATURE: 98.4 F | SYSTOLIC BLOOD PRESSURE: 134 MMHG | RESPIRATION RATE: 16 BRPM | DIASTOLIC BLOOD PRESSURE: 72 MMHG | HEART RATE: 75 BPM | BODY MASS INDEX: 36.47 KG/M2 | OXYGEN SATURATION: 100 % | WEIGHT: 193 LBS

## 2019-04-12 DIAGNOSIS — R05.3 CHRONIC COUGH: ICD-10-CM

## 2019-04-12 DIAGNOSIS — K14.8 TONGUE DISCOLORATION: Primary | ICD-10-CM

## 2019-04-12 DIAGNOSIS — H69.93 DYSFUNCTION OF BOTH EUSTACHIAN TUBES: ICD-10-CM

## 2019-04-12 DIAGNOSIS — R73.01 IFG (IMPAIRED FASTING GLUCOSE): ICD-10-CM

## 2019-04-12 DIAGNOSIS — E66.01 MORBID OBESITY (H): ICD-10-CM

## 2019-04-12 DIAGNOSIS — R42 DIZZINESS: ICD-10-CM

## 2019-04-12 LAB
ANION GAP SERPL CALCULATED.3IONS-SCNC: 5 MMOL/L (ref 3–14)
BUN SERPL-MCNC: 11 MG/DL (ref 7–30)
CALCIUM SERPL-MCNC: 9 MG/DL (ref 8.5–10.1)
CHLORIDE SERPL-SCNC: 109 MMOL/L (ref 94–109)
CO2 SERPL-SCNC: 26 MMOL/L (ref 20–32)
CREAT SERPL-MCNC: 0.86 MG/DL (ref 0.52–1.04)
GFR SERPL CREATININE-BSD FRML MDRD: 73 ML/MIN/{1.73_M2}
GLUCOSE SERPL-MCNC: 93 MG/DL (ref 70–99)
HBA1C MFR BLD: 6.4 % (ref 0–5.6)
POTASSIUM SERPL-SCNC: 3.8 MMOL/L (ref 3.4–5.3)
SODIUM SERPL-SCNC: 140 MMOL/L (ref 133–144)

## 2019-04-12 PROCEDURE — 80048 BASIC METABOLIC PNL TOTAL CA: CPT | Performed by: NURSE PRACTITIONER

## 2019-04-12 PROCEDURE — 99214 OFFICE O/P EST MOD 30 MIN: CPT | Performed by: NURSE PRACTITIONER

## 2019-04-12 PROCEDURE — 36415 COLL VENOUS BLD VENIPUNCTURE: CPT | Performed by: NURSE PRACTITIONER

## 2019-04-12 PROCEDURE — 83036 HEMOGLOBIN GLYCOSYLATED A1C: CPT | Performed by: NURSE PRACTITIONER

## 2019-04-12 RX ORDER — NYSTATIN 100000/ML
500000 SUSPENSION, ORAL (FINAL DOSE FORM) ORAL 4 TIMES DAILY
Qty: 400 ML | Refills: 0 | Status: SHIPPED | OUTPATIENT
Start: 2019-04-12 | End: 2019-10-17

## 2019-04-12 NOTE — PATIENT INSTRUCTIONS
1.  Unsure if tongue changes are due to thrush but no risk to treatment, start nystatin switch and spit 4 times a day  2.  Ok to stop Singulair but monitor cough and anxiety, would want to restart if cough worsens  3.  Fluid behind ears should resolve within 2-3 months  4.  Update labs today  5.  Call Central Scheduling [phone] to schedule mammogram

## 2019-04-13 NOTE — RESULT ENCOUNTER NOTE
Tani Pat,    Your A1C (3 month blood sugar average) is slightly higher at 6.4%.  This is still in the prediabetic range, values above 6.7% are a diagnosis of diabetes.  I strongly recommend working on diet and exercise for weight loss to help prevent progression to diabetes.    normal kidney function and electrolytes.      Monse Willis, CNP

## 2019-05-01 ASSESSMENT — ENCOUNTER SYMPTOMS
NECK PAIN: 1
MYALGIAS: 0
SINUS CONGESTION: 0
STIFFNESS: 0
MUSCLE WEAKNESS: 0
ARTHRALGIAS: 1
SMELL DISTURBANCE: 0
TASTE DISTURBANCE: 0
BACK PAIN: 0
JOINT SWELLING: 0
MUSCLE CRAMPS: 1
SORE THROAT: 0
NECK MASS: 0
HOARSE VOICE: 1
SINUS PAIN: 1

## 2019-05-02 ENCOUNTER — OFFICE VISIT (OUTPATIENT)
Dept: PULMONOLOGY | Facility: CLINIC | Age: 64
End: 2019-05-02
Attending: INTERNAL MEDICINE
Payer: COMMERCIAL

## 2019-05-02 VITALS
RESPIRATION RATE: 17 BRPM | DIASTOLIC BLOOD PRESSURE: 91 MMHG | BODY MASS INDEX: 36.44 KG/M2 | WEIGHT: 193 LBS | SYSTOLIC BLOOD PRESSURE: 160 MMHG | OXYGEN SATURATION: 98 % | HEART RATE: 76 BPM | HEIGHT: 61 IN

## 2019-05-02 DIAGNOSIS — J30.9 ALLERGIC RHINITIS, UNSPECIFIED SEASONALITY, UNSPECIFIED TRIGGER: ICD-10-CM

## 2019-05-02 DIAGNOSIS — R05.8 POST-VIRAL COUGH SYNDROME: Primary | ICD-10-CM

## 2019-05-02 DIAGNOSIS — K21.9 GASTROESOPHAGEAL REFLUX DISEASE, ESOPHAGITIS PRESENCE NOT SPECIFIED: ICD-10-CM

## 2019-05-02 PROCEDURE — G0463 HOSPITAL OUTPT CLINIC VISIT: HCPCS | Mod: ZF

## 2019-05-02 ASSESSMENT — MIFFLIN-ST. JEOR: SCORE: 1377.82

## 2019-05-02 ASSESSMENT — PAIN SCALES - GENERAL: PAINLEVEL: MILD PAIN (2)

## 2019-05-02 NOTE — NURSING NOTE
Chief Complaint   Patient presents with     RECHECK     3 month follow up     Medications reviewed and vital signs taken.   Velvet De La Torre CMA

## 2019-05-02 NOTE — PROGRESS NOTES
Pulmonary Clinic Return Visit  History of Present Illness    Ms. Camacho is a 63-year-old female with a history of allergic rhinitis and hypertension who presents to pulmonary clinic today for follow up of chronic cough.  To briefly review, cough started in the setting of a viral URI in the Fall of 2018.  When we last visited, I felt her cough was likely multi-factorial in etiology and due in parts to post-viral cough syndrome, GERD, and post nasal drip. Negative CT sinuses, CXR x2 and normal PFTS at that time. We started inhaled atrovent for post-viral cough syndrome, recommended continuation of Zyrtec, Singulair and Flonase for post nasal drip, and referred to GI for further evaluation and management of reflux.    Ms. Camacho returns to clinic today doing well.  Her cough is dramatically improved.  She has stopped taking Singulair as she felt it was making her anxious.  She felt the Atrovent inhaler was helpful in the setting of her cough but no longer feels she needs this and stopped taking this too about 3 weeks ago.  She was seen and evaluated in early March in GI clinic where they recommended lifestyle modifications aimed at reducing reflux, low dose PPI and EGD.  She has been keeping a journal regarding foods and does note associations between particular foods and worsening cough.  Since elevating the head of her bed, her night time cough has nearly resolved.  She has not yet undergone EGD.  Denies new or worsening dyspnea.      She is a previous social smoker of less than 1/2 pack/day for 10 years.  She quit altogether 30 years ago.            Review of Systems:  10 of 14 systems reviewed and are negative unless otherwise stated in HPI.    Past Medical History:   Diagnosis Date     Allergic rhinitis due to dogs      CARDIOVASCULAR SCREENING; LDL GOAL LESS THAN 130      Contraceptive management      Hypertension goal BP (blood pressure) < 140/90      Recurrent herpes labialis      Rosacea        Past Surgical  History:   Procedure Laterality Date     NO HISTORY OF SURGERY         Family History   Problem Relation Age of Onset     Hypertension Father      Hypertension Brother      Hypertension Sister      Diabetes Sister         and brother     Kidney Disease Sister      Diabetes Brother      Cancer - colorectal No family hx of      Breast Cancer No family hx of      Thyroid Disease No family hx of        Social History     Socioeconomic History     Marital status:      Spouse name: Harry     Number of children: 1     Years of education: None     Highest education level: None   Social Needs     Financial resource strain: None     Food insecurity - worry: None     Food insecurity - inability: None     Transportation needs - medical: None     Transportation needs - non-medical: None   Occupational History     Occupation: Business Banking      Employer: [employer]     Comment: Works in [employer]   Tobacco Use     Smoking status: Former Smoker     Years: 10.00     Types: Cigarettes     Smokeless tobacco: Never Used   Substance and Sexual Activity     Alcohol use: Yes     Alcohol/week: 1.0 oz     Types: 2 Cans of beer per week     Comment: occassionally     Drug use: No     Sexual activity: Yes     Partners: Male   Other Topics Concern     Parent/sibling w/ CABG, MI or angioplasty before 65F 55M? No   Social History Narrative     None         Allergies   Allergen Reactions     Dogs      Dust Mites          Current Outpatient Medications:      acyclovir (ZOVIRAX) 400 MG tablet, TAKE 1 TABLET BY MOUTH THREE TIMES DAILY, Disp: 30 tablet, Rfl: 0     cetirizine (ZYRTEC) 10 MG tablet, Take 10 mg by mouth daily, Disp: , Rfl:      COMPRESSION STOCKINGS, 1 each daily Lower Extremity:   Knee High;  bilateral;  20-30 mm Hg.  Measure and fit.  Style and color per patient preference.  Michael Haddad per patient need., Disp: 6 each, Rfl: 11     fluticasone (FLONASE) 50 MCG/ACT spray, SHAKE LIQUID AND USE 2 SPRAYS IN EACH NOSTRIL  "TWICE DAILY, Disp: 48 mL, Rfl: 3     Ibuprofen (ADVIL PO), Take 200 mg by mouth as needed for moderate pain, Disp: , Rfl:      ipratropium (ATROVENT HFA) 17 MCG/ACT inhaler, Inhale 2 puffs into the lungs 4 times daily, Disp: 12.9 g, Rfl: 3     loperamide (IMODIUM) 2 MG capsule, Take 2 mg by mouth 4 times daily as needed for diarrhea, Disp: , Rfl:      metoprolol succinate (TOPROL-XL) 25 MG 24 hr tablet, TAKE 1 TABLET(25 MG) BY MOUTH DAILY, Disp: 90 tablet, Rfl: 3     nystatin (MYCOSTATIN) 842999 UNIT/ML suspension, Take 5 mLs (500,000 Units) by mouth 4 times daily, Disp: 400 mL, Rfl: 0     omeprazole (PRILOSEC) 20 MG DR capsule, TAKE 1 CAPSULE BY MOUTH DAILY 30 TO 60 MINUTES BEFORE A MEAL, Disp: 90 capsule, Rfl: 0     albuterol (PROAIR HFA/PROVENTIL HFA/VENTOLIN HFA) 108 (90 Base) MCG/ACT inhaler, Inhale 2 puffs into the lungs every 6 hours as needed for shortness of breath / dyspnea or wheezing (Patient not taking: Reported on 3/4/2019), Disp: 1 Inhaler, Rfl: 3     montelukast (SINGULAIR) 10 MG tablet, Take 1 tablet (10 mg) by mouth At Bedtime (Patient not taking: Reported on 5/2/2019), Disp: 90 tablet, Rfl: 1      Physical Exam:  BP (!) 160/91   Pulse 76   Resp 17   Ht 1.549 m (5' 1\")   Wt 87.5 kg (193 lb)   SpO2 98%   BMI 36.47 kg/m    GENERAL: Well developed, well nourished, alert, and in no apparent distress.  HEENT: Normocephalic, atraumatic. PERRL, EOMI. Oral mucosa is moist. No perioral cyanosis.  NECK: supple, no masses, no thyromegaly.  RESP:  Normal respiratory effort.  CTAB.  No rales, wheezes, rhonchi.  No cyanosis or clubbing.  CV: Normal S1, S2, regular rhythm, normal rate. No murmur.  No LE edema.   ABDOMEN:  Soft, non-tender, non-distended.   SKIN: warm and dry. No rash.  NEURO: AAOx3.  Normal gait.  No focal neuro deficits.  PSYCH: mentation appears normal. and affect normal/bright    Results:  None.    Assessment and Plan:   Adilia Camacho is a 63 year old female with a history of allergic " florencia who presents to pulmonary clinic today for follow up of chronic cough.  At our last visit, I felt cough was likely multi-factorial with components due to post nasal drip and GERD, but largely driven by post-viral cough syndrome.  Fortunately, she returns to clinic today much improved, with sustained improvement in cough both with GERD lifestyle modifications and despite de-escalation of both Atrovent and Singulair.  Based on this, it is likely that her cough was due to GERD and post-viral cough syndrome as initially suspected.  I agree that it is reasonable to remain off of Atrovent and Singulair at this time.  As cough is improved, previous PFTs and CXR were negative, no further pulmonary work up is necessary at this time. I have encouraged her to continue follow up with GI as scheduled.  If a similar, post-viral type cough were to return in the future, as has been the case with her in the past, I would recommend initiation of inhaled atrovent and singulair for treatment.  This could also be facilitated through her PCP.  Smoking history is insufficient to qualify for lung cancer screening.  The above findings and plan were discussed with Ms. Camacho who voiced understanding and agreement.  Questions and concerns were answered to her satisfaction.  she was provided with my contact information should new questions or concerns arise in the interim.\  Return to clinic as needed.  Madie Salazar MD  Pulmonary and Critical Care Medicine    The above note was dictated using voice recognition software and may include typographical errors. Please contact the author for any clarifications.  I spent a total of 30 minutes face to face with Adilia Camacho during today's office visit. Over 50% of this time was spent counseling the patient and/or coordinating care regarding their pulmonary disease.

## 2019-05-02 NOTE — LETTER
5/2/2019       RE: Adilia Camacho  [address]  [facility] [address]     Dear Colleague,    Thank you for referring your patient, Adilia Camacho, to the [facility] at [facility]. Please see a copy of my visit note below.    Pulmonary Clinic Return Visit  History of Present Illness    Ms. Camacho is a 63-year-old female with a history of allergic rhinitis and hypertension who presents to pulmonary clinic today for follow up of chronic cough.  To briefly review, cough started in the setting of a viral URI in the Fall of 2018.  When we last visited, I felt her cough was likely multi-factorial in etiology and due in parts to post-viral cough syndrome, GERD, and post nasal drip. Negative CT sinuses, CXR x2 and normal PFTS at that time. We started inhaled atrovent for post-viral cough syndrome, recommended continuation of Zyrtec, Singulair and Flonase for post nasal drip, and referred to GI for further evaluation and management of reflux.    Ms. Camacho returns to clinic today doing well.  Her cough is dramatically improved.  She has stopped taking Singulair as she felt it was making her anxious.  She felt the Atrovent inhaler was helpful in the setting of her cough but no longer feels she needs this and stopped taking this too about 3 weeks ago.  She was seen and evaluated in early March in GI clinic where they recommended lifestyle modifications aimed at reducing reflux, low dose PPI and EGD.  She has been keeping a journal regarding foods and does note associations between particular foods and worsening cough.  Since elevating the head of her bed, her night time cough has nearly resolved.  She has not yet undergone EGD.  Denies new or worsening dyspnea.      She is a previous social smoker of less than 1/2 pack/day for 10 years.  She quit altogether 30 years ago.            Review of Systems:  10 of 14 systems reviewed and are negative unless  otherwise stated in HPI.    Past Medical History:   Diagnosis Date     Allergic rhinitis due to dogs      CARDIOVASCULAR SCREENING; LDL GOAL LESS THAN 130      Contraceptive management      Hypertension goal BP (blood pressure) < 140/90      Recurrent herpes labialis      Rosacea        Past Surgical History:   Procedure Laterality Date     NO HISTORY OF SURGERY         Family History   Problem Relation Age of Onset     Hypertension Father      Hypertension Brother      Hypertension Sister      Diabetes Sister         and brother     Kidney Disease Sister      Diabetes Brother      Cancer - colorectal No family hx of      Breast Cancer No family hx of      Thyroid Disease No family hx of        Social History     Socioeconomic History     Marital status:      Spouse name: Harry     Number of children: 1     Years of education: None     Highest education level: None   Social Needs     Financial resource strain: None     Food insecurity - worry: None     Food insecurity - inability: None     Transportation needs - medical: None     Transportation needs - non-medical: None   Occupational History     Occupation: Business Banking      Employer: [employer]     Comment: Works in [employer]   Tobacco Use     Smoking status: Former Smoker     Years: 10.00     Types: Cigarettes     Smokeless tobacco: Never Used   Substance and Sexual Activity     Alcohol use: Yes     Alcohol/week: 1.0 oz     Types: 2 Cans of beer per week     Comment: occassionally     Drug use: No     Sexual activity: Yes     Partners: Male   Other Topics Concern     Parent/sibling w/ CABG, MI or angioplasty before 65F 55M? No   Social History Narrative     None         Allergies   Allergen Reactions     Dogs      Dust Mites          Current Outpatient Medications:      acyclovir (ZOVIRAX) 400 MG tablet, TAKE 1 TABLET BY MOUTH THREE TIMES DAILY, Disp: 30 tablet, Rfl: 0     cetirizine (ZYRTEC) 10 MG tablet, Take 10 mg by mouth daily, Disp: , Rfl:  "     COMPRESSION STOCKINGS, 1 each daily Lower Extremity:   Knee High;  bilateral;  20-30 mm Hg.  Measure and fit.  Style and color per patient preference.  Michael Haddad per patient need., Disp: 6 each, Rfl: 11     fluticasone (FLONASE) 50 MCG/ACT spray, SHAKE LIQUID AND USE 2 SPRAYS IN EACH NOSTRIL TWICE DAILY, Disp: 48 mL, Rfl: 3     Ibuprofen (ADVIL PO), Take 200 mg by mouth as needed for moderate pain, Disp: , Rfl:      ipratropium (ATROVENT HFA) 17 MCG/ACT inhaler, Inhale 2 puffs into the lungs 4 times daily, Disp: 12.9 g, Rfl: 3     loperamide (IMODIUM) 2 MG capsule, Take 2 mg by mouth 4 times daily as needed for diarrhea, Disp: , Rfl:      metoprolol succinate (TOPROL-XL) 25 MG 24 hr tablet, TAKE 1 TABLET(25 MG) BY MOUTH DAILY, Disp: 90 tablet, Rfl: 3     nystatin (MYCOSTATIN) [id number] UNIT/ML suspension, Take 5 mLs (500,000 Units) by mouth 4 times daily, Disp: 400 mL, Rfl: 0     omeprazole (PRILOSEC) 20 MG DR capsule, TAKE 1 CAPSULE BY MOUTH DAILY 30 TO 60 MINUTES BEFORE A MEAL, Disp: 90 capsule, Rfl: 0     albuterol (PROAIR HFA/PROVENTIL HFA/VENTOLIN HFA) 108 (90 Base) MCG/ACT inhaler, Inhale 2 puffs into the lungs every 6 hours as needed for shortness of breath / dyspnea or wheezing (Patient not taking: Reported on 3/4/2019), Disp: 1 Inhaler, Rfl: 3     montelukast (SINGULAIR) 10 MG tablet, Take 1 tablet (10 mg) by mouth At Bedtime (Patient not taking: Reported on 5/2/2019), Disp: 90 tablet, Rfl: 1      Physical Exam:  BP (!) 160/91   Pulse 76   Resp 17   Ht 1.549 m (5' 1\")   Wt 87.5 kg (193 lb)   SpO2 98%   BMI 36.47 kg/m     GENERAL: Well developed, well nourished, alert, and in no apparent distress.  HEENT: Normocephalic, atraumatic. PERRL, EOMI. Oral mucosa is moist. No perioral cyanosis.  NECK: supple, no masses, no thyromegaly.  RESP:  Normal respiratory effort.  CTAB.  No rales, wheezes, rhonchi.  No cyanosis or clubbing.  CV: Normal S1, S2, regular rhythm, normal rate. No murmur.  No LE edema. "   ABDOMEN:  Soft, non-tender, non-distended.   SKIN: warm and dry. No rash.  NEURO: AAOx3.  Normal gait.  No focal neuro deficits.  PSYCH: mentation appears normal. and affect normal/bright    Results:  None.    Assessment and Plan:   Adilia Camacho is a 63 year old female with a history of allergic rhinitis who presents to pulmonary clinic today for follow up of chronic cough.  At our last visit, I felt cough was likely multi-factorial with components due to post nasal drip and GERD, but largely driven by post-viral cough syndrome.  Fortunately, she returns to clinic today much improved, with sustained improvement in cough both with GERD lifestyle modifications and despite de-escalation of both Atrovent and Singulair.  Based on this, it is likely that her cough was due to GERD and post-viral cough syndrome as initially suspected.  I agree that it is reasonable to remain off of Atrovent and Singulair at this time.  As cough is improved, previous PFTs and CXR were negative, no further pulmonary work up is necessary at this time. I have encouraged her to continue follow up with GI as scheduled.  If a similar, post-viral type cough were to return in the future, as has been the case with her in the past, I would recommend initiation of inhaled atrovent and singulair for treatment.  This could also be facilitated through her PCP.  Smoking history is insufficient to qualify for lung cancer screening.  The above findings and plan were discussed with Ms. Camacho who voiced understanding and agreement.  Questions and concerns were answered to her satisfaction.  she was provided with my contact information should new questions or concerns arise in the interim.\  Return to clinic as needed.  Madie Salazar MD  Pulmonary and Critical Care Medicine    The above note was dictated using voice recognition software and may include typographical errors. Please contact the author for any clarifications.  I spent a total of 30 minutes  face to face with Adilia Camacho during today's office visit. Over 50% of this time was spent counseling the patient and/or coordinating care regarding their pulmonary disease.            Again, thank you for allowing me to participate in the care of your patient.      Sincerely,    Lorri Salazar MD

## 2019-05-08 DIAGNOSIS — B00.1 RECURRENT HERPES LABIALIS: ICD-10-CM

## 2019-05-08 RX ORDER — ACYCLOVIR 400 MG/1
TABLET ORAL
Qty: 30 TABLET | Refills: 0 | Status: SHIPPED | OUTPATIENT
Start: 2019-05-08 | End: 2019-09-07

## 2019-05-08 NOTE — TELEPHONE ENCOUNTER
"Prescription approved per [facility] Refill Protocol.  KORIN DobbsN, RN      Requested Prescriptions   Pending Prescriptions Disp Refills     acyclovir (ZOVIRAX) 400 MG tablet [Pharmacy Med Name: ACYCLOVIR 400MG TABLETS] 30 tablet 0     Sig: TAKE 1 TABLET BY MOUTH THREE TIMES DAILY       Antivirals for Herpes Protocol Passed - 5/8/2019  7:06 AM        Passed - Patient is age 12 or older        Passed - Recent (12 mo) or future (30 days) visit within the authorizing provider's specialty     Patient had office visit in the last 12 months or has a visit in the next 30 days with authorizing provider or within the authorizing provider's specialty.  See \"Patient Info\" tab in inbasket, or \"Choose Columns\" in Meds & Orders section of the refill encounter.              Passed - Medication is active on med list        Passed - Normal serum creatinine on file in past 12 months     Recent Labs   Lab Test 04/12/19  1633   CR 0.86               "

## 2019-05-20 DIAGNOSIS — J30.81 ALLERGIC RHINITIS DUE TO DOGS: ICD-10-CM

## 2019-05-20 DIAGNOSIS — K21.9 GASTROESOPHAGEAL REFLUX DISEASE, ESOPHAGITIS PRESENCE NOT SPECIFIED: ICD-10-CM

## 2019-05-20 NOTE — TELEPHONE ENCOUNTER
"Requested Prescriptions   Pending Prescriptions Disp Refills     fluticasone (FLONASE) 50 MCG/ACT nasal spray [Pharmacy Med Name: FLUTICASONE 50MCG NASAL SP (120) RX] 48 mL 0     Sig: SHAKE LIQUID AND USE 2 SPRAYS IN EACH NOSTRIL TWICE DAILY  Last Written Prescription Date:  2/21/2018  Last Fill Quantity: 48ml,  # refills: 3   Last Office Visit: 4/12/2019   Future Office Visit:            Inhaled Steroids Protocol Passed - 5/20/2019  4:38 PM        Passed - Patient is age 12 or older        Passed - Recent (12 mo) or future (30 days) visit within the authorizing provider's specialty     Patient had office visit in the last 12 months or has a visit in the next 30 days with authorizing provider or within the authorizing provider's specialty.  See \"Patient Info\" tab in inbasket, or \"Choose Columns\" in Meds & Orders section of the refill encounter.            Passed - Medication is active on med list          "

## 2019-05-20 NOTE — TELEPHONE ENCOUNTER
"Requested Prescriptions   Pending Prescriptions Disp Refills     omeprazole (PRILOSEC) 20 MG DR capsule [Pharmacy Med Name: OMEPRAZOLE 20MG CAPSULES] 90 capsule 0     Sig: TAKE 1 CAPSULE BY MOUTH DAILY 30 TO 60 MINUTES BEFORE A MEAL  Last Written Prescription Date:  12/27/2018  Last Fill Quantity: 90 capsule,  # refills: 0   Last Office Visit: 4/12/2019   Future Office Visit:            PPI Protocol Passed - 5/20/2019  4:38 PM        Passed - Not on Clopidogrel (unless Pantoprazole ordered)        Passed - No diagnosis of osteoporosis on record        Passed - Recent (12 mo) or future (30 days) visit within the authorizing provider's specialty     Patient had office visit in the last 12 months or has a visit in the next 30 days with authorizing provider or within the authorizing provider's specialty.  See \"Patient Info\" tab in inbasket, or \"Choose Columns\" in Meds & Orders section of the refill encounter.            Passed - Medication is active on med list        Passed - Patient is age 18 or older        Passed - No active pregnacy on record        Passed - No positive pregnancy test in past 12 months          "

## 2019-05-21 RX ORDER — FLUTICASONE PROPIONATE 50 MCG
SPRAY, SUSPENSION (ML) NASAL
Qty: 48 ML | Refills: 3 | Status: SHIPPED | OUTPATIENT
Start: 2019-05-21 | End: 2019-10-17

## 2019-05-22 NOTE — TELEPHONE ENCOUNTER
Signed Prescriptions:                        Disp   Refills    fluticasone (FLONASE) 50 MCG/ACT nasal spr*48 mL  3        Sig: SHAKE LIQUID AND USE 2 SPRAYS IN EACH NOSTRIL TWICE           DAILY  Authorizing Provider: LORE BENITEZ  Ordering User: TOI GILES

## 2019-09-07 DIAGNOSIS — B00.1 RECURRENT HERPES LABIALIS: ICD-10-CM

## 2019-09-09 NOTE — TELEPHONE ENCOUNTER
"Requested Prescriptions   Pending Prescriptions Disp Refills     acyclovir (ZOVIRAX) 400 MG tablet [Pharmacy Med Name: ACYCLOVIR 400MG TABLETS] 30 tablet 0     Sig: TAKE 1 TABLET BY MOUTH THREE TIMES DAILY  Last Written Prescription Date:  5/8/2019  Last Fill Quantity: 30 tablet,  # refills: 0   Last Office Visit: 4/12/2019   Future Office Visit:            Antivirals for Herpes Protocol Passed - 9/7/2019  9:01 AM        Passed - Patient is age 12 or older        Passed - Recent (12 mo) or future (30 days) visit within the authorizing provider's specialty     Patient had office visit in the last 12 months or has a visit in the next 30 days with authorizing provider or within the authorizing provider's specialty.  See \"Patient Info\" tab in inbasket, or \"Choose Columns\" in Meds & Orders section of the refill encounter.            Passed - Medication is active on med list        Passed - Normal serum creatinine on file in past 12 months     Recent Labs   Lab Test 04/12/19  1633   CR 0.86               "

## 2019-09-10 RX ORDER — ACYCLOVIR 400 MG/1
TABLET ORAL
Qty: 30 TABLET | Refills: 1 | Status: SHIPPED | OUTPATIENT
Start: 2019-09-10 | End: 2020-12-23

## 2019-09-17 ENCOUNTER — ANCILLARY PROCEDURE (OUTPATIENT)
Dept: MAMMOGRAPHY | Facility: CLINIC | Age: 64
End: 2019-09-17
Attending: INTERNAL MEDICINE
Payer: COMMERCIAL

## 2019-09-17 DIAGNOSIS — Z12.31 VISIT FOR SCREENING MAMMOGRAM: ICD-10-CM

## 2019-09-17 PROCEDURE — 77067 SCR MAMMO BI INCL CAD: CPT

## 2019-10-17 ENCOUNTER — TELEPHONE (OUTPATIENT)
Dept: FAMILY MEDICINE | Facility: CLINIC | Age: 64
End: 2019-10-17

## 2019-10-17 ENCOUNTER — OFFICE VISIT (OUTPATIENT)
Dept: FAMILY MEDICINE | Facility: CLINIC | Age: 64
End: 2019-10-17
Payer: COMMERCIAL

## 2019-10-17 VITALS
TEMPERATURE: 98.7 F | WEIGHT: 191.25 LBS | HEIGHT: 62 IN | BODY MASS INDEX: 35.19 KG/M2 | HEART RATE: 74 BPM | DIASTOLIC BLOOD PRESSURE: 84 MMHG | OXYGEN SATURATION: 100 % | SYSTOLIC BLOOD PRESSURE: 146 MMHG

## 2019-10-17 DIAGNOSIS — E11.9 DIABETES MELLITUS, TYPE 2 (H): Primary | ICD-10-CM

## 2019-10-17 DIAGNOSIS — K21.9 GASTROESOPHAGEAL REFLUX DISEASE, ESOPHAGITIS PRESENCE NOT SPECIFIED: ICD-10-CM

## 2019-10-17 DIAGNOSIS — R35.0 URINARY FREQUENCY: ICD-10-CM

## 2019-10-17 DIAGNOSIS — Z00.00 ROUTINE GENERAL MEDICAL EXAMINATION AT A HEALTH CARE FACILITY: Primary | ICD-10-CM

## 2019-10-17 DIAGNOSIS — R05.9 COUGH: ICD-10-CM

## 2019-10-17 DIAGNOSIS — I10 HYPERTENSION GOAL BP (BLOOD PRESSURE) < 140/90: ICD-10-CM

## 2019-10-17 DIAGNOSIS — R73.9 ELEVATED BLOOD SUGAR: ICD-10-CM

## 2019-10-17 DIAGNOSIS — J30.81 ALLERGIC RHINITIS DUE TO DOGS: ICD-10-CM

## 2019-10-17 DIAGNOSIS — N39.41 URGENCY INCONTINENCE: ICD-10-CM

## 2019-10-17 LAB
CHOLEST SERPL-MCNC: 202 MG/DL
CREAT UR-MCNC: 219 MG/DL
HBA1C MFR BLD: 6.5 % (ref 0–5.6)
HDLC SERPL-MCNC: 54 MG/DL
LDLC SERPL CALC-MCNC: 122 MG/DL
MICROALBUMIN UR-MCNC: 18 MG/L
MICROALBUMIN/CREAT UR: 8.31 MG/G CR (ref 0–25)
NONHDLC SERPL-MCNC: 148 MG/DL
TRIGL SERPL-MCNC: 128 MG/DL

## 2019-10-17 PROCEDURE — 99396 PREV VISIT EST AGE 40-64: CPT | Performed by: FAMILY MEDICINE

## 2019-10-17 PROCEDURE — 80061 LIPID PANEL: CPT | Performed by: FAMILY MEDICINE

## 2019-10-17 PROCEDURE — 36415 COLL VENOUS BLD VENIPUNCTURE: CPT | Performed by: FAMILY MEDICINE

## 2019-10-17 PROCEDURE — 83036 HEMOGLOBIN GLYCOSYLATED A1C: CPT | Performed by: FAMILY MEDICINE

## 2019-10-17 PROCEDURE — 99213 OFFICE O/P EST LOW 20 MIN: CPT | Mod: 25 | Performed by: FAMILY MEDICINE

## 2019-10-17 PROCEDURE — 82043 UR ALBUMIN QUANTITATIVE: CPT | Performed by: FAMILY MEDICINE

## 2019-10-17 RX ORDER — METOPROLOL SUCCINATE 25 MG/1
25 TABLET, EXTENDED RELEASE ORAL DAILY
Qty: 90 TABLET | Refills: 3 | Status: SHIPPED | OUTPATIENT
Start: 2019-10-17 | End: 2020-10-28

## 2019-10-17 RX ORDER — FLUTICASONE PROPIONATE 50 MCG
SPRAY, SUSPENSION (ML) NASAL
Qty: 48 ML | Refills: 11 | Status: SHIPPED | OUTPATIENT
Start: 2019-10-17 | End: 2020-12-24

## 2019-10-17 RX ORDER — HYDROCHLOROTHIAZIDE 12.5 MG/1
12.5 TABLET ORAL DAILY
Qty: 30 TABLET | Refills: 0 | Status: SHIPPED | OUTPATIENT
Start: 2019-10-17 | End: 2019-11-11

## 2019-10-17 ASSESSMENT — ENCOUNTER SYMPTOMS: FREQUENCY: 1

## 2019-10-17 ASSESSMENT — MIFFLIN-ST. JEOR: SCORE: 1376.78

## 2019-10-17 NOTE — TELEPHONE ENCOUNTER
RN --please call Adilia to let her know that her hemoglobin A1c has returned at 6.5.  We make a diagnosis of type 2 diabetes at 6.5.  For this reason, I am recommending that she visit with our diabetes educator who can help her create a plan to improve her diet and work on weight loss which can help significantly lower blood sugar, hopefully bring her blood sugar back into the normal range.  Please place referral and help her schedule.  I did not place the referral because I would like her to hear of the result from our clinic first.  In addition I recommend scheduling with MTM to review and discuss medications.  I would like to see her after she has seen diabetes educator and MTM, in approximately 3 months.  The rest of her results are still pending. Sheree Graf M.D.          Results for orders placed or performed in visit on 10/17/19   Hemoglobin A1c   Result Value Ref Range    Hemoglobin A1C 6.5 (H) 0 - 5.6 %

## 2019-10-17 NOTE — PROGRESS NOTES
SUBJECTIVE:   CC: Adilia Camacho is an 64 year old woman who presents for preventive health visit.     Healthy Habits:     Getting at least 3 servings of Calcium per day:  NO    Bi-annual eye exam:  NO    Dental care twice a year:  Yes    Sleep apnea or symptoms of sleep apnea:  None    Diet:  Regular (no restrictions)    Frequency of exercise:  2-3 days/week    Duration of exercise:  15-30 minutes    Taking medications regularly:  Yes    Medication side effects:  None    PHQ-2 Total Score: 0    Additional concerns today:  No    She actually has a list of concerns today.  See below.    Today's PHQ-2 Score:   PHQ-2 ( 1999 Pf[employer]) 10/17/2019   Q1: Little interest or pleasure in doing things 0   Q2: Feeling down, depressed or hopeless 0   PHQ-2 Score 0   Q1: Little interest or pleasure in doing things Not at all   Q2: Feeling down, depressed or hopeless Not at all   PHQ-2 Score 0       Abuse: Current or Past(Physical, Sexual or Emotional)- No  Do you feel safe in your environment? Yes    Social History     Tobacco Use     Smoking status: Former Smoker     Packs/day: 0.00     Years: 10.00     Pack years: 0.00     Types: Cigarettes     Smokeless tobacco: Never Used   Substance Use Topics     Alcohol use: Yes     Alcohol/week: 1.7 standard drinks     Comment: occassionally     If you drink alcohol do you typically have >3 drinks per day or >7 drinks per week? No    Alcohol Use 10/17/2019   Prescreen: >3 drinks/day or >7 drinks/week? No   Prescreen: >3 drinks/day or >7 drinks/week? -   No flowsheet data found.    Reviewed orders with patient.  Reviewed health maintenance and updated orders accordingly - Yes  BP Readings from Last 3 Encounters:   10/17/19 (!) 142/88   05/02/19 (!) 160/91   04/12/19 134/72    Wt Readings from Last 3 Encounters:   10/17/19 86.8 kg (191 lb 4 oz)   05/02/19 87.5 kg (193 lb)   04/12/19 87.5 kg (193 lb)            Wt Readings from Last 5 Encounters:   10/17/19 86.8 kg (191 lb 4 oz)   05/02/19  87.5 kg (193 lb)   04/12/19 87.5 kg (193 lb)   03/04/19 87.7 kg (193 lb 6.4 oz)   01/28/19 88.2 kg (194 lb 6.4 oz)            Patient Active Problem List   Diagnosis     Hypertension goal BP (blood pressure) < 140/90     CARDIOVASCULAR SCREENING; LDL GOAL LESS THAN 130     Allergic rhinitis due to dogs     Contraceptive management     Recurrent herpes labialis     Rosacea     Female stress incontinence     GERD (gastroesophageal reflux disease)     Advanced directives, counseling/discussion     Venous (peripheral) insufficiency     Obesity (BMI 35.0-39.9) with comorbidity (H)     Past Surgical History:   Procedure Laterality Date     NO HISTORY OF SURGERY         Social History     Tobacco Use     Smoking status: Former Smoker     Packs/day: 0.00     Years: 10.00     Pack years: 0.00     Types: Cigarettes     Smokeless tobacco: Never Used   Substance Use Topics     Alcohol use: Yes     Alcohol/week: 1.7 standard drinks     Comment: occassionally     Family History   Problem Relation Age of Onset     Hypertension Father      Hypertension Brother      Hypertension Sister      Diabetes Sister         and brother     Kidney Disease Sister      Diabetes Brother      Diabetes Sister      Hypertension Sister      Diabetes Brother      Hypertension Brother      Cancer - colorectal No family hx of      Breast Cancer No family hx of      Thyroid Disease No family hx of          Current Outpatient Medications   Medication Sig Dispense Refill     acyclovir (ZOVIRAX) 400 MG tablet TAKE 1 TABLET BY MOUTH THREE TIMES DAILY 30 tablet 1     cetirizine (ZYRTEC) 10 MG tablet Take 10 mg by mouth daily       COMPRESSION STOCKINGS 1 each daily Lower Extremity:   Knee High;  bilateral;  20-30 mm Hg.  Measure and fit.  Style and color per patient preference.  Michael Haddad per patient need. 6 each 11     fluticasone (FLONASE) 50 MCG/ACT nasal spray SHAKE LIQUID AND USE 2 SPRAYS IN EACH NOSTRIL TWICE DAILY 48 mL 3     Ibuprofen (ADVIL PO)  Take 200 mg by mouth as needed for moderate pain       ipratropium (ATROVENT HFA) 17 MCG/ACT inhaler Inhale 2 puffs into the lungs 4 times daily 12.9 g 3     loperamide (IMODIUM) 2 MG capsule Take 2 mg by mouth 4 times daily as needed for diarrhea       metoprolol succinate (TOPROL-XL) 25 MG 24 hr tablet TAKE 1 TABLET(25 MG) BY MOUTH DAILY 90 tablet 3     omeprazole (PRILOSEC) 20 MG DR capsule TAKE 1 CAPSULE BY MOUTH DAILY 30 TO 60 MINUTES BEFORE A MEAL 90 capsule 1     albuterol (PROAIR HFA/PROVENTIL HFA/VENTOLIN HFA) 108 (90 Base) MCG/ACT inhaler Inhale 2 puffs into the lungs every 6 hours as needed for shortness of breath / dyspnea or wheezing (Patient not taking: Reported on 3/4/2019) 1 Inhaler 3     montelukast (SINGULAIR) 10 MG tablet Take 1 tablet (10 mg) by mouth At Bedtime (Patient not taking: Reported on 5/2/2019) 90 tablet 1     nystatin (MYCOSTATIN) 471268 UNIT/ML suspension Take 5 mLs (500,000 Units) by mouth 4 times daily (Patient not taking: Reported on 10/17/2019) 400 mL 0     Allergies   Allergen Reactions     Dogs      Dust Mites      Recent Labs   Lab Test 04/12/19  1633 02/13/18  0954 10/23/17  1604 03/24/17  0739 03/22/16  0924  02/15/13  1135 10/28/11  0955   A1C 6.4*  --   --  6.3*  --   --   --   --    LDL  --   --   --  91 107*  --  95 96   HDL  --   --   --  42* 50  --  43* 46*   TRIG  --   --   --  115 182*  --  119 136   ALT  --   --   --   --   --   --  22  --    CR 0.86 0.84  --  0.88 0.84   < > 0.76  --    GFRESTIMATED 73 69  --  66 69   < > 79  --    GFRESTBLACK 84 83  --  80 84   < > >90  --    POTASSIUM 3.8 4.1  --  3.9 4.2   < > 4.2  --    TSH  --   --  1.92  --   --   --   --  1.71    < > = values in this interval not displayed.        Mammogram Screening: Patient over age 50, mutual decision to screen reflected in health maintenance.    Pertinent mammograms are reviewed under the imaging tab.  History of abnormal Pap smear: NO - age 30-65 PAP every 5 years with negative HPV  co-testing recommended  PAP / HPV Latest Ref Rng & Units 3/22/2016 10/28/2011   PAP - NIL NIL   HPV 16 DNA NEG Negative -   HPV 18 DNA NEG Negative -   OTHER HR HPV NEG Negative -     Reviewed and updated as needed this visit by clinical staff  Tobacco  Allergies  Meds         Reviewed and updated as needed this visit by Provider        Past Medical History:   Diagnosis Date     Allergic rhinitis due to dogs      CARDIOVASCULAR SCREENING; LDL GOAL LESS THAN 130      Contraceptive management      Hypertension goal BP (blood pressure) < 140/90      Recurrent herpes labialis      Rosacea       Past Surgical History:   Procedure Laterality Date     NO HISTORY OF SURGERY       OB History   No obstetric history on file.       Review of Systems   Genitourinary: Positive for frequency.      ROS: 10 point ROS neg other than the symptoms noted above in the HPI.      She saw pulmonology last May for postviral cough syndrome.  She had used Singulair and her cough improved significantly so she stopped the medication.  She also felt it was making her anxious.  She felt that Atrovent inhaler was helpful in the setting of her cough, but then stopped it when her cough resolved.Her cough is that likely secondary to combination of GERD and postviral cough syndrome.  Her PFTs and chest x-ray were negative.  No further pulmonary work-up was necessary.  If symptoms return, pulmonology recommended initiation of inhaled Atrovent and Singulair for treatment.  She would like to have refill available of the Atrovent and Singulair.    She reports urinary urgency and frequency.  Has had some episodes of urinary urge incontinence as well.  No report of dysuria or hematuria.  Symptoms have been going on for a while.  No acute changes.    For her blood pressure, she tolerates the metoprolol well.  The first medication is she was started on was atenolol and she has been on a beta-blocker since diagnosis.  She has not had previous issues with  "palpitations or migraines.      OBJECTIVE:   BP (!) 142/88 (BP Location: Right arm, Patient Position: Sitting, Cuff Size: Adult Regular)   Pulse 74   Temp 98.7  F (37.1  C) (Oral)   Ht 1.568 m (5' 1.75\")   Wt 86.8 kg (191 lb 4 oz)   SpO2 100%   BMI 35.26 kg/m     Physical Exam  GENERAL APPEARANCE: healthy, alert and no distress  EYES: Eyes grossly normal to inspection, PERRL and conjunctivae and sclerae normal  HENT: ear canals and TM's normal, nose and mouth without ulcers or lesions, oropharynx clear and oral mucous membranes moist  NECK: no adenopathy, no asymmetry, masses, or scars and thyroid normal to palpation  RESP: lungs clear to auscultation - no rales, rhonchi or wheezes  BREAST: normal without masses, tenderness or nipple discharge and no palpable axillary masses or adenopathy  CV: regular rate and rhythm, normal S1 S2, no S3 or S4, no murmur, click or rub, no peripheral edema and peripheral pulses strong  ABDOMEN: soft, nontender, no hepatosplenomegaly, no masses and bowel sounds normal  MS: no musculoskeletal defects are noted and gait is age appropriate without ataxia  SKIN: no suspicious lesions or rashes  NEURO: Normal strength and tone, sensory exam grossly normal, mentation intact and speech normal  PSYCH: mentation appears normal and affect normal/bright    Diagnostic Test Results:  Labs reviewed in Epic    ASSESSMENT/PLAN:      1. Routine general medical examination at a health care facility  Mammogram was completed on 9/17/2019  Pap with cotesting was normal in 2016.  She is due for repeat Pap with cotesting in 2021  She has never completed colon cancer screening.  She prefers to do a colonoscopy.  Referral was provided today.  Shingrix vaccine was recommended today.  Risks and benefits discussed.  She already had her influenza vaccine this year.  - GASTROENTEROLOGY ADULT REF PROCEDURE ONLY None  - Lipid panel reflex to direct LDL Fasting    2. Hypertension goal BP (blood pressure) < " 140/90  BMP today.  Uncontrolled.  Will start hydrochlorothiazide 12.5 mg daily and have her return in 2 weeks for repeat blood pressure and BMP.  Encouraged working on weight loss and increasing exercise level.  Discussed if blood pressure improved significantly, could consider stopping the metoprolol.  - metoprolol succinate ER (TOPROL-XL) 25 MG 24 hr tablet; Take 1 tablet (25 mg) by mouth daily  Dispense: 90 tablet; Refill: 3  - Albumin Random Urine Quantitative with Creat Ratio  - hydrochlorothiazide (HYDRODIURIL) 12.5 MG tablet; Take 1 tablet (12.5 mg) by mouth daily  Dispense: 30 tablet; Refill: 0    3. Gastroesophageal reflux disease, esophagitis presence not specified  Controlled on omeprazole daily.  Cough is improved since she started treatment.  She has been able to change some lifestyle triggers as well.  - omeprazole (PRILOSEC) 20 MG DR capsule; Take 1 capsule (20 mg) by mouth daily 30-60 minutes before a meal.  Dispense: 90 capsule; Refill: 1    4. Cough  Much improved.    She saw pulmonology last May for postviral cough syndrome.  She had used Singulair and her cough improved significantly so she stopped the medication.  She also felt it was making her anxious.  She felt that Atrovent inhaler was helpful in the setting of her cough, but then stopped it when her cough resolved.Her cough is that likely secondary to combination of GERD and postviral cough syndrome.  Her PFTs and chest x-ray were negative.  No further pulmonary work-up was necessary.  If symptoms return, pulmonology recommended initiation of inhaled Atrovent and Singulair for treatment.  She would like to have refill available of the Atrovent and Singulair.  5. Allergic rhinitis due to dogs  Stable. She says her dog is old and has been ill. Does not plan to get another doc.   - fluticasone (FLONASE) 50 MCG/ACT nasal spray; SHAKE LIQUID AND USE 2 SPRAYS IN EACH NOSTRIL TWICE DAILY  Dispense: 48 mL; Refill: 11    6. Urgency incontinence  7.  "Urinary frequency  I gave her some information on urgency incontinence today.  Discussed the possibility of her seeing PT for pelvic floor therapy.  She was interested.  - [facility] REFERRAL; Future    8. Elevated blood sugar  We will recheck A1c today.  Borderline A1c last year at 6.4.  - Hemoglobin A1c     COUNSELING:  Reviewed preventive health counseling, as reflected in patient instructions    Estimated body mass index is 35.26 kg/m  as calculated from the following:    Height as of this encounter: 1.568 m (5' 1.75\").    Weight as of this encounter: 86.8 kg (191 lb 4 oz).    Weight management plan: Discussed healthy diet and exercise guidelines     reports that she has quit smoking. Her smoking use included cigarettes. She smoked 0.00 packs per day for 10.00 years. She has never used smokeless tobacco.      Counseling Resources:  ATP IV Guidelines  Pooled Cohorts Equation Calculator  Breast Cancer Risk Calculator  FRAX Risk Assessment  ICSI Preventive Guidelines  Dietary Guidelines for Americans, 2010  USDA's MyPlate  ASA Prophylaxis  Lung CA Screening    Sheree Graf MD  [facility]  "

## 2019-10-17 NOTE — TELEPHONE ENCOUNTER
Left message to call back and ask to speak with an available triage nurse.  KIKA Duggan, KORINN, RN

## 2019-10-17 NOTE — PATIENT INSTRUCTIONS
Preventive Health Recommendations  Female Ages 50 - 64    Yearly exam: See your health care provider every year in order to  o Review health changes.   o Discuss preventive care.    o Review your medicines if your doctor has prescribed any.      Get a Pap test every three years (unless you have an abnormal result and your provider advises testing more often).    If you get Pap tests with HPV test, you only need to test every 5 years, unless you have an abnormal result.     You do not need a Pap test if your uterus was removed (hysterectomy) and you have not had cancer.    You should be tested each year for STDs (sexually transmitted diseases) if you're at risk.     Have a mammogram every 1 to 2 years.    Have a colonoscopy at age 50, or have a yearly FIT test (stool test). These exams screen for colon cancer.      Have a cholesterol test every 5 years, or more often if advised.    Have a diabetes test (fasting glucose) every three years. If you are at risk for diabetes, you should have this test more often.     If you are at risk for osteoporosis (brittle bone disease), think about having a bone density scan (DEXA).    Shots: Get a flu shot each year. Get a tetanus shot every 10 years.    Nutrition:     Eat at least 5 servings of fruits and vegetables each day.    Eat whole-grain bread, whole-wheat pasta and brown rice instead of white grains and rice.    Get adequate Calcium and Vitamin D.     Lifestyle    Exercise at least 150 minutes a week (30 minutes a day, 5 days a week). This will help you control your weight and prevent disease.    Limit alcohol to one drink per day.    No smoking.     Wear sunscreen to prevent skin cancer.     See your dentist every six months for an exam and cleaning.    See your eye doctor every 1 to 2 years.    I recommend getting the new shingles vaccine, Shingrix.  You can call your insurance company to find out if this vaccine is covered.  You may also ask our pharmacy to check if  your insurance covers Shingrix if given at the pharmacy.     You need a colonoscopy:  This is a lifesaving screening test - please call to set up an appointment today.  [phone] ([facility])  or   [phone] ([address])    Start the hydrochlorothiazide 12.5mg daily in the morning. Continue the metoprolol for now. See me in about two weeks.

## 2019-10-18 NOTE — TELEPHONE ENCOUNTER
Writer relayed message to patient from below. Referrals placed and patient informed. Patient wants to call to schedule on own.     Patient in agreement with plan and verbalizes understanding.   Thanks!   Vicki Hernandez RN

## 2019-10-21 ENCOUNTER — TELEPHONE (OUTPATIENT)
Dept: FAMILY MEDICINE | Facility: CLINIC | Age: 64
End: 2019-10-21

## 2019-10-21 NOTE — RESULT ENCOUNTER NOTE
The results of your recent lipid (cholesterol) profile were abnormal.      Here are the results:  Lab Results       Component                Value               Date                       CHOL                     202                 10/17/2019            Lab Results       Component                Value               Date                       HDL                      54                  10/17/2019            Lab Results       Component                Value               Date                       LDL                      122                 10/17/2019            Lab Results       Component                Value               Date                       TRIG                     128                 10/17/2019            Lab Results       Component                Value               Date                       CHOLHDLRATIO             3.8                 02/15/2013              Desired or goal levels are:  CHOLESTEROL: Desirable is less than 200.   HDL (Good Cholesterol): Desirable is greater than 40 (for men) greater than 50 (for women).  LDL (Bad Cholesterol): Desirable is less than 130 (or less than 100 if you have heart disease or diabetes). Borderline 130-160.  TRIGLYCERIDES: Desirable is less than 150.  Borderline is 150-200.      **Your 10-year heart disease risk score, which is calculated using the factors shown below, is  13.4%. When the risk score is 7% or higher, it is recommended that we consider starting a statin (cholesterol-lowering) medication to reduce your risk of heart attack and stroke. Please schedule a visit with me to discuss starting this medication.      The 10-year ASCVD risk score (Rebel ALFONSO Jr., et al., 2013) is: 13.4%    Values used to calculate the score:      Age: 64 years      Sex: Female      Is Non- : No      Diabetic: Yes      Tobacco smoker: No      Systolic Blood Pressure: 146 mmHg      Is BP treated: Yes      HDL Cholesterol: 54 mg/dL      Total Cholesterol: 202  mg/dL .    As you may know, an elevated cholesterol is one factor that increases your risk for heart disease and stroke. You can improve your cholesterol by controlling the amount and type of fat you eat and by increasing your daily activity level.    Here are some ways to improve your nutrition:  Eat less fat (especially butter, Crisco and other saturated fats)  Buy lean cuts of meat, reduce your portions of red meat or substitute poultry or fish  Use skim milk and low-fat dairy products  Eat no more than 4 egg yolks per week  Avoid fried or fast foods that are high in fat  Eat more fruits and vegetables      Also consider starting or increasing your aerobic activity. Aerobic activity is the best way to improve HDL (good) cholesterol. If this would be new to you, please talk with me first about what activities are safe for you.      Other lab results:    Your urine protein test was normal.     Please feel free to contact us with any questions or if you would like more information.

## 2019-10-21 NOTE — TELEPHONE ENCOUNTER
Diabetes Education Scheduling Outreach #1:    Call to patient to schedule. Left message with phone number to call to schedule.    Plan for 2nd outreach attempt within 1 week.    Vangie Laws [employer]  Diabetes and Nutrition Scheduling

## 2019-10-23 ENCOUNTER — TELEPHONE (OUTPATIENT)
Dept: FAMILY MEDICINE | Facility: CLINIC | Age: 64
End: 2019-10-23

## 2019-10-23 NOTE — TELEPHONE ENCOUNTER
MTM referral from: [facility] visit (referral by provider)    MTM referral outreach attempt #2 on October 23, 2019 at 12:33 PM      Outcome: Patient not reachable after several attempts, will route to MTM Pharmacist/Provider as an FYI. Thank you for the referral.    See [facility] Pharmacy Coordinator

## 2019-10-30 NOTE — PROGRESS NOTES
SUBJECTIVE:   Adliia Camacho is a 64 year old female who presents to clinic today for the following health issues:    Answers for HPI/ROS submitted by the patient on 10/31/2019   If you checked off any problems, how difficult have these problems made it for you to do your work, take care of things at home, or get along with other people?: Not difficult at all  PHQ9 TOTAL SCORE: 2  EMMANUEL 7 TOTAL SCORE: 1      Hypertension Follow-up      Do you check your blood pressure regularly outside of the clinic? No     Are you following a low salt diet? No    Are your blood pressures ever more than 140 on the top number (systolic) OR more   than 90 on the bottom number (diastolic), for example 140/90? No      How many servings of fruits and vegetables do you eat daily?  0-1    On average, how many sweetened beverages do you drink each day (soda, juice, sweet tea, etc)?   Coffee a day    How many days per week do you miss taking your medication? 0         The 10-year ASCVD risk score (Rebel ALFONSO Jr., et al., 2013) is: 12.4%    Values used to calculate the score:      Age: 64 years      Sex: Female      Is Non- : No      Diabetic: Yes      Tobacco smoker: No      Systolic Blood Pressure: 140 mmHg      Is BP treated: Yes      HDL Cholesterol: 54 mg/dL      Total Cholesterol: 202 mg/dL     Problem list and histories reviewed & adjusted, as indicated.  Additional history: as documented    Patient Active Problem List   Diagnosis     Hypertension goal BP (blood pressure) < 140/90     CARDIOVASCULAR SCREENING; LDL GOAL LESS THAN 130     Allergic rhinitis due to dogs     Contraceptive management     Recurrent herpes labialis     Rosacea     Female stress incontinence     GERD (gastroesophageal reflux disease)     Advanced directives, counseling/discussion     Venous (peripheral) insufficiency     Obesity (BMI 35.0-39.9) with comorbidity (H)     Diabetes mellitus, type 2 (H)     Past Surgical History:   Procedure  Laterality Date     NO HISTORY OF SURGERY         Social History     Tobacco Use     Smoking status: Former Smoker     Packs/day: 0.00     Years: 10.00     Pack years: 0.00     Types: Cigarettes     Smokeless tobacco: Never Used   Substance Use Topics     Alcohol use: Yes     Alcohol/week: 1.7 standard drinks     Comment: occassionally     Family History   Problem Relation Age of Onset     Hypertension Father      Hypertension Brother      Hypertension Sister      Diabetes Sister         and brother     Kidney Disease Sister      Diabetes Brother      Diabetes Sister      Hypertension Sister      Diabetes Brother      Hypertension Brother      Cancer - colorectal No family hx of      Breast Cancer No family hx of      Thyroid Disease No family hx of          Current Outpatient Medications   Medication Sig Dispense Refill     acyclovir (ZOVIRAX) 400 MG tablet TAKE 1 TABLET BY MOUTH THREE TIMES DAILY 30 tablet 1     atorvastatin (LIPITOR) 40 MG tablet Take 1 tablet (40 mg) by mouth daily 90 tablet 3     cetirizine (ZYRTEC) 10 MG tablet Take 10 mg by mouth daily       COMPRESSION STOCKINGS 1 each daily Lower Extremity:   Knee High;  bilateral;  20-30 mm Hg.  Measure and fit.  Style and color per patient preference.  Michael Haddad per patient need. 6 each 11     fluticasone (FLONASE) 50 MCG/ACT nasal spray SHAKE LIQUID AND USE 2 SPRAYS IN EACH NOSTRIL TWICE DAILY 48 mL 11     hydrochlorothiazide (HYDRODIURIL) 12.5 MG tablet Take 1 tablet (12.5 mg) by mouth daily 30 tablet 0     Ibuprofen (ADVIL PO) Take 200 mg by mouth as needed for moderate pain       ipratropium (ATROVENT HFA) 17 MCG/ACT inhaler Inhale 2 puffs into the lungs 4 times daily 12.9 g 3     loperamide (IMODIUM) 2 MG capsule Take 2 mg by mouth 4 times daily as needed for diarrhea       metoprolol succinate ER (TOPROL-XL) 25 MG 24 hr tablet Take 1 tablet (25 mg) by mouth daily 90 tablet 3     omeprazole (PRILOSEC) 20 MG DR capsule Take 1 capsule (20 mg) by  "mouth daily 30-60 minutes before a meal. 90 capsule 1     Allergies   Allergen Reactions     Dogs      Dust Mites      Recent Labs   Lab Test 10/17/19  0939 04/12/19  1633 02/13/18  0954 10/23/17  1604 03/24/17  0739 03/22/16  0924  02/15/13  1135 10/28/11  0955   A1C 6.5* 6.4*  --   --  6.3*  --   --   --   --    *  --   --   --  91 107*  --  95 96   HDL 54  --   --   --  42* 50  --  43* 46*   TRIG 128  --   --   --  115 182*  --  119 136   ALT  --   --   --   --   --   --   --  22  --    CR  --  0.86 0.84  --  0.88 0.84   < > 0.76  --    GFRESTIMATED  --  73 69  --  66 69   < > 79  --    GFRESTBLACK  --  84 83  --  80 84   < > >90  --    POTASSIUM  --  3.8 4.1  --  3.9 4.2   < > 4.2  --    TSH  --   --   --  1.92  --   --   --   --  1.71    < > = values in this interval not displayed.      BP Readings from Last 3 Encounters:   10/31/19 (!) 140/82   10/17/19 (!) 146/84   05/02/19 (!) 160/91    Wt Readings from Last 3 Encounters:   10/31/19 86.4 kg (190 lb 8 oz)   10/17/19 86.8 kg (191 lb 4 oz)   05/02/19 87.5 kg (193 lb)              Reviewed and updated as needed this visit by clinical staff  Tobacco  Allergies  Meds       Reviewed and updated as needed this visit by Provider         ROS:  No complaint of chest pain or shortness of breath     OBJECTIVE:     BP (!) 140/82 (BP Location: Left arm, Patient Position: Sitting, Cuff Size: Adult Regular)   Pulse 80   Temp 98.2  F (36.8  C) (Oral)   Ht 1.549 m (5' 1\")   Wt 86.4 kg (190 lb 8 oz)   SpO2 97%   BMI 35.99 kg/m    Body mass index is 35.99 kg/m .  GENERAL: healthy, alert and no distress    Diagnostic Test Results:  Results for orders placed or performed in visit on 10/17/19   Albumin Random Urine Quantitative with Creat Ratio   Result Value Ref Range    Creatinine Urine 219 mg/dL    Albumin Urine mg/L 18 mg/L    Albumin Urine mg/g Cr 8.31 0 - 25 mg/g Cr   Hemoglobin A1c   Result Value Ref Range    Hemoglobin A1C 6.5 (H) 0 - 5.6 %   Lipid panel " reflex to direct LDL Fasting   Result Value Ref Range    Cholesterol 202 (H) <200 mg/dL    Triglycerides 128 <150 mg/dL    HDL Cholesterol 54 >49 mg/dL    LDL Cholesterol Calculated 122 (H) <100 mg/dL    Non HDL Cholesterol 148 (H) <130 mg/dL       The 10-year ASCVD risk score (Rebel ALFONSO JrBruna, et al., 2013) is: 12.4%    Values used to calculate the score:      Age: 64 years      Sex: Female      Is Non- : No      Diabetic: Yes      Tobacco smoker: No      Systolic Blood Pressure: 140 mmHg      Is BP treated: Yes      HDL Cholesterol: 54 mg/dL      Total Cholesterol: 202 mg/dL          ASSESSMENT/PLAN:          1. Type 2 diabetes mellitus without complication, without long-term current use of insulin (H)  New diagnosis. Has not scheduled with DM ed or MTM yet. Briefly discussed the diagnosis of type II DM and pathophysiology. Gave her pamphlets on diabetes and dietary recommendations. She will schedule with DM ed. She prefers to work on dietary changes before starting medication, which is appropriate at this point.     2. Hypertension goal BP (blood pressure) < 140/90  Mildly elevated. She would like to check her bp at home and report results to me before increasing medication. See instructions below.    Uncontrolled at the time of her last visit 2 weeks ago.  Started hydrochlorothiazide 12.5 mg daily.  Repeat BMP today.  She continues metoprolol 25 mg daily.    3. Morbid obesity (H)  Encouraged working with a DM ed/nutritionist to make some dietary changes and work on weight loss.      4. Gastroesophageal reflux disease, esophagitis presence not specified  Controlled on omeprazole daily.  Cough is improved since she started treatment.  She has been able to change some lifestyle triggers as well.         5. Urgency incontinence  6. Urinary frequency--elevated BG could be contributing  I gave her some information on urgency incontinence 2 weeks ago.  Discussed the possibility of her seeing PT for  pelvic floor therapy.  She was interested.       7. Hyperlipidemia  ASCVD risk score 12.4% and new diagnosis of diabetes. I recommended starting a statin medication today. Would recommend starting atorvastatin 40mg daily.Discussed risks and benefits of the medication.       HM  Mammogram was completed on 9/17/2019  Pap with cotesting was normal in 2016.  She is due for repeat Pap with cotesting in 2021  She has never completed colon cancer screening.  She prefers to do a colonoscopy.  Referral was provided   Shingrix vaccine was recommended.  Risks and benefits discussed.  She already had her influenza vaccine this year.     Patient Instructions   Check your blood pressure once daily at different times of the day and write down your results. Send your results via [employer] or bring to your next visit. Goal blood pressure is less than 140/90. If your blood pressure is higher than this, we will need to discuss a change in treatment.     If your blood pressure is at goal, plan on seeing me in 3 months.    Start the atorvastatin 40mg daily.     Schedule with the diabetes educator.             Sheree Graf M.D.        [facility]

## 2019-10-31 ENCOUNTER — OFFICE VISIT (OUTPATIENT)
Dept: FAMILY MEDICINE | Facility: CLINIC | Age: 64
End: 2019-10-31
Payer: COMMERCIAL

## 2019-10-31 VITALS
HEIGHT: 61 IN | OXYGEN SATURATION: 97 % | TEMPERATURE: 98.2 F | WEIGHT: 190.5 LBS | SYSTOLIC BLOOD PRESSURE: 140 MMHG | DIASTOLIC BLOOD PRESSURE: 82 MMHG | HEART RATE: 80 BPM | BODY MASS INDEX: 35.97 KG/M2

## 2019-10-31 DIAGNOSIS — E11.9 TYPE 2 DIABETES MELLITUS WITHOUT COMPLICATION, WITHOUT LONG-TERM CURRENT USE OF INSULIN (H): Primary | ICD-10-CM

## 2019-10-31 DIAGNOSIS — E66.01 MORBID OBESITY (H): ICD-10-CM

## 2019-10-31 DIAGNOSIS — E78.5 HYPERLIPIDEMIA, UNSPECIFIED HYPERLIPIDEMIA TYPE: ICD-10-CM

## 2019-10-31 DIAGNOSIS — I10 HYPERTENSION GOAL BP (BLOOD PRESSURE) < 140/90: ICD-10-CM

## 2019-10-31 LAB
ANION GAP SERPL CALCULATED.3IONS-SCNC: 10 MMOL/L (ref 3–14)
BUN SERPL-MCNC: 17 MG/DL (ref 7–30)
CALCIUM SERPL-MCNC: 8.8 MG/DL (ref 8.5–10.1)
CHLORIDE SERPL-SCNC: 105 MMOL/L (ref 94–109)
CO2 SERPL-SCNC: 24 MMOL/L (ref 20–32)
CREAT SERPL-MCNC: 0.83 MG/DL (ref 0.52–1.04)
GFR SERPL CREATININE-BSD FRML MDRD: 75 ML/MIN/{1.73_M2}
GLUCOSE SERPL-MCNC: 135 MG/DL (ref 70–99)
POTASSIUM SERPL-SCNC: 3.8 MMOL/L (ref 3.4–5.3)
SODIUM SERPL-SCNC: 139 MMOL/L (ref 133–144)

## 2019-10-31 PROCEDURE — 99214 OFFICE O/P EST MOD 30 MIN: CPT | Performed by: FAMILY MEDICINE

## 2019-10-31 PROCEDURE — 80048 BASIC METABOLIC PNL TOTAL CA: CPT | Performed by: FAMILY MEDICINE

## 2019-10-31 PROCEDURE — 36415 COLL VENOUS BLD VENIPUNCTURE: CPT | Performed by: FAMILY MEDICINE

## 2019-10-31 RX ORDER — METFORMIN HCL 500 MG
500 TABLET, EXTENDED RELEASE 24 HR ORAL
Status: CANCELLED | OUTPATIENT
Start: 2019-10-31

## 2019-10-31 RX ORDER — ATORVASTATIN CALCIUM 40 MG/1
40 TABLET, FILM COATED ORAL DAILY
Qty: 90 TABLET | Refills: 3 | Status: SHIPPED | OUTPATIENT
Start: 2019-10-31 | End: 2020-08-24

## 2019-10-31 ASSESSMENT — ANXIETY QUESTIONNAIRES
1. FEELING NERVOUS, ANXIOUS, OR ON EDGE: SEVERAL DAYS
GAD7 TOTAL SCORE: 1
3. WORRYING TOO MUCH ABOUT DIFFERENT THINGS: NOT AT ALL
5. BEING SO RESTLESS THAT IT IS HARD TO SIT STILL: NOT AT ALL
7. FEELING AFRAID AS IF SOMETHING AWFUL MIGHT HAPPEN: NOT AT ALL
6. BECOMING EASILY ANNOYED OR IRRITABLE: NOT AT ALL
2. NOT BEING ABLE TO STOP OR CONTROL WORRYING: NOT AT ALL
7. FEELING AFRAID AS IF SOMETHING AWFUL MIGHT HAPPEN: NOT AT ALL
4. TROUBLE RELAXING: NOT AT ALL
GAD7 TOTAL SCORE: 1
GAD7 TOTAL SCORE: 1

## 2019-10-31 ASSESSMENT — PATIENT HEALTH QUESTIONNAIRE - PHQ9
SUM OF ALL RESPONSES TO PHQ QUESTIONS 1-9: 2
SUM OF ALL RESPONSES TO PHQ QUESTIONS 1-9: 2
10. IF YOU CHECKED OFF ANY PROBLEMS, HOW DIFFICULT HAVE THESE PROBLEMS MADE IT FOR YOU TO DO YOUR WORK, TAKE CARE OF THINGS AT HOME, OR GET ALONG WITH OTHER PEOPLE: NOT DIFFICULT AT ALL

## 2019-10-31 ASSESSMENT — MIFFLIN-ST. JEOR: SCORE: 1361.48

## 2019-10-31 NOTE — PATIENT INSTRUCTIONS
Check your blood pressure once daily at different times of the day and write down your results. Send your results via [employer] or bring to your next visit. Goal blood pressure is less than 140/90. If your blood pressure is higher than this, we will need to discuss a change in treatment.     If your blood pressure is at goal, plan on seeing me in 3 months.    Start the atorvastatin 40mg daily.     Schedule with the diabetes educator.

## 2019-11-02 ASSESSMENT — ANXIETY QUESTIONNAIRES: GAD7 TOTAL SCORE: 1

## 2019-11-02 ASSESSMENT — PATIENT HEALTH QUESTIONNAIRE - PHQ9: SUM OF ALL RESPONSES TO PHQ QUESTIONS 1-9: 2

## 2019-11-04 DIAGNOSIS — I10 HYPERTENSION GOAL BP (BLOOD PRESSURE) < 140/90: ICD-10-CM

## 2019-11-04 RX ORDER — METOPROLOL SUCCINATE 25 MG/1
TABLET, EXTENDED RELEASE ORAL
Qty: 90 TABLET | Refills: 0 | OUTPATIENT
Start: 2019-11-04

## 2019-11-04 NOTE — TELEPHONE ENCOUNTER
Message sent to pharmacy - Refusal reason: Duplicate (SEE ORDER SENT 10/17/19 FOR ONE YEAR SUPPLY.).  Deonna HILTON

## 2019-11-05 ENCOUNTER — HEALTH MAINTENANCE LETTER (OUTPATIENT)
Age: 64
End: 2019-11-05

## 2019-11-11 DIAGNOSIS — I10 HYPERTENSION GOAL BP (BLOOD PRESSURE) < 140/90: ICD-10-CM

## 2019-11-11 NOTE — TELEPHONE ENCOUNTER
"Requested Prescriptions   Pending Prescriptions Disp Refills     hydrochlorothiazide (HYDRODIURIL) 12.5 MG tablet [Pharmacy Med Name: HYDROCHLOROTHIAZIDE 12.5MG TABLETS] 30 tablet 0     Sig: TAKE 1 TABLET(12.5 MG) BY MOUTH DAILY  Last Written Prescription Date:  10/17/2019  Last Fill Quantity: 30 tablet,  # refills: 0   Last Office Visit: 10/31/2019   Future Office Visit:            Diuretics (Including Combos) Protocol Failed - 11/11/2019 10:59 AM        Failed - Blood pressure under 140/90 in past 12 months     BP Readings from Last 3 Encounters:   10/31/19 (!) 140/82   10/17/19 (!) 146/84   05/02/19 (!) 160/91           Passed - Recent (12 mo) or future (30 days) visit within the authorizing provider's specialty     Patient has had an office visit with the authorizing provider or a provider within the authorizing providers department within the previous 12 mos or has a future within next 30 days. See \"Patient Info\" tab in inbasket, or \"Choose Columns\" in Meds & Orders section of the refill encounter.            Passed - Medication is active on med list        Passed - Patient is age 18 or older        Passed - No active pregancy on record        Passed - Normal serum creatinine on file in past 12 months     Recent Labs   Lab Test 10/31/19  0814   CR 0.83            Passed - Normal serum potassium on file in past 12 months     Recent Labs   Lab Test 10/31/19  0814   POTASSIUM 3.8            Passed - Normal serum sodium on file in past 12 months     Recent Labs   Lab Test 10/31/19  0814               Passed - No positive pregnancy test in past 12 months          "

## 2019-11-12 NOTE — TELEPHONE ENCOUNTER
Office visit 10/31 - Hypertension goal BP (blood pressure) < 140/90  Mildly elevated. She would like to check her bp at home and report results to me before increasing medication. See instructions below.    Uncontrolled at the time of her last visit 2 weeks ago.  Started hydrochlorothiazide 12.5 mg daily.  Repeat BMP today.  She continues metoprolol 25 mg daily.      Routing refill request to provider for review/approval because:  Patient has not followed up with BP reporting

## 2019-11-13 RX ORDER — HYDROCHLOROTHIAZIDE 12.5 MG/1
12.5 TABLET ORAL DAILY
Qty: 15 TABLET | Refills: 0 | Status: SHIPPED | OUTPATIENT
Start: 2019-11-13 | End: 2020-01-28

## 2019-11-13 NOTE — TELEPHONE ENCOUNTER
Sheree Graf MD,  Patient reports BP around 135-142-70's    Would you like to see patient for HTN follow up as BP is still not at goal?    Thank You!  Beverley Ventura, RN  Triage Nurse

## 2019-11-13 NOTE — TELEPHONE ENCOUNTER
Sheree Graf MD,  Plan pended - cued Rx for 15 tablets with note added to medication sig.     Team Coordinators: please contact patient, has she been taking BP at home as instructed at LOV? If so, what have readings been? If not, please schedule office visit for HTN follow up    Thank You!  Beverley Ventura, YOBANI  Triage Nurse

## 2019-11-13 NOTE — TELEPHONE ENCOUNTER
Spoke to patient and she states she has been taking her BP at home it has been around 142/74 & yesterday was 135/71     Patient states the las few weeks have been extra crazy/busy at work as they are moving etc. as well     Patient reports she is trying to be compliant to Dr Graf

## 2019-11-15 NOTE — TELEPHONE ENCOUNTER
Team coordinators-Please contact patient to schedule BP follow up visit with Dr. Graf.    Thank you!  KORIN DobbsN, RN

## 2019-12-16 ENCOUNTER — HOSPITAL ENCOUNTER (EMERGENCY)
Facility: CLINIC | Age: 64
Discharge: HOME | End: 2019-12-16
Attending: EMERGENCY MEDICINE | Admitting: EMERGENCY MEDICINE
Payer: COMMERCIAL

## 2019-12-16 ENCOUNTER — APPOINTMENT (OUTPATIENT)
Dept: GENERAL RADIOLOGY | Facility: CLINIC | Age: 64
End: 2019-12-16
Attending: EMERGENCY MEDICINE
Payer: COMMERCIAL

## 2019-12-16 ENCOUNTER — APPOINTMENT (OUTPATIENT)
Dept: ULTRASOUND IMAGING | Facility: CLINIC | Age: 64
End: 2019-12-16
Attending: EMERGENCY MEDICINE
Payer: COMMERCIAL

## 2019-12-16 VITALS
HEIGHT: 62 IN | HEART RATE: 87 BPM | WEIGHT: 191 LBS | DIASTOLIC BLOOD PRESSURE: 94 MMHG | TEMPERATURE: 97.5 F | RESPIRATION RATE: 16 BRPM | OXYGEN SATURATION: 98 % | BODY MASS INDEX: 35.15 KG/M2 | SYSTOLIC BLOOD PRESSURE: 177 MMHG

## 2019-12-16 DIAGNOSIS — M79.89 SWELLING OF LIMB: ICD-10-CM

## 2019-12-16 DIAGNOSIS — S89.92XA KNEE INJURY, LEFT, INITIAL ENCOUNTER: ICD-10-CM

## 2019-12-16 PROCEDURE — 29505 APPLICATION LONG LEG SPLINT: CPT | Mod: LT

## 2019-12-16 PROCEDURE — 73560 X-RAY EXAM OF KNEE 1 OR 2: CPT | Mod: LT

## 2019-12-16 PROCEDURE — 99284 EMERGENCY DEPT VISIT MOD MDM: CPT | Mod: 25

## 2019-12-16 PROCEDURE — 99284 EMERGENCY DEPT VISIT MOD MDM: CPT | Mod: Z6 | Performed by: EMERGENCY MEDICINE

## 2019-12-16 PROCEDURE — 93971 EXTREMITY STUDY: CPT | Mod: LT

## 2019-12-16 PROCEDURE — 25000132 ZZH RX MED GY IP 250 OP 250 PS 637: Performed by: EMERGENCY MEDICINE

## 2019-12-16 RX ORDER — IBUPROFEN 600 MG/1
600 TABLET, FILM COATED ORAL EVERY 8 HOURS PRN
Qty: 20 TABLET | Refills: 0 | Status: SHIPPED | OUTPATIENT
Start: 2019-12-16 | End: 2021-03-11

## 2019-12-16 RX ORDER — IBUPROFEN 200 MG
400 TABLET ORAL ONCE
Status: COMPLETED | OUTPATIENT
Start: 2019-12-16 | End: 2019-12-16

## 2019-12-16 RX ORDER — HYDROCODONE BITARTRATE AND ACETAMINOPHEN 5; 325 MG/1; MG/1
1 TABLET ORAL EVERY 6 HOURS PRN
Qty: 10 TABLET | Refills: 0 | Status: SHIPPED | OUTPATIENT
Start: 2019-12-16 | End: 2020-02-07

## 2019-12-16 RX ORDER — ACETAMINOPHEN 325 MG/1
975 TABLET ORAL ONCE
Status: COMPLETED | OUTPATIENT
Start: 2019-12-16 | End: 2019-12-16

## 2019-12-16 RX ADMIN — IBUPROFEN 400 MG: 200 TABLET, FILM COATED ORAL at 10:35

## 2019-12-16 RX ADMIN — ACETAMINOPHEN 975 MG: 325 TABLET, FILM COATED ORAL at 10:35

## 2019-12-16 ASSESSMENT — MIFFLIN-ST. JEOR: SCORE: 1379.62

## 2019-12-16 ASSESSMENT — ENCOUNTER SYMPTOMS: JOINT SWELLING: 1

## 2019-12-16 NOTE — ED PROVIDER NOTES
"    [place] EMERGENCY DEPARTMENT ([facility])  12/16/19 [facility]    History     Chief Complaint   Patient presents with     Knee Pain     left     The history is provided by the patient and medical records.     Adilia Camacho is a 64 year old female with a history of type II diabetes mellitus and HTN who presents to the ED with her son for evaluation of left knee pain that started a couple weeks ago. Patient complains her left knee hurts with movements, and she feels \"water\" behind the knee. She states her left knee has been swollen for about a year. She notes she was seen by at an orthopedic clinic a year ago for left knee pain and had an ultrasound done at that time. However, she has been having increased left knee pain over the past few weeks. Today, she states she was walking down a few steps and thinks she stepped wrong. Patient reports she had to sit down on the steps, because she was having a lot of pain in her left knee. She states she took a couple tablets of Ibuprofen at 7:30 AM. Patient denies previous history of DVT/PE.     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     I have reviewed the Medications, Allergies, Past Medical and Surgical History, and Social History in the [facility] system.  Past Medical History:   Diagnosis Date     Allergic rhinitis due to dogs      CARDIOVASCULAR SCREENING; LDL GOAL LESS THAN 130      Contraceptive management      Diabetes mellitus, type 2 (H) 10/31/2019     Hypertension goal BP (blood pressure) < 140/90      Recurrent herpes labialis      Rosacea        Past Surgical History:   Procedure Laterality Date     NO HISTORY OF SURGERY         Family History   Problem Relation Age of Onset     Hypertension Father      Hypertension Brother      Hypertension Sister      Diabetes " "Sister         and brother     Kidney Disease Sister      Diabetes Brother      Diabetes Sister      Hypertension Sister      Diabetes Brother      Hypertension Brother      Cancer - colorectal No family hx of      Breast Cancer No family hx of      Thyroid Disease No family hx of        Social History     Tobacco Use     Smoking status: Former Smoker     Packs/day: 0.00     Years: 10.00     Pack years: 0.00     Types: Cigarettes     Smokeless tobacco: Never Used   Substance Use Topics     Alcohol use: Yes     Alcohol/week: 1.7 standard drinks     Comment: occassionally      Review of Systems   Musculoskeletal: Positive for joint swelling (L knee).        Left knee pain   All other systems reviewed and are negative.      Physical Exam   BP: (!) 177/94  Pulse: 87  Temp: 97.5  F (36.4  C)  Resp: 16  Height: 157.5 cm (5' 2\")  Weight: 86.6 kg (191 lb)  SpO2: 98 %      Physical Exam  Vitals signs and nursing note reviewed.   Constitutional:       General: She is in acute distress.      Appearance: Normal appearance.   HENT:      Head: Atraumatic.   Cardiovascular:      Rate and Rhythm: Normal rate and regular rhythm.   Pulmonary:      Effort: Pulmonary effort is normal.      Breath sounds: Normal breath sounds.   Musculoskeletal:      Left knee: She exhibits decreased range of motion, effusion and abnormal meniscus. She exhibits no ecchymosis, no deformity and no bony tenderness. Tenderness found. Medial joint line tenderness noted. No patellar tendon tenderness noted.      Comments: Left knee is stable to varus and valgus stress and anterior drawer.  She has pain with flexion, suspect meniscus injury   Neurological:      Mental Status: She is alert.         ED Course        Procedures      Results for orders placed or performed during the hospital encounter of 12/16/19 (from the past 24 hour(s))   US Lower Extremity Venous Duplex Left    Narrative    EXAMINATION: DOPPLER VENOUS ULTRASOUND OF THE LEFT LOWER " EXTREMITY,  12/16/2019 11:13 AM     COMPARISON: Venous ultrasound 5/11/2019    HISTORY: Popliteal pain and swelling    TECHNIQUE:  Gray-scale evaluation with compression, spectral flow, and  color Doppler assessment of the deep venous system of the left leg  from groin to knee, and then at the ankle.    FINDINGS:  In the left lower extremity, the common femoral, femoral, popliteal  and posterior tibial veins demonstrate normal compressibility and  blood flow.    Patent right common femoral vein for comparison.      Impression    IMPRESSION:  No evidence left lower extremity deep venous thrombosis.    I have personally reviewed the examination and initial interpretation  and I agree with the findings.    SHRUTHI HAMLIN MD   XR Knee Left 1/2 Views    Narrative    2 views left knee radiographs 12/16/2019 12:17 PM    History: pain after twisting    Comparison: None available.    Findings:    AP and lateral views of the left knee were obtained.     No acute osseous abnormality. Trace joint effusion.    Mild medial compartment joint space loss. Patellar enthesophyte.  Prominent enthesopathy posterior aspect of the proximal tibia and  fibula.      Impression    Impression:  1. No acute osseous abnormality.  2. Mild medial compartment joint space loss.    MISTY CARLOS     Medications   acetaminophen (TYLENOL) tablet 975 mg (975 mg Oral Given 12/16/19 1035)   ibuprofen (ADVIL/MOTRIN) tablet 400 mg (400 mg Oral Given 12/16/19 1035)         Assessments & Plan (with Medical Decision Making)      Adilia Camacho is a 64-year-old female with acute left knee injury.  We did an ultrasound which is negative for DVT and Baker's cyst. We did a plain x-ray which is negative as well. Clinically the knee is stable. She does have pain and impingement with flexion likely representing meniscal tear.  She was given 975 mg Tylenol and 400 mg ibuprofen. She was placed in a knee immobilizer, given crutches and pain medication, and phone  number to follow-up with Orthopedics.  She likely will need an MRI of this knee for definitive diagnosis, suspect meniscus tear    This part of the document was transcribed by Luna Mccarthy, Medical Scribe.      I have reviewed the nursing notes.    I have reviewed the findings, diagnosis, plan and need for follow up with the patient.    New Prescriptions    HYDROCODONE-ACETAMINOPHEN (NORCO) 5-325 MG TABLET    Take 1 tablet by mouth every 6 hours as needed for severe pain    IBUPROFEN (ADVIL/MOTRIN) 600 MG TABLET    Take 1 tablet (600 mg) by mouth every 8 hours as needed for moderate pain       Final diagnoses:   Knee injury, left, initial encounter     I, Char Gonzáles, am serving as a trained medical scribe to document services personally performed by Jeet Garcia MD, based on the provider's statements to me.      I, Jeet Garcia MD, was physically present and have reviewed and verified the accuracy of this note documented by Char Gonzáles.     12/16/2019   [facility], [place], EMERGENCY DEPARTMENT     Jeet Garcia MD  12/16/19 [id number]

## 2019-12-16 NOTE — DISCHARGE INSTRUCTIONS
Use crutches and wear immobilizer when ambulating  Use pain medication as needed  Schedule on orthopedic follow-up, you will need an MRI of that knee  Please make an appointment to follow up with Orthopedics (phone: [phone]) as soon as possible.

## 2019-12-16 NOTE — ED TRIAGE NOTES
"Patient presents to triage with c/o left knee pain. Patient reports pain for the past week and a half. Denies injury, thinks maybe she stepped on in wrong. Also states she \"has a wonky vein\" in that leg. Took ibuprofen around 0730.   "

## 2019-12-17 ENCOUNTER — OFFICE VISIT (OUTPATIENT)
Dept: ORTHOPEDICS | Facility: CLINIC | Age: 64
End: 2019-12-17
Payer: COMMERCIAL

## 2019-12-17 ENCOUNTER — ANCILLARY PROCEDURE (OUTPATIENT)
Dept: MRI IMAGING | Facility: CLINIC | Age: 64
End: 2019-12-17
Attending: FAMILY MEDICINE
Payer: COMMERCIAL

## 2019-12-17 ENCOUNTER — OFFICE VISIT (OUTPATIENT)
Dept: FAMILY MEDICINE | Facility: CLINIC | Age: 64
End: 2019-12-17
Payer: COMMERCIAL

## 2019-12-17 VITALS
TEMPERATURE: 97.9 F | BODY MASS INDEX: 36.09 KG/M2 | HEIGHT: 61 IN | DIASTOLIC BLOOD PRESSURE: 86 MMHG | OXYGEN SATURATION: 99 % | HEART RATE: 79 BPM | SYSTOLIC BLOOD PRESSURE: 142 MMHG

## 2019-12-17 VITALS — HEIGHT: 62 IN | WEIGHT: 191 LBS | BODY MASS INDEX: 35.15 KG/M2 | RESPIRATION RATE: 16 BRPM

## 2019-12-17 DIAGNOSIS — M23.92 INTERNAL DERANGEMENT OF LEFT KNEE: Primary | ICD-10-CM

## 2019-12-17 DIAGNOSIS — E11.9 TYPE 2 DIABETES MELLITUS WITHOUT COMPLICATION, WITHOUT LONG-TERM CURRENT USE OF INSULIN (H): ICD-10-CM

## 2019-12-17 DIAGNOSIS — I10 HYPERTENSION GOAL BP (BLOOD PRESSURE) < 140/90: Primary | ICD-10-CM

## 2019-12-17 DIAGNOSIS — M23.92 INTERNAL DERANGEMENT OF LEFT KNEE: ICD-10-CM

## 2019-12-17 DIAGNOSIS — E78.5 HYPERLIPIDEMIA, UNSPECIFIED HYPERLIPIDEMIA TYPE: ICD-10-CM

## 2019-12-17 DIAGNOSIS — M25.562 LEFT KNEE PAIN, UNSPECIFIED CHRONICITY: ICD-10-CM

## 2019-12-17 DIAGNOSIS — K21.9 GASTROESOPHAGEAL REFLUX DISEASE, ESOPHAGITIS PRESENCE NOT SPECIFIED: ICD-10-CM

## 2019-12-17 PROCEDURE — 99214 OFFICE O/P EST MOD 30 MIN: CPT | Performed by: FAMILY MEDICINE

## 2019-12-17 RX ORDER — HYDROCHLOROTHIAZIDE 12.5 MG/1
25 TABLET ORAL DAILY
Qty: 90 TABLET | Refills: 0 | Status: SHIPPED | OUTPATIENT
Start: 2019-12-17 | End: 2020-03-20

## 2019-12-17 ASSESSMENT — MIFFLIN-ST. JEOR: SCORE: 1379.62

## 2019-12-17 NOTE — PROGRESS NOTES
[facility]  Orthopedics  Ramiro Arellano,   2019     Name: Adilia Camacho  MRN: [id number]  Age: 64 year old  : 1955  Referring provider: Referred Self     Chief Complaint: Pain of the Left Knee    Date of Injury: 19    History of Present Illness:   Adilia Camacho is a 64 year old female who presents today for evaluation of left knee pain sustained on 19, after her knee twisted and gave out while walking down the stairs. She was last evaluated in the ED by Dr. Garcia on 19, at which time clinical examination and radiographs revealed concern for torn meniscus. She also received an ultrasound, which was negative for DVT and Baker's cyst. She currently endorses aching, dull, and sharp pain in her left knee.She rates her pain a 6 out of 10 on a scale. Pain is exacerbated with walking, bending, and sitting. She notes that her brace doesn't fit her knee well.     Review of Systems:   A 10-point review of systems was obtained and is negative except for as noted in the HPI.     Medications:      acyclovir (ZOVIRAX) 400 MG tablet, TAKE 1 TABLET BY MOUTH THREE TIMES DAILY, Disp: 30 tablet, Rfl: 1     atorvastatin (LIPITOR) 40 MG tablet, Take 1 tablet (40 mg) by mouth daily, Disp: 90 tablet, Rfl: 3     cetirizine (ZYRTEC) 10 MG tablet, Take 10 mg by mouth daily, Disp: , Rfl:      COMPRESSION STOCKINGS, 1 each daily Lower Extremity:   Knee High;  bilateral;  20-30 mm Hg.  Measure and fit.  Style and color per patient preference.  Michael Haddad per patient need., Disp: 6 each, Rfl: 11     fluticasone (FLONASE) 50 MCG/ACT nasal spray, SHAKE LIQUID AND USE 2 SPRAYS IN EACH NOSTRIL TWICE DAILY, Disp: 48 mL, Rfl: 11     hydrochlorothiazide (HYDRODIURIL) 12.5 MG tablet, Take 2 tablets (25 mg) by mouth daily, Disp: 90 tablet, Rfl: 0     hydrochlorothiazide (HYDRODIURIL) 12.5 MG tablet, Take 1 tablet (12.5 mg) by mouth daily ++ Due for office visit in clinic ++, Disp: 15 tablet, Rfl: 0      "HYDROcodone-acetaminophen (NORCO) 5-325 MG tablet, Take 1 tablet by mouth every 6 hours as needed for severe pain, Disp: 10 tablet, Rfl: 0     Ibuprofen (ADVIL PO), Take 200 mg by mouth as needed for moderate pain, Disp: , Rfl:      ibuprofen (ADVIL/MOTRIN) 600 MG tablet, Take 1 tablet (600 mg) by mouth every 8 hours as needed for moderate pain, Disp: 20 tablet, Rfl: 0     ipratropium (ATROVENT HFA) 17 MCG/ACT inhaler, Inhale 2 puffs into the lungs 4 times daily, Disp: 12.9 g, Rfl: 3     loperamide (IMODIUM) 2 MG capsule, Take 2 mg by mouth 4 times daily as needed for diarrhea, Disp: , Rfl:      metoprolol succinate ER (TOPROL-XL) 25 MG 24 hr tablet, Take 1 tablet (25 mg) by mouth daily, Disp: 90 tablet, Rfl: 3     omeprazole (PRILOSEC) 20 MG DR capsule, Take 1 capsule (20 mg) by mouth daily 30-60 minutes before a meal., Disp: 90 capsule, Rfl: 1  No current facility-administered medications for this visit.     Allergies:  Dogs and Dust mites     Past Medical History:  Allergic rhinitis   Cardiovascular screening  Contraceptive management  Diabetes mellitus  Hypertension  Recurrent herpes labialis  Rosacea    Past Surgical History:  The patient does not have any pertinent past surgical history.    Social History:  She denies tobacco use and admits to alcohol use.     Family History:  Positive: Hypertension (father, brother, sister), Diabetes (brother, sister), Kidney Disease (sister)    Physical Examination:  Resp. rate 16, height 1.575 m (5' 2\"), weight 86.6 kg (191 lb), not currently breastfeeding.    General  - normal appearance, in no obvious distress  CV  - normal popliteal pulse  Pulm  - normal respiratory pattern, non-labored  Musculoskeletal - left knee  - stance: Antalgic gait, limp with immobilizer  - inspection: no swelling or effusion, normal bone and joint alignment, no obvious deformity, no erythema  - palpation: tenderness to palpation of the popliteal fossa at the medial aspect. Palpable joint " effusion of left knee. Medial joint line tenderness. No lateral joint line tenderness, warmth of the left knee.   - ROM: 85 degrees of flexion, 0 degrees of extension.   - strength: 5/5 in flexion, 5/5 in extension  - special tests:  (+) Jose Carlos  Negative valgus/varus stress testing  Negative anterior/posterior drawer testing  Unable to   Neuro  - no sensory or motor deficit, grossly normal coordination, normal muscle tone  Skin  - no ecchymosis, erythema, warmth, or induration, no obvious rash  Psych  - interactive, appropriate, normal mood and affect    Imaging:   Radiographs of the left knee - 1/2 views (12/17/2019)  1. No acute osseous abnormality.  2. Mild medial compartment joint space loss.    I have independently reviewed the above imaging studies; the results were discussed with the patient.     Assessment:   Patient is a 64-year old female presenting with acute onset of left knee pain after a misstep and twist of left knee while walking downstairs yesterday. X-rays and ultrasounds negative in ED yesterday. MRI warranted for suspected meniscal tear with possible mechanical block of the joint.     Plan:     MRI is warranted to evaluate for meniscus injury.    Ice and ibuprofen.    Continue immobilizer.    Follow-up for MRI results via MyChart or by phone.     IRamiro DO, have reviewed the above note and agree with the scribe's notation as written.    Scribe Disclosure:  Jose JOHNSON, am serving as a scribe to document services personally performed by Ramiro Arellano DO at this visit, based upon the provider's statements to me. All documentation has been reviewed by the aforementioned provider prior to being entered into the official medical record.

## 2019-12-17 NOTE — LETTER
2019       RE: Adilia Camacho  [address]  [facility] [address]     Dear Colleague,    Thank you for referring your patient, Adilia Camacho, to the [facility] SPORTS AND ORTHOPAEDIC WALK IN CLINIC at [facility]. Please see a copy of my visit note below.    [facility]  Orthopedics  Angel. Adan, DO  2019     Name: Adilia Camacho  MRN: [id number]  Age: 64 year old  : 1955  Referring provider: Referred Self     Chief Complaint: Pain of the Left Knee    Date of Injury: 19    History of Present Illness:   Adilia Camacho is a 64 year old female who presents today for evaluation of left knee pain sustained on 19, after her knee twisted and gave out while walking down the stairs. She was last evaluated in the ED by Dr. Garcia on 19, at which time clinical examination and radiographs revealed concern for torn meniscus. She also received an ultrasound, which was negative for DVT and Baker's cyst. She currently endorses aching, dull, and sharp pain in her left knee.She rates her pain a 6 out of 10 on a scale. Pain is exacerbated with walking, bending, and sitting. She notes that her brace doesn't fit her knee well.     Review of Systems:   A 10-point review of systems was obtained and is negative except for as noted in the HPI.     Medications:      acyclovir (ZOVIRAX) 400 MG tablet, TAKE 1 TABLET BY MOUTH THREE TIMES DAILY, Disp: 30 tablet, Rfl: 1     atorvastatin (LIPITOR) 40 MG tablet, Take 1 tablet (40 mg) by mouth daily, Disp: 90 tablet, Rfl: 3     cetirizine (ZYRTEC) 10 MG tablet, Take 10 mg by mouth daily, Disp: , Rfl:      COMPRESSION STOCKINGS, 1 each daily Lower Extremity:   Knee High;  bilateral;  20-30 mm Hg.  Measure and fit.  Style and color per patient preference.  Michael Haddad per patient need., Disp: 6 each, Rfl: 11     fluticasone (FLONASE) 50 MCG/ACT nasal spray, SHAKE LIQUID AND USE 2 SPRAYS IN EACH NOSTRIL TWICE DAILY,  "Disp: 48 mL, Rfl: 11     hydrochlorothiazide (HYDRODIURIL) 12.5 MG tablet, Take 2 tablets (25 mg) by mouth daily, Disp: 90 tablet, Rfl: 0     hydrochlorothiazide (HYDRODIURIL) 12.5 MG tablet, Take 1 tablet (12.5 mg) by mouth daily ++ Due for office visit in clinic ++, Disp: 15 tablet, Rfl: 0     HYDROcodone-acetaminophen (NORCO) 5-325 MG tablet, Take 1 tablet by mouth every 6 hours as needed for severe pain, Disp: 10 tablet, Rfl: 0     Ibuprofen (ADVIL PO), Take 200 mg by mouth as needed for moderate pain, Disp: , Rfl:      ibuprofen (ADVIL/MOTRIN) 600 MG tablet, Take 1 tablet (600 mg) by mouth every 8 hours as needed for moderate pain, Disp: 20 tablet, Rfl: 0     ipratropium (ATROVENT HFA) 17 MCG/ACT inhaler, Inhale 2 puffs into the lungs 4 times daily, Disp: 12.9 g, Rfl: 3     loperamide (IMODIUM) 2 MG capsule, Take 2 mg by mouth 4 times daily as needed for diarrhea, Disp: , Rfl:      metoprolol succinate ER (TOPROL-XL) 25 MG 24 hr tablet, Take 1 tablet (25 mg) by mouth daily, Disp: 90 tablet, Rfl: 3     omeprazole (PRILOSEC) 20 MG DR capsule, Take 1 capsule (20 mg) by mouth daily 30-60 minutes before a meal., Disp: 90 capsule, Rfl: 1  No current facility-administered medications for this visit.     Allergies:  Dogs and Dust mites     Past Medical History:  Allergic rhinitis   Cardiovascular screening  Contraceptive management  Diabetes mellitus  Hypertension  Recurrent herpes labialis  Rosacea    Past Surgical History:  The patient does not have any pertinent past surgical history.    Social History:  She denies tobacco use and admits to alcohol use.     Family History:  Positive: Hypertension (father, brother, sister), Diabetes (brother, sister), Kidney Disease (sister)    Physical Examination:  Resp. rate 16, height 1.575 m (5' 2\"), weight 86.6 kg (191 lb), not currently breastfeeding.    General  - normal appearance, in no obvious distress  CV  - normal popliteal pulse  Pulm  - normal respiratory pattern, " non-labored  Musculoskeletal - left knee  - stance: Antalgic gait, limp with immobilizer  - inspection: no swelling or effusion, normal bone and joint alignment, no obvious deformity, no erythema  - palpation: tenderness to palpation of the popliteal fossa at the medial aspect. Palpable joint effusion of left knee. Medial joint line tenderness. No lateral joint line tenderness, warmth of the left knee.   - ROM: 85 degrees of flexion, 0 degrees of extension.   - strength: 5/5 in flexion, 5/5 in extension  - special tests:  (+) Jose Carlos  Negative valgus/varus stress testing  Negative anterior/posterior drawer testing  Unable to   Neuro  - no sensory or motor deficit, grossly normal coordination, normal muscle tone  Skin  - no ecchymosis, erythema, warmth, or induration, no obvious rash  Psych  - interactive, appropriate, normal mood and affect    Imaging:   Radiographs of the left knee - 1/2 views (12/17/2019)  1. No acute osseous abnormality.  2. Mild medial compartment joint space loss.    I have independently reviewed the above imaging studies; the results were discussed with the patient.     Assessment:   Patient is a 64-year old female presenting with acute onset of left knee pain after a misstep and twist of left knee while walking downstairs yesterday. X-rays and ultrasounds negative in ED yesterday. MRI warranted for suspected meniscal tear with possible mechanical block of the joint.     Plan:     MRI is warranted to evaluate for meniscus injury.    Ice and ibuprofen.    Continue immobilizer.    Follow-up for MRI results via MyChart or by phone.     Ramiro JOHNSON DO, have reviewed the above note and agree with the scribe's notation as written.    Scribe Disclosure:  Jose JOHNSON, am serving as a scribe to document services personally performed by Ramiro Arellano DO at this visit, based upon the provider's statements to me. All documentation has been reviewed by the aforementioned provider prior to being  entered into the official medical record.              SPORTS & ORTHOPEDIC WALK-IN VISIT 12/17/2019    Primary Care Physician: Dr. Graf       Yesterday was going down the stairs and knee gave out and she sat there for 10 minutes. Felt the knee completely move. Pain is when knee is bent, pain is anterior knee and posterior. No warmth or redness.     Seen yesterday at ED, they did an US and was negative for DVT and bakers cyst, also had an XR done which was negative.       Reason for visit:     What part of your body is injured / painful?  left knee    What caused the injury /pain? No inciting event     How long ago did your injury occur or pain begin? yesterday    What are your most bothersome symptoms? Pain and Swelling    How would you characterize your symptom?  aching, dull and sharp    What makes your symptoms better? Nothing    What makes your symptoms worse? Walking, bending, sitting     Have you been previously seen for this problem? Yes, ER    Medical History:    Any recent changes to your medical history? No    Any new medication prescribed since last visit? No    Have you had surgery on this body part before? No    Social History:    Occupation: Banker     Handedness: Right    Exercise:     Review of Systems:    Do you have fever, chills, weight loss? No    Do you have any vision problems? No    Do you have any chest pain or edema? No    Do you have any shortness of breath or wheezing?  No    Do you have stomach problems? No    Do you have any numbness or focal weakness? No    Do you have diabetes? Yes, pre diabetic     Do you have problems with bleeding or clotting? No    Do you have an rashes or other skin lesions? No

## 2019-12-17 NOTE — PATIENT INSTRUCTIONS
Restart the hydrochlorothiazide 12.5mg daily for your blood pressure.   Follow up with me in 2-3 weeks.   Remember to schedule with the diabetes educator when you have time.

## 2019-12-17 NOTE — PROGRESS NOTES
"  SUBJECTIVE:   Adilia Camacho is a 64 year old female who presents to clinic today for the following health issues:    Hypertension Follow-up      Do you check your blood pressure regularly outside of the clinic? Yes     Are you following a low salt diet? No but she also does not add a lot of salt in her food     Are your blood pressures ever more than 140 on the top number (systolic) OR more   than 90 on the bottom number (diastolic), for example 140/90? Yes      How many servings of fruits and vegetables do you eat daily?  0-1    On average, how many sweetened beverages do you drink each day (Examples: soda, juice, sweet tea, etc.  Do NOT count diet or artificially sweetened beverages)?   0    How many days per week do you miss taking your medication? 0    Left knee pain --Seen in the ED yesterday with increased knee pain after she stepped wrong. US was negative for DVT and Baker's cyst. X-ray was negative. Meniscal tear was suspected. OTC pain relievers were recommended and she was placed in a knee immobilizer, given crutches and instructions to follow up with orthopedics, whom she has seen in the past for left knee pain.    She plans on going to the Ortho walk-in clinic today due to the ongoing pain.    She has been checking her blood pressure outside of clinic.  When she was on the hydrochlorothiazide, she thinks her blood pressure was under good control.  She ran out of hydrochlorothiazide about 2 weeks ago.    From clinic visit on 10/31/19:   \"  Hypertension Follow-up      Do you check your blood pressure regularly outside of the clinic? No     Are you following a low salt diet? No    Are your blood pressures ever more than 140 on the top number (systolic) OR more   than 90 on the bottom number (diastolic), for example 140/90? No      How many servings of fruits and vegetables do you eat daily?  0-1    On average, how many sweetened beverages do you drink each day (soda, juice, sweet tea, etc)?   Coffee a " "day    How many days per week do you miss taking your medication? 0         The 10-year ASCVD risk score (Rebel ALFONSO Jr., et al., 2013) is: 12.7%    Values used to calculate the score:      Age: 64 years      Sex: Female      Is Non- : No      Diabetic: Yes      Tobacco smoker: No      Systolic Blood Pressure: 142 mmHg      Is BP treated: Yes      HDL Cholesterol: 54 mg/dL      Total Cholesterol: 202 mg/dL \"    Problem list and histories reviewed & adjusted, as indicated.  Additional history: as documented    Patient Active Problem List   Diagnosis     Hypertension goal BP (blood pressure) < 140/90     CARDIOVASCULAR SCREENING; LDL GOAL LESS THAN 130     Allergic rhinitis due to dogs     Contraceptive management     Recurrent herpes labialis     Rosacea     Female stress incontinence     GERD (gastroesophageal reflux disease)     Advanced directives, counseling/discussion     Venous (peripheral) insufficiency     Obesity (BMI 35.0-39.9) with comorbidity (H)     Diabetes mellitus, type 2 (H)     Past Surgical History:   Procedure Laterality Date     NO HISTORY OF SURGERY         Social History     Tobacco Use     Smoking status: Former Smoker     Packs/day: 0.00     Years: 10.00     Pack years: 0.00     Types: Cigarettes     Smokeless tobacco: Never Used   Substance Use Topics     Alcohol use: Yes     Alcohol/week: 1.7 standard drinks     Comment: occassionally     Family History   Problem Relation Age of Onset     Hypertension Father      Hypertension Brother      Hypertension Sister      Diabetes Sister         and brother     Kidney Disease Sister      Diabetes Brother      Diabetes Sister      Hypertension Sister      Diabetes Brother      Hypertension Brother      Cancer - colorectal No family hx of      Breast Cancer No family hx of      Thyroid Disease No family hx of          Current Outpatient Medications   Medication Sig Dispense Refill     acyclovir (ZOVIRAX) 400 MG tablet TAKE 1 " TABLET BY MOUTH THREE TIMES DAILY 30 tablet 1     atorvastatin (LIPITOR) 40 MG tablet Take 1 tablet (40 mg) by mouth daily 90 tablet 3     cetirizine (ZYRTEC) 10 MG tablet Take 10 mg by mouth daily       COMPRESSION STOCKINGS 1 each daily Lower Extremity:   Knee High;  bilateral;  20-30 mm Hg.  Measure and fit.  Style and color per patient preference.  Michael Haddad per patient need. 6 each 11     fluticasone (FLONASE) 50 MCG/ACT nasal spray SHAKE LIQUID AND USE 2 SPRAYS IN EACH NOSTRIL TWICE DAILY 48 mL 11     hydrochlorothiazide (HYDRODIURIL) 12.5 MG tablet Take 2 tablets (25 mg) by mouth daily 90 tablet 0     hydrochlorothiazide (HYDRODIURIL) 12.5 MG tablet Take 1 tablet (12.5 mg) by mouth daily ++ Due for office visit in clinic ++ 15 tablet 0     HYDROcodone-acetaminophen (NORCO) 5-325 MG tablet Take 1 tablet by mouth every 6 hours as needed for severe pain 10 tablet 0     Ibuprofen (ADVIL PO) Take 200 mg by mouth as needed for moderate pain       ibuprofen (ADVIL/MOTRIN) 600 MG tablet Take 1 tablet (600 mg) by mouth every 8 hours as needed for moderate pain 20 tablet 0     ipratropium (ATROVENT HFA) 17 MCG/ACT inhaler Inhale 2 puffs into the lungs 4 times daily 12.9 g 3     loperamide (IMODIUM) 2 MG capsule Take 2 mg by mouth 4 times daily as needed for diarrhea       metoprolol succinate ER (TOPROL-XL) 25 MG 24 hr tablet Take 1 tablet (25 mg) by mouth daily 90 tablet 3     omeprazole (PRILOSEC) 20 MG DR capsule Take 1 capsule (20 mg) by mouth daily 30-60 minutes before a meal. 90 capsule 1     Allergies   Allergen Reactions     Dogs      Dust Mites      Recent Labs   Lab Test 10/31/19  0814 10/17/19  0939 04/12/19  1633  10/23/17  1604 03/24/17  0739 03/22/16  0924  02/15/13  1135 10/28/11  0955   A1C  --  6.5* 6.4*  --   --  6.3*  --   --   --   --    LDL  --  122*  --   --   --  91 107*  --  95 96   HDL  --  54  --   --   --  42* 50  --  43* 46*   TRIG  --  128  --   --   --  115 182*  --  119 136   ALT  --   " --   --   --   --   --   --   --  22  --    CR 0.83  --  0.86   < >  --  0.88 0.84   < > 0.76  --    GFRESTIMATED 75  --  73   < >  --  66 69   < > 79  --    GFRESTBLACK 87  --  84   < >  --  80 84   < > >90  --    POTASSIUM 3.8  --  3.8   < >  --  3.9 4.2   < > 4.2  --    TSH  --   --   --   --  1.92  --   --   --   --  1.71    < > = values in this interval not displayed.      BP Readings from Last 3 Encounters:   12/17/19 (!) 142/86   12/16/19 (!) 177/94   10/31/19 (!) 140/82    Wt Readings from Last 3 Encounters:   12/16/19 86.6 kg (191 lb)   10/31/19 86.4 kg (190 lb 8 oz)   10/17/19 86.8 kg (191 lb 4 oz)              Reviewed and updated as needed this visit by clinical staff  Tobacco  Allergies  Meds       Reviewed and updated as needed this visit by Provider         ROS:  No complaint of chest pain or shortness of breath     OBJECTIVE:     BP (!) 142/86 (BP Location: Left arm, Patient Position: Sitting, Cuff Size: Adult Regular)   Pulse 79   Temp 97.9  F (36.6  C) (Oral)   Ht 1.549 m (5' 1\")   SpO2 99%   BMI 36.09 kg/m    Body mass index is 36.09 kg/m .  GENERAL:  alert and no distress  MS: Has brace on her left knee.    Diagnostic Test Results:  No results found for any visits on 12/17/19.    The 10-year ASCVD risk score (Rebel KADEN Jr., et al., 2013) is: 12.7%    Values used to calculate the score:      Age: 64 years      Sex: Female      Is Non- : No      Diabetic: Yes      Tobacco smoker: No      Systolic Blood Pressure: 142 mmHg      Is BP treated: Yes      HDL Cholesterol: 54 mg/dL      Total Cholesterol: 202 mg/dL          ASSESSMENT/PLAN:          1. Hypertension goal BP (blood pressure) < 140/90  Mildly elevated.  Hydrochlorothiazide 12.5 mg was started at the time of her last visit, but she ran out of the medication a couple of weeks ago.  She reports that while she was on hydrochlorothiazide, her blood pressure was in normal range.  I sent a refill today with " instructions for her to follow-up with me in about 2 weeks so we can recheck her blood pressure and her BMP.     She continues metoprolol 25 mg daily.    2. Type 2 diabetes mellitus without complication, without long-term current use of insulin (H)  Recent diagnosis. Has not scheduled with DM ed or MTM yet, due to lack of time. Previously discussed the diagnosis of type II DM and pathophysiology. Gave her pamphlets on diabetes and dietary recommendations at her visit in October. She will schedule with DM ed. She prefers to work on dietary changes before starting medication, which is appropriate at this point.     3. Knee pain, left    Seen in the ED yesterday with increased knee pain after she stepped wrong. US was negative for DVT and Baker's cyst. X-ray was negative. Meniscal tear was suspected. OTC pain relievers were recommended and she was placed in a knee immobilizer, given crutches and instructions to follow up with orthopedics, whom she has seen in the past for left knee pain.   She plans on going to the Ortho walk-in clinic today.    4. Gastroesophageal reflux disease, esophagitis presence not specified  Controlled on omeprazole daily.  Cough is improved since she started treatment.  She has been able to change some lifestyle triggers as well.     5. Urgency incontinence  6. Urinary frequency--elevated BG could be contributing  I gave her some information on urgency incontinence In October.  Discussed the possibility of her seeing PT for pelvic floor therapy.       7. Hyperlipidemia  She continues on atorvastatin 40mg daily    8. Morbid obesity (H)  Encouraged working with a DM ed/nutritionist to make some dietary changes and work on weight loss.        HM  Mammogram was completed on 9/17/2019  Pap with cotesting was normal in 2016.  She is due for repeat Pap with cotesting in 2021  She has never completed colon cancer screening.  She prefers to do a colonoscopy.  Referral was provided   Shingrix vaccine  was recommended previously.  Risks and benefits discussed.  She already had her influenza vaccine this year.     Patient Instructions   Restart the hydrochlorothiazide 12.5mg daily for your blood pressure.   Follow up with me in 2-3 weeks.   Remember to schedule with the diabetes educator when you have time.       Sheree Graf M.D.        [facility] [place]

## 2019-12-17 NOTE — PROGRESS NOTES
SPORTS & ORTHOPEDIC WALK-IN VISIT 12/17/2019    Primary Care Physician: Dr. Graf       Yesterday was going down the stairs and knee gave out and she sat there for 10 minutes. Felt the knee completely move. Pain is when knee is bent, pain is anterior knee and posterior. No warmth or redness.     Seen yesterday at ED, they did an US and was negative for DVT and bakers cyst, also had an XR done which was negative.       Reason for visit:     What part of your body is injured / painful?  left knee    What caused the injury /pain? No inciting event     How long ago did your injury occur or pain begin? yesterday    What are your most bothersome symptoms? Pain and Swelling    How would you characterize your symptom?  aching, dull and sharp    What makes your symptoms better? Nothing    What makes your symptoms worse? Walking, bending, sitting     Have you been previously seen for this problem? Yes, ER    Medical History:    Any recent changes to your medical history? No    Any new medication prescribed since last visit? No    Have you had surgery on this body part before? No    Social History:    Occupation: Banker     Handedness: Right    Exercise:     Review of Systems:    Do you have fever, chills, weight loss? No    Do you have any vision problems? No    Do you have any chest pain or edema? No    Do you have any shortness of breath or wheezing?  No    Do you have stomach problems? No    Do you have any numbness or focal weakness? No    Do you have diabetes? Yes, pre diabetic     Do you have problems with bleeding or clotting? No    Do you have an rashes or other skin lesions? No

## 2019-12-18 ENCOUNTER — TELEPHONE (OUTPATIENT)
Dept: FAMILY MEDICINE | Facility: CLINIC | Age: 64
End: 2019-12-18

## 2019-12-18 NOTE — TELEPHONE ENCOUNTER
Diabetes Education Scheduling Outreach #1:    Call to patient to schedule. Patient declined to schedule. She just had knee surgery and needs to see how that goes. She can't drive too far but will see what's going on with her knee. She will most likely call the [facility] to see an educator or call us back.    Keily Laws OnCall  Diabetes and Nutrition Scheduling

## 2019-12-20 NOTE — TELEPHONE ENCOUNTER
RECORDS RECEIVED FROM: Left knee internal derangement- kim referral   DATE RECEIVED: Jan 6, 2020     NOTES STATUS DETAILS   OFFICE NOTE from referring provider Internal  Ramiro Arellano DO   OFFICE NOTE from other specialist N/A    DISCHARGE SUMMARY from hospital N/A    DISCHARGE REPORT from the ER N/A    OPERATIVE REPORT N/A    MEDICATION LIST Internal    IMPLANT RECORD/STICKER N/A    LABS     CBC/DIFF N/A    CULTURES N/A    INJECTIONS DONE IN RADIOLOGY N/A    MRI Internal 12/17/19   CT SCAN     XRAYS (IMAGES & REPORTS) Internal  12/16/19      TUMOR     PATHOLOGY  Slides & report N/A      12/20/19   4:17 PM   Pre-visit complete  Aleah Glamm, CMA

## 2019-12-26 ENCOUNTER — DOCUMENTATION ONLY (OUTPATIENT)
Dept: CARE COORDINATION | Facility: CLINIC | Age: 64
End: 2019-12-26

## 2020-01-03 ASSESSMENT — ENCOUNTER SYMPTOMS
ARTHRALGIAS: 1
JOINT SWELLING: 1

## 2020-01-06 ENCOUNTER — PRE VISIT (OUTPATIENT)
Dept: ORTHOPEDICS | Facility: CLINIC | Age: 65
End: 2020-01-06

## 2020-01-06 ENCOUNTER — OFFICE VISIT (OUTPATIENT)
Dept: ORTHOPEDICS | Facility: CLINIC | Age: 65
End: 2020-01-06
Payer: COMMERCIAL

## 2020-01-06 ENCOUNTER — PREP FOR PROCEDURE (OUTPATIENT)
Dept: ORTHOPEDICS | Facility: CLINIC | Age: 65
End: 2020-01-06

## 2020-01-06 VITALS — WEIGHT: 197.53 LBS | BODY MASS INDEX: 36.35 KG/M2 | HEIGHT: 62 IN

## 2020-01-06 DIAGNOSIS — G89.29 CHRONIC PAIN OF LEFT KNEE: Primary | ICD-10-CM

## 2020-01-06 DIAGNOSIS — M25.562 CHRONIC PAIN OF LEFT KNEE: Primary | ICD-10-CM

## 2020-01-06 ASSESSMENT — MIFFLIN-ST. JEOR: SCORE: 1409.38

## 2020-01-06 NOTE — NURSING NOTE
"Reason For Visit:   Chief Complaint   Patient presents with     Consult     Left knee internal derangement.  Ref. By Dr. Arellano        1.575 m (5' 2.01\")   Wt 89.6 kg (197 lb 8.5 oz)   BMI 36.12 kg/m      Pain Assessment  Patient Currently in Pain: Yes  0-10 Pain Scale: 3  Primary Pain Location: Knee(Left)  Pain Descriptors: Dull, Shooting  Alleviating Factors: NSAIDS  Aggravating Factors: Other (comment), Lying(Worse at Night)    Vale Saucedo ATC    "

## 2020-01-06 NOTE — LETTER
1/6/2020       RE: Adilia Camacho  [address]  [facility] [address]     Dear Colleague,    Thank you for referring your patient, Adilia Camacho, to the [facility] at [facility]. Please see a copy of my visit note below.    CHIEF CONCERN: Left knee pain    HISTORY:   64-year-old female who presents clinic today for evaluation of left knee pain.  On 12/16/2019 she was walking on stairs and twisted her left knee.  She went to the ED where radiographs were read as normal.  Following day she had an MRI which showed a medial meniscus tear.  She notes pain with up down stairs.  Pain is localized mostly to the posterior medial aspect the knee.  Since this injury 3 weeks ago her pain is gone slightly better.  She is taking ibuprofen pretty regularly.  She lives alone and is concerned about hurting the knee any further.  She is concerned about her stability when walking on ice.  She denies any locking or catching symptoms.  For fun she likes to work in the garden and go up to her cabin.    Is a nondiabetic non-smoker.  No prior treatment for the knee.  She does note some symptoms in the past prior to this injury 3 weeks ago.    PAST MEDICAL HISTORY: (Reviewed with the patient and in the [facility] medical record)    PAST SURGICAL HISTORY: (Reviewed with the patient and in the [facility] medical record)    MEDICATIONS: (Reviewed with the patient and in the [facility] medical record)    Notable medications include: nothing specific    ALLERGIES: (Reviewed with the patient and in the [facility] medical record)  1. none      SOCIAL HISTORY: (Reviewed with the patient and in the medical record)  --Tobacco: Denies  --Occupation: Tired  --Avocation/Sport: Gardening    FAMILY HISTORY: (Reviewed with the patient and in the medical record)  -- No family history of bleeding, clotting, or difficulty with anesthesia    REVIEW OF SYSTEMS: (Reviewed with the patient and on the health intake form)  --  A comprehensive 10 point review of systems was conducted and is negative except as noted in the HPI    EXAM:     General: Awake, Alert and Oriented, No acute Distress. Articulate and Interactive    Body mass index is 36.12 kg/m .    Left Lower extremity :    Skin is Warm and Well perfused, no suggestion of infection    No effusion noted    Range of motion is 0-120 degrees, stable varus valgus stress.    Tender palpation over the medial aspect of the knee and posterior aspect of the knee    EHL/FHL/TA/GS 5/5    Sensation intact L3-S1    2+ Dorsalis Pedis Pulse    IMAGING:    Plain Radiographs: Unremarkable    MRI: Tear of the posterior horn of the medial meniscus    ASSESSMENT:  1. 64-year-old female with a medial meniscus tear, posterior root    PLAN:  1. We discussed with the patient that this be treated operatively or nonoperatively.  Nonoperative interventions will include doing nothing, physical therapy, corticosteroid injection and close follow-up.  Operative intention would be a repair of the posterior root of the medial meniscus.  We discussed the postoperative course for operative intervention.  We discussed the goal, with either path, would be to limit her progression of osteoarthritis.  Patient states that she will think about the treatment options and come back with plan.    Dony Hernandez MD  Orthopaedic Resident    The patient was discussed and seen with Dr. Robles, who agrees with the assessment and plan noted above.    Answers for HPI/ROS submitted by the patient on 1/3/2020   General Symptoms: No  Skin Symptoms: No  HENT Symptoms: No  EYE SYMPTOMS: No  HEART SYMPTOMS: No  LUNG SYMPTOMS: No  INTESTINAL SYMPTOMS: No  URINARY SYMPTOMS: No  GYNECOLOGIC SYMPTOMS: No  BREAST SYMPTOMS: No  SKELETAL SYMPTOMS: Yes  BLOOD SYMPTOMS: No  NERVOUS SYSTEM SYMPTOMS: No  MENTAL HEALTH SYMPTOMS: No  Swollen joints: Yes  Joint pain: Yes      Patient seen and examined with the resident.     Assesment: Patellar  femoral chondrosis    Medial meniscus posterior horn root tear    Plan: I had a long discussion today with the patient regarding her left knee.  I reviewed with her diagnosis the potential treatment options I discussed with her thoughts regarding meniscus root tears as well as the potential treatment options including a transosseous repair of her meniscus root.  I told her very clearly that this does not guarantee that it will decrease her long-term risk of arthritis however there is significant risk with nonsurgical management about progression of disease.  She understands that even with surgical treatment she still may get progression of disease and it certainly will not revert degenerative changes that have already occurred.    I discussed with the patient the risks, benefits, complications and techniques of surgery as well as the natural history of meniscus root tears and the alternative treatment options.    The risks include, but are not limited to the risk of death and risk of a myocardial infarction, risk of bleeding and a risk of infection, risk of nerve damage and a risk of muscle damage, stiffness, instability, continued pain or worsening pain and subsequent arthritis, need for knee replacement surgery, continued pain, failure to improve.    The patient was provided an opportunity to ask questions and these were answered.     I agree with history, physical and imaging as well as the assessment and plan as detailed by Dr. Hernandez.       Again, thank you for allowing me to participate in the care of your patient.      Sincerely,    Nacho Robles MD

## 2020-01-06 NOTE — NURSING NOTE
Teaching Flowsheet   Relevant Diagnosis: Left knee meniscus tear  Teaching Topic: Left knee arthroscopy, meniscus root repair.     Patient lives in [facility] alone. She does have a son and a friend who would provide support. Patient's health history is positive for HTN on hydrochlorothiazide, diabetes diet and exercise controlled. Patient has never had anesthesia. She is unsure yet of proceeding and will call when she decides; contact information provided.      Person(s) involved in teaching:   Patient     Motivation Level:  Asks Questions: Yes  Eager to Learn: Yes  Cooperative: Yes  Receptive (willing/able to accept information): Yes  Any cultural factors/Temple beliefs that may influence understanding or compliance? No     Patient demonstrates understanding of the following:  Reason for the appointment, diagnosis and treatment plan: Yes  Knowledge of proper use of medications and conditions for which they are ordered (with special attention to potential side effects or drug interactions): Yes  Which situations necessitate calling provider and whom to contact: Yes     Teaching Concerns Addressed: Patient understands she will need a preop exam within 30 days before surgery. She knows to begin physical therapy 3 to 5 days postop.     Proper use and care of crutches, brace (medical equip, care aids, etc.): Yes  Nutritional needs and diet plan: Yes  Pain management techniques: Yes  Wound Care: Yes  How and/when to access community resources: Yes     Instructional Materials Used/Given: Preoperative teaching packet, 2 bottles surgical soap.

## 2020-01-06 NOTE — PROGRESS NOTES
Patient seen and examined with the resident.     Assesment: Patellar femoral chondrosis    Medial meniscus posterior horn root tear    Plan: I had a long discussion today with the patient regarding her left knee.  I reviewed with her diagnosis the potential treatment options I discussed with her thoughts regarding meniscus root tears as well as the potential treatment options including a transosseous repair of her meniscus root.  I told her very clearly that this does not guarantee that it will decrease her long-term risk of arthritis however there is significant risk with nonsurgical management about progression of disease.  She understands that even with surgical treatment she still may get progression of disease and it certainly will not revert degenerative changes that have already occurred.    I discussed with the patient the risks, benefits, complications and techniques of surgery as well as the natural history of meniscus root tears and the alternative treatment options.    The risks include, but are not limited to the risk of death and risk of a myocardial infarction, risk of bleeding and a risk of infection, risk of nerve damage and a risk of muscle damage, stiffness, instability, continued pain or worsening pain and subsequent arthritis, need for knee replacement surgery, continued pain, failure to improve.    The patient was provided an opportunity to ask questions and these were answered.     I agree with history, physical and imaging as well as the assessment and plan as detailed by Dr. Hernandez.

## 2020-01-06 NOTE — PROGRESS NOTES
CHIEF CONCERN: Left knee pain    HISTORY:   64-year-old female who presents clinic today for evaluation of left knee pain.  On 12/16/2019 she was walking on stairs and twisted her left knee.  She went to the ED where radiographs were read as normal.  Following day she had an MRI which showed a medial meniscus tear.  She notes pain with up down stairs.  Pain is localized mostly to the posterior medial aspect the knee.  Since this injury 3 weeks ago her pain is gone slightly better.  She is taking ibuprofen pretty regularly.  She lives alone and is concerned about hurting the knee any further.  She is concerned about her stability when walking on ice.  She denies any locking or catching symptoms.  For fun she likes to work in the garden and go up to her cabin.    Is a nondiabetic non-smoker.  No prior treatment for the knee.  She does note some symptoms in the past prior to this injury 3 weeks ago.    PAST MEDICAL HISTORY: (Reviewed with the patient and in the [facility] medical record)    PAST SURGICAL HISTORY: (Reviewed with the patient and in the [facility] medical record)    MEDICATIONS: (Reviewed with the patient and in the [facility] medical record)    Notable medications include: nothing specific    ALLERGIES: (Reviewed with the patient and in the [facility] medical record)  1. none      SOCIAL HISTORY: (Reviewed with the patient and in the medical record)  --Tobacco: Denies  --Occupation: Tired  --Avocation/Sport: Gardening    FAMILY HISTORY: (Reviewed with the patient and in the medical record)  -- No family history of bleeding, clotting, or difficulty with anesthesia    REVIEW OF SYSTEMS: (Reviewed with the patient and on the health intake form)  -- A comprehensive 10 point review of systems was conducted and is negative except as noted in the HPI    EXAM:     General: Awake, Alert and Oriented, No acute Distress. Articulate and Interactive    Body mass index is 36.12 kg/m .    Left Lower extremity :    Skin is Warm and Well  perfused, no suggestion of infection    No effusion noted    Range of motion is 0-120 degrees, stable varus valgus stress.    Tender palpation over the medial aspect of the knee and posterior aspect of the knee    EHL/FHL/TA/GS 5/5    Sensation intact L3-S1    2+ Dorsalis Pedis Pulse    IMAGING:    Plain Radiographs: Unremarkable    MRI: Tear of the posterior horn of the medial meniscus    ASSESSMENT:  1. 64-year-old female with a medial meniscus tear, posterior root    PLAN:  1. We discussed with the patient that this be treated operatively or nonoperatively.  Nonoperative interventions will include doing nothing, physical therapy, corticosteroid injection and close follow-up.  Operative intention would be a repair of the posterior root of the medial meniscus.  We discussed the postoperative course for operative intervention.  We discussed the goal, with either path, would be to limit her progression of osteoarthritis.  Patient states that she will think about the treatment options and come back with plan.    Dony Hernandez MD  Orthopaedic Resident    The patient was discussed and seen with Dr. Robles, who agrees with the assessment and plan noted above.    Answers for HPI/ROS submitted by the patient on 1/3/2020   General Symptoms: No  Skin Symptoms: No  HENT Symptoms: No  EYE SYMPTOMS: No  HEART SYMPTOMS: No  LUNG SYMPTOMS: No  INTESTINAL SYMPTOMS: No  URINARY SYMPTOMS: No  GYNECOLOGIC SYMPTOMS: No  BREAST SYMPTOMS: No  SKELETAL SYMPTOMS: Yes  BLOOD SYMPTOMS: No  NERVOUS SYSTEM SYMPTOMS: No  MENTAL HEALTH SYMPTOMS: No  Swollen joints: Yes  Joint pain: Yes

## 2020-01-13 ENCOUNTER — TELEPHONE (OUTPATIENT)
Dept: ORTHOPEDICS | Facility: CLINIC | Age: 65
End: 2020-01-13

## 2020-01-13 NOTE — TELEPHONE ENCOUNTER
[facility] Call Center    Phone Message    May a detailed message be left on voicemail: yes    Reason for Call: Other: Pt got a call about scheduling physical therapy, and she was wondering if Dr. Robles was wanting her to do PT before surgery, or if she should be scheduling this for sometime after the surgery. Please give her a call back.     Action Taken: Message routed to:  [facility] ([facility]): Orthopedics

## 2020-01-14 ENCOUNTER — TELEPHONE (OUTPATIENT)
Dept: ORTHOPEDICS | Facility: CLINIC | Age: 65
End: 2020-01-14

## 2020-01-14 PROBLEM — G89.29 CHRONIC PAIN OF LEFT KNEE: Status: ACTIVE | Noted: 2020-01-14

## 2020-01-14 PROBLEM — M25.562 CHRONIC PAIN OF LEFT KNEE: Status: ACTIVE | Noted: 2020-01-14

## 2020-01-14 NOTE — TELEPHONE ENCOUNTER
Patient is scheduled for surgery with Dr. Robles    Spoke or left message with: Patient    Date of Surgery: 2/7/20    Location: [facility]    Post op: 1 week,scheduled    Pre-op with surgeon (if applicable): Complete    H&P: Patient will schedule with PCP    Additional imaging/appointments: N/A    Surgery packet: Received in clinic     Additional comments: Patient had questions about what she can expect physically after surgery. Requested a call from the RN to discuss.

## 2020-01-15 ENCOUNTER — TELEPHONE (OUTPATIENT)
Dept: ORTHOPEDICS | Facility: CLINIC | Age: 65
End: 2020-01-15

## 2020-01-15 NOTE — TELEPHONE ENCOUNTER
[facility] Call Center    Phone Message    May a detailed message be left on voicemail: yes    Reason for Call: Other: Pt is requesting a call back from Parvin please. She states she has some questions regarding her surgery before speaking to her HR department about her leave. Please advise.      Action Taken: Message routed to:  [facility] ([facility] Ortho

## 2020-01-16 NOTE — TELEPHONE ENCOUNTER
Returned patient's call. Discussed that she would be TTWB on crutches locked in extension for the first 6 weeks and then weaning off brace and crutches after that. She will ask for 6-8 weeks off work. We discussed that if she feels she is able to go back soon, that can be discussed with Dr. Robles. Discussed that physical therapy should begin 3-5 days postop.    Patient expressed understanding.

## 2020-01-21 ENCOUNTER — DOCUMENTATION ONLY (OUTPATIENT)
Dept: ORTHOPEDICS | Facility: CLINIC | Age: 65
End: 2020-01-21

## 2020-01-21 NOTE — PROGRESS NOTES
Completed patient's FMLA forms and faxed to [employer] at [phone]. Copy sent to Medical Records for scanning. Informed patient via phone.

## 2020-01-22 ENCOUNTER — PATIENT OUTREACH (OUTPATIENT)
Dept: CARE COORDINATION | Facility: CLINIC | Age: 65
End: 2020-01-22

## 2020-01-22 NOTE — PROGRESS NOTES
Social Work Intervention  [facility]    Data/Intervention:    Patient Name:  Adilia Camacho  /Age:  1955 (64 year old)    Visit Type: telephone  Referral Source: YOBANI Ibanez in [facility]   Reason for Referral:  Pre-surgery planning     Collaborated With:    - Patient   - YOBANI Ibanez     Patient Concerns/Issues:   Received request to reach out to pt re: questions about support options following upcoming surgery. Called pt to follow-up; introduced self and role of SW, reason for call. Pt reported that her main concern is transportation to physical therapy appts following her surgery. She has some family/friends that can drive her sometimes, but otherwise reported that she may just end up taking an Uber until she can drive again. Pt plans to stay at a friend's condo in [place] following surgery, as there are no stairs and there is an elevator in the [facility]. Discussed [employer] as an option if she is coming to the [facility] for physical therapy. Also discussed [employer], but pt didn't think she would use this option as it requires a lot of lead time to request rides. Pt unsure if she will do PT at [facility] or another clinic closer to her friend's house. She will look at her options and decide what's going to work best for her. Agreed to send pt a Apparent message with a link to info on [employer], as well as connect with RN CC about her questions re: PT location/frequency. Pt thanked SW for the call and information.     Intervention/Education/Resources Provided:  - Education on transportation options   - Collaboration w/ care team     Assessment/Plan:  Pt was very pleasant and receptive to SW, easily engaged and open to support. Pt has information on transportation options and has family/friends that are lily to assist, as well. Pt to decide what will work best for her. SW available if additional questions/concerns arise.     Provided  patient/family with contact information and availability.    MICHELINE Lema, [facility]  Clinical   Outpatient Speciality Clinics   [facility] [place]  Ph. [phone]    NO LETTER

## 2020-01-28 ENCOUNTER — OFFICE VISIT (OUTPATIENT)
Dept: FAMILY MEDICINE | Facility: CLINIC | Age: 65
End: 2020-01-28
Payer: COMMERCIAL

## 2020-01-28 ENCOUNTER — ALLIED HEALTH/NURSE VISIT (OUTPATIENT)
Dept: EDUCATION SERVICES | Facility: CLINIC | Age: 65
End: 2020-01-28
Payer: COMMERCIAL

## 2020-01-28 VITALS
WEIGHT: 193.5 LBS | SYSTOLIC BLOOD PRESSURE: 136 MMHG | TEMPERATURE: 98.4 F | DIASTOLIC BLOOD PRESSURE: 84 MMHG | BODY MASS INDEX: 35.61 KG/M2 | OXYGEN SATURATION: 98 % | HEIGHT: 62 IN | HEART RATE: 79 BPM

## 2020-01-28 VITALS — WEIGHT: 193.8 LBS | BODY MASS INDEX: 35.45 KG/M2

## 2020-01-28 DIAGNOSIS — I10 HYPERTENSION GOAL BP (BLOOD PRESSURE) < 140/90: ICD-10-CM

## 2020-01-28 DIAGNOSIS — E78.5 HYPERLIPIDEMIA, UNSPECIFIED HYPERLIPIDEMIA TYPE: ICD-10-CM

## 2020-01-28 DIAGNOSIS — Z01.818 PREOP GENERAL PHYSICAL EXAM: Primary | ICD-10-CM

## 2020-01-28 DIAGNOSIS — M25.562 LEFT KNEE PAIN, UNSPECIFIED CHRONICITY: ICD-10-CM

## 2020-01-28 DIAGNOSIS — E11.9 TYPE 2 DIABETES MELLITUS WITHOUT COMPLICATION, WITHOUT LONG-TERM CURRENT USE OF INSULIN (H): ICD-10-CM

## 2020-01-28 DIAGNOSIS — K21.9 GASTROESOPHAGEAL REFLUX DISEASE, ESOPHAGITIS PRESENCE NOT SPECIFIED: ICD-10-CM

## 2020-01-28 DIAGNOSIS — E66.01 MORBID OBESITY (H): ICD-10-CM

## 2020-01-28 DIAGNOSIS — E11.9 TYPE 2 DIABETES MELLITUS (H): Primary | ICD-10-CM

## 2020-01-28 LAB
ANION GAP SERPL CALCULATED.3IONS-SCNC: 9 MMOL/L (ref 3–14)
BUN SERPL-MCNC: 15 MG/DL (ref 7–30)
CALCIUM SERPL-MCNC: 9.1 MG/DL (ref 8.5–10.1)
CHLORIDE SERPL-SCNC: 105 MMOL/L (ref 94–109)
CO2 SERPL-SCNC: 26 MMOL/L (ref 20–32)
CREAT SERPL-MCNC: 0.84 MG/DL (ref 0.52–1.04)
GFR SERPL CREATININE-BSD FRML MDRD: 74 ML/MIN/{1.73_M2}
GLUCOSE SERPL-MCNC: 126 MG/DL (ref 70–99)
HBA1C MFR BLD: 6.8 % (ref 0–5.6)
HGB BLD-MCNC: 14 G/DL (ref 11.7–15.7)
POTASSIUM SERPL-SCNC: 3.7 MMOL/L (ref 3.4–5.3)
SODIUM SERPL-SCNC: 140 MMOL/L (ref 133–144)

## 2020-01-28 PROCEDURE — 36415 COLL VENOUS BLD VENIPUNCTURE: CPT | Performed by: FAMILY MEDICINE

## 2020-01-28 PROCEDURE — 93000 ELECTROCARDIOGRAM COMPLETE: CPT | Performed by: FAMILY MEDICINE

## 2020-01-28 PROCEDURE — 83036 HEMOGLOBIN GLYCOSYLATED A1C: CPT | Performed by: FAMILY MEDICINE

## 2020-01-28 PROCEDURE — 80048 BASIC METABOLIC PNL TOTAL CA: CPT | Performed by: FAMILY MEDICINE

## 2020-01-28 PROCEDURE — 99215 OFFICE O/P EST HI 40 MIN: CPT | Performed by: FAMILY MEDICINE

## 2020-01-28 PROCEDURE — 85018 HEMOGLOBIN: CPT | Performed by: FAMILY MEDICINE

## 2020-01-28 RX ORDER — LANCETS 30 GAUGE
1 EACH MISCELLANEOUS DAILY
Qty: 100 EACH | Refills: 11 | Status: SHIPPED | OUTPATIENT
Start: 2020-01-28 | End: 2023-01-23

## 2020-01-28 ASSESSMENT — MIFFLIN-ST. JEOR: SCORE: 1390.96

## 2020-01-28 NOTE — PATIENT INSTRUCTIONS
Before Your Surgery      Call your surgeon if there is any change in your health. This includes signs of a cold or flu (such as a sore throat, runny nose, cough, rash or fever).    Do not smoke, drink alcohol or take over the counter medicine (unless your surgeon or primary care doctor tells you to) for the 24 hours before and after surgery.    If you take prescribed drugs: Follow your doctor s orders about which medicines to take and which to stop until after surgery.    Eating and drinking prior to surgery: follow the instructions from your surgeon    Take a shower or bath the night before surgery. Use the soap your surgeon gave you to gently clean your skin. If you do not have soap from your surgeon, use your regular soap. Do not shave or scrub the surgery site.  Wear clean pajamas and have clean sheets on your bed.     Do not take ibuprofen the day before your procedure.

## 2020-01-28 NOTE — LETTER
January 29, 2020      Adilia Camacho  [address] [facility] [facility] [facility] [address]        Dear Adilia,    This note is to let you know the results of your recent lab studies. Some of these results were already discussed with you at your office visit.    Your lab results are stable. Please let us know if you have any questions.     Results for orders placed or performed in visit on 01/28/20   Hemoglobin A1c     Status: Abnormal   Result Value Ref Range    Hemoglobin A1C 6.8 (H) 0 - 5.6 %   Hemoglobin     Status: None   Result Value Ref Range    Hemoglobin 14.0 11.7 - 15.7 g/dL   Basic metabolic panel     Status: Abnormal   Result Value Ref Range    Sodium 140 133 - 144 mmol/L    Potassium 3.7 3.4 - 5.3 mmol/L    Chloride 105 94 - 109 mmol/L    Carbon Dioxide 26 20 - 32 mmol/L    Anion Gap 9 3 - 14 mmol/L    Glucose 126 (H) 70 - 99 mg/dL    Urea Nitrogen 15 7 - 30 mg/dL    Creatinine 0.84 0.52 - 1.04 mg/dL    GFR Estimate 74 >60 mL/min/[1.73_m2]    GFR Estimate If Black 86 >60 mL/min/[1.73_m2]    Calcium 9.1 8.5 - 10.1 mg/dL         Sincerely,        Sheree Graf MD/lela

## 2020-01-28 NOTE — PROGRESS NOTES
[facility]  [address] [place] [phone]  Dept: [phone]    PRE-OP EVALUATION:  Today's date: 2020    Adilia Camacho (: 1955) presents for pre-operative evaluation assessment as requested by Dr. Robles, Nacho France MD.  She requires evaluation and anesthesia risk assessment prior to undergoing surgery/procedure for treatment of Examination under anesthesia Left knee, Left knee arthroscopy, Left meniscus root repair .    Fax number for surgical facility: [facility]  Primary Physician: Sheree Graf  Type of Anesthesia Anticipated: to be determined    Patient has a Health Care Directive or Living Will:  YES she has a will     Preop Questions 2020   Who is doing your surgery? Dr. Nacho Robles   What are you having done? Meniscus root repair of knee   Date of Surgery/Procedure: 2020   Facility or Hospital where procedure/surgery will be performed: [facility]   1.  Do you have a history of Heart attack, stroke, stent, coronary bypass surgery, or other heart surgery? No   2.  Do you ever have any pain or discomfort in your chest? No   3.  Do you have a history of  Heart Failure? No   4.   Are you troubled by shortness of breath when:  walking on a level surface, or up a slight hill, or at night? No   5.  Do you currently have a cold, bronchitis or other respiratory infection? No   6.  Do you have a cough, shortness of breath, or wheezing? No   7.  Do you sometimes get pains in the calves of your legs when you walk? YES - she has a bad vein in her leg which causes her pain and discomfort   8. Do you or anyone in your family have previous history of blood clots? No   9.  Do you or does anyone in your family have a serious bleeding problem such as prolonged bleeding following surgeries or cuts? No   10. Have you ever had problems with  anemia or been told to take iron pills? No   11. Have you had any abnormal blood loss such as black, tarry or bloody stools, or abnormal vaginal bleeding? No   12. Have you ever had a blood transfusion? No   13. Have you or any of your relatives ever had problems with anesthesia? No   14. Do you have sleep apnea, excessive snoring or daytime drowsiness? No   15. Do you have any prosthetic heart valves? No   16. Do you have prosthetic joints? No   17. Is there any chance that you may be pregnant? No        HPI:     HPI related to upcoming procedure: Adilia Camacho is a 64 year old female who presents today for preop for knee arthroscopy.     She has had elevated home blood pressure into the 140s at times, though many are in normal range. Her bp cuff reads higher than ours in clinic today. She is going to purchase a new cuff.       DIABETES - Patient has a longstanding history of DiabetesType Type II . Patient is being treated with diet and denies significant side effects.      HYPERTENSION - Patient has history of HTN , currently does not report any symptoms referable to elevated blood pressure. Specifically there is no complaint of chest pain, palpitations, dyspnea, orthopnea, PND or peripheral edema. Blood pressure readings have not been in normal range. Current medication regimen is as listed below. Patient denies any side effects of medication.       MEDICAL HISTORY:     Patient Active Problem List    Diagnosis Date Noted     Chronic pain of left knee 01/14/2020     Priority: Medium     Added automatically from request for surgery [id number]       Diabetes mellitus, type 2 (H) 10/31/2019     Priority: Medium     Obesity (BMI 35.0-39.9) with comorbidity (H) 04/12/2019     Priority: Medium     Venous (peripheral) insufficiency 03/27/2018     Priority: Medium     Advanced directives, counseling/discussion 02/24/2013     Priority: Medium     GERD (gastroesophageal reflux disease) 11/08/2011     Priority: Medium      Female stress incontinence 02/22/2011     Priority: Medium     Hypertension goal BP (blood pressure) < 140/90      Priority: Medium     CARDIOVASCULAR SCREENING; LDL GOAL LESS THAN 130      Priority: Medium     Allergic rhinitis due to dogs      Priority: Medium     Contraceptive management      Priority: Medium     Recurrent herpes labialis      Priority: Medium     Rosacea      Priority: Medium      Past Medical History:   Diagnosis Date     Allergic rhinitis due to dogs      CARDIOVASCULAR SCREENING; LDL GOAL LESS THAN 130      Contraceptive management      Diabetes mellitus, type 2 (H) 10/31/2019     Hypertension goal BP (blood pressure) < 140/90      Recurrent herpes labialis      Rosacea      Past Surgical History:   Procedure Laterality Date     NO HISTORY OF SURGERY       Current Outpatient Medications   Medication Sig Dispense Refill     acyclovir (ZOVIRAX) 400 MG tablet TAKE 1 TABLET BY MOUTH THREE TIMES DAILY 30 tablet 1     atorvastatin (LIPITOR) 40 MG tablet Take 1 tablet (40 mg) by mouth daily 90 tablet 3     cetirizine (ZYRTEC) 10 MG tablet Take 10 mg by mouth daily       COMPRESSION STOCKINGS 1 each daily Lower Extremity:   Knee High;  bilateral;  20-30 mm Hg.  Measure and fit.  Style and color per patient preference.  Doff cherie Catie per patient need. 6 each 11     fluticasone (FLONASE) 50 MCG/ACT nasal spray SHAKE LIQUID AND USE 2 SPRAYS IN EACH NOSTRIL TWICE DAILY 48 mL 11     hydrochlorothiazide (HYDRODIURIL) 12.5 MG tablet Take 2 tablets (25 mg) by mouth daily 90 tablet 0     ibuprofen (ADVIL/MOTRIN) 600 MG tablet Take 1 tablet (600 mg) by mouth every 8 hours as needed for moderate pain 20 tablet 0     ipratropium (ATROVENT HFA) 17 MCG/ACT inhaler Inhale 2 puffs into the lungs 4 times daily 12.9 g 3     loperamide (IMODIUM) 2 MG capsule Take 2 mg by mouth 4 times daily as needed for diarrhea       metoprolol succinate ER (TOPROL-XL) 25 MG 24 hr tablet Take 1 tablet (25 mg) by mouth daily 90 tablet  "3     omeprazole (PRILOSEC) 20 MG DR capsule Take 1 capsule (20 mg) by mouth daily 30-60 minutes before a meal. 90 capsule 1     OTC products: NSAIDS    Allergies   Allergen Reactions     Dogs      Dust Mites       Latex Allergy: NO    Social History     Tobacco Use     Smoking status: Former Smoker     Packs/day: 0.00     Years: 10.00     Pack years: 0.00     Types: Cigarettes     Start date: 1975     Last attempt to quit: 1984     Years since quittin.4     Smokeless tobacco: Never Used     Tobacco comment: I never was a heavy smoker never woke up and had a cigarette maybe 3 a day   Substance Use Topics     Alcohol use: Yes     Alcohol/week: 1.7 standard drinks     Comment: occassionally     History   Drug Use No       REVIEW OF SYSTEMS:   Constitutional, neuro, ENT, endocrine, pulmonary, cardiac, gastrointestinal, genitourinary, musculoskeletal, integument and psychiatric systems are negative, except as otherwise noted.    EXAM:   /84 (BP Location: Right arm, Patient Position: Sitting, Cuff Size: Adult Regular)   Pulse 79   Temp 98.4  F (36.9  C) (Temporal)   Ht 1.575 m (5' 2\")   Wt 87.8 kg (193 lb 8 oz)   SpO2 98%   BMI 35.39 kg/m      GENERAL APPEARANCE: healthy, alert and no distress     EYES: EOMI, PERRL     HENT: ear canals and TM's normal and nose and mouth without ulcers or lesions     NECK: no adenopathy, no asymmetry, masses, or scars and thyroid normal to palpation     RESP: lungs clear to auscultation - no rales, rhonchi or wheezes     CV: regular rates and rhythm, normal S1 S2, no S3 or S4 and no murmur, click or rub     ABDOMEN:  soft, nontender, no HSM or masses and bowel sounds normal     MS: extremities normal- no gross deformities noted, no evidence of inflammation in joints, FROM in all extremities.     SKIN: no suspicious lesions or rashes     NEURO: Normal strength and tone, sensory exam grossly normal, mentation intact and speech normal     PSYCH: mentation appears " normal. and affect normal/bright     LYMPHATICS: No cervical adenopathy    DIAGNOSTICS:     EKG: appears normal, NSR, normal axis, normal intervals, no acute ST/T changes c/w ischemia, unchanged from previous tracings  Labs Drawn and in Process:   Unresulted Labs Ordered in the Past 30 Days of this Admission     Date and Time Order Name Status Description    1/28/2020 0836 BASIC METABOLIC PANEL In process           Recent Labs   Lab Test 10/31/19  0814 10/17/19  0939 04/12/19  1633  05/13/14  1423   HGB  --   --   --   --  13.5   PLT  --   --   --   --  351     --  140   < >  --    POTASSIUM 3.8  --  3.8   < >  --    CR 0.83  --  0.86   < >  --    A1C  --  6.5* 6.4*   < >  --     < > = values in this interval not displayed.        IMPRESSION:   Reason for surgery/procedure: left knee pain   Diagnosis/reason for consult: left knee arthroscopy    The proposed surgical procedure is considered INTERMEDIATE risk.    REVISED CARDIAC RISK INDEX  The patient has the following serious cardiovascular risks for perioperative complications such as (MI, PE, VFib and 3  AV Block):  No serious cardiac risks  INTERPRETATION: 0 risks: Class I (very low risk - 0.4% complication rate)    The patient has the following additional risks for perioperative complications:  No identified additional risks      ICD-10-CM    1. Preop general physical exam Z01.818 Hemoglobin A1c     Hemoglobin     Basic metabolic panel   2. Left knee pain, unspecified chronicity M25.562    3. Hypertension goal BP (blood pressure) < 140/90 I10    4. Type 2 diabetes mellitus without complication, without long-term current use of insulin (H) E11.9    5. Hyperlipidemia, unspecified hyperlipidemia type E78.5    6. Gastroesophageal reflux disease, esophagitis presence not specified K21.9    7. Morbid obesity (H) E66.01        RECOMMENDATIONS:         --Patient is to take all scheduled medications on the day of surgery EXCEPT for modifications listed  below.    Anticoagulant or Antiplatelet Medication Use  NSAIDS: Ibuprofen (Motrin):         Stop one day prior to surgery        APPROVAL GIVEN to proceed with proposed procedure, without further diagnostic evaluation       Signed Electronically by: Sheree Graf MD     Copy of this evaluation report is provided to requesting physician.    [place] Preop Guidelines    Revised Cardiac Risk Index

## 2020-01-28 NOTE — PATIENT INSTRUCTIONS
Glucose goals:     Fasting/before meals -   Two hours after a meal - under 180    Include lean protein and vegetables with your carbohydrate.     [phone]

## 2020-01-29 ENCOUNTER — TELEPHONE (OUTPATIENT)
Dept: ORTHOPEDICS | Facility: CLINIC | Age: 65
End: 2020-01-29

## 2020-01-29 NOTE — TELEPHONE ENCOUNTER
Called patient to introduce Dr. Robles's [facility] Outcome study, which was explained and the patient agreed to participate. Registration tipsheet was emailed to patient and direct contact information was given for any questions or concerns.

## 2020-01-29 NOTE — PROGRESS NOTES
"Diabetes Self-Management Education & Support  Diabetes Education Self Management & Training    SUBJECTIVE/OBJECTIVE:  Presents for: Initial Assessment for new diagnosis  Accompanied by: Self  Diabetes education in the past 24mo: No  Focus of Visit: Healthy Eating  Diabetes type: Type 2  Transportation concerns: No  Other concerns:: None  Cultural Influences/Ethnic Background:  American   Diagnosed in October 2019    Diabetes Symptoms & Complications    Patient Problem List and Family Medical History reviewed for relevant medical history, current medical status, and diabetes risk factors.    Vitals:  Wt 87.9 kg (193 lb 12.8 oz)   BMI 35.45 kg/m    Estimated body mass index is 35.45 kg/m  as calculated from the following:    Height as of an earlier encounter on 1/28/20: 1.575 m (5' 2\").    Weight as of this encounter: 87.9 kg (193 lb 12.8 oz).   Last 3 BP:   BP Readings from Last 3 Encounters:   01/28/20 136/84   12/17/19 (!) 142/86   12/16/19 (!) 177/94       History   Smoking Status     Former Smoker     Packs/day: 0.00     Years: 10.00     Types: Cigarettes     Start date: 9/1/1975     Quit date: 9/1/1984   Smokeless Tobacco     Never Used     Comment: I never was a heavy smoker never woke up and had a cigarette maybe 3 a day       Labs:  Lab Results   Component Value Date    A1C 6.8 01/28/2020     Lab Results   Component Value Date     01/28/2020     Lab Results   Component Value Date     10/17/2019     HDL Cholesterol   Date Value Ref Range Status   10/17/2019 54 >49 mg/dL Final   ]  GFR Estimate   Date Value Ref Range Status   01/28/2020 74 >60 mL/min/[1.73_m2] Final     Comment:     Non  GFR Calc  Starting 12/18/2018, serum creatinine based estimated GFR (eGFR) will be   calculated using the Chronic Kidney Disease Epidemiology Collaboration   (CKD-EPI) equation.       GFR Estimate If Black   Date Value Ref Range Status   01/28/2020 86 >60 mL/min/[1.73_m2] Final     Comment:     "  GFR Calc  Starting 12/18/2018, serum creatinine based estimated GFR (eGFR) will be   calculated using the Chronic Kidney Disease Epidemiology Collaboration   (CKD-EPI) equation.       Lab Results   Component Value Date    CR 0.84 01/28/2020     No results found for: MICROALBUMIN    Healthy Eating  Patient brought food records to the appointment. Some meals are high carb. In general, diet is low in vegetables and some meals are low in protein/high in carb like cereal. Patient does choose whole grains when able.     Being Active  Being Active Assessed Today: Yes  Exercise:: Unable to exercise  Barrier to exercise: Physical limitation - meniscus tear - preparing for surgery next week    Monitoring  Blood glucose meter taught today    Taking Medications  No. Diet controlled    Current Treatments: Diet    Problem Solving  Problem Solving Assessed Today: No    Reducing Risks  Reducing Risks Assessed Today: No    Healthy Coping  Emotional response to diabetes: Ready to learn  Patient Activation Measure Survey Score:  No flowsheet data found.    ASSESSMENT:  Newly diagnosed type 2 diabetes  Patient's most recent   Lab Results   Component Value Date    A1C 6.8 01/28/2020    is meeting goal of <7.0    INTERVENTION:   Diabetes knowledge and skills assessment:     Patient needs further education on the following diabetes management concepts: Needs education on all diabetes management topics    Based on learning assessment above, most appropriate setting for further diabetes education would be: Individual setting.    Education provided today on:  AADE Self-Care Behaviors:  Diabetes Pathophysiology  Healthy Eating: carbohydrate counting for portion control, discussed aiming for <60 grams of carbohydrate at meals and <30 grams of carbohydrate at snacks, reviewed nutrient dense higher fiber sources of carbohydrate, consistency in amount, composition, and timing of food intake, plate planning method and label  reading, general healthy eating  Monitoring: purpose, proper technique, individual blood glucose targets, frequency of monitoring and proper sharps disposal  Reducing Risks: A1C - goals, relating to blood glucose levels, how often to check  Patient was instructed on One Touch Verio Flex meter and was able to provide an accurate return demonstration.   Opportunities for ongoing education and support in diabetes-self management were discussed.    Pt verbalized understanding of concepts discussed and recommendations provided today.       Education Materials Provided:  [facility] Taking Charge of Your Diabetes Book and Carbohydrate Counting    PLAN:  See Patient Instructions for co-developed, patient-stated behavior change goals.  AVS printed and provided to patient today. See Follow-Up section for recommended follow-up.  Patient will call to schedule follow up with myself and RN SHANE after her surgery  Time Spent: 60 minutes  Encounter Type: Individual    Any diabetes medication dose changes were made via the CDE Protocol and Collaborative Practice Agreement with the patient's referring provider. A copy of this encounter was shared with the provider.

## 2020-02-05 ENCOUNTER — TELEPHONE (OUTPATIENT)
Dept: ORTHOPEDICS | Facility: CLINIC | Age: 65
End: 2020-02-05

## 2020-02-05 NOTE — TELEPHONE ENCOUNTER
[facility] Call Center    Phone Message    May a detailed message be left on voicemail: yes     Reason for Call: Patient just had her pre op physical for her upcoming surgery this Friday and her provider told her to hold her Advil. Caller questioning how long she should proceed on holding her Advil and if it is OK for her to take Tylenol. Patient requesting a call back, but if you are unable to reach her it is OK to send her a [employer] message.    Thank you, Erin Garcia on 2/5/2020 at 10:40 AM     Action Taken: Message routed to:  [facility] Ortho    Travel Screening: Not Applicable

## 2020-02-06 ENCOUNTER — ANESTHESIA EVENT (OUTPATIENT)
Dept: SURGERY | Facility: AMBULATORY SURGERY CENTER | Age: 65
End: 2020-02-06

## 2020-02-07 ENCOUNTER — ANESTHESIA (OUTPATIENT)
Dept: SURGERY | Facility: AMBULATORY SURGERY CENTER | Age: 65
End: 2020-02-07

## 2020-02-07 ENCOUNTER — HOSPITAL ENCOUNTER (OUTPATIENT)
Facility: AMBULATORY SURGERY CENTER | Age: 65
End: 2020-02-07
Attending: ORTHOPAEDIC SURGERY
Payer: COMMERCIAL

## 2020-02-07 VITALS
HEIGHT: 62 IN | HEART RATE: 70 BPM | RESPIRATION RATE: 14 BRPM | OXYGEN SATURATION: 96 % | SYSTOLIC BLOOD PRESSURE: 129 MMHG | WEIGHT: 190 LBS | BODY MASS INDEX: 34.96 KG/M2 | DIASTOLIC BLOOD PRESSURE: 72 MMHG | TEMPERATURE: 98 F

## 2020-02-07 DIAGNOSIS — M25.562 CHRONIC PAIN OF LEFT KNEE: ICD-10-CM

## 2020-02-07 DIAGNOSIS — G89.29 CHRONIC PAIN OF LEFT KNEE: ICD-10-CM

## 2020-02-07 DIAGNOSIS — Z98.890 STATUS POST KNEE SURGERY: Primary | ICD-10-CM

## 2020-02-07 DEVICE — IMP ANCHOR ARTHREX BIO-SWIVELOCK 5.5MM AR-2323BCC: Type: IMPLANTABLE DEVICE | Site: KNEE | Status: FUNCTIONAL

## 2020-02-07 RX ORDER — LIDOCAINE HYDROCHLORIDE 20 MG/ML
INJECTION, SOLUTION INFILTRATION; PERINEURAL PRN
Status: DISCONTINUED | OUTPATIENT
Start: 2020-02-07 | End: 2020-02-07

## 2020-02-07 RX ORDER — ONDANSETRON 2 MG/ML
INJECTION INTRAMUSCULAR; INTRAVENOUS PRN
Status: DISCONTINUED | OUTPATIENT
Start: 2020-02-07 | End: 2020-02-07

## 2020-02-07 RX ORDER — ONDANSETRON 4 MG/1
4-8 TABLET, ORALLY DISINTEGRATING ORAL EVERY 8 HOURS PRN
Qty: 4 TABLET | Refills: 0 | Status: SHIPPED | OUTPATIENT
Start: 2020-02-07 | End: 2020-05-27

## 2020-02-07 RX ORDER — ACETAMINOPHEN 325 MG/1
975 TABLET ORAL ONCE
Status: COMPLETED | OUTPATIENT
Start: 2020-02-07 | End: 2020-02-07

## 2020-02-07 RX ORDER — ACETAMINOPHEN 325 MG/1
650 TABLET ORAL EVERY 4 HOURS
Qty: 120 TABLET | Refills: 0 | Status: SHIPPED | OUTPATIENT
Start: 2020-02-07 | End: 2020-04-15

## 2020-02-07 RX ORDER — FENTANYL CITRATE 50 UG/ML
INJECTION, SOLUTION INTRAMUSCULAR; INTRAVENOUS PRN
Status: DISCONTINUED | OUTPATIENT
Start: 2020-02-07 | End: 2020-02-07

## 2020-02-07 RX ORDER — DEXAMETHASONE SODIUM PHOSPHATE 4 MG/ML
INJECTION, SOLUTION INTRA-ARTICULAR; INTRALESIONAL; INTRAMUSCULAR; INTRAVENOUS; SOFT TISSUE PRN
Status: DISCONTINUED | OUTPATIENT
Start: 2020-02-07 | End: 2020-02-07

## 2020-02-07 RX ORDER — BUPIVACAINE HYDROCHLORIDE AND EPINEPHRINE 2.5; 5 MG/ML; UG/ML
INJECTION, SOLUTION INFILTRATION; PERINEURAL PRN
Status: DISCONTINUED | OUTPATIENT
Start: 2020-02-07 | End: 2020-02-07 | Stop reason: HOSPADM

## 2020-02-07 RX ORDER — SODIUM CHLORIDE, SODIUM LACTATE, POTASSIUM CHLORIDE, CALCIUM CHLORIDE 600; 310; 30; 20 MG/100ML; MG/100ML; MG/100ML; MG/100ML
INJECTION, SOLUTION INTRAVENOUS CONTINUOUS
Status: DISCONTINUED | OUTPATIENT
Start: 2020-02-07 | End: 2020-02-08 | Stop reason: HOSPADM

## 2020-02-07 RX ORDER — LIDOCAINE 40 MG/G
CREAM TOPICAL
Status: DISCONTINUED | OUTPATIENT
Start: 2020-02-07 | End: 2020-02-07 | Stop reason: HOSPADM

## 2020-02-07 RX ORDER — CEFAZOLIN SODIUM 2 G/50ML
2 SOLUTION INTRAVENOUS
Status: COMPLETED | OUTPATIENT
Start: 2020-02-07 | End: 2020-02-07

## 2020-02-07 RX ORDER — PROPOFOL 10 MG/ML
INJECTION, EMULSION INTRAVENOUS PRN
Status: DISCONTINUED | OUTPATIENT
Start: 2020-02-07 | End: 2020-02-07

## 2020-02-07 RX ORDER — CEFAZOLIN SODIUM 1 G/50ML
1 SOLUTION INTRAVENOUS SEE ADMIN INSTRUCTIONS
Status: DISCONTINUED | OUTPATIENT
Start: 2020-02-07 | End: 2020-02-07 | Stop reason: HOSPADM

## 2020-02-07 RX ORDER — SODIUM CHLORIDE, SODIUM LACTATE, POTASSIUM CHLORIDE, CALCIUM CHLORIDE 600; 310; 30; 20 MG/100ML; MG/100ML; MG/100ML; MG/100ML
INJECTION, SOLUTION INTRAVENOUS CONTINUOUS
Status: DISCONTINUED | OUTPATIENT
Start: 2020-02-07 | End: 2020-02-07 | Stop reason: HOSPADM

## 2020-02-07 RX ORDER — ONDANSETRON 4 MG/1
4 TABLET, ORALLY DISINTEGRATING ORAL EVERY 30 MIN PRN
Status: DISCONTINUED | OUTPATIENT
Start: 2020-02-07 | End: 2020-02-08 | Stop reason: HOSPADM

## 2020-02-07 RX ORDER — AMOXICILLIN 250 MG
1-2 CAPSULE ORAL 2 TIMES DAILY
Qty: 12 TABLET | Refills: 0 | Status: SHIPPED | OUTPATIENT
Start: 2020-02-07 | End: 2020-05-27

## 2020-02-07 RX ORDER — OXYCODONE HYDROCHLORIDE 5 MG/1
5-10 TABLET ORAL EVERY 4 HOURS PRN
Qty: 18 TABLET | Refills: 0 | Status: SHIPPED | OUTPATIENT
Start: 2020-02-07 | End: 2020-05-27

## 2020-02-07 RX ORDER — ONDANSETRON 2 MG/ML
4 INJECTION INTRAMUSCULAR; INTRAVENOUS EVERY 30 MIN PRN
Status: DISCONTINUED | OUTPATIENT
Start: 2020-02-07 | End: 2020-02-08 | Stop reason: HOSPADM

## 2020-02-07 RX ORDER — FENTANYL CITRATE 50 UG/ML
25-50 INJECTION, SOLUTION INTRAMUSCULAR; INTRAVENOUS EVERY 5 MIN PRN
Status: DISCONTINUED | OUTPATIENT
Start: 2020-02-07 | End: 2020-02-07 | Stop reason: HOSPADM

## 2020-02-07 RX ORDER — KETOROLAC TROMETHAMINE 30 MG/ML
INJECTION, SOLUTION INTRAMUSCULAR; INTRAVENOUS PRN
Status: DISCONTINUED | OUTPATIENT
Start: 2020-02-07 | End: 2020-02-07

## 2020-02-07 RX ORDER — NALOXONE HYDROCHLORIDE 0.4 MG/ML
.1-.4 INJECTION, SOLUTION INTRAMUSCULAR; INTRAVENOUS; SUBCUTANEOUS
Status: DISCONTINUED | OUTPATIENT
Start: 2020-02-07 | End: 2020-02-08 | Stop reason: HOSPADM

## 2020-02-07 RX ORDER — OXYCODONE HYDROCHLORIDE 5 MG/1
5 TABLET ORAL EVERY 4 HOURS PRN
Status: DISCONTINUED | OUTPATIENT
Start: 2020-02-07 | End: 2020-02-08 | Stop reason: HOSPADM

## 2020-02-07 RX ORDER — PROPOFOL 10 MG/ML
INJECTION, EMULSION INTRAVENOUS CONTINUOUS PRN
Status: DISCONTINUED | OUTPATIENT
Start: 2020-02-07 | End: 2020-02-07

## 2020-02-07 RX ADMIN — ACETAMINOPHEN 975 MG: 325 TABLET ORAL at 09:43

## 2020-02-07 RX ADMIN — PROPOFOL 200 MCG/KG/MIN: 10 INJECTION, EMULSION INTRAVENOUS at 10:43

## 2020-02-07 RX ADMIN — PROPOFOL 200 MG: 10 INJECTION, EMULSION INTRAVENOUS at 10:43

## 2020-02-07 RX ADMIN — FENTANYL CITRATE 50 MCG: 50 INJECTION, SOLUTION INTRAMUSCULAR; INTRAVENOUS at 10:43

## 2020-02-07 RX ADMIN — SODIUM CHLORIDE, SODIUM LACTATE, POTASSIUM CHLORIDE, CALCIUM CHLORIDE: 600; 310; 30; 20 INJECTION, SOLUTION INTRAVENOUS at 09:48

## 2020-02-07 RX ADMIN — CEFAZOLIN SODIUM 2 G: 2 SOLUTION INTRAVENOUS at 10:51

## 2020-02-07 RX ADMIN — FENTANYL CITRATE 50 MCG: 50 INJECTION, SOLUTION INTRAMUSCULAR; INTRAVENOUS at 11:07

## 2020-02-07 RX ADMIN — KETOROLAC TROMETHAMINE 30 MG: 30 INJECTION, SOLUTION INTRAMUSCULAR; INTRAVENOUS at 11:43

## 2020-02-07 RX ADMIN — OXYCODONE HYDROCHLORIDE 5 MG: 5 TABLET ORAL at 12:10

## 2020-02-07 RX ADMIN — DEXAMETHASONE SODIUM PHOSPHATE 4 MG: 4 INJECTION, SOLUTION INTRA-ARTICULAR; INTRALESIONAL; INTRAMUSCULAR; INTRAVENOUS; SOFT TISSUE at 10:43

## 2020-02-07 RX ADMIN — ONDANSETRON 4 MG: 2 INJECTION INTRAMUSCULAR; INTRAVENOUS at 10:43

## 2020-02-07 RX ADMIN — LIDOCAINE HYDROCHLORIDE 100 MG: 20 INJECTION, SOLUTION INFILTRATION; PERINEURAL at 10:43

## 2020-02-07 ASSESSMENT — MIFFLIN-ST. JEOR: SCORE: 1367.14

## 2020-02-07 NOTE — OP NOTE
PREOPERATIVE DIAGNOSIS:   1. Left Medial meniscus root tear    POSTOPERATIVE DIAGNOSIS:  1. Left Medial meniscus root tear    PROCEDURE:  1. Examination under anesthesia Left Knee  2. Left Knee arthroscopy  3. Transosseous repair of Left medial meniscus root    DATE OF SURGERY: 2/7/2020    SURGEON: Nacho Robles MD    ASSISTANT: None.     RESIDENT OR FELLOW: None available    OPERATIVE INDICATIONS: Adilia Camacho is a pleasant 64 year old female who I saw through my orthopedic clinic with a history, physical, imaging consistent with medial meniscus root tears .  I reviewed with the patient the risks, benefits, complications, techniques and alternatives to surgery.  We reviewed the expected course of recovery and the potential expected outcomes.  I specifically discussed with her that there is not yet clear evidence that a successful surgery ultimately decreases the risk of knee replacement surgery.  I was very clear that it would not reverse the degenerative changes that had already occurred.  The patient understood both the risks and benefits and desired to proceed despite the risks.    OPERATIVE DETAILS: In the preoperative area the patient's informed consent was reviewed and they desired to proceed.  The left leg was marked and the patient was in agreement.  The patient was taken to the operating room where a timeout was performed and all parties were in agreement.  Preoperative antibiotics were given within 1 hour of the time of incision.  The patient was placed in the supine position and surrendered to LMA anesthesia.  No tourniquet was applied.  Egg crate was placed beneath the well leg and a side post was utilized.  The operative leg was prepped and draped in the usual sterile fashion.     Examination Under Anesthesia: Range of motion was 0 to 125 degrees.  Stable to varus and valgus stress testing.  Stable anterior and posterior drawer testing.  No pivot shift.  Lachman 0, 1 quadrant medial lateral  translation of the patella.    Anterior medial and anterolateral arthroscopic portals were created and the diagnostic arthroscopy was performed with the following findings: There were no loose bodies within the suprapatellar pouch, medial gutter, lateral gutter.  Lateral femoral condyle and lateral tibial plateau showed overall well-preserved cartilage but to some grade 2 fissures of the tibial plateau.  ACL and PCL were intact.  Central trochlea showed grade 2-3 fissures.  Patella was largely maintained with just grade 1 softening.  Medial tibial plateau was intact.  Medial femoral condyle was grade 2 fissures.  Clear tear of the medial meniscus root.  Lateral meniscus was intact.    At this time a cannula was placed on the medial portal and a meniscus scorpion was used to place 2, 0-fiber link sutures in a grasping fashion in the posterior horn of the medial meniscus.  At this time a multiuse guide was introduced and a 2.4 mm drill to pin was placed into the anatomic insertion of the medial meniscus posterior root.  We prepared the footprint with a curette prior to placing the pin.  The pin was advanced under arthroscopic localization and we overreamed with a 5 mm reamer.  A passing suture was placed through this tunnel and her sutures and the meniscus were routed through the tibia.  Maximum traction was applied to the sutures and excellent reduction of the meniscus to the tibia was noted.  No further instability probing.  Excellent fixation.    At this time on the anterior medial tibia 2.4 mm drill to pin was placed just distal to our aperture on the anteromedial tibial cortex we reamed with a 5 mm reamer tapping was performed and a 5.5 swivel lock was placed.  Final arthroscopic images showed good position and excellent reduction of the meniscus to the tibial plateau    Copious irrigation was performed an a layered closure was initiated, sterile dressings were applied and the patient was transferred to the  recovery room in stable condition with stable vital signs.    ESTIMATED BLOOD LOSS: 5 ml    TOURNIQUET TIME: No tourniquet was placed.    COMPLICATIONS: None apparent.    DRAINS: None.    SPECIMENS: None.     POSTOPERATIVE PLAN:  1. Toe-touch weightbearing x 6weeks   2. Range of motion 0 to 90 degrees  3. Shower on day 3.  No submerging the wounds.   4. Physical therapy to start within 1 week  5. Follow-up with me in 1 week.  6. At 6 weeks begin weightbearing as tolerated  7. No running or impact sports for 4 to 6 months.  No kneeling or squatting for as long as possible

## 2020-02-07 NOTE — ANESTHESIA POSTPROCEDURE EVALUATION
Anesthesia POST Procedure Evaluation    Patient: Adilia Camacho   MRN:     [id number] Gender:   female   Age:    64 year old :      1955        Preoperative Diagnosis: Chronic pain of left knee [M25.562, G89.29]   Procedure(s):  Examination under anesthesia Left knee, Left knee arthroscopy, Left meniscus root repair   Postop Comments: No value filed.       Anesthesia Type:  Not documented  General    Reportable Event: NO     PAIN: Uncomplicated   Sign Out status: Comfortable, Well controlled pain     PONV: No PONV   Sign Out status:  No Nausea or Vomiting     Neuro/Psych: Uneventful perioperative course   Sign Out Status: Preoperative baseline; Age appropriate mentation     Airway/Resp.: Uneventful perioperative course   Sign Out Status: Non labored breathing, age appropriate RR; Resp. Status within EXPECTED Parameters     CV: Uneventful perioperative course   Sign Out status: Appropriate BP and perfusion indices; Appropriate HR/Rhythm     Disposition:   Sign Out in:  PACU  Disposition:  Phase II; Home  Recovery Course: Uneventful  Follow-Up: Not required           Last Anesthesia Record Vitals:  CRNA VITALS  2020 1127 - 2020 1227      2020             Pulse:  71    SpO2:  94 %    Resp Rate (set):  10          Last PACU Vitals:  Vitals Value Taken Time   /73 2020 12:15 PM   Temp 36.3  C (97.3  F) 2020 12:15 PM   Pulse 64 2020 12:15 PM   Resp 12 2020 12:16 PM   SpO2 96 % 2020 12:16 PM   Temp src     NIBP     Pulse     SpO2     Resp     Temp     Ht Rate     Temp 2     Vitals shown include unvalidated device data.      Electronically Signed By: Dwain Millan MD, 2020, 12:45 PM

## 2020-02-07 NOTE — DISCHARGE INSTRUCTIONS
"[facility]  Home Care Following Anesthesia  For 24 hours after surgery:  1. Get plenty of rest.  A responsible adult must stay with you for at least 24 hours after you leave the surgery center.  2. Do not drive or use heavy equipment.  If you have weakness or tingling, don't drive or use heavy equipment until this feeling goes away.   3. Do not drink alcohol.   4. Avoid strenuous or risky activities.  Ask for help when climbing stairs.  5. You may feel lightheaded.  IF so, sit for a few minutes before standing.  Have someone help you get up.   6. If you have nausea (feel sick to your stomach): Drink only clear liquids such as apple juice, ginger ale, broth or 7-Up.  Rest may also help.  Be sure to drink enough fluids.  Move to a regular diet as you feel able.   7. You may have a slight fever.  Call the doctor if your fever is over 100 F (37.7 C) (taken under the tongue) or lasts longer than 24 hours.  8. You may have a dry mouth, a sore throat, muscle aches or trouble sleeping. These should go away after 24 hours.  9. Do not make important or legal decisions.        Today you received a Marcaine or bupivacaine block to numb the nerves near your surgery site.  This is a block using local anesthetic or \"numbing\" medication injected around the nerves to anesthetize or \"numb\" the area supplied by those nerves.  This block is injected into the muscle layer near your surgical site.  The medication may numb the location where you had surgery for 6-18 hours, but may last up to 24 hours.  If your surgical site is an arm or leg you should be careful with your affected limb, since it is possible to injure your limb without being aware of it due to the numbing.  Until full feeling returns, you should guard against bumping or hitting your limb, and avoid extreme hot or cold temperatures on the skin.  As the block wears off, the feeling will return as a tingling or prickly sensation near your " surgical site.  You will experience more discomfort from your incision as the feeling returns.  You may want to take a pain pill (a narcotic or Tylenol if this was prescribed by your surgeon) when you start to experience mild pain before the pain beccomes more severe.  If your pain medications do not control your pain you should notifiy your surgeon.    Tips for taking pain medications  To get the best pain relief possible, remember these points:    Take pain medications as directed, before pain becomes severe.    Pain medication can upset your stomach: taking it with food may help.    Constipation is a common side effect of pain medication. Drink plenty of  fluids.    Eat foods high in fiber. Take a stool softener if recommended by your doctor or pharmacist.    Do not drink alcohol, drive or operate machinery while taking pain medications.    Ask about other ways to control pain, such as with heat, ice or relaxation.    Tylenol/Acetaminophen Consumption  To help encourage the safe use of acetaminophen, the makers of TYLENOL  have lowered the maximum daily dose for single-ingredient Extra Strength TYLENOL  (acetaminophen) products sold in the [place] from 8 pills per day (4,000 mg) to 6 pills per day (3,000 mg). The dosing interval has also changed from 2 pills every 4-6 hours to 2 pills every 6 hours.    If you feel your pain relief is insufficient, you may take Tylenol/Acetaminophen in addition to your narcotic pain medication.     Be careful not to exceed 3,000 mg of Tylenol/Acetaminophen in a 24 hour period from all sources.    If you are taking extra strength Tylenol/acetaminophen (500 mg), the maximum dose is 6 tablets in 24 hours.    If you are taking regular strength acetaminophen (325 mg), the maximum dose is 9 tablets in 24 hours.    Tylenol 975mg given at 9:43am. Next dose available after 3:45pm. Then follow package instructions.     Call a doctor for any of the followin. Signs of infection (fever,  "growing tenderness at the surgery site, a large amount of drainage or bleeding, severe pain, foul-smelling drainage, redness, swelling).  2. It has been over 8 to 10 hours since surgery and you are still not able to urinate (pass water).  3. Headache for over 24 hours.    Your doctor is:       Dr. Nacho Robles, Orthopaedics: [phone]               Or dial [phone] and ask for the resident on call for:  Orthopaedics  For emergency care, call the:  [facility] Emergency Department: [phone] (TTY for hearing impaired: [phone])        Safety Tips for Using Crutches    Crutch Fit:    Assume good standing posture with shoulders relaxed and crutch tips 6-8 inches out from the side of the foot.    The underarm pad should fall 2-3 fingers width below the armpit.    The handgrip is positioned level with the wrist to allow 30  flexion at the elbow.    Safety Tips:    Bear weight on your hands, not on your armpits.    Do not add extra padding to the underarm pad. This will, in effect, lengthen the crutches and increase risk of nerve injury.    Wear flat, properly fitting shoes. Do not walk in stocking feet, high heels or slippers.    Household hazards:  --Throw rugs should be removed from floors.  --Stairs should be cleared of obstacles.  --Use extra caution on slippery, highly polished, littered or uneven floor surfaces.  --Check for electric cords.    Check crutch tips for excessive wear and keep wing nuts tight.    While walking, look forward with  head up  and  eyes open.  Take equal length steps.    Use BOTH crutches.    Stairs Sequence:    UP: \"Good\" leg first, followed by  bad  leg, then crutches.    DOWN: Crutches, followed by  bad  leg, \"good\" leg.     Walking with Crutches:    Move both crutches forward at the same time.    Non-Weight Bearing (NWB):  Hold the involved leg up and swing through the crutches with the involved leg. The involved leg does not touch the floor.    Toe Touch Weight Bearing " (TTWB): Move the involved leg forward. Rest it lightly on the floor for balance only. Step through the crutches with the uninvolved leg.    Partial Weight Bearing (PWB): Move the involved leg forward. Step down the weight of the leg only.  Step through the crutches with the uninvolved leg.    Weight Bearing As Tolerated (WBAT): Move the involved leg forward. Put as much pressure through the involved leg as you can tolerate comfortably. Then step through the crutches with the uninvolved leg.

## 2020-02-07 NOTE — ANESTHESIA CARE TRANSFER NOTE
Patient: Adilia Camacho    Procedure(s):  Examination under anesthesia Left knee, Left knee arthroscopy, Left meniscus root repair    Diagnosis: Chronic pain of left knee [M25.562, G89.29]  Diagnosis Additional Information: No value filed.    Anesthesia Type:   General     Note:  Airway :Room Air  Patient transferred to:PACU  Handoff Report: Identifed the Patient, Identified the Reponsible Provider, Reviewed the pertinent medical history, Discussed the surgical course, Reviewed Intra-OP anesthesia mangement and issues during anesthesia, Set expectations for post-procedure period and Allowed opportunity for questions and acknowledgement of understanding      Vitals: (Last set prior to Anesthesia Care Transfer)    CRNA VITALS  2/7/2020 1127 - 2/7/2020 1202      2/7/2020             Pulse:  71    SpO2:  94 %    Resp Rate (set):  10                Electronically Signed By: VIKAS Langford CRNA  February 7, 2020  12:02 PM

## 2020-02-17 ENCOUNTER — THERAPY VISIT (OUTPATIENT)
Dept: PHYSICAL THERAPY | Facility: CLINIC | Age: 65
End: 2020-02-17
Payer: COMMERCIAL

## 2020-02-17 DIAGNOSIS — G89.29 CHRONIC PAIN OF LEFT KNEE: ICD-10-CM

## 2020-02-17 DIAGNOSIS — M25.562 CHRONIC PAIN OF LEFT KNEE: ICD-10-CM

## 2020-02-17 DIAGNOSIS — Z47.89 AFTERCARE FOLLOWING SURGERY OF THE MUSCULOSKELETAL SYSTEM: ICD-10-CM

## 2020-02-17 PROCEDURE — 97110 THERAPEUTIC EXERCISES: CPT | Mod: GP | Performed by: PHYSICAL THERAPIST

## 2020-02-17 PROCEDURE — 97161 PT EVAL LOW COMPLEX 20 MIN: CPT | Mod: GP | Performed by: PHYSICAL THERAPIST

## 2020-02-17 ASSESSMENT — ACTIVITIES OF DAILY LIVING (ADL)
STAND: ACTIVITY IS SOMEWHAT DIFFICULT
SWELLING: I HAVE THE SYMPTOM BUT IT DOES NOT AFFECT MY ACTIVITY
KNEE_ACTIVITY_OF_DAILY_LIVING_SUM: 30
PAIN: I HAVE THE SYMPTOM BUT IT DOES NOT AFFECT MY ACTIVITY
RISE FROM A CHAIR: ACTIVITY IS SOMEWHAT DIFFICULT
LIMPING: THE SYMPTOM PREVENTS ME FROM ALL DAILY ACTIVITIES
GO DOWN STAIRS: I AM UNABLE TO DO THE ACTIVITY
GIVING WAY, BUCKLING OR SHIFTING OF KNEE: THE SYMPTOM PREVENTS ME FROM ALL DAILY ACTIVITIES
GO UP STAIRS: I AM UNABLE TO DO THE ACTIVITY
HOW_WOULD_YOU_RATE_THE_OVERALL_FUNCTION_OF_YOUR_KNEE_DURING_YOUR_USUAL_DAILY_ACTIVITIES?: SEVERELY ABNORMAL
RAW_SCORE: 30
SQUAT: I AM UNABLE TO DO THE ACTIVITY
KNEE_ACTIVITY_OF_DAILY_LIVING_SCORE: 42.86
SIT WITH YOUR KNEE BENT: ACTIVITY IS SOMEWHAT DIFFICULT
HOW_WOULD_YOU_RATE_THE_CURRENT_FUNCTION_OF_YOUR_KNEE_DURING_YOUR_USUAL_DAILY_ACTIVITIES_ON_A_SCALE_FROM_0_TO_100_WITH_100_BEING_YOUR_LEVEL_OF_KNEE_FUNCTION_PRIOR_TO_YOUR_INJURY_AND_0_BEING_THE_INABILITY_TO_PERFORM_ANY_OF_YOUR_USUAL_DAILY_ACTIVITIES?: 20
WEAKNESS: I HAVE THE SYMPTOM BUT IT DOES NOT AFFECT MY ACTIVITY
STIFFNESS: I DO NOT HAVE THE SYMPTOM
KNEEL ON THE FRONT OF YOUR KNEE: I AM UNABLE TO DO THE ACTIVITY
WALK: ACTIVITY IS MINIMALLY DIFFICULT
AS_A_RESULT_OF_YOUR_KNEE_INJURY,_HOW_WOULD_YOU_RATE_YOUR_CURRENT_LEVEL_OF_DAILY_ACTIVITY?: SEVERELY ABNORMAL

## 2020-02-17 NOTE — PROGRESS NOTES
[facility] Initial Evaluation     Present: no    Subjective:  Adilia Camacho is a 64 year old female with a left knee condition. Pt presents to PT s/p left meniscus root repair on 2/7/20. Pt reports that her knee was bothering her for quite a while but got quite a bit worse after she twisted her knee under standing up while playing with her grandchild. Then a few weeks later her left knee gave out completely on a curb went the ER. She then saw Dr. Robles and had an MRI which showed an menical root tear. She is currently TTWB for 6 weeks without a brace since the surgery. She is gradually improving after the surgery with decreased pain. She sees the surgeon again this week and then at 6 weeks. She denies any vague symptoms.      Symptoms commenced as a result of: Wear/age/overuse. Condition occurred in the following environment: time. Onset of symptoms: 2/7/20(DOS). Location of symptoms: left knee. Pain level on number scale: 4/10. Quality of pain: dull achy with intermittant shooting pain. Associated symptoms: stiffness, swelling. Pain frequency (constant/intermittent): constant. Symptoms are exacerbated by: movement, certain positions. Symptoms are relieved by: pain meds, certain positions. Progression of symptoms since onset (same/better/worse): better. Special tests (x-ray, MRI, CT scan, EMG, bone scan): See Epic, surgery under anestisa. Previous treatment: none. General health as reported by patient is good. Pertinent medical history includes: See Epic. Medical allergies: see Epic. Other pertinent surgeries: see Epic. Current medications: See Epic. Occupation: Banking. Patient is (working in normal job without restrictions/working in normal job with restrictions/working in an alternate job/not working due to present treatment problem): not currently working until after 6 weeks. Primary job tasks: sitting, computer work. Barriers at home/work: None reported by patient. Red flags:   None reported by patient.    Objective  Gait:  TTWB (currently not putting any weight through the left leg) with crutches no brace  Screening: none    Flexibility: hamstring and gastroc tight    Posture Right Left   Genu Valgum     Genu Varum     Genu Recurvatum     Patella [place]     Patella [place]       Knee AROM/PROM: R 0-0-135 pain free, L 0-22-52 prior to exercise (0-10-55 afterwards)    Hip ROM: NT    Knee Strength (* = pain) Right Left   FL 5/5 NT/5   EXT 5/5 NT/5   Quad Contraction (Good/Fair/Poor) good poor   Hip ABD NT/5 NT/5       Special Tests: None (post op)    Palpation Tenderness:  Incision site tender, popliteal region tender    Knee Joint Circumference Right Left   Joint Line 45   42.5   10 cm above mid patella       R 52.5   L 51  Accessory Motion: hypomobile patella left    Functional Squat: NT    Balance: NT    Other tests: none        Assessment/Plan:    Patient is a 64 year old female with left side knee complaints.    Patient has the following significant findings with corresponding treatment plan.                Diagnosis 1:  Left Knee pain, aftercare s/p left meniscus root repair  Pain -  manual therapy, self management, education and home program  Decreased ROM/flexibility - manual therapy and therapeutic exercise  Decreased joint mobility - manual therapy and therapeutic exercise  Decreased strength - therapeutic exercise and therapeutic activities  Impaired balance - neuro re-education and therapeutic activities  Impaired gait - gait training  Impaired muscle performance - neuro re-education  Decreased function - therapeutic activities  Impaired posture - neuro re-education    Therapy Evaluation Codes:   1) History comprised of:   Personal factors that impact the plan of care:      Age and Time since onset of symptoms.    Comorbidity factors that impact the plan of care are:      High blood pressure.     Medications impacting care: High blood pressure and Pain.  2) Examination of Body  Systems comprised of:   Body structures and functions that impact the plan of care:      Knee.   Activity limitations that impact the plan of care are:      Bathing, Bending, Cooking, Driving, Dressing, Jumping, Lifting, Reading/Computer work, Running, Sitting, Sports, Squatting/kneeling, Stairs, Standing, Walking, Working, Sleeping and Laying down.  3) Clinical presentation characteristics are:   Evolving/Changing.  4) Decision-Making    Low complexity using standardized patient assessment instrument and/or measureable assessment of functional outcome.  Cumulative Therapy Evaluation is: Low complexity.    Previous and current functional limitations:  (See Goal Flow Sheet for this information)    Short term and Long term goals: (See Goal Flow Sheet for this information)     Communication ability:  Patient appears to be able to clearly communicate and understand verbal and written communication and follow directions correctly.  Treatment Explanation - The following has been discussed with the patient:   RX ordered/plan of care  Anticipated outcomes  Possible risks and side effects  This patient would benefit from PT intervention to resume normal activities.   Rehab potential is good.    Frequency:  2 X week, once daily  Duration:  for 4 weeks, then 1 x week for 10 weeks   Discharge Plan:  Achieve all LTG.  Independent in home treatment program.  Reach maximal therapeutic benefit.    Please refer to the daily flowsheet for treatment today, total treatment time and time spent performing 1:1 timed codes.     Please Contact me with any questions or concerns. Thank you for for patience and cooperation.     Renzo Allen PT, DPT, [facility]  Physical Therapist  [facility]  [phone]

## 2020-02-19 ENCOUNTER — OFFICE VISIT (OUTPATIENT)
Dept: ORTHOPEDICS | Facility: CLINIC | Age: 65
End: 2020-02-19
Payer: COMMERCIAL

## 2020-02-19 DIAGNOSIS — M25.562 CHRONIC PAIN OF LEFT KNEE: Primary | ICD-10-CM

## 2020-02-19 DIAGNOSIS — G89.29 CHRONIC PAIN OF LEFT KNEE: Primary | ICD-10-CM

## 2020-02-19 NOTE — LETTER
Return to Work  2020     Seen today: yes    Patient:  Adilia Camacho  :   1955  MRN:     [id number]  Physician: DENISE ROBLES    Adilia Camacho may not return to work until after her 6 week follow-up with me.      The next clinic appointment is scheduled for (date/time) 3/18/20 at 10:30 am.    Patient limitations:  No work until follow-up.              Electronically signed by Denise Robles MD

## 2020-02-19 NOTE — LETTER
2/19/2020      RE: Adilia Camacho  [address]  [facility] [place] [facility] [address]       DIAGNOSIS:   1. Left meniscus root tear    PROCEDURES:  1. Transosseous repair of left medial meniscus root date of surgery 2/7/2020    HISTORY:  Doing well 1 week out from surgery.  Pain controlled.  Off narcotics.    EXAM:     General: Awake, Alert, and oriented. Articulates and communicates with a normal affect     Left lower Extremity:    Incisions well healed without evidence of infection    Normal post-operative effusion and ecchymosis    Range of motion and stability exam not performed    Neurovascularly intact    IMAGING:  None    ASSESSMENT:  1. 1 week following arthroscopic, transosseous lateral meniscus root repair    PLAN:     Toe-touch weightbearing x6 weeks    Range of motion 0 to 90 degrees x 6 weeks    Sutures removed in clinic    Leave steri-strips in place until they fall off    OK to shower allowing water to run over incision    No soaking, scrubbing, baths, or lake for 1 additional week    Continue PT as scheduled     Pain medications reviewed and no refills required.     Operative report provided and arthroscopic images reviewed    Follow up at 6 weeks from the date of surgery with no new X-Rays needed         Nacho Robles MD

## 2020-02-20 ENCOUNTER — DOCUMENTATION ONLY (OUTPATIENT)
Dept: ORTHOPEDICS | Facility: CLINIC | Age: 65
End: 2020-02-20

## 2020-02-20 NOTE — PROGRESS NOTES
Completed patient's [employer] short term disability paperwork. Copy was faxed to the [employer] at [phone] and sent to Medical Records for scanning.

## 2020-02-20 NOTE — PROGRESS NOTES
DIAGNOSIS:   1. Left meniscus root tear    PROCEDURES:  1. Transosseous repair of left medial meniscus root date of surgery 2/7/2020    HISTORY:  Doing well 1 week out from surgery.  Pain controlled.  Off narcotics.    EXAM:     General: Awake, Alert, and oriented. Articulates and communicates with a normal affect     Left lower Extremity:    Incisions well healed without evidence of infection    Normal post-operative effusion and ecchymosis    Range of motion and stability exam not performed    Neurovascularly intact    IMAGING:  None    ASSESSMENT:  1. 1 week following arthroscopic, transosseous lateral meniscus root repair    PLAN:     Toe-touch weightbearing x6 weeks    Range of motion 0 to 90 degrees x 6 weeks    Sutures removed in clinic    Leave steri-strips in place until they fall off    OK to shower allowing water to run over incision    No soaking, scrubbing, baths, or lake for 1 additional week    Continue PT as scheduled     Pain medications reviewed and no refills required.     Operative report provided and arthroscopic images reviewed    Follow up at 6 weeks from the date of surgery with no new X-Rays needed

## 2020-02-24 ENCOUNTER — THERAPY VISIT (OUTPATIENT)
Dept: PHYSICAL THERAPY | Facility: CLINIC | Age: 65
End: 2020-02-24
Payer: COMMERCIAL

## 2020-02-24 DIAGNOSIS — Z47.89 AFTERCARE FOLLOWING SURGERY OF THE MUSCULOSKELETAL SYSTEM: ICD-10-CM

## 2020-02-24 PROCEDURE — 97110 THERAPEUTIC EXERCISES: CPT | Mod: GP | Performed by: PHYSICAL THERAPIST

## 2020-03-02 ENCOUNTER — THERAPY VISIT (OUTPATIENT)
Dept: PHYSICAL THERAPY | Facility: CLINIC | Age: 65
End: 2020-03-02
Payer: COMMERCIAL

## 2020-03-02 DIAGNOSIS — Z47.89 AFTERCARE FOLLOWING SURGERY OF THE MUSCULOSKELETAL SYSTEM: ICD-10-CM

## 2020-03-02 PROCEDURE — 97110 THERAPEUTIC EXERCISES: CPT | Mod: GP | Performed by: PHYSICAL THERAPIST

## 2020-03-05 ENCOUNTER — THERAPY VISIT (OUTPATIENT)
Dept: PHYSICAL THERAPY | Facility: CLINIC | Age: 65
End: 2020-03-05
Payer: COMMERCIAL

## 2020-03-05 DIAGNOSIS — Z47.89 AFTERCARE FOLLOWING SURGERY OF THE MUSCULOSKELETAL SYSTEM: ICD-10-CM

## 2020-03-05 PROCEDURE — 97110 THERAPEUTIC EXERCISES: CPT | Mod: GP | Performed by: PHYSICAL THERAPIST

## 2020-03-09 ENCOUNTER — THERAPY VISIT (OUTPATIENT)
Dept: PHYSICAL THERAPY | Facility: CLINIC | Age: 65
End: 2020-03-09
Payer: COMMERCIAL

## 2020-03-09 DIAGNOSIS — Z47.89 AFTERCARE FOLLOWING SURGERY OF THE MUSCULOSKELETAL SYSTEM: ICD-10-CM

## 2020-03-09 PROCEDURE — 97110 THERAPEUTIC EXERCISES: CPT | Mod: GP | Performed by: PHYSICAL THERAPIST

## 2020-03-09 PROCEDURE — 97530 THERAPEUTIC ACTIVITIES: CPT | Mod: GP | Performed by: PHYSICAL THERAPIST

## 2020-03-12 ENCOUNTER — THERAPY VISIT (OUTPATIENT)
Dept: PHYSICAL THERAPY | Facility: CLINIC | Age: 65
End: 2020-03-12
Payer: COMMERCIAL

## 2020-03-12 DIAGNOSIS — Z47.89 AFTERCARE FOLLOWING SURGERY OF THE MUSCULOSKELETAL SYSTEM: ICD-10-CM

## 2020-03-12 PROCEDURE — 97530 THERAPEUTIC ACTIVITIES: CPT | Mod: GP | Performed by: PHYSICAL THERAPIST

## 2020-03-12 PROCEDURE — 97110 THERAPEUTIC EXERCISES: CPT | Mod: GP | Performed by: PHYSICAL THERAPIST

## 2020-03-18 ENCOUNTER — VIRTUAL VISIT (OUTPATIENT)
Dept: ORTHOPEDICS | Facility: CLINIC | Age: 65
End: 2020-03-18
Payer: COMMERCIAL

## 2020-03-18 DIAGNOSIS — G89.29 CHRONIC PAIN OF LEFT KNEE: Primary | ICD-10-CM

## 2020-03-18 DIAGNOSIS — M25.562 CHRONIC PAIN OF LEFT KNEE: Primary | ICD-10-CM

## 2020-03-18 DIAGNOSIS — I10 HYPERTENSION GOAL BP (BLOOD PRESSURE) < 140/90: ICD-10-CM

## 2020-03-18 NOTE — PROGRESS NOTES
DIAGNOSIS:   1. Left meniscus root tear    PROCEDURES:  1. Transosseous repair of left medial meniscus root date of surgery 2/7/2020    HISTORY:  Overall doing well.  Pain seems to be controlled.  Off narcotics.  Working with therapy.    EXAM:     No examination was completed today as this was a phone visit  IMAGING:  None    ASSESSMENT:  1. 6 weeks following arthroscopic, transosseous lateral meniscus root repair    PLAN:     Weightbearing as tolerated, range of motion as tolerated    No running or impact sports    Avoid kneeling or squatting    I am going to give her a new note holding her out of work until April 2 at that time she can go back with avoiding kneeling, squatting, climbing, jumping.  She will need an operative sit rest change positions as necessary.    Follow-up with me in clinic in 6 weeks    This visit was completed over the telephone secondary to the ongoing recommendations from the CDC, [place] Department of Health and our organization about limiting in person visits secondary to the Covid-19 pandemic. I spoke with the patient on the phone and had a medical discussion for 15 minutes.

## 2020-03-19 NOTE — TELEPHONE ENCOUNTER
"Requested Prescriptions   Pending Prescriptions Disp Refills     hydrochlorothiazide (HYDRODIURIL) 12.5 MG tablet [Pharmacy Med Name: HYDROCHLOROTHIAZIDE 12.5MG TABLETS] 90 tablet 0     Sig: TAKE 2 TABLETS(25 MG) BY MOUTH DAILY  Last Written Prescription Date:  12/17/2019  Last Fill Quantity: 90 tablet,  # refills: 0   Last Office Visit: 1/28/2020   Future Office Visit:            Diuretics (Including Combos) Protocol Passed - 3/18/2020  3:28 PM        Passed - Blood pressure under 140/90 in past 12 months     BP Readings from Last 3 Encounters:   02/07/20 129/72   01/28/20 136/84   12/17/19 (!) 142/86           Passed - Recent (12 mo) or future (30 days) visit within the authorizing provider's specialty     Patient has had an office visit with the authorizing provider or a provider within the authorizing providers department within the previous 12 mos or has a future within next 30 days. See \"Patient Info\" tab in inbasket, or \"Choose Columns\" in Meds & Orders section of the refill encounter.            Passed - Medication is active on med list        Passed - Patient is age 18 or older        Passed - No active pregancy on record        Passed - Normal serum creatinine on file in past 12 months     Recent Labs   Lab Test 01/28/20  0833   CR 0.84            Passed - Normal serum potassium on file in past 12 months     Recent Labs   Lab Test 01/28/20  0833   POTASSIUM 3.7            Passed - Normal serum sodium on file in past 12 months     Recent Labs   Lab Test 01/28/20  0833               Passed - No positive pregnancy test in past 12 months             "

## 2020-03-20 ENCOUNTER — THERAPY VISIT (OUTPATIENT)
Dept: PHYSICAL THERAPY | Facility: CLINIC | Age: 65
End: 2020-03-20
Payer: COMMERCIAL

## 2020-03-20 DIAGNOSIS — R26.9 ABNORMAL GAIT: Primary | ICD-10-CM

## 2020-03-20 DIAGNOSIS — Z47.89 AFTERCARE FOLLOWING SURGERY OF THE MUSCULOSKELETAL SYSTEM: ICD-10-CM

## 2020-03-20 PROCEDURE — 97110 THERAPEUTIC EXERCISES: CPT | Mod: GP | Performed by: PHYSICAL THERAPIST

## 2020-03-20 PROCEDURE — 97116 GAIT TRAINING THERAPY: CPT | Mod: GP | Performed by: PHYSICAL THERAPIST

## 2020-03-20 RX ORDER — HYDROCHLOROTHIAZIDE 12.5 MG/1
12.5 TABLET ORAL DAILY
Qty: 90 TABLET | Refills: 1 | Status: SHIPPED | OUTPATIENT
Start: 2020-03-20 | End: 2020-10-06

## 2020-03-20 NOTE — TELEPHONE ENCOUNTER
"Per chart review, patient was restarted on hydrochlorothiadize 12.5 mg DAILY on 12/17/19. Routing to PCP for dose review as it is not clear if patient should be taking 25 mg daily or 12.5 mg.     Last ov for pre-op 1/28/20: \"HYPERTENSION - Patient has history of HTN , currently does not report any symptoms referable to elevated blood pressure. Specifically there is no complaint of chest pain, palpitations, dyspnea, orthopnea, PND or peripheral edema. Blood pressure readings have not been in normal range. Current medication regimen is as listed below. Patient denies any side effects of medication.\"    Janelle Hilton, KORINN, RN    "

## 2020-04-07 ENCOUNTER — THERAPY VISIT (OUTPATIENT)
Dept: PHYSICAL THERAPY | Facility: CLINIC | Age: 65
End: 2020-04-07
Payer: COMMERCIAL

## 2020-04-07 DIAGNOSIS — Z47.89 AFTERCARE FOLLOWING SURGERY OF THE MUSCULOSKELETAL SYSTEM: ICD-10-CM

## 2020-04-07 PROCEDURE — 97110 THERAPEUTIC EXERCISES: CPT | Mod: GP | Performed by: PHYSICAL THERAPIST

## 2020-04-07 PROCEDURE — 97116 GAIT TRAINING THERAPY: CPT | Mod: GP | Performed by: PHYSICAL THERAPIST

## 2020-04-15 ENCOUNTER — TELEPHONE (OUTPATIENT)
Dept: ORTHOPEDICS | Facility: CLINIC | Age: 65
End: 2020-04-15

## 2020-04-15 DIAGNOSIS — Z98.890 STATUS POST KNEE SURGERY: ICD-10-CM

## 2020-04-15 RX ORDER — ACETAMINOPHEN 325 MG/1
650 TABLET ORAL EVERY 6 HOURS PRN
Qty: 120 TABLET | Refills: 0 | Status: SHIPPED | OUTPATIENT
Start: 2020-04-15

## 2020-04-15 NOTE — TELEPHONE ENCOUNTER
Refilled Tylenol prescription and sent to patient's pharmacy. Patient underwent meniscus root repair on 2/7/20

## 2020-04-15 NOTE — TELEPHONE ENCOUNTER
[facility] Call Center    Phone Message    May a detailed message be left on voicemail: yes     Reason for Call: Medication Refill Request    Has the patient contacted the pharmacy for the refill? Yes   Name of medication being requested: acetaminophen (TYLENOL) 325 MG tablet   Provider who prescribed the medication: Dr. Cantrell  Pharmacy: [id number] [place], [place] - [address] AT [facility] & [address] [place] [address]  Date medication is needed: ASAP, pt states she is nearly out     Action Taken: Message routed to:  [facility] ([facility] Ortho    Travel Screening: Not Applicable

## 2020-04-17 ENCOUNTER — THERAPY VISIT (OUTPATIENT)
Dept: PHYSICAL THERAPY | Facility: CLINIC | Age: 65
End: 2020-04-17
Payer: COMMERCIAL

## 2020-04-17 DIAGNOSIS — Z47.89 AFTERCARE FOLLOWING SURGERY OF THE MUSCULOSKELETAL SYSTEM: ICD-10-CM

## 2020-04-17 PROCEDURE — 97110 THERAPEUTIC EXERCISES: CPT | Mod: GP | Performed by: PHYSICAL THERAPIST

## 2020-04-17 PROCEDURE — 97116 GAIT TRAINING THERAPY: CPT | Mod: GP | Performed by: PHYSICAL THERAPIST

## 2020-04-28 ENCOUNTER — THERAPY VISIT (OUTPATIENT)
Dept: PHYSICAL THERAPY | Facility: CLINIC | Age: 65
End: 2020-04-28
Payer: COMMERCIAL

## 2020-04-28 DIAGNOSIS — Z47.89 AFTERCARE FOLLOWING SURGERY OF THE MUSCULOSKELETAL SYSTEM: ICD-10-CM

## 2020-04-28 PROCEDURE — 97110 THERAPEUTIC EXERCISES: CPT | Mod: GP | Performed by: PHYSICAL THERAPIST

## 2020-04-28 NOTE — PROGRESS NOTES
PROGRESS  REPORT    Progress reporting period is from 2/17/2020 to 4/28/2020.       SUBJECTIVE  Subjective changes noted by patient:   Patient reports improving pain symptoms, ROM, strength and overall function. Walking has been getting better. Using SEC in the community. Patient states she feels better with activity and worse with prolonged sitting and inactivity.    Current Pain level: 2/10.     Initial Pain level: 3/10.   Changes in function:  Yes (See Goal flowsheet attached for changes in current functional level)  Adverse reaction to treatment or activity: None    OBJECTIVE  Changes noted in objective findings:  Yes,   Objective: left knee AAROM: 0-7-105     ASSESSMENT/PLAN  Updated problem list and treatment plan: Diagnosis 1:  Left knee pain, Aftercare s/p left meniscus root repair  Pain -  manual therapy, self management, education, directional preference exercise and home program  Decreased ROM/flexibility - manual therapy and therapeutic exercise  Decreased strength - therapeutic exercise and therapeutic activities  Impaired balance - neuro re-education and therapeutic activities  Decreased proprioception - neuro re-education and therapeutic activities  Edema - self management/home program  Impaired gait - gait training  Impaired muscle performance - neuro re-education  Decreased function - therapeutic activities  STG/LTGs have been met or progress has been made towards goals:  Yes (See Goal flow sheet completed today.)  Assessment of Progress: The patient's condition is improving.  Self Management Plans:  Patient has been instructed in a home treatment program.  Patient  has been instructed in self management of symptoms.  I have re-evaluated this patient and find that the nature, scope, duration and intensity of the therapy is appropriate for the medical condition of the patient.  Adilia continues to require the following intervention to meet STG and LTG's:  PT    Recommendations:  This patient would  benefit from continued therapy.     Frequency:  1 X week, once daily  Duration:  for 8 weeks        Please refer to the daily flowsheet for treatment today, total treatment time and time spent performing 1:1 timed codes.

## 2020-04-29 ENCOUNTER — VIRTUAL VISIT (OUTPATIENT)
Dept: ORTHOPEDICS | Facility: CLINIC | Age: 65
End: 2020-04-29
Payer: COMMERCIAL

## 2020-04-29 DIAGNOSIS — M25.562 CHRONIC PAIN OF LEFT KNEE: Primary | ICD-10-CM

## 2020-04-29 DIAGNOSIS — G89.29 CHRONIC PAIN OF LEFT KNEE: Primary | ICD-10-CM

## 2020-04-29 RX ORDER — DICLOFENAC SODIUM 75 MG/1
75 TABLET, DELAYED RELEASE ORAL 2 TIMES DAILY
Qty: 60 TABLET | Refills: 0 | Status: SHIPPED | OUTPATIENT
Start: 2020-04-29 | End: 2020-08-24

## 2020-04-29 NOTE — PROGRESS NOTES
"Adilia Camacho is a 64 year old female who is being evaluated via a billable video visit.      The patient has been notified of following:     \"This video visit will be conducted via a call between you and your physician/provider. We have found that certain health care needs can be provided without the need for an in-person physical exam.  This service lets us provide the care you need with a video conversation.  If a prescription is necessary we can send it directly to your pharmacy.  If lab work is needed we can place an order for that and you can then stop by our lab to have the test done at a later time.    Video visits are billed at different rates depending on your insurance coverage.  Please reach out to your insurance provider with any questions.    If during the course of the call the physician/provider feels a video visit is not appropriate, you will not be charged for this service.\"    Patient has given verbal consent for Video visit? Yes    How would you like to obtain your AVS? Lorraine    Patient would like the video invitation sent by: Send to e-mail at: [email or web address]    Will anyone else be joining your video visit? No      DIAGNOSIS:   1. Left meniscus root tear    PROCEDURES:  1. Transosseous repair of left medial meniscus root date of surgery 2/7/2020    HISTORY:  Off crutches, returned home. Doing PT. No brace, no crutches. Still gets swollen, working from home. Some difficulty with icy, has been more sedentary during Pandemic.    EXAM:     No examination was completed today as this was a phone visit    IMAGING:  None    ASSESSMENT:  1. 12 weeks following arthroscopic, transosseous lateral meniscus root repair    PLAN:     Weightbearing as tolerated, range of motion as tolerated    No running or impact sports    Avoid kneeling or squatting    Will start PO diclofenac, scheduled for 2 weeks and then prn. If not better will call my clinic and we can do a steroid injection      Video-Visit " Details    Type of service:  Video Visit    Video Start Time: 1005  Video End Time: 1030    Originating Location (pt. Location): Home    Distant Location (provider location):  [facility] SP[facility]IC[facility]     Mode of Communication:  Video Conference via [facility]      Nacho Robles MD

## 2020-04-30 ENCOUNTER — TELEPHONE (OUTPATIENT)
Dept: ORTHOPEDICS | Facility: CLINIC | Age: 65
End: 2020-04-30

## 2020-04-30 NOTE — TELEPHONE ENCOUNTER
[facility] Call Center    Phone Message    May a detailed message be left on voicemail: yes     Reason for Call: Medication Question or concern regarding medication   Prescription Clarification  Name of Medication: diclofenac (VOLTAREN) 75 MG EC tablet   Prescribing Provider: PRIYA   Pharmacy: [id number] - [facility] [address] AT [address]    What on the order needs clarification?  Pt read into this rx and has some concerns. Pt has high blood pressure and wants to be sure it'll be ok for pt to take this rx. Is there an alternative that pt could take instead? Please call back.      Action Taken: Other:  sport    Travel Screening: Not Applicable

## 2020-05-01 NOTE — TELEPHONE ENCOUNTER
Returned call to patient after discussing patient's medication concern with Dr. Robles. Relayed to her his response:    Yes. This is a little bit of a concern. I think overall the medicine is safe. Especially when used within the prescribing guidelines.     If she would prefer, she can just use tylenol. All other NSAIDS - IBU, Naproxen, diclofenac will have the same concerns.     With that said, when used for short periods of time, I think it is safe.     Patient expressed understanding and will try the medication starting Sunday when she returns from her cabin and will monitor her blood pressure. She will call for an injection appointment if this does not help after 2 weeks.

## 2020-05-06 DIAGNOSIS — K21.9 GASTROESOPHAGEAL REFLUX DISEASE, ESOPHAGITIS PRESENCE NOT SPECIFIED: ICD-10-CM

## 2020-05-12 ENCOUNTER — THERAPY VISIT (OUTPATIENT)
Dept: PHYSICAL THERAPY | Facility: CLINIC | Age: 65
End: 2020-05-12
Payer: COMMERCIAL

## 2020-05-12 DIAGNOSIS — Z47.89 AFTERCARE FOLLOWING SURGERY OF THE MUSCULOSKELETAL SYSTEM: ICD-10-CM

## 2020-05-12 PROCEDURE — 97110 THERAPEUTIC EXERCISES: CPT | Mod: GP | Performed by: PHYSICAL THERAPIST

## 2020-05-20 ENCOUNTER — TELEPHONE (OUTPATIENT)
Dept: ORTHOPEDICS | Facility: CLINIC | Age: 65
End: 2020-05-20

## 2020-05-20 NOTE — TELEPHONE ENCOUNTER
Called and scheduled patient with  she stated she has been off anti-inflammatories for a couple of days and has helped marginally, I scheduled her for an in person appointment for an injection. Building logistics were reviewed with the patient.

## 2020-05-20 NOTE — TELEPHONE ENCOUNTER
[facility] Call Center    Phone Message    May a detailed message be left on voicemail: yes     Reason for Call: Other: Pt requesting call back. Pt stated that she finished her script of anti inflamitories and still has symptoms. Pt needing to know next steps, if she should come in for a steriod shot that Dr. Robles recommended or try another medication      Action Taken: Message routed to:  [facility] sports med

## 2020-05-27 ENCOUNTER — OFFICE VISIT (OUTPATIENT)
Dept: ORTHOPEDICS | Facility: CLINIC | Age: 65
End: 2020-05-27
Payer: COMMERCIAL

## 2020-05-27 DIAGNOSIS — M25.562 CHRONIC PAIN OF LEFT KNEE: Primary | ICD-10-CM

## 2020-05-27 DIAGNOSIS — G89.29 CHRONIC PAIN OF LEFT KNEE: Primary | ICD-10-CM

## 2020-05-27 RX ADMIN — LIDOCAINE HYDROCHLORIDE 8 ML: 5 INJECTION, SOLUTION INFILTRATION; INTRAVENOUS at 11:46

## 2020-05-27 RX ADMIN — TRIAMCINOLONE ACETONIDE 40 MG: 40 INJECTION, SUSPENSION INTRA-ARTICULAR; INTRAMUSCULAR at 11:46

## 2020-05-27 NOTE — NURSING NOTE
[address] [address]  Dept: [phone]  ______________________________________________________________________________    Patient: Adilia Camacho   : 1955   MRN: [id number]   May 27, 2020    INVASIVE PROCEDURE SAFETY CHECKLIST    Date: 20    Procedure: Left knee CSI with Kenalog  Patient Name: Adilia Camacho  MRN: [id number]  YOB: 1955    Action: Complete sections as appropriate. Any discrepancy results in a HARD COPY until resolved.     PRE PROCEDURE:  Patient ID verified with 2 identifiers (name and  or MRN): Yes  Procedure and site verified with patient/designee (when able): Yes  Accurate consent documentation in medical record: Yes  H&P (or appropriate assessment) documented in medical record: Yes  H&P must be up to 20 days prior to procedure and updates within 24 hours of procedure as applicable: NA  Relevant diagnostic and radiology test results appropriately labeled and displayed as applicable: Yes  Procedure site(s) marked with provider initials: NA    TIMEOUT:  Time-Out performed immediately prior to starting procedure, including verbal and active participation of all team members addressing the following:Yes  * Correct patient identify  * Confirmed that the correct side and site are marked  * An accurate procedure consent form  * Agreement on the procedure to be done  * Correct patient position  * Relevant images and results are properly labeled and appropriately displayed  * The need to administer antibiotics or fluids for irrigation purposes during the procedure as applicable   * Safety precautions based on patient history or medication use    DURING PROCEDURE: Verification of correct person, site, and procedures any time the responsibility for care of the patient is transferred to another member of the care team.       Prior to injection, verified patient identity using patient's name and date of  birth.  Due to injection administration, patient instructed to remain in clinic for 15 minutes  afterwards, and to report any adverse reaction to me immediately.    Joint injection was performed.      Drug Amount Wasted:  42 of 50mg Lidocaine  Vial/Syringe: Single dose vial  Expiration Date:  2/23      Thania Birch, [facility]  May 27, 2020

## 2020-05-27 NOTE — LETTER
5/27/2020      RE: Adilia Camacho  [address]  [facility] [address]       DIAGNOSIS:   1. Left meniscus root tear    PROCEDURES:  1. Transosseous repair of left medial meniscus root date of surgery 2/7/2020    HISTORY:  Overall doing well.  Wean from the crutches.  Definitely has seen some improvement, the oral diclofenac to help however she still has some limitations in light of this she like to proceed with a steroid injection she returns to clinic today to complete.    EXAM:     Examination today shows full range of motion of her knee.  Ligaments are stable.  Neurovascular intact.  Well-healed surgical incisions.    IMAGING:  None    ASSESSMENT:  1. Approximately 14 weeks following arthroscopic, transosseous lateral meniscus root repair    PLAN:     Weightbearing as tolerated, range of motion as tolerated    No running or impact sports    Overall she is doing well.  I do think she is improved from her preoperative state.  She certainly saw some symptoms.  I think some of the swelling that she is having is a recovered in the arthritis particular patellofemoral compartment.  She did not see big benefit for the diclofenac it did help however she would like to try the injection.  Is what we discussed at her last note.    After written informed consent obtained and signed, after sufficient prepping and sterile technique, 40 mg of kenalog and , 8 cc of 1% lidocaine were injected without complication into the left knee. The patient tolerated the injection well and a sterile dressing was applied.         Large Joint Injection/Arthocentesis: L knee joint    Date/Time: 5/27/2020 11:46 AM  Performed by: Nacho Robles MD  Authorized by: Nacho Robles MD     Needle Size:  22 G  Guidance: landmark guided    Approach:  Anterolateral  Location:  Knee      Medications:  40 mg triamcinolone 40 MG/ML; 8 mL lidocaine (PF) 0.5 %  Outcome:  Tolerated well, no immediate  complications  Procedure discussed: discussed risks, benefits, and alternatives    Consent Given by:  Patient  Timeout: timeout called immediately prior to procedure    Prep: patient was prepped and draped in usual sterile fashion     Scribed by Thania Birch ATC for Dr. Robles on 5/27/20 at 11:30AM, based on the provider s statements to me.      Nacho Robles MD

## 2020-05-27 NOTE — PROGRESS NOTES
DIAGNOSIS:   1. Left meniscus root tear    PROCEDURES:  1. Transosseous repair of left medial meniscus root date of surgery 2/7/2020    HISTORY:  Overall doing well.  Wean from the crutches.  Definitely has seen some improvement, the oral diclofenac to help however she still has some limitations in light of this she like to proceed with a steroid injection she returns to clinic today to complete.    EXAM:     Examination today shows full range of motion of her knee.  Ligaments are stable.  Neurovascular intact.  Well-healed surgical incisions.    IMAGING:  None    ASSESSMENT:  1. Approximately 14 weeks following arthroscopic, transosseous lateral meniscus root repair    PLAN:     Weightbearing as tolerated, range of motion as tolerated    No running or impact sports    Overall she is doing well.  I do think she is improved from her preoperative state.  She certainly saw some symptoms.  I think some of the swelling that she is having is a recovered in the arthritis particular patellofemoral compartment.  She did not see big benefit for the diclofenac it did help however she would like to try the injection.  Is what we discussed at her last note.    After written informed consent obtained and signed, after sufficient prepping and sterile technique, 40 mg of kenalog and , 8 cc of 1% lidocaine were injected without complication into the left knee. The patient tolerated the injection well and a sterile dressing was applied.         Large Joint Injection/Arthocentesis: L knee joint    Date/Time: 5/27/2020 11:46 AM  Performed by: Nacho Robles MD  Authorized by: Nacho Robles MD     Needle Size:  22 G  Guidance: landmark guided    Approach:  Anterolateral  Location:  Knee      Medications:  40 mg triamcinolone 40 MG/ML; 8 mL lidocaine (PF) 0.5 %  Outcome:  Tolerated well, no immediate complications  Procedure discussed: discussed risks, benefits, and alternatives    Consent Given by:   Patient  Timeout: timeout called immediately prior to procedure    Prep: patient was prepped and draped in usual sterile fashion     Scribed by Thania Birch ATC for Dr. Robles on 5/27/20 at 11:30AM, based on the provider s statements to me.

## 2020-05-28 RX ORDER — TRIAMCINOLONE ACETONIDE 40 MG/ML
40 INJECTION, SUSPENSION INTRA-ARTICULAR; INTRAMUSCULAR
Status: SHIPPED | OUTPATIENT
Start: 2020-05-27

## 2020-05-28 RX ORDER — LIDOCAINE HYDROCHLORIDE 5 MG/ML
8 INJECTION, SOLUTION INFILTRATION; INTRAVENOUS
Status: SHIPPED | OUTPATIENT
Start: 2020-05-27

## 2020-07-21 ENCOUNTER — TELEPHONE (OUTPATIENT)
Dept: FAMILY MEDICINE | Facility: CLINIC | Age: 65
End: 2020-07-21

## 2020-07-21 NOTE — TELEPHONE ENCOUNTER
Little red bump-  noticed one a couple days ago(maybe Thursday)- now two or three  No itch or burn  Bumps located lower torso- rt side  Fri-Monday- [place]  Advised to do an evisit and send photos- but explained that shingles is typically fluid filled vesicles- that itch pre-erruption, and burn and hurt post eruption.  She did say she had some pain on that side last week  She did also say she took some acyclovir 400mg- one per day-   Pt in agreement to do evisit or to monitor  She is just worried about her  granddaughter and also said she should get the shingles shot  Advised she can discuss this is in evisit as well  Mary Kuo RN

## 2020-07-21 NOTE — TELEPHONE ENCOUNTER
Reason for Call:  Other call back    Detailed comments: Patient is requesting a call back RE concerns of shingles. States she had one spot, now two spots - changing in appearance, might be getting more. Please advise. Thanks!    Phone Number Patient can be reached at: Cell number on file:    Telephone Information:   Mobile [phone]     Best Time: Any    Can we leave a detailed message on this number? YES    Call taken on 7/21/2020 at 9:35 AM by Ev Macias

## 2020-07-22 PROBLEM — Z47.89 AFTERCARE FOLLOWING SURGERY OF THE MUSCULOSKELETAL SYSTEM: Status: RESOLVED | Noted: 2020-02-17 | Resolved: 2020-07-22

## 2020-07-22 NOTE — PROGRESS NOTES
Discharge Note    Progress reporting period is from initial evaluation date (please see noted date below) to May 12, 2020.  Linked Episodes   Type: Episode: Status: Noted: Resolved: Last update: Updated by:   PHYSICAL THERAPY Left Knee (s/p meniscus repair 2/7/20) 2/17/20 Active 2/17/2020 5/12/2020 10:38 AM Renzo Allen PT      Comments:       Adilia failed to follow up and current status is unknown.  Please see information below for last relevant information on current status.  Patient seen for 11 visits.    SUBJECTIVE  Subjective changes noted by patient:  Patient states she is on anti-inflammatories secondary to increased pain the past couple of weeks. Patient has stopped sitting for so long while working at home. Patient states she has increased pain today secondary to using riding  and emptying out grass  this past weekend at the cabin.  .  Current pain level is 4/10.     Previous pain level was  3/10.   Changes in function:  Yes (See Goal flowsheet attached for changes in current functional level)  Adverse reaction to treatment or activity: None    OBJECTIVE  Changes noted in objective findings: left knee AAROM: 0-6-105     ASSESSMENT/PLAN  Diagnosis: Left knee pain, Aftercare s/p left meniscus root repair   Updated problem list and treatment plan:     STG/LTGs have been met or progress has been made towards goals:  Yes, please see goal flowsheet for most current information  Assessment of Progress: current status is unknown.    Last current status: Pt has experienced exacerbation of symptoms   Self Management Plans:  HEP  I have re-evaluated this patient and find that the nature, scope, duration and intensity of the therapy is appropriate for the medical condition of the patient.  Adilia continues to require the following intervention to meet STG and LTG's:  HEP.    Recommendations:  Discharge with current home program.  Patient to follow up with MD as needed.    Please refer to the  daily flowsheet for treatment today, total treatment time and time spent performing 1:1 timed codes.

## 2020-08-10 DIAGNOSIS — K21.9 GASTROESOPHAGEAL REFLUX DISEASE, ESOPHAGITIS PRESENCE NOT SPECIFIED: ICD-10-CM

## 2020-08-14 ENCOUNTER — MYC REFILL (OUTPATIENT)
Dept: FAMILY MEDICINE | Facility: CLINIC | Age: 65
End: 2020-08-14

## 2020-08-14 DIAGNOSIS — K21.9 GASTROESOPHAGEAL REFLUX DISEASE, ESOPHAGITIS PRESENCE NOT SPECIFIED: ICD-10-CM

## 2020-08-18 ENCOUNTER — TELEPHONE (OUTPATIENT)
Dept: ORTHOPEDICS | Facility: CLINIC | Age: 65
End: 2020-08-18

## 2020-08-18 NOTE — TELEPHONE ENCOUNTER
ATC received a call from pt. Pt stated that today she was sitting and when she tried to stand up, she felt a snap on her left leg, right above the calf muscle. Pt rated her pain 8/10 when it happened. Her pain is 3/10 with rest. Pt also reported that she can't extend her knee fully. Pt reported no warmth and redness.         ATC consulted with Parvin. She informed that pt can be seen by our walk-in clinic today or by Dr. Robles on Monday. [facility] offered apt to pt. Pt would like to see Dr. Robles on Monday. Informed pt to RICE and give us a call if she notices abnormal symptoms. Pt verified understanding.              -Tai ATC- Orthopedics

## 2020-08-21 ASSESSMENT — ENCOUNTER SYMPTOMS
SLEEP DISTURBANCES DUE TO BREATHING: 0
MUSCLE WEAKNESS: 0
PALPITATIONS: 0
STIFFNESS: 0
SYNCOPE: 0
LIGHT-HEADEDNESS: 0
NECK PAIN: 0
MUSCLE CRAMPS: 1
LEG PAIN: 1
BACK PAIN: 0
EXERCISE INTOLERANCE: 0
MYALGIAS: 1
ARTHRALGIAS: 1
HYPERTENSION: 1
HYPOTENSION: 0
ORTHOPNEA: 0
JOINT SWELLING: 0

## 2020-08-24 ENCOUNTER — OFFICE VISIT (OUTPATIENT)
Dept: ORTHOPEDICS | Facility: CLINIC | Age: 65
End: 2020-08-24
Payer: COMMERCIAL

## 2020-08-24 DIAGNOSIS — M25.562 CHRONIC PAIN OF LEFT KNEE: Primary | ICD-10-CM

## 2020-08-24 DIAGNOSIS — G89.29 CHRONIC PAIN OF LEFT KNEE: Primary | ICD-10-CM

## 2020-08-24 RX ORDER — DICLOFENAC SODIUM 75 MG/1
75 TABLET, DELAYED RELEASE ORAL 2 TIMES DAILY
Qty: 60 TABLET | Refills: 0 | Status: SHIPPED | OUTPATIENT
Start: 2020-08-24 | End: 2021-06-21

## 2020-08-24 NOTE — PROGRESS NOTES
Patient seen and examined with the resident.     Assesment: Left knee pain following a 'tweak' last week.  6 months following meniscus root repair    Plan: I had a long discussion today with the patient regarding her knee.  This time I do not think that anything ominous is happened to her knee.  I think we get her through this.  Her pain seems to be posterior lateral.  I think she may have ruptured a Baker's cyst or strained her gastroc origin.  I do not think it necessarily represents a re-tear of her meniscus.    I am to start her on an oral anti-inflammatory, diclofenac, she can take this as needed, if he fails to see lasting benefit we could consider a steroid injection.    I did try to  her on what to expect going forward.  She likely will have some ups and downs as she goes through life and certainly when she is more active she may know more knee pain.  With that said I am glad to hear that she is improved from her preoperative state.  She can follow-up with me on an as-needed basis going forward particularly if she would like to proceed with a steroid injection.    I agree with history, physical and imaging as well as the assessment and plan as detailed by Dr. Ruby.

## 2020-08-24 NOTE — NURSING NOTE
"Reason For Visit:   Chief Complaint   Patient presents with     RECHECK     Left knee         Date of injury: 8/18/20  Type of injury: Patient stood up and felt a \"snap\" in her posterior lower leg causing her to almost drop to the floor. She states that it's been very tender to touch.    Last seen on 5/27/20 for a cortisone injection.     Date of surgery: 2/7/20  Type of surgery:   PROCEDURE:  1. Examination under anesthesia Left Knee  2. Left Knee arthroscopy  3. Transosseous repair of Left medial meniscus root      LMP 07/17/2012          Allergies   Allergen Reactions     Dogs      Dust Mites        Current Outpatient Medications   Medication     acetaminophen (TYLENOL) 325 MG tablet     acyclovir (ZOVIRAX) 400 MG tablet     aspirin (ASA) 81 MG tablet     atorvastatin (LIPITOR) 40 MG tablet     blood glucose (NO BRAND SPECIFIED) test strip     cetirizine (ZYRTEC) 10 MG tablet     diclofenac (VOLTAREN) 75 MG EC tablet     fluticasone (FLONASE) 50 MCG/ACT nasal spray     hydrochlorothiazide (HYDRODIURIL) 12.5 MG tablet     ibuprofen (ADVIL/MOTRIN) 600 MG tablet     ipratropium (ATROVENT HFA) 17 MCG/ACT inhaler     Lancets (ONETOUCH DELICA PLUS [id number]) MISC     metoprolol succinate ER (TOPROL-XL) 25 MG 24 hr tablet     omeprazole (PRILOSEC) 20 MG DR capsule     Current Facility-Administered Medications   Medication     lidocaine (PF) 0.5 % injection SOLN 8 mL     triamcinolone (KENALOG-40) injection 40 mg         Thania Birch, ATC    "

## 2020-08-24 NOTE — PROGRESS NOTES
DIAGNOSIS:   1. Left meniscus root tear    PROCEDURES:  1. Transosseous repair of left medial meniscus root date of surgery 2/7/2020    HISTORY:  Pt was doing well until last week when she felt a pop in her posterior calf/leg. States she has never experienced this before. Was severe enough that she almost fell to the floor. Does not feel like her knee did prior to surgery. States she has only taking tylenol. Reports pain is overall maybe improving. Is able to ambulate without gait aide. Denies any numbness or tingling. Denies any radiating pain. Did have an injection in 5/2020.    EXAM:     Examination today shows full range of motion of her knee. Posterior lateral non focal tenderness to palpation. Ligaments are stable.  Neurovascular intact.  Well-healed surgical incisions. No medial or lateral joint line pain    IMAGING:  None    ASSESSMENT:  1. 6mo s/p arthroscopic, transosseous lateral meniscus root repair; now with new posterolateral leg/knee pain    PLAN:     Likely symptomatic ruptured bakers cyst vs muscle strain    Rest, time, and prescription NSAID sent    If not improving could consider repeat injection    Follow up as needed    Norm Ruby MD  Fellow, Sports Medicine & Shoulder  [facility]

## 2020-08-24 NOTE — LETTER
8/24/2020         RE: Adilia Camacho  [address]  [facility] [address]        Dear Colleague,    Thank you for referring your patient, Adilia Camacho, to the [facility]. Please see a copy of my visit note below.    DIAGNOSIS:   1. Left meniscus root tear    PROCEDURES:  1. Transosseous repair of left medial meniscus root date of surgery 2/7/2020    HISTORY:  Pt was doing well until last week when she felt a pop in her posterior calf/leg. States she has never experienced this before. Was severe enough that she almost fell to the floor. Does not feel like her knee did prior to surgery. States she has only taking tylenol. Reports pain is overall maybe improving. Is able to ambulate without gait aide. Denies any numbness or tingling. Denies any radiating pain. Did have an injection in 5/2020.    EXAM:     Examination today shows full range of motion of her knee. Posterior lateral non focal tenderness to palpation. Ligaments are stable.  Neurovascular intact.  Well-healed surgical incisions. No medial or lateral joint line pain    IMAGING:  None    ASSESSMENT:  1. 6mo s/p arthroscopic, transosseous lateral meniscus root repair; now with new posterolateral leg/knee pain    PLAN:     Likely symptomatic ruptured bakers cyst vs muscle strain    Rest, time, and prescription NSAID sent    If not improving could consider repeat injection    Follow up as needed    Norm Ruby MD  Fellow, Sports Medicine & Shoulder  [facility]          Patient seen and examined with the resident.     Assesment: Left knee pain following a 'tweak' last week.  6 months following meniscus root repair    Plan: I had a long discussion today with the patient regarding her knee.  This time I do not think that anything ominous is happened to her knee.  I think we get her through this.  Her pain seems to be posterior lateral.  I think she may have ruptured a Baker's cyst or strained her gastroc origin.  I  do not think it necessarily represents a re-tear of her meniscus.    I am to start her on an oral anti-inflammatory, diclofenac, she can take this as needed, if he fails to see lasting benefit we could consider a steroid injection.    I did try to  her on what to expect going forward.  She likely will have some ups and downs as she goes through life and certainly when she is more active she may know more knee pain.  With that said I am glad to hear that she is improved from her preoperative state.  She can follow-up with me on an as-needed basis going forward particularly if she would like to proceed with a steroid injection.    I agree with history, physical and imaging as well as the assessment and plan as detailed by Dr. Ruby.       Again, thank you for allowing me to participate in the care of your patient.        Sincerely,        Nacho Robles MD

## 2020-10-28 ENCOUNTER — OFFICE VISIT (OUTPATIENT)
Dept: FAMILY MEDICINE | Facility: CLINIC | Age: 65
End: 2020-10-28
Payer: COMMERCIAL

## 2020-10-28 VITALS
SYSTOLIC BLOOD PRESSURE: 146 MMHG | DIASTOLIC BLOOD PRESSURE: 89 MMHG | TEMPERATURE: 98.2 F | BODY MASS INDEX: 36.69 KG/M2 | WEIGHT: 197.4 LBS | OXYGEN SATURATION: 98 % | HEART RATE: 83 BPM

## 2020-10-28 DIAGNOSIS — E66.01 MORBID OBESITY (H): ICD-10-CM

## 2020-10-28 DIAGNOSIS — E78.5 HYPERLIPIDEMIA, UNSPECIFIED HYPERLIPIDEMIA TYPE: ICD-10-CM

## 2020-10-28 DIAGNOSIS — E11.9 TYPE 2 DIABETES MELLITUS WITHOUT COMPLICATION, WITHOUT LONG-TERM CURRENT USE OF INSULIN (H): Primary | ICD-10-CM

## 2020-10-28 DIAGNOSIS — K21.9 GASTROESOPHAGEAL REFLUX DISEASE, UNSPECIFIED WHETHER ESOPHAGITIS PRESENT: ICD-10-CM

## 2020-10-28 DIAGNOSIS — M79.631 PAIN OF RIGHT FOREARM: ICD-10-CM

## 2020-10-28 DIAGNOSIS — J30.81 ALLERGIC RHINITIS DUE TO DOGS: ICD-10-CM

## 2020-10-28 DIAGNOSIS — R19.00 ABDOMINAL WALL BULGE: ICD-10-CM

## 2020-10-28 DIAGNOSIS — I10 HYPERTENSION GOAL BP (BLOOD PRESSURE) < 140/90: ICD-10-CM

## 2020-10-28 DIAGNOSIS — Z23 NEED FOR PROPHYLACTIC VACCINATION AND INOCULATION AGAINST INFLUENZA: ICD-10-CM

## 2020-10-28 LAB — HBA1C MFR BLD: 6.9 % (ref 0–5.6)

## 2020-10-28 PROCEDURE — 99214 OFFICE O/P EST MOD 30 MIN: CPT | Mod: 25 | Performed by: FAMILY MEDICINE

## 2020-10-28 PROCEDURE — 36415 COLL VENOUS BLD VENIPUNCTURE: CPT | Performed by: FAMILY MEDICINE

## 2020-10-28 PROCEDURE — 80061 LIPID PANEL: CPT | Performed by: FAMILY MEDICINE

## 2020-10-28 PROCEDURE — 83036 HEMOGLOBIN GLYCOSYLATED A1C: CPT | Performed by: FAMILY MEDICINE

## 2020-10-28 PROCEDURE — 90682 RIV4 VACC RECOMBINANT DNA IM: CPT | Performed by: FAMILY MEDICINE

## 2020-10-28 PROCEDURE — 80048 BASIC METABOLIC PNL TOTAL CA: CPT | Performed by: FAMILY MEDICINE

## 2020-10-28 PROCEDURE — 82043 UR ALBUMIN QUANTITATIVE: CPT | Performed by: FAMILY MEDICINE

## 2020-10-28 PROCEDURE — 90471 IMMUNIZATION ADMIN: CPT | Performed by: FAMILY MEDICINE

## 2020-10-28 RX ORDER — ATORVASTATIN CALCIUM 20 MG/1
20 TABLET, FILM COATED ORAL DAILY
Qty: 90 TABLET | Refills: 3 | Status: SHIPPED | OUTPATIENT
Start: 2020-10-28 | End: 2021-11-09

## 2020-10-28 RX ORDER — HYDROCHLOROTHIAZIDE 12.5 MG/1
TABLET ORAL
Qty: 90 TABLET | Refills: 3 | Status: SHIPPED | OUTPATIENT
Start: 2020-10-28 | End: 2021-06-21

## 2020-10-28 RX ORDER — METOPROLOL SUCCINATE 25 MG/1
25 TABLET, EXTENDED RELEASE ORAL DAILY
Qty: 90 TABLET | Refills: 3 | Status: SHIPPED | OUTPATIENT
Start: 2020-10-28 | End: 2021-11-09

## 2020-10-28 NOTE — PATIENT INSTRUCTIONS
1) Check your blood pressure daily for the next week and send or call in results.   2) Schedule with the diabetes educator again.   3) Schedule your ultrasound  4) Schedule with the hand therapist if you would like   5) Follow up with me in 3 months.

## 2020-10-28 NOTE — PROGRESS NOTES
SUBJECTIVE:   Adilia Camacho is a 65 year old female who presents to clinic today for the following health issues:    Here for bulge on her left side. Not painful. Just happened to notice that there is an asymmetrical appearance to her abdomen with a bulge on the left mid abdomen. No increase bulge with lifting or straining. Notices it more when standing.     Also due for chronic care follow up. Has been too busy to schedule DM ed, then COVID pandemic hit.   Diabetes Follow-up      How often are you checking your blood sugar? Not at all    What concerns do you have today about your diabetes? None     Do you have any of these symptoms? (Select all that apply)  Numbness in feet    Have you had a diabetic eye exam in the last 12 months? No        BP Readings from Last 2 Encounters:   10/28/20 (!) 146/89   02/07/20 129/72     Hemoglobin A1C (%)   Date Value   01/28/2020 6.8 (H)   10/17/2019 6.5 (H)     LDL Cholesterol Calculated (mg/dL)   Date Value   10/17/2019 122 (H)   03/24/2017 91               Hypertension Follow-up      Do you check your blood pressure regularly outside of the clinic? No     Are you following a low salt diet? Yes    Are your blood pressures ever more than 140 on the top number (systolic) OR more   than 90 on the bottom number (diastolic), for example 140/90? No      How many servings of fruits and vegetables do you eat daily?  0-1    On average, how many sweetened beverages do you drink each day (Examples: soda, juice, sweet tea, etc.  Do NOT count diet or artificially sweetened beverages)?   0    How many days per week do you exercise enough to make your heart beat faster? Depends on the weather, she also had knee surgery recently     How many minutes a day do you exercise enough to make your heart beat faster? Depends on what she is doing     How many days per week do you miss taking your medication? 0           Problem list and histories reviewed & adjusted, as indicated.  Additional  history: as documented    Patient Active Problem List   Diagnosis     Hypertension goal BP (blood pressure) < 140/90     CARDIOVASCULAR SCREENING; LDL GOAL LESS THAN 130     Allergic rhinitis due to dogs     Contraceptive management     Recurrent herpes labialis     Rosacea     Female stress incontinence     GERD (gastroesophageal reflux disease)     Advanced directives, counseling/discussion     Venous (peripheral) insufficiency     Obesity (BMI 35.0-39.9) with comorbidity (H)     Diabetes mellitus, type 2 (H)     Chronic pain of left knee     Past Surgical History:   Procedure Laterality Date     ARTHROSCOPY KNEE WITH MENISCAL REPAIR Left 2020    Procedure: Examination under anesthesia Left knee, Left knee arthroscopy, Left meniscus root repair;  Surgeon: Nacho Robles MD;  Location:  OR     NO HISTORY OF SURGERY         Social History     Tobacco Use     Smoking status: Former Smoker     Packs/day: 0.00     Years: 10.00     Pack years: 0.00     Types: Cigarettes     Start date: 1975     Quit date: 1984     Years since quittin.1     Smokeless tobacco: Never Used     Tobacco comment: I never was a heavy smoker never woke up and had a cigarette maybe 3 a day   Substance Use Topics     Alcohol use: Yes     Alcohol/week: 1.7 standard drinks     Comment: ocasionally     Family History   Problem Relation Age of Onset     Hypertension Father      Hypertension Brother      Hypertension Sister      Diabetes Sister         and brother     Kidney Disease Sister      Diabetes Brother      Diabetes Sister      Hypertension Sister      Anxiety Disorder Sister      Diabetes Brother      Hypertension Brother      Cancer - colorectal No family hx of      Breast Cancer No family hx of      Thyroid Disease No family hx of          Current Outpatient Medications   Medication Sig Dispense Refill     acetaminophen (TYLENOL) 325 MG tablet Take 2 tablets (650 mg) by mouth every 6 hours as needed for mild  pain 120 tablet 0     acyclovir (ZOVIRAX) 400 MG tablet TAKE 1 TABLET BY MOUTH THREE TIMES DAILY 30 tablet 1     aspirin (ASA) 81 MG tablet Take 2 tablets (162 mg) by mouth daily 56 tablet 0     blood glucose (NO BRAND SPECIFIED) test strip Use to test blood sugar 1 times daily or as directed. 50 each 11     cetirizine (ZYRTEC) 10 MG tablet Take 10 mg by mouth daily       diclofenac (VOLTAREN) 75 MG EC tablet Take 1 tablet (75 mg) by mouth 2 times daily 60 tablet 0     fluticasone (FLONASE) 50 MCG/ACT nasal spray SHAKE LIQUID AND USE 2 SPRAYS IN EACH NOSTRIL TWICE DAILY 48 mL 11     hydrochlorothiazide (HYDRODIURIL) 12.5 MG tablet TAKE 1 TABLET(12.5 MG) BY MOUTH DAILY 90 tablet 0     ibuprofen (ADVIL/MOTRIN) 600 MG tablet Take 1 tablet (600 mg) by mouth every 8 hours as needed for moderate pain 20 tablet 0     Lancets (ONETOUCH DELICA PLUS [id number]) MISC 1 each daily 100 each 11     metoprolol succinate ER (TOPROL-XL) 25 MG 24 hr tablet Take 1 tablet (25 mg) by mouth daily 90 tablet 3     omeprazole (PRILOSEC) 20 MG DR capsule TAKE 1 CAPSULE(20 MG) BY MOUTH DAILY 30 TO 60 MINUTES BEFORE A MEAL 90 capsule 0     ipratropium (ATROVENT HFA) 17 MCG/ACT inhaler Inhale 2 puffs into the lungs 4 times daily 12.9 g 3     Allergies   Allergen Reactions     Dogs      Dust Mites      Recent Labs   Lab Test 01/28/20  0833 10/31/19  0814 10/17/19  0939 04/12/19  1633 10/23/17  1604 10/23/17  1604 03/24/17  0739 03/22/16  0924 03/22/16  0924 02/15/13  1135 02/15/13  1135   A1C 6.8*  --  6.5* 6.4*  --   --  6.3*   < >  --   --   --    LDL  --   --  122*  --   --   --  91  --  107*  --  95   HDL  --   --  54  --   --   --  42*  --  50  --  43*   TRIG  --   --  128  --   --   --  115  --  182*  --  119   ALT  --   --   --   --   --   --   --   --   --   --  22   CR 0.84 0.83  --  0.86   < >  --  0.88  --  0.84   < > 0.76   GFRESTIMATED 74 75  --  73   < >  --  66  --  69   < > 79   GFRESTBLACK 86 87  --  84   < >  --  80  --  84   <  > >90   POTASSIUM 3.7 3.8  --  3.8   < >  --  3.9  --  4.2   < > 4.2   TSH  --   --   --   --   --  1.92  --   --   --   --   --     < > = values in this interval not displayed.      BP Readings from Last 3 Encounters:   10/28/20 (!) 146/89   02/07/20 129/72   01/28/20 136/84    Wt Readings from Last 3 Encounters:   10/28/20 89.5 kg (197 lb 6.4 oz)   02/07/20 86.2 kg (190 lb)   01/28/20 87.9 kg (193 lb 12.8 oz)              Reviewed and updated as needed this visit by clinical staff  Tobacco  Allergies  Meds            Reviewed and updated as needed this visit by Provider                 ROS:  As above     OBJECTIVE:     BP (!) 146/89 (BP Location: Right arm, Patient Position: Sitting, Cuff Size: Adult Large)   Pulse 83   Temp 98.2  F (36.8  C) (Temporal)   Wt 89.5 kg (197 lb 6.4 oz)   LMP 07/17/2012   SpO2 98%   BMI 36.69 kg/m    Body mass index is 36.69 kg/m .  GENERAL: healthy, alert and no distress  EYES: Eyes grossly normal to inspection, PERRL and conjunctivae and sclerae normal  RESP: lungs clear to auscultation - no rales, rhonchi or wheezes  CV: regular rate and rhythm, normal S1 S2, no S3 or S4, no murmur, click or rub, no peripheral edema and peripheral pulses strong  ABDOMEN: soft, nontender, no hepatosplenomegaly, there is a bulge mid left abdomen, but I am unable to appreciate a hernia in the area, no distinct mass, nontender.       The 10-year ASCVD risk score (Rebel DC Jr., et al., 2013) is: 14.8%    Values used to calculate the score:      Age: 65 years      Sex: Female      Is Non- : No      Diabetic: Yes      Tobacco smoker: No      Systolic Blood Pressure: 146 mmHg      Is BP treated: Yes      HDL Cholesterol: 54 mg/dL      Total Cholesterol: 202 mg/dL     Diagnostic Test Results:  Labs reviewed in [facility]  none   Lab Results   Component Value Date    A1C 6.9 10/28/2020    A1C 6.8 01/28/2020    A1C 6.5 10/17/2019    A1C 6.4 04/12/2019    A1C 6.3 03/24/2017           ASSESSMENT/PLAN:        1. Hypertension goal BP (blood pressure) < 140/90  Uncontrolled. See instructions below. May increase hydrochlorothiazide if continues to be high. For now continue hydrochlorothiazide 12.5mg daily, metoprolol 25mg daily. Eye exam recommended.   Recheck BMP and urine albumin today   Mildly elevated.  Hydrochlorothiazide 12.5 mg was started at the time of her last visit, but she ran out of the medication a couple of weeks ago.  She reports that while she was on hydrochlorothiazide, her blood pressure was in normal range.  I sent a refill today with instructions for her to follow-up with me in about 2 weeks so we can recheck her blood pressure and her BMP.     She continues metoprolol 25 mg daily.     2. Type 2 diabetes mellitus without complication, without long-term current use of insulin (H)  Controlled.   Check a1c today  Diagnosed about a year ago. Has not scheduled with DM ed or MTM yet, due to lack of time and the COVID pandemic. Previously discussed the diagnosis of type II DM and pathophysiology. Gave her pamphlets on diabetes and dietary recommendations at her visit in October. She will schedule with DM ed.      3.  Abdominal wall bulge   unable to appreciate discreet mass or hernia. Will evaluate with abdominal ultrasound.     4. Gastroesophageal reflux disease, esophagitis presence not specified  Controlled on omeprazole daily.  Cough is improved since she started treatment.  She has been able to change some lifestyle triggers as well.     5. Urgency incontinence  6. Urinary frequency--elevated BG could be contributing  I gave her some information on urgency incontinence In October.  Discussed the possibility of her seeing PT for pelvic floor therapy.       7. Hyperlipidemia   atorvastatin 40mg daily     8. Morbid obesity (H)  Encouraged working with a DM ed/nutritionist to make some dietary changes and work on weight loss.      9. Pain of right forearm  Referred to hand therapy.      Patient Instructions   1) Check your blood pressure daily for the next week and send or call in results.   2) Schedule with the diabetes educator again.   3) Schedule your ultrasound  4) Schedule with the hand therapist if you would like   5) Follow up with me in 3 months.       Sheree Graf M.D.        [facility]

## 2020-10-29 LAB
ANION GAP SERPL CALCULATED.3IONS-SCNC: 4 MMOL/L (ref 3–14)
BUN SERPL-MCNC: 13 MG/DL (ref 7–30)
CALCIUM SERPL-MCNC: 9.1 MG/DL (ref 8.5–10.1)
CHLORIDE SERPL-SCNC: 104 MMOL/L (ref 94–109)
CHOLEST SERPL-MCNC: 213 MG/DL
CO2 SERPL-SCNC: 30 MMOL/L (ref 20–32)
CREAT SERPL-MCNC: 0.75 MG/DL (ref 0.52–1.04)
GFR SERPL CREATININE-BSD FRML MDRD: 85 ML/MIN/{1.73_M2}
GLUCOSE SERPL-MCNC: 97 MG/DL (ref 70–99)
HDLC SERPL-MCNC: 57 MG/DL
LDLC SERPL CALC-MCNC: 119 MG/DL
NONHDLC SERPL-MCNC: 156 MG/DL
POTASSIUM SERPL-SCNC: 4.1 MMOL/L (ref 3.4–5.3)
SODIUM SERPL-SCNC: 138 MMOL/L (ref 133–144)
TRIGL SERPL-MCNC: 183 MG/DL

## 2020-10-29 NOTE — RESULT ENCOUNTER NOTE
The results of your recent lipid (cholesterol) profile were abnormal.    Here are the results:  Lab Results       Component                Value               Date                       CHOL                     213                 10/28/2020            Lab Results       Component                Value               Date                       HDL                      57                  10/28/2020            Lab Results       Component                Value               Date                       LDL                      119                 10/28/2020            Lab Results       Component                Value               Date                       TRIG                     183                 10/28/2020            Lab Results       Component                Value               Date                       CHOLHDLRDEBO             3.8                 02/15/2013              Desired or goal levels are:  CHOLESTEROL: Desirable is less than 200.   HDL (Good Cholesterol): Desirable is greater than 40 (for men) greater than 50 (for women).  LDL (Bad Cholesterol): Desirable is less than 130 (or less than 100 if you have heart disease or diabetes). Borderline 130-160.  TRIGLYCERIDES: Desirable is less than 150.  Borderline is 150-200.    We can recheck your lipids 2-3 months after you start the atorvastatin.    As you may know, an elevated cholesterol is one factor that increases your risk for heart disease and stroke. You can improve your cholesterol by controlling the amount and type of fat you eat and by increasing your daily activity level.    Here are some ways to improve your nutrition:  Eat less fat (especially butter, Crisco and other saturated fats)  Buy lean cuts of meat, reduce your portions of red meat or substitute poultry or fish  Use skim milk and low-fat dairy products  Eat no more than 4 egg yolks per week  Avoid fried or fast foods that are high in fat  Eat more fruits and vegetables      Also consider  starting or increasing your aerobic activity. Aerobic activity is the best way to improve HDL (good) cholesterol. If this would be new to you, please talk with me first about what activities are safe for you.      Other lab results:    The testing of your blood sugar, kidney function, and electrolytes was normal.    Please feel free to contact us with any questions or if you would like more information.    Sheree Graf M.D.

## 2020-10-30 LAB
CREAT UR-MCNC: 61 MG/DL
MICROALBUMIN UR-MCNC: 8 MG/L
MICROALBUMIN/CREAT UR: 13.48 MG/G CR (ref 0–25)

## 2020-11-14 ENCOUNTER — ANCILLARY PROCEDURE (OUTPATIENT)
Dept: ULTRASOUND IMAGING | Facility: CLINIC | Age: 65
End: 2020-11-14
Attending: FAMILY MEDICINE
Payer: COMMERCIAL

## 2020-11-14 DIAGNOSIS — R19.00 ABDOMINAL WALL BULGE: ICD-10-CM

## 2020-11-14 PROCEDURE — 76705 ECHO EXAM OF ABDOMEN: CPT | Mod: GC | Performed by: RADIOLOGY

## 2020-11-22 ENCOUNTER — HEALTH MAINTENANCE LETTER (OUTPATIENT)
Age: 65
End: 2020-11-22

## 2020-12-03 NOTE — TELEPHONE ENCOUNTER
RN returned patient's call. Per YOBANI Melendrez. The PT will be more after surgery. RN did however advise patient that she can actually do some exercise to pre-hab her knee as well and  Gave her a list of instructions to try. Patient verbalized understanding.    Jaquan Urrutia RN     Sign out requested from Dr. Porsha Quintero.

## 2020-12-23 DIAGNOSIS — J30.81 ALLERGIC RHINITIS DUE TO DOGS: ICD-10-CM

## 2020-12-24 RX ORDER — FLUTICASONE PROPIONATE 50 MCG
SPRAY, SUSPENSION (ML) NASAL
Qty: 48 G | Refills: 1 | Status: SHIPPED | OUTPATIENT
Start: 2020-12-24 | End: 2021-11-08

## 2021-02-11 ENCOUNTER — TELEPHONE (OUTPATIENT)
Dept: FAMILY MEDICINE | Facility: CLINIC | Age: 66
End: 2021-02-11

## 2021-02-11 NOTE — TELEPHONE ENCOUNTER
Diabetes Education Scheduling Outreach #1:    Call to patient to schedule. Patient declined to schedule and will call us back to schedule at a later time.    Keily Torres  [facility]  Diabetes and Nutrition Scheduling

## 2021-02-13 ENCOUNTER — HEALTH MAINTENANCE LETTER (OUTPATIENT)
Age: 66
End: 2021-02-13

## 2021-03-09 ENCOUNTER — MYC MEDICAL ADVICE (OUTPATIENT)
Dept: FAMILY MEDICINE | Facility: CLINIC | Age: 66
End: 2021-03-09

## 2021-03-10 NOTE — TELEPHONE ENCOUNTER
Writer responded via [employer].  KORIN DobbsN, RN  [facility]

## 2021-03-10 NOTE — TELEPHONE ENCOUNTER
Writer responded via [employer].  KORIN DobbsN, RN  [facility]

## 2021-03-11 ENCOUNTER — VIRTUAL VISIT (OUTPATIENT)
Dept: FAMILY MEDICINE | Facility: CLINIC | Age: 66
End: 2021-03-11
Payer: COMMERCIAL

## 2021-03-11 DIAGNOSIS — R42 DIZZINESS: Primary | ICD-10-CM

## 2021-03-11 DIAGNOSIS — E11.9 TYPE 2 DIABETES MELLITUS WITHOUT COMPLICATION, WITHOUT LONG-TERM CURRENT USE OF INSULIN (H): ICD-10-CM

## 2021-03-11 DIAGNOSIS — I10 HYPERTENSION GOAL BP (BLOOD PRESSURE) < 140/90: ICD-10-CM

## 2021-03-11 DIAGNOSIS — R07.89 CHEST PRESSURE: ICD-10-CM

## 2021-03-11 PROCEDURE — 99215 OFFICE O/P EST HI 40 MIN: CPT | Mod: GT | Performed by: FAMILY MEDICINE

## 2021-03-11 RX ORDER — LANCING DEVICE/LANCETS
KIT MISCELLANEOUS
Qty: 1 EACH | Refills: 0 | Status: SHIPPED | OUTPATIENT
Start: 2021-03-11 | End: 2021-03-11

## 2021-03-11 RX ORDER — GLUCOSAMINE HCL/CHONDROITIN SU 500-400 MG
CAPSULE ORAL
Qty: 100 EACH | Refills: 6 | Status: SHIPPED | OUTPATIENT
Start: 2021-03-11

## 2021-03-11 NOTE — TELEPHONE ENCOUNTER
No response received from patient.    Writer called patient: left message on identified voicemail stating this is a triage nurse following up on a [employer] message. May call back to speak with triage, or check [employer] messages.    Virtual visit with Dr. Graf cancelled for this morning and writer responded via [employer].  KORIN DobbsN, RN  [facility]

## 2021-03-11 NOTE — TELEPHONE ENCOUNTER
Patient had virtual visit with Dr. Graf today.  KIKA Duggan, KORINN, RN  [facility]

## 2021-03-11 NOTE — PATIENT INSTRUCTIONS
1) start flonase 2 sprays each nostril once daily to see if this helps with ear plugging and possibly dizziness.   2) Schedule a lab visit in the next few days   3) Continue your current medicines, but based on lab results or how you are feeling, we may stop the hydrochlorothiazide. I just don't want to make major changes before your trip.   4) Keep taking your blood pressure once daily. Check during an episode of dizziness if you can.   5) Do your best to hydrate well.   6) Given your episode of chest pressure when you were shoveling, avoid significant exertion for now until we can get a stress test. Schedule this for when you return from your trip (we can help schedule if they don't call you)   7) Schedule a visit with me for after you return.   8) Do not take decongestant as it can make your blood pressure too high.   9) Check your blood sugar daily.       Patient Education     Dizziness (Uncertain Cause)  Dizziness is a common symptom. It may be described as lightheadedness, spinning, or feeling like you are going to faint. Dizziness can have many causes.   Tell the healthcare provider about:     All medicines you take, including prescription, over-the-counter, herbs, and supplements    Any other symptoms you have    Any health problems you are being treated for    Any past major health problems you've had, such as a heart attack, balance issues, hearing problems, or blood pressure problems    Anything that causes the dizziness to get worse or better  Today's exam did not show an exact cause for your dizziness . Other tests may be needed. Follow up with your healthcare provider.   Home care    Dizziness that occurs with sudden standing may be a sign of mild dehydration. Drink extra fluids for the next few days.    If you recently started a new medicine, stopped a medicine, or had the dose of a current medicine changed, talk with the prescribing healthcare provider. Your medicine plan may need adjustment.    If  dizziness lasts more than a few seconds, sit or lie down until it passes. This may help prevent injury in case you pass out. Get up slowly when you feel better.    Don't drive or use power tools or dangerous equipment until you have had no dizziness for at least 48 hours.    Follow-up care  Follow up with your healthcare provider for further evaluation in the next 7 days, or as advised.   When to get medical advice  Call your healthcare provider for any of the following:     Worsening of symptoms or new symptoms    Repeated vomiting    Headache    Vision or hearing changes  Call 911  Call 911, or get medical care right away if any of these occur:     Chest, arm, neck, back, or jaw pain    Weakness of an arm or leg or one side of the face    Blood in vomit or stool (black or red color)    Shortness of breath    Feeling that your heart is fluttering or beating fast or hard (palpitations)    Passing out or seizure    Trouble walking or speaking  [employer] last reviewed this educational content on 2/1/2020 2000-2020 [employer]. All rights reserved. This information is not intended as a substitute for professional medical care. Always follow your healthcare professional's instructions.           Patient Education     Dizziness (Uncertain Cause)  Dizziness is a common symptom. It may be described as lightheadedness, spinning, or feeling like you are going to faint. Dizziness can have many causes.   Tell the healthcare provider about:     All medicines you take, including prescription, over-the-counter, herbs, and supplements    Any other symptoms you have    Any health problems you are being treated for    Any past major health problems you've had, such as a heart attack, balance issues, hearing problems, or blood pressure problems    Anything that causes the dizziness to get worse or better  Today's exam did not show an exact cause for your dizziness . Other tests may be needed. Follow up with your  healthcare provider.   Home care    Dizziness that occurs with sudden standing may be a sign of mild dehydration. Drink extra fluids for the next few days.    If you recently started a new medicine, stopped a medicine, or had the dose of a current medicine changed, talk with the prescribing healthcare provider. Your medicine plan may need adjustment.    If dizziness lasts more than a few seconds, sit or lie down until it passes. This may help prevent injury in case you pass out. Get up slowly when you feel better.    Don't drive or use power tools or dangerous equipment until you have had no dizziness for at least 48 hours.    Follow-up care  Follow up with your healthcare provider for further evaluation in the next 7 days, or as advised.   When to get medical advice  Call your healthcare provider for any of the following:     Worsening of symptoms or new symptoms    Repeated vomiting    Headache    Vision or hearing changes  Call 911  Call 911, or get medical care right away if any of these occur:     Chest, arm, neck, back, or jaw pain    Weakness of an arm or leg or one side of the face    Blood in vomit or stool (black or red color)    Shortness of breath    Feeling that your heart is fluttering or beating fast or hard (palpitations)    Passing out or seizure    Trouble walking or speaking  [employer] last reviewed this educational content on 2/1/2020 2000-2020 [employer]. All rights reserved. This information is not intended as a substitute for professional medical care. Always follow your healthcare professional's instructions.           Patient Education     Dizziness (Uncertain Cause)  Dizziness is a common symptom. It may be described as lightheadedness, spinning, or feeling like you are going to faint. Dizziness can have many causes.   Tell the healthcare provider about:     All medicines you take, including prescription, over-the-counter, herbs, and supplements    Any other symptoms you  have    Any health problems you are being treated for    Any past major health problems you've had, such as a heart attack, balance issues, hearing problems, or blood pressure problems    Anything that causes the dizziness to get worse or better  Today's exam did not show an exact cause for your dizziness . Other tests may be needed. Follow up with your healthcare provider.   Home care    Dizziness that occurs with sudden standing may be a sign of mild dehydration. Drink extra fluids for the next few days.    If you recently started a new medicine, stopped a medicine, or had the dose of a current medicine changed, talk with the prescribing healthcare provider. Your medicine plan may need adjustment.    If dizziness lasts more than a few seconds, sit or lie down until it passes. This may help prevent injury in case you pass out. Get up slowly when you feel better.    Don't drive or use power tools or dangerous equipment until you have had no dizziness for at least 48 hours.    Follow-up care  Follow up with your healthcare provider for further evaluation in the next 7 days, or as advised.   When to get medical advice  Call your healthcare provider for any of the following:     Worsening of symptoms or new symptoms    Repeated vomiting    Headache    Vision or hearing changes  Call 911  Call 911, or get medical care right away if any of these occur:     Chest, arm, neck, back, or jaw pain    Weakness of an arm or leg or one side of the face    Blood in vomit or stool (black or red color)    Shortness of breath    Feeling that your heart is fluttering or beating fast or hard (palpitations)    Passing out or seizure    Trouble walking or speaking  [employer] last reviewed this educational content on 2/1/2020 2000-2020 [employer]. All rights reserved. This information is not intended as a substitute for professional medical care. Always follow your healthcare professional's instructions.

## 2021-03-11 NOTE — PROGRESS NOTES
Adilia is a 65 year old who is being evaluated via a billable video visit.      How would you like to obtain your AVS? MyChart  If the video visit is dropped, the invitation should be resent by: Text to cell phone: [phone] (M)   Will anyone else be joining your video visit? No      Video Start Time: 10:06 AM    Assessment & Plan     Dizziness  unclear etiology with uncertain prognosis, requires further workup   Symptoms sound orthostatic in nature, but are somewhat vague. Will check BMP in the next few days to evaluate for electrolyte derangement. BP has not been significantly elevated and she has not had documented hypotension. I recommended checking BP  when she has an episode and once a day until she sees me. Her dizziness does not sound vertiginous--not suggestive of BPPV for example. I do wonder about possible ear effusion/ETD and allergies contributing to this vague sense of dizziness. She will try flonase (see below). Will check ears when she comes into clinic if sx not improving. Other possible contributors could be hydrochlorothiazide, uncontrolled DM (she has not been checking BG), increased stress recently.   - **Basic metabolic panel FUTURE anytime    Hypertension goal BP (blood pressure) < 140/90  Uncontrolled. BPs in the 140s systolic still at home  Discussed stopping hydrochlorothiazide (she has frequent urination when taking and would prefer different med) as I also wonder if it might be contributing to dizziness/lightheadedness. Given she will be traveling next week, opted to not change her hydrochlorothiazide right now, but rather check BMP to make sure no electrolyte abnormalities and continue hydrochlorothiazide until she returns, then will follow up with me in clinic and likely switch to lisinopril.     Type 2 diabetes mellitus without complication, without long-term current use of insulin (H)  Has not been checking her BP. ?whether sx could be related to elevated BG, though she does not  report other symptoms such as polyuria, polydipsia.   - blood glucose (NO BRAND SPECIFIED) test strip  Dispense: 100 strip; Refill: 3  - alcohol swab prep pads  Dispense: 100 each; Refill: 6  - blood glucose (NO BRAND SPECIFIED) lancets standard  Dispense: 100 each; Refill: 1    Chest pressure  Episode of exertional chest pressure 2 months ago, no recurrence. Given risk factors for cardiovascular disease, recommended further eval with stress test. Will plan on scheduling this when she returns from her trip and she will avoid significant exertion in the meantime and go to the ER if chest pain, shortness of breath occur.   - Exercise Stress Test - Adult         52 minutes spent on the date of the encounter doing chart review, history and exam, documentation and further activities as noted above       Patient Instructions   1) start flonase 2 sprays each nostril once daily to see if this helps with ear plugging and possibly dizziness.   2) Schedule a lab visit in the next few days   3) Continue your current medicines, but based on lab results or how you are feeling, we may stop the hydrochlorothiazide. I just don't want to make major changes before your trip.   4) Keep taking your blood pressure once daily. Check during an episode of dizziness if you can.   5) Do your best to hydrate well.   6) Given your episode of chest pressure when you were shoveling, avoid significant exertion for now until we can get a stress test. Schedule this for when you return from your trip (we can help schedule if they don't call you)   7) Schedule a visit with me for after you return.   8) Do not take decongestant as it can make your blood pressure too high.   9) Check your blood sugar daily.       No follow-ups on file.    Sheree Graf MD, MD [facility]    Delmi Pat is a 65 year old who presents for the following health issues      HPI       Dizziness  Onset/Duration: last week   Description:  "  Do you feel faint: no  Does it feel like the surroundings (bed, room) are moving: no, \"more in her head \"  Unsteady/off balance: YES - sometimes   Have you passed out or fallen: no  Intensity: mild  Progression of Symptoms: intermittent  Accompanying Signs & Symptoms:  Heart palpitations or chest pain: no  Nausea, vomiting: no  Weakness or lack of coordination in arms or legs: no  Vision or speech changes: no  Numbness or tingling: no  Ringing in ears (Tinnitus): YES- last night, feels like ears have been clogged or pressure   Hearing Loss: no  History:   Head trauma/concussion history: no  Previous similar symptoms: no  Recent bleeding history: no  Any new medications (BP?): no  Precipitating factors:   Worse with activity: no  Worse with head movement: no  Alleviating factors:   Does staying in a fixed position give relief: no   Therapies tried and outcome: None     BP -- has been checking and BPs are in the 140s range typically, one time 150s. Has not checked during dizzy episode. She wants to try something other than hydrochlorothiazide. Has to urinate frequently.     Dizziness -- she is unsure of pattern, but seems to notice the sensation more when she is standing from seated position. No vertigo sx. Seems more lightheaded or vague dizziness. Does not feel like she is going to faint. No accompanying palpitations, SOB, chest pain. Did not start when she started hydrochlorothiazide. It has been more recent, about a week. Sx are daily. Friday, under more stress, had dizziness more frequently and did not feel well, but vague and hard to describe. She does not feel ill at this point. Does feel her ears are plugged. Takes daily zyrtec or other antihistamine (per pt varies) at night. Does have allergy symptoms at times, but does not feel they are flaring. No recent change in med or OTC med. Does have fluticasone nasal spray, but not using lately. Not taking decongestant. Does not notice nasal congestion or post " nasal drip recently. Ears sometimes feel like they need to pop recently. No decrease in hearing or tinnitus.    Chest pressure -- had one episode about 2 months ago when she was shoveling very heavy snow and had sensation of chest pressure, so she stopped and it went away. Denies shortness of breath or other accompanying symptoms then. Has shoveled lighter snow several times since then and no chest pressure. Denies any exertional sx otherwise.     Has had swelling of one foot below where her surgery was. No swelling otherwise. No report of recent weight gain, PND, orthopnea.     Leaving on a trip to [place] without plans to be in public otherwise. Leaves next Wednesday through the weekend.       Review of Systems          Objective           Vitals:  No vitals were obtained today due to virtual visit.    Physical Exam   GENERAL: Healthy, alert and no distress  EYES: Eyes grossly normal to inspection.  No discharge or erythema, or obvious scleral/conjunctival abnormalities.  RESP: No audible wheeze, cough, or visible cyanosis.  No visible retractions or increased work of breathing.    SKIN: Visible skin clear. No significant rash, abnormal pigmentation or lesions.  NEURO: Cranial nerves grossly intact.  Mentation and speech appropriate for age.  PSYCH: Mentation appears normal, affect normal/bright, judgement and insight intact, normal speech and appearance well-groomed.                Video-Visit Details    Type of service:  Video Visit    Video End Time:10:50 AM    Originating Location (pt. Location): Home    Distant Location (provider location):  [facility]     Platform used for Video Visit: [email or web address]

## 2021-03-12 DIAGNOSIS — R42 DIZZINESS: ICD-10-CM

## 2021-03-12 PROCEDURE — 80048 BASIC METABOLIC PNL TOTAL CA: CPT | Performed by: FAMILY MEDICINE

## 2021-03-12 PROCEDURE — 36415 COLL VENOUS BLD VENIPUNCTURE: CPT | Performed by: FAMILY MEDICINE

## 2021-03-13 LAB
ANION GAP SERPL CALCULATED.3IONS-SCNC: 4 MMOL/L (ref 3–14)
BUN SERPL-MCNC: 13 MG/DL (ref 7–30)
CALCIUM SERPL-MCNC: 9 MG/DL (ref 8.5–10.1)
CHLORIDE SERPL-SCNC: 104 MMOL/L (ref 94–109)
CO2 SERPL-SCNC: 29 MMOL/L (ref 20–32)
CREAT SERPL-MCNC: 0.76 MG/DL (ref 0.52–1.04)
GFR SERPL CREATININE-BSD FRML MDRD: 83 ML/MIN/{1.73_M2}
GLUCOSE SERPL-MCNC: 123 MG/DL (ref 70–99)
POTASSIUM SERPL-SCNC: 4 MMOL/L (ref 3.4–5.3)
SODIUM SERPL-SCNC: 137 MMOL/L (ref 133–144)

## 2021-05-30 ENCOUNTER — HEALTH MAINTENANCE LETTER (OUTPATIENT)
Age: 66
End: 2021-05-30

## 2021-06-11 ENCOUNTER — MYC MEDICAL ADVICE (OUTPATIENT)
Dept: FAMILY MEDICINE | Facility: CLINIC | Age: 66
End: 2021-06-11

## 2021-06-14 NOTE — TELEPHONE ENCOUNTER
Appt tentatively scheduled on 6/21/21.    Writer responded via [employer].    ROCK Dobbs, RN  [facility]

## 2021-06-21 ENCOUNTER — OFFICE VISIT (OUTPATIENT)
Dept: FAMILY MEDICINE | Facility: CLINIC | Age: 66
End: 2021-06-21
Payer: COMMERCIAL

## 2021-06-21 VITALS
BODY MASS INDEX: 35.5 KG/M2 | HEART RATE: 69 BPM | SYSTOLIC BLOOD PRESSURE: 137 MMHG | OXYGEN SATURATION: 98 % | WEIGHT: 191 LBS | DIASTOLIC BLOOD PRESSURE: 78 MMHG | RESPIRATION RATE: 18 BRPM | TEMPERATURE: 98 F

## 2021-06-21 DIAGNOSIS — R07.89 CHEST PRESSURE: ICD-10-CM

## 2021-06-21 DIAGNOSIS — Z12.11 COLON CANCER SCREENING: ICD-10-CM

## 2021-06-21 DIAGNOSIS — R42 DIZZINESS: Primary | ICD-10-CM

## 2021-06-21 DIAGNOSIS — E11.9 TYPE 2 DIABETES MELLITUS WITHOUT COMPLICATION, WITHOUT LONG-TERM CURRENT USE OF INSULIN (H): ICD-10-CM

## 2021-06-21 DIAGNOSIS — K21.9 GASTROESOPHAGEAL REFLUX DISEASE, UNSPECIFIED WHETHER ESOPHAGITIS PRESENT: ICD-10-CM

## 2021-06-21 DIAGNOSIS — R05.9 COUGH: ICD-10-CM

## 2021-06-21 DIAGNOSIS — I10 HYPERTENSION GOAL BP (BLOOD PRESSURE) < 140/90: ICD-10-CM

## 2021-06-21 DIAGNOSIS — J30.81 ALLERGIC RHINITIS DUE TO DOGS: ICD-10-CM

## 2021-06-21 LAB — HBA1C MFR BLD: 6.9 % (ref 0–5.6)

## 2021-06-21 PROCEDURE — 36415 COLL VENOUS BLD VENIPUNCTURE: CPT | Performed by: FAMILY MEDICINE

## 2021-06-21 PROCEDURE — 83036 HEMOGLOBIN GLYCOSYLATED A1C: CPT | Performed by: FAMILY MEDICINE

## 2021-06-21 PROCEDURE — 99215 OFFICE O/P EST HI 40 MIN: CPT | Performed by: FAMILY MEDICINE

## 2021-06-21 PROCEDURE — 99207 PR FOOT EXAM NO CHARGE: CPT | Performed by: FAMILY MEDICINE

## 2021-06-22 ENCOUNTER — MYC MEDICAL ADVICE (OUTPATIENT)
Dept: FAMILY MEDICINE | Facility: CLINIC | Age: 66
End: 2021-06-22

## 2021-06-22 DIAGNOSIS — J20.9 ACUTE BRONCHITIS WITH SYMPTOMS > 10 DAYS: ICD-10-CM

## 2021-06-22 DIAGNOSIS — R05.9 COUGH: ICD-10-CM

## 2021-06-22 DIAGNOSIS — R05.8 POST-VIRAL COUGH SYNDROME: ICD-10-CM

## 2021-06-23 NOTE — TELEPHONE ENCOUNTER
Routing refill request to provider for review/approval because:  Drug not active on patient's medication list    Last filled: 1/28/2019 - 12.9 grams x 3 refills    Last visit: 6/21/2021    thank you

## 2021-06-29 DIAGNOSIS — B00.1 RECURRENT HERPES LABIALIS: ICD-10-CM

## 2021-07-01 RX ORDER — ACYCLOVIR 400 MG/1
TABLET ORAL
Qty: 30 TABLET | Refills: 1 | Status: SHIPPED | OUTPATIENT
Start: 2021-07-01 | End: 2022-07-21

## 2021-07-01 NOTE — TELEPHONE ENCOUNTER
---Prescription approved per [facility] Refill Protocol.       Sulema Serrato RN BSN     [facility]

## 2021-07-09 ENCOUNTER — TELEPHONE (OUTPATIENT)
Dept: FAMILY MEDICINE | Facility: CLINIC | Age: 66
End: 2021-07-09

## 2021-07-09 DIAGNOSIS — R05.8 POST-VIRAL COUGH SYNDROME: Primary | ICD-10-CM

## 2021-07-09 NOTE — TELEPHONE ENCOUNTER
Formulary Issue:Atrovent is > $600.  Asking for one of the alternatives to be sent in.  The preferred alternative is: Incruseelli, spiriva, serevent diskus  Please call/fax the pharmacy to change medication along with strength, directions, quantity and refills.  PLEASE - let patient know if/when new rx has been sent.      2 referrals were given at the 6/21/21 appointment.  Patient is asking what the Gastroenterology appointment is for and per chart review was given for colonoscopy.  Also patient will need to call her insurance regarding the ENT referral.    Granddaughter was sick after patient was seen.  Patient now just feels sick and has an extended cogh that she is not able to sort out from the cough with the acid reflux.  Patient will be seen by Dr West on Monday 7/1221.  Is on vacation at a cabin now.  Brynn Earl RN  [facility]

## 2021-07-12 ENCOUNTER — ANCILLARY PROCEDURE (OUTPATIENT)
Dept: GENERAL RADIOLOGY | Facility: CLINIC | Age: 66
End: 2021-07-12
Attending: FAMILY MEDICINE
Payer: COMMERCIAL

## 2021-07-12 ENCOUNTER — OFFICE VISIT (OUTPATIENT)
Dept: FAMILY MEDICINE | Facility: CLINIC | Age: 66
End: 2021-07-12
Payer: COMMERCIAL

## 2021-07-12 VITALS
RESPIRATION RATE: 15 BRPM | BODY MASS INDEX: 33.86 KG/M2 | HEIGHT: 62 IN | WEIGHT: 184 LBS | HEART RATE: 76 BPM | TEMPERATURE: 97.9 F | DIASTOLIC BLOOD PRESSURE: 84 MMHG | OXYGEN SATURATION: 96 % | SYSTOLIC BLOOD PRESSURE: 144 MMHG

## 2021-07-12 DIAGNOSIS — R05.9 COUGH: ICD-10-CM

## 2021-07-12 DIAGNOSIS — J01.90 ACUTE SINUSITIS WITH COEXISTING CONDITION REQUIRING PROPHYLACTIC TREATMENT: ICD-10-CM

## 2021-07-12 DIAGNOSIS — R05.9 COUGH: Primary | ICD-10-CM

## 2021-07-12 DIAGNOSIS — K21.00 GASTROESOPHAGEAL REFLUX DISEASE WITH ESOPHAGITIS WITHOUT HEMORRHAGE: ICD-10-CM

## 2021-07-12 DIAGNOSIS — J30.1 ALLERGIC RHINITIS DUE TO POLLEN, UNSPECIFIED SEASONALITY: ICD-10-CM

## 2021-07-12 DIAGNOSIS — E11.9 TYPE 2 DIABETES MELLITUS WITHOUT COMPLICATION, WITHOUT LONG-TERM CURRENT USE OF INSULIN (H): ICD-10-CM

## 2021-07-12 DIAGNOSIS — E66.01 MORBID OBESITY (H): ICD-10-CM

## 2021-07-12 DIAGNOSIS — I10 ESSENTIAL HYPERTENSION: ICD-10-CM

## 2021-07-12 PROCEDURE — 99214 OFFICE O/P EST MOD 30 MIN: CPT | Performed by: FAMILY MEDICINE

## 2021-07-12 PROCEDURE — 71046 X-RAY EXAM CHEST 2 VIEWS: CPT | Performed by: RADIOLOGY

## 2021-07-12 RX ORDER — MONTELUKAST SODIUM 10 MG/1
10 TABLET ORAL AT BEDTIME
Qty: 30 TABLET | Refills: 2 | Status: SHIPPED | OUTPATIENT
Start: 2021-07-12 | End: 2023-01-04

## 2021-07-12 RX ORDER — AZITHROMYCIN 250 MG/1
TABLET, FILM COATED ORAL
Qty: 6 TABLET | Refills: 0 | Status: SHIPPED | OUTPATIENT
Start: 2021-07-12 | End: 2021-07-17

## 2021-07-12 RX ORDER — ALBUTEROL SULFATE 90 UG/1
2 AEROSOL, METERED RESPIRATORY (INHALATION) EVERY 4 HOURS PRN
Qty: 18 G | Refills: 0 | Status: SHIPPED | OUTPATIENT
Start: 2021-07-12 | End: 2021-10-27

## 2021-07-12 RX ORDER — FAMOTIDINE 40 MG/1
40 TABLET, FILM COATED ORAL DAILY
Qty: 30 TABLET | Refills: 2 | Status: SHIPPED | OUTPATIENT
Start: 2021-07-12 | End: 2021-10-11

## 2021-07-12 ASSESSMENT — MIFFLIN-ST. JEOR: SCORE: 1342.87

## 2021-07-12 NOTE — PATIENT INSTRUCTIONS
Allergies -   Add singular at night     GERD -   Add Pepcid 40 mg daily   Continue to limit spicy foods, fatty foods, citrus foods, caffeinated beverages, chocolate and sparkling water.   No eating three hours before going to bed    Sinus/cough -   azithromycin 5 day course  Albuterol 2 puffs every 4 to 6 hours as needed for wheezing or shortness of breath

## 2021-07-12 NOTE — PROGRESS NOTES
Assessment & Plan     Cough  - H/o diabetes mellitus, BMI 33, GERD, allergies and cough. We discussed that cough likely due to uncontrolled GERD and allergies. Ordered CXR which showed no acute abnormalities. Due to wheezing ordered Albuterol PRN. Advised to continue current medications and add singular for allergies and Pepcid for GERD. Follow up in one week.  - XR Chest 2 Views; Future  - albuterol (PROAIR HFA/PROVENTIL HFA/VENTOLIN HFA) 108 (90 Base) MCG/ACT inhaler; Inhale 2 puffs into the lungs every 4 hours as needed for shortness of breath / dyspnea or wheezing  Dispense: 18 g; Refill: 0    Gastroesophageal reflux disease with esophagitis without hemorrhage  - discussed lifestyle modifications  - added pepcid 40 mg daily   - famotidine (PEPCID) 40 MG tablet; Take 1 tablet (40 mg) by mouth daily  Dispense: 30 tablet; Refill: 2  - follow up in one week    Allergic rhinitis due to pollen, unspecified seasonality  - montelukast (SINGULAIR) 10 MG tablet; Take 1 tablet (10 mg) by mouth At Bedtime  Dispense: 30 tablet; Refill: 2    Acute sinusitis with coexisting condition requiring prophylactic treatment  - azithromycin (ZITHROMAX) 250 MG tablet; Take 2 tablets (500 mg) by mouth daily for 1 day, THEN 1 tablet (250 mg) daily for 4 days.  Dispense: 6 tablet; Refill: 0  - Follow if symptoms worsen or fail to improve.    Essential hypertension  - not addressed during visit  - advised to follow up in one week        Return in about 1 week (around 7/19/2021) for Routine Visit.    Katina West MD  [facility]    Delmi Pat is a 65 year old who presents for the following health issues     HPI     RESPIRATORY SYMPTOMS    Cough (dry) for two months.   Granddaughter was sick around June 30, 2021 and since then she has had cold symptoms. She feels that she has  Nasal congestion, sinus pressure, nasal discharge, PND and throat is raw. At times she has wheezing. She has chest  "soreness from coughing.  No fever, chills or headache.  She will return to work and concerned about the cough.   She is trying to eliminate milk.   She has an ENT appt in one week.   No recent travel.   She was taking atorvent which did help.   GERD - she has been careful not eating later in the evening and limiting citrus foods. She is currently taking Prilosec 20 mg daily and takes pepcid here and there.   No history of travel outside [place].   Light smoker quit in mid 20's.    Review of Systems         Objective    BP (!) 144/84 (BP Location: Left arm, Patient Position: Chair, Cuff Size: Adult Regular)   Pulse 76   Temp 97.9  F (36.6  C) (Tympanic)   Resp 15   Ht 1.575 m (5' 2\")   Wt 83.5 kg (184 lb)   LMP 07/17/2012   SpO2 96%   BMI 33.65 kg/m    Body mass index is 33.65 kg/m .  Physical Exam   GENERAL: healthy, alert and no distress  HENT: ear canals and TM's normal, nose and mouth without ulcers or lesions  NECK: no adenopathy  RESP: lungs clear to auscultation - no rales, rhonchi or wheezes  CV: regular rate and rhythm, normal S1 S2    CXR - Reviewed and interpreted by me Normal- no infiltrates, effusions, pneumothoraces, cardiomegaly or masses            "

## 2021-08-02 ENCOUNTER — TRANSFERRED RECORDS (OUTPATIENT)
Dept: HEALTH INFORMATION MANAGEMENT | Facility: CLINIC | Age: 66
End: 2021-08-02

## 2021-08-03 RX ORDER — LANCING DEVICE/LANCETS
KIT MISCELLANEOUS
COMMUNITY
Start: 2021-03-11 | End: 2024-03-11

## 2021-08-03 NOTE — TELEPHONE ENCOUNTER
"Referral for Gastroenterology signed, agree.  Would continue with pulmonology just to be safe and since she already has the appointment.  Thanks  Lexi \"Joe\" APPLE Rg   "
Lexi Rg (Joe) Jaison CHIU    Please review my chart message and advise     GI referral pended if you agree with patient visiting GI     Libbysa Arana Registered Nurse   [facility]       
Patient notified via my chart     Libby Arana Registered Nurse   [facility]     
Never

## 2021-08-04 ENCOUNTER — OFFICE VISIT (OUTPATIENT)
Dept: FAMILY MEDICINE | Facility: CLINIC | Age: 66
End: 2021-08-04
Payer: COMMERCIAL

## 2021-08-04 VITALS
TEMPERATURE: 97.1 F | WEIGHT: 185.04 LBS | BODY MASS INDEX: 34.05 KG/M2 | HEART RATE: 70 BPM | RESPIRATION RATE: 16 BRPM | OXYGEN SATURATION: 99 % | DIASTOLIC BLOOD PRESSURE: 82 MMHG | SYSTOLIC BLOOD PRESSURE: 144 MMHG | HEIGHT: 62 IN

## 2021-08-04 DIAGNOSIS — J30.1 ALLERGIC RHINITIS DUE TO POLLEN, UNSPECIFIED SEASONALITY: ICD-10-CM

## 2021-08-04 DIAGNOSIS — Z00.00 ROUTINE GENERAL MEDICAL EXAMINATION AT A HEALTH CARE FACILITY: Primary | ICD-10-CM

## 2021-08-04 DIAGNOSIS — K21.9 GASTROESOPHAGEAL REFLUX DISEASE, UNSPECIFIED WHETHER ESOPHAGITIS PRESENT: ICD-10-CM

## 2021-08-04 DIAGNOSIS — I10 ESSENTIAL HYPERTENSION: ICD-10-CM

## 2021-08-04 DIAGNOSIS — M54.42 ACUTE LEFT-SIDED LOW BACK PAIN WITH LEFT-SIDED SCIATICA: ICD-10-CM

## 2021-08-04 DIAGNOSIS — E11.9 TYPE 2 DIABETES MELLITUS WITHOUT COMPLICATION, WITHOUT LONG-TERM CURRENT USE OF INSULIN (H): ICD-10-CM

## 2021-08-04 DIAGNOSIS — Z11.4 SCREENING FOR HIV (HUMAN IMMUNODEFICIENCY VIRUS): ICD-10-CM

## 2021-08-04 DIAGNOSIS — R05.9 COUGH: ICD-10-CM

## 2021-08-04 PROCEDURE — 99213 OFFICE O/P EST LOW 20 MIN: CPT | Mod: 25 | Performed by: FAMILY MEDICINE

## 2021-08-04 PROCEDURE — 87624 HPV HI-RISK TYP POOLED RSLT: CPT | Performed by: FAMILY MEDICINE

## 2021-08-04 PROCEDURE — G0296 VISIT TO DETERM LDCT ELIG: HCPCS | Performed by: FAMILY MEDICINE

## 2021-08-04 PROCEDURE — 99396 PREV VISIT EST AGE 40-64: CPT | Performed by: FAMILY MEDICINE

## 2021-08-04 PROCEDURE — G0145 SCR C/V CYTO,THINLAYER,RESCR: HCPCS | Performed by: FAMILY MEDICINE

## 2021-08-04 RX ORDER — HYDROCHLOROTHIAZIDE 12.5 MG/1
12.5 TABLET ORAL DAILY
Qty: 90 TABLET | Refills: 1 | Status: CANCELLED | OUTPATIENT
Start: 2021-08-04

## 2021-08-04 ASSESSMENT — ENCOUNTER SYMPTOMS
BREAST MASS: 0
COUGH: 1
FREQUENCY: 1

## 2021-08-04 ASSESSMENT — MIFFLIN-ST. JEOR: SCORE: 1347.59

## 2021-08-04 NOTE — PATIENT INSTRUCTIONS
I recommend getting the new shingles vaccine, Shingrix.  You can call your insurance company to find out if this vaccine is covered.  You may also ask our pharmacy to check if your insurance covers Shingrix if given at the pharmacy.         Preventive Health Recommendations  Female Ages 50 - 64    Yearly exam: See your health care provider every year in order to  o Review health changes.   o Discuss preventive care.    o Review your medicines if your doctor has prescribed any.      Get a Pap test every three years (unless you have an abnormal result and your provider advises testing more often).    If you get Pap tests with HPV test, you only need to test every 5 years, unless you have an abnormal result.     You do not need a Pap test if your uterus was removed (hysterectomy) and you have not had cancer.    You should be tested each year for STDs (sexually transmitted diseases) if you're at risk.     Have a mammogram every 1 to 2 years.    Have a colonoscopy at age 50, or have a yearly FIT test (stool test). These exams screen for colon cancer.      Have a cholesterol test every 5 years, or more often if advised.    Have a diabetes test (fasting glucose) every three years. If you are at risk for diabetes, you should have this test more often.     If you are at risk for osteoporosis (brittle bone disease), think about having a bone density scan (DEXA).    Shots: Get a flu shot each year. Get a tetanus shot every 10 years.    Nutrition:     Eat at least 5 servings of fruits and vegetables each day.    Eat whole-grain bread, whole-wheat pasta and brown rice instead of white grains and rice.    Get adequate Calcium and Vitamin D.     Lifestyle    Exercise at least 150 minutes a week (30 minutes a day, 5 days a week). This will help you control your weight and prevent disease.    Limit alcohol to one drink per day.    No smoking.     Wear sunscreen to prevent skin cancer.     See your dentist every six months for an  exam and cleaning.    See your eye doctor every 1 to 2 years.    Patient Education     Tips to Control Acid Reflux    To control acid reflux, you ll need to make some basic diet and lifestyle changes. The simple steps outlined below may be all you ll need to ease discomfort.   Watch what you eat    Don't have fatty foods or spicy foods.    Eat fewer acidic foods, such as citrus and tomato-based foods. These can increase symptoms.    Limit drinking alcohol, caffeine, and fizzy beverages. All increase acid reflux.    Try limiting chocolate, peppermint, and spearmint. These can make acid reflux worse in some people.    Watch when you eat    Don't lie down for 3 hours after eating.    Don't snack before going to bed.    Raise your head  Raising your head and upper body by 4 to 6 inches helps limit reflux when you re lying down. Put blocks under the head of your bed frame or a wedge under your mattress to raise it.   Other changes    Lose weight, if you need to    Don t exercise near bedtime    Don't wear tight-fitting clothes    Limit aspirin and ibuprofen    Stop smoking    [employer] last reviewed this educational content on 6/1/2019 2000-2021 [employer]. All rights reserved. This information is not intended as a substitute for professional medical care. Always follow your healthcare professional's instructions.

## 2021-08-04 NOTE — PROGRESS NOTES
SUBJECTIVE:   CC: Adilia Camacho is an 65 year old woman who presents for preventive health visit.     {  Healthy Habits:     Getting at least 3 servings of Calcium per day:  Yes    Bi-annual eye exam:  NO    Dental care twice a year:  Yes    Sleep apnea or symptoms of sleep apnea:  None    Diet:  Other    Frequency of exercise:  2-3 days/week    Duration of exercise:  30-45 minutes    Taking medications regularly:  Yes    Medication side effects:  None    PHQ-2 Total Score: 0    Additional concerns today:  Yes     Cough improved with singulair, z-pack. Still some cough. Cough worse when she sits down. Can be active, working around the yard and does not have cough. Saw ENT yesterday and was told she has some vocal cord inflammation. He recommended speech therapy and was going to give her a referral. She plans to do this. She also has been taking pepcid 40mg daily and that seems to have helped. She has been working on making dietary changes for GERD, but finds this seems very limiting to diet, so not eating as much and has lost some weight in the process. Reports home blood pressures and blood sugars are better.        Today's PHQ-2 Score:   PHQ-2 ( 1999 Pfizer) 8/4/2021   Q1: Little interest or pleasure in doing things 0   Q2: Feeling down, depressed or hopeless 0   PHQ-2 Score 0   Q1: Little interest or pleasure in doing things Not at all   Q2: Feeling down, depressed or hopeless Not at all   PHQ-2 Score 0       Abuse: Current or Past (Physical, Sexual or Emotional) - No  Do you feel safe in your environment? Yes    Have you ever done Advance Care Planning? (For example, a Health Directive, POLST, or a discussion with a medical provider or your loved ones about your wishes): No, advance care planning information given to patient to review.  Patient plans to discuss their wishes with loved ones or provider.      Social History     Tobacco Use     Smoking status: Former Smoker     Packs/day: 0.00     Years:  10.00     Pack years: 0.00     Types: Cigarettes     Start date: 1975     Quit date: 1984     Years since quittin.9     Smokeless tobacco: Never Used     Tobacco comment: I never was a heavy smoker never woke up and had a cigarette maybe 3 a day   Substance Use Topics     Alcohol use: Yes     Alcohol/week: 1.7 standard drinks     Comment: ocasionally     If you drink alcohol do you typically have >3 drinks per day or >7 drinks per week? No    Alcohol Use 2021   Prescreen: >3 drinks/day or >7 drinks/week? No   Prescreen: >3 drinks/day or >7 drinks/week? -   No flowsheet data found.    Reviewed orders with patient.  Reviewed health maintenance and updated orders accordingly - Yes       Breast Cancer Screening:  Any new diagnosis of family breast, ovarian, or bowel cancer? No       Pertinent mammograms are reviewed under the imaging tab.    History of abnormal Pap smear: NO - age 30-65 PAP every 5 years with negative HPV co-testing recommended  PAP / HPV Latest Ref Rng & Units 3/22/2016 10/28/2011   PAP (Historical) - NIL NIL   HPV16 NEG Negative -   HPV18 NEG Negative -   HRHPV NEG Negative -     Reviewed and updated as needed this visit by clinical staff  Tobacco  Allergies  Meds   Med Hx  Surg Hx  Fam Hx  Soc Hx        Reviewed and updated as needed this visit by Provider                Past Medical History:   Diagnosis Date     Allergic rhinitis due to dogs      CARDIOVASCULAR SCREENING; LDL GOAL LESS THAN 130      Contraceptive management      Diabetes mellitus, type 2 (H) 10/31/2019     Hypertension goal BP (blood pressure) < 140/90      Other chronic pain     Left knee     Recurrent herpes labialis      Rosacea       Past Surgical History:   Procedure Laterality Date     ARTHROSCOPY KNEE WITH MENISCAL REPAIR Left 2020    Procedure: Examination under anesthesia Left knee, Left knee arthroscopy, Left meniscus root repair;  Surgeon: Nacho Robles MD;  Location:  OR       "HISTORY OF SURGERY         Review of Systems   Respiratory: Positive for cough.    Cardiovascular: Positive for peripheral edema.   Breasts:  Negative for tenderness, breast mass and discharge.   Genitourinary: Positive for frequency and urgency. Negative for pelvic pain, vaginal bleeding and vaginal discharge.           OBJECTIVE:   BP (!) 144/82 (BP Location: Right arm, Patient Position: Sitting, Cuff Size: Adult Large)   Pulse 70   Temp 97.1  F (36.2  C) (Temporal)   Resp 16   Ht 1.575 m (5' 2\")   Wt 83.9 kg (185 lb 0.6 oz)   LMP 07/17/2012   SpO2 99%   BMI 33.84 kg/m     Wt Readings from Last 5 Encounters:   08/04/21 83.9 kg (185 lb 0.6 oz)   07/12/21 83.5 kg (184 lb)   06/21/21 86.6 kg (191 lb)   10/28/20 89.5 kg (197 lb 6.4 oz)   02/07/20 86.2 kg (190 lb)      Physical Exam  GENERAL APPEARANCE: healthy, alert and no distress  EYES: Eyes grossly normal to inspection, PERRL and conjunctivae and sclerae normal  HENT: ear canals and TM's normal, nose and mouth without ulcers or lesions, oropharynx clear and oral mucous membranes moist  NECK: no adenopathy, no asymmetry, masses, or scars and thyroid normal to palpation  RESP: lungs clear to auscultation - no rales, rhonchi or wheezes  BREAST: normal without masses, tenderness or nipple discharge and no palpable axillary masses or adenopathy  CV: regular rate and rhythm, normal S1 S2, no S3 or S4, no murmur, click or rub, no peripheral edema and peripheral pulses strong  ABDOMEN: soft, nontender, no hepatosplenomegaly, no masses and bowel sounds normal   (female): normal female external genitalia, normal urethral meatus, vaginal mucosal atrophy noted, normal cervix  MS: no musculoskeletal defects are noted and gait is age appropriate without ataxia  SKIN: no suspicious lesions or rashes  NEURO: Normal strength and tone, sensory exam grossly normal, mentation intact and speech normal  PSYCH: mentation appears normal and affect normal/bright    Diagnostic " Test Results:  Labs reviewed in Epic    ASSESSMENT/PLAN:       ICD-10-CM    1. Routine general medical examination at a health care facility  Z00.00    2. Screening for cervical cancer  Z12.4      Routine general medical examination at a health care facility  Mammogram was completed on 9/17/2019 -- she will schedule  Pap with cotesting was normal in 2016.  She is due for repeat Pap with HPV cotesting today  She has never completed colon cancer screening.  She prefers to do a colonoscopy.  Referral was provided today.           Hypertension goal BP (blood pressure) < 140/90  Home bp has been in normal range.  Continues metoprolol 25mg daily     Gastroesophageal reflux disease, esophagitis presence not specified  Improved control on omeprazole 20mg daily and famotidine 40mg daily. Encouraged lifestyle modifications. If not resolved by follow up in a month consider EGD      Cough  Recently started singulair, which seems to help and she is tolerating it well. Atrovent was sent, but not covered by insurance, so Incruse Ellipta was selected as an alternative to address possible post viral cough. 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  She had used Singulair and her cough improved significantly so she stopped the medication.  She also felt it was making her anxious.  She felt that Atrovent inhaler was helpful in the setting of her cough, but then stopped it when her cough resolved.Her cough is that likely secondary to combination of GERD and postviral cough syndrome.  Her PFTs and chest x-ray were negative.  No further pulmonary work-up was necessary.  If symptoms return, pulmonology recommended initiation of inhaled Atrovent and Singulair for treatment.  She would like to have refill available of the Atrovent and Singulair.      Allergic rhinitis    Continues flonase and started singulair         Type 2 diabetes mellitus without complication, without long-term  "current use of insulin (H)  Controlled.due for recheck next month. Encouraged scheduling with DM ed.         Chest pressure -- she never scheduled a stress test. I again encouraged her to do so.   Schedule stress test. Has not had further episodes of chest pressure with exertion despite working in her yard.      Episode of exertional chest pressure 2 months ago, no recurrence. Given risk factors for cardiovascular disease, recommended further eval with stress test. Will plan on scheduling this when she returns from her trip and she will avoid significant exertion in the meantime and go to the ER if chest pain, shortness of breath occur.   - Exercise Stress Test - Adult  Patient has been advised of split billing requirements and indicates understanding: Yes  COUNSELING:  Reviewed preventive health counseling, as reflected in patient instructions    Estimated body mass index is 33.84 kg/m  as calculated from the following:    Height as of this encounter: 1.575 m (5' 2\").    Weight as of this encounter: 83.9 kg (185 lb 0.6 oz).    Weight management plan: diet and exercise    She reports that she quit smoking about 36 years ago. Her smoking use included cigarettes. She started smoking about 45 years ago. She smoked 0.00 packs per day for 10.00 years. She has never used smokeless tobacco.      Counseling Resources:  ATP IV Guidelines  Pooled Cohorts Equation Calculator  Breast Cancer Risk Calculator  BRCA-Related Cancer Risk Assessment: FHS-7 Tool  FRAX Risk Assessment  ICSI Preventive Guidelines  Dietary Guidelines for Americans, 2010  USDA's MyPlate  ASA Prophylaxis  Lung CA Screening    Sheree Graf MD   [facility]  "

## 2021-08-06 LAB
BKR LAB AP GYN ADEQUACY: NORMAL
BKR LAB AP GYN INTERPRETATION: NORMAL
BKR LAB AP HPV REFLEX: NORMAL
BKR LAB AP PREVIOUS ABNORMAL: NORMAL
PATH REPORT.COMMENTS IMP SPEC: NORMAL
PATH REPORT.RELEVANT HX SPEC: NORMAL

## 2021-08-10 LAB
HUMAN PAPILLOMA VIRUS 16 DNA: NEGATIVE
HUMAN PAPILLOMA VIRUS 18 DNA: NEGATIVE
HUMAN PAPILLOMA VIRUS FINAL DIAGNOSIS: NORMAL
HUMAN PAPILLOMA VIRUS OTHER HR: NEGATIVE

## 2021-08-11 PROBLEM — Z12.4 CERVICAL CANCER SCREENING: Status: ACTIVE | Noted: 2021-08-11

## 2021-09-19 ENCOUNTER — HEALTH MAINTENANCE LETTER (OUTPATIENT)
Age: 66
End: 2021-09-19

## 2021-09-21 ENCOUNTER — ANCILLARY PROCEDURE (OUTPATIENT)
Dept: MAMMOGRAPHY | Facility: CLINIC | Age: 66
End: 2021-09-21
Payer: COMMERCIAL

## 2021-09-21 DIAGNOSIS — Z12.31 VISIT FOR SCREENING MAMMOGRAM: ICD-10-CM

## 2021-09-21 DIAGNOSIS — Z00.00 PREVENTATIVE HEALTH CARE: ICD-10-CM

## 2021-09-21 PROCEDURE — 77067 SCR MAMMO BI INCL CAD: CPT | Mod: GC | Performed by: STUDENT IN AN ORGANIZED HEALTH CARE EDUCATION/TRAINING PROGRAM

## 2021-09-21 PROCEDURE — 77063 BREAST TOMOSYNTHESIS BI: CPT | Mod: GC | Performed by: STUDENT IN AN ORGANIZED HEALTH CARE EDUCATION/TRAINING PROGRAM

## 2021-10-11 ENCOUNTER — OFFICE VISIT (OUTPATIENT)
Dept: FAMILY MEDICINE | Facility: CLINIC | Age: 66
End: 2021-10-11
Payer: COMMERCIAL

## 2021-10-11 VITALS
DIASTOLIC BLOOD PRESSURE: 83 MMHG | HEART RATE: 69 BPM | SYSTOLIC BLOOD PRESSURE: 141 MMHG | WEIGHT: 184.08 LBS | BODY MASS INDEX: 33.67 KG/M2 | OXYGEN SATURATION: 100 % | TEMPERATURE: 97.2 F

## 2021-10-11 DIAGNOSIS — I10 HYPERTENSION GOAL BP (BLOOD PRESSURE) < 140/90: ICD-10-CM

## 2021-10-11 DIAGNOSIS — K21.9 GASTROESOPHAGEAL REFLUX DISEASE, UNSPECIFIED WHETHER ESOPHAGITIS PRESENT: ICD-10-CM

## 2021-10-11 DIAGNOSIS — R05.9 COUGH: ICD-10-CM

## 2021-10-11 DIAGNOSIS — E78.5 HYPERLIPIDEMIA, UNSPECIFIED HYPERLIPIDEMIA TYPE: ICD-10-CM

## 2021-10-11 DIAGNOSIS — R07.89 CHEST PRESSURE: ICD-10-CM

## 2021-10-11 DIAGNOSIS — R05.8 POST-VIRAL COUGH SYNDROME: ICD-10-CM

## 2021-10-11 DIAGNOSIS — K21.00 GASTROESOPHAGEAL REFLUX DISEASE WITH ESOPHAGITIS WITHOUT HEMORRHAGE: ICD-10-CM

## 2021-10-11 DIAGNOSIS — E11.9 TYPE 2 DIABETES MELLITUS WITHOUT COMPLICATION, WITHOUT LONG-TERM CURRENT USE OF INSULIN (H): Primary | ICD-10-CM

## 2021-10-11 DIAGNOSIS — R39.15 URINARY URGENCY: ICD-10-CM

## 2021-10-11 DIAGNOSIS — J30.1 ALLERGIC RHINITIS DUE TO POLLEN, UNSPECIFIED SEASONALITY: ICD-10-CM

## 2021-10-11 PROCEDURE — 90471 IMMUNIZATION ADMIN: CPT | Performed by: FAMILY MEDICINE

## 2021-10-11 PROCEDURE — 90682 RIV4 VACC RECOMBINANT DNA IM: CPT | Performed by: FAMILY MEDICINE

## 2021-10-11 PROCEDURE — 99214 OFFICE O/P EST MOD 30 MIN: CPT | Mod: 25 | Performed by: FAMILY MEDICINE

## 2021-10-11 RX ORDER — FAMOTIDINE 40 MG/1
40 TABLET, FILM COATED ORAL DAILY
Qty: 90 TABLET | Refills: 3 | Status: SHIPPED | OUTPATIENT
Start: 2021-10-11 | End: 2022-10-07

## 2021-10-11 NOTE — PATIENT INSTRUCTIONS
1) Continue famotidine 40mg daily, which treats acid reflux and the cough that can come along with acid reflux  2) Stop the omeprazole if you are able (if cough and acid reflux do not come back)   3) You can stay off the singulair (montelukast).   4) Continue voice therapy.   5) Schedule your diabetic eye appointment.   6) Schedule physical therapy for your back and your pelvic floor.   7) Schedule colonoscopy     You need a colonoscopy:  This is a lifesaving screening test - please call to set up an appointment today.  [phone] ([facility])  or   [phone] ([address])      Sheree Graf MD   [address] [address]   Phone: [phone]   Fax: [phone]    Diagnoses: Acute left-sided low back pain with left-sided sciatica   Order: [facility] Referral    [address] [address]   Phone: [phone]      Comment: Physical Therapy, Hand Therapy and Chiropractic Care are available through:   *[facility]   *Hand Therapy (Occupational Therapy or Physical Therapy)   *[facility]      Call one number to schedule at any of the above locations: [phone].      Physical therapy, Hand therapy and/or Chiropractic care has been recommended by your physician as an excellent treatment option to reduce pain and help people return to normal activities, including sports.  Therapy and/or chiropractic care services are a great complement or alternative to expensive and invasive surgery, injections, or long-term use of prescription medications. The primary goal is to identify the underlying problem and provide you the tools to manage your condition on your own.       Please be aware that coverage of these services is subject to the terms and limitations of your health insurance plan.  Call member services at your health plan with any benefit or  coverage questions.        Please bring the following to your appointment:   *Your personal calendar for scheduling future appointments   *Comfortable clothing

## 2021-10-11 NOTE — PROGRESS NOTES
Assessment & Plan        Hypertension goal BP (blood pressure) < 140/90  Home bp has been in normal range.  Slightly high BP today in clinic,  But was rushing to get here. Continues metoprolol 25mg daily      Hyperlipidemia   Continues atorvastatin 20 mg daily.      gastroesophageal reflux disease, esophagitis presence not specified  Improved control on omeprazole 20mg daily and famotidine 40mg daily.  We will try stopping omeprazole to see if she can maintain well with famotidine and lifestyle modifications.      Cough  Postviral cough  Improving since she started working with the speech therapist for vocal cord dysfunction and inflammation. She has stopped the singulair.     Recently started singulair, which seems to help and she is tolerating it well. Atrovent was sent, but not covered by insurance, so Incruse Ellipta was selected as an alternative to address possible post viral cough. 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  She had used Singulair and her cough improved significantly so she stopped the medication.  She also felt it was making her anxious.  She felt that Atrovent inhaler was helpful in the setting of her cough, but then stopped it when her cough resolved.Her cough is that likely secondary to combination of GERD and postviral cough syndrome.  Her PFTs and chest x-ray were negative.  No further pulmonary work-up was necessary.  If symptoms return, pulmonology recommended initiation of inhaled Atrovent and Singulair for treatment.  She would like to have refill available of the Atrovent and Singulair.        Allergic rhinitis    Continues flonase intermittently   Also uses Zyrtec.     Urinary urgency, longstanding  Had previously discussed pelvic floor therapy.  She is interested in referral today.  Referral given.    type 2 diabetes mellitus without complication, without long-term current use of insulin (H)  Controlled. due for recheck in  December.   I reminded her to schedule an eye exam.        Chest pressure -- she never scheduled a stress test. I again encouraged her to do so.   Schedule stress test. She notes today that she does a lot of yard work and does not have chest pressure or pain since that one episode about 2 years ago.     Has not had further episodes of chest pressure with exertion despite working in her yard.      Episode of exertional chest pressure 2 months ago, no recurrence. Given risk factors for cardiovascular disease, recommended further eval with stress test. Will plan on scheduling this when she returns from her trip and she will avoid significant exertion in the meantime and go to the ER if chest pain, shortness of breath occur.   - Exercise Stress Test - Adult    Flu shot recommended. Pneumovax recommended.           Patient Instructions     1) Continue famotidine 40mg daily, which treats acid reflux and the cough that can come along with acid reflux  2) Stop the omeprazole if you are able (if cough and acid reflux do not come back)   3) You can stay off the singulair (montelukast).   4) Continue voice therapy.   5) Schedule your diabetic eye appointment.   6) Schedule physical therapy for your back and your pelvic floor.   7) Schedule colonoscopy     You need a colonoscopy:  This is a lifesaving screening test - please call to set up an appointment today.  [phone] ([facility])  or   [phone] ([address])      Sheree Graf MD   [address] [address]   Phone: [phone]   Fax: [phone]    Diagnoses: Acute left-sided low back pain with left-sided sciatica   Order: [facility] Referral    [address] [address]   Phone: [phone]      Comment: Physical Therapy, Hand Therapy and Chiropractic Care are available through:   *[facility]   *Hand Therapy  (Occupational Therapy or Physical Therapy)   *[facility]      Call one number to schedule at any of the above locations: [phone].      Physical therapy, Hand therapy and/or Chiropractic care has been recommended by your physician as an excellent treatment option to reduce pain and help people return to normal activities, including sports.  Therapy and/or chiropractic care services are a great complement or alternative to expensive and invasive surgery, injections, or long-term use of prescription medications. The primary goal is to identify the underlying problem and provide you the tools to manage your condition on your own.       Please be aware that coverage of these services is subject to the terms and limitations of your health insurance plan.  Call member services at your health plan with any benefit or coverage questions.        Please bring the following to your appointment:   *Your personal calendar for scheduling future appointments   *Comfortable clothing           Return in about 2 months (around 12/11/2021) for Follow up, with me.    Sheree Graf MD   [facility]    Delmi Pat is a 66 year old who presents for the following health issues      HPI     Would like to go over medication for cough     Diabetes Follow-up      How often are you checking your blood sugar? Not at all    What concerns do you have today about your diabetes? None     Do you have any of these symptoms? (Select all that apply)  No numbness or tingling in feet.  No redness, sores or blisters on feet.  No complaints of excessive thirst.  No reports of blurry vision.  No significant changes to weight.    Have you had a diabetic eye exam in the last 12 months? No        BP Readings from Last 2 Encounters:   10/11/21 (!) 141/83   08/04/21 (!) 144/82     Hemoglobin A1C (%)   Date Value   06/21/2021 6.9 (H)   10/28/2020 6.9 (H)     LDL Cholesterol Calculated (mg/dL)   Date  Value   10/28/2020 119 (H)   10/17/2019 122 (H)          Hyperlipidemia Follow-Up      Are you regularly taking any medication or supplement to lower your cholesterol?   Yes- lipitor     Are you having muscle aches or other side effects that you think could be caused by your cholesterol lowering medication?  No    Hypertension Follow-up      Do you check your blood pressure regularly outside of the clinic? Yes , intermittent     Are you following a low salt diet? Yes    Are your blood pressures ever more than 140 on the top number (systolic) OR more   than 90 on the bottom number (diastolic), for example 140/90? No           Review of Systems          Objective    BP (!) 141/83 (BP Location: Left arm, Patient Position: Sitting, Cuff Size: Adult Regular)   Pulse 69   Temp 97.2  F (36.2  C) (Temporal)   Wt 83.5 kg (184 lb 1.3 oz)   LMP 07/17/2012   SpO2 100%   BMI 33.67 kg/m    Body mass index is 33.67 kg/m .  Physical Exam   GENERAL: healthy, alert and no distress    Office Visit on 08/04/2021   Component Date Value Ref Range Status     Interpretation 08/04/2021 Negative for Intraepithelial Lesion or Malignancy (NILM)    Final     Specimen Adequacy 08/04/2021 Satisfactory for evaluation, endocerv/transformation zone component absent, atrophy   Final     Clinical Information 08/04/2021    Final                    Value:This result contains rich text formatting which cannot be displayed here.     HPV Reflex 08/04/2021 Yes regardless of result   Final     Previous Abnormal? 08/04/2021    Final                    Value:This result contains rich text formatting which cannot be displayed here.     Performing Labs 08/04/2021    Final                    Value:This result contains rich text formatting which cannot be displayed here.     Other HR HPV 08/04/2021 Negative  Negative Final     HPV16 DNA 08/04/2021 Negative  Negative Final     HPV18 DNA 08/04/2021 Negative  Negative Final     FINAL DIAGNOSIS 08/04/2021     Final                    Value:This result contains rich text formatting which cannot be displayed here.

## 2021-10-26 ENCOUNTER — MYC MEDICAL ADVICE (OUTPATIENT)
Dept: FAMILY MEDICINE | Facility: CLINIC | Age: 66
End: 2021-10-26

## 2021-10-26 DIAGNOSIS — R05.9 COUGH: ICD-10-CM

## 2021-10-27 RX ORDER — ALBUTEROL SULFATE 90 UG/1
2 AEROSOL, METERED RESPIRATORY (INHALATION) EVERY 4 HOURS PRN
Qty: 18 G | Refills: 0 | Status: SHIPPED | OUTPATIENT
Start: 2021-10-27 | End: 2023-01-04

## 2021-10-27 NOTE — TELEPHONE ENCOUNTER
Dr. Graf-Please review and advise if you recommend patient refill Albuterol inhaler?  Order pended.    Writer responded via [employer].      Thank you!  KORIN DobbsN, RN  [facility]

## 2021-10-28 ENCOUNTER — MYC MEDICAL ADVICE (OUTPATIENT)
Dept: FAMILY MEDICINE | Facility: CLINIC | Age: 66
End: 2021-10-28

## 2021-10-28 ENCOUNTER — OFFICE VISIT (OUTPATIENT)
Dept: URGENT CARE | Facility: URGENT CARE | Age: 66
End: 2021-10-28
Payer: COMMERCIAL

## 2021-10-28 ENCOUNTER — ANCILLARY PROCEDURE (OUTPATIENT)
Dept: GENERAL RADIOLOGY | Facility: CLINIC | Age: 66
End: 2021-10-28
Attending: FAMILY MEDICINE
Payer: COMMERCIAL

## 2021-10-28 VITALS
WEIGHT: 184 LBS | HEART RATE: 70 BPM | SYSTOLIC BLOOD PRESSURE: 142 MMHG | HEIGHT: 62 IN | DIASTOLIC BLOOD PRESSURE: 82 MMHG | OXYGEN SATURATION: 98 % | RESPIRATION RATE: 14 BRPM | BODY MASS INDEX: 33.86 KG/M2 | TEMPERATURE: 98.4 F

## 2021-10-28 DIAGNOSIS — K21.9 GASTROESOPHAGEAL REFLUX DISEASE WITHOUT ESOPHAGITIS: ICD-10-CM

## 2021-10-28 DIAGNOSIS — J45.20 MILD INTERMITTENT REACTIVE AIRWAY DISEASE WITH WHEEZING WITHOUT COMPLICATION: ICD-10-CM

## 2021-10-28 DIAGNOSIS — R05.9 COUGH: ICD-10-CM

## 2021-10-28 DIAGNOSIS — Z20.822 SUSPECTED 2019 NOVEL CORONAVIRUS INFECTION: ICD-10-CM

## 2021-10-28 DIAGNOSIS — J18.0 BRONCHOPNEUMONIA: Primary | ICD-10-CM

## 2021-10-28 PROCEDURE — 99214 OFFICE O/P EST MOD 30 MIN: CPT | Performed by: FAMILY MEDICINE

## 2021-10-28 PROCEDURE — U0005 INFEC AGEN DETEC AMPLI PROBE: HCPCS | Performed by: FAMILY MEDICINE

## 2021-10-28 PROCEDURE — 71046 X-RAY EXAM CHEST 2 VIEWS: CPT | Performed by: RADIOLOGY

## 2021-10-28 PROCEDURE — U0003 INFECTIOUS AGENT DETECTION BY NUCLEIC ACID (DNA OR RNA); SEVERE ACUTE RESPIRATORY SYNDROME CORONAVIRUS 2 (SARS-COV-2) (CORONAVIRUS DISEASE [COVID-19]), AMPLIFIED PROBE TECHNIQUE, MAKING USE OF HIGH THROUGHPUT TECHNOLOGIES AS DESCRIBED BY CMS-2020-01-R: HCPCS | Performed by: FAMILY MEDICINE

## 2021-10-28 RX ORDER — BENZONATATE 200 MG/1
200 CAPSULE ORAL 3 TIMES DAILY PRN
Qty: 30 CAPSULE | Refills: 0 | Status: SHIPPED | OUTPATIENT
Start: 2021-10-28 | End: 2022-09-30

## 2021-10-28 RX ORDER — ALBUTEROL SULFATE 90 UG/1
2 AEROSOL, METERED RESPIRATORY (INHALATION) EVERY 4 HOURS PRN
Qty: 18 G | Refills: 0 | Status: SHIPPED | OUTPATIENT
Start: 2021-10-28 | End: 2023-04-26

## 2021-10-28 RX ORDER — DOXYCYCLINE HYCLATE 100 MG
100 TABLET ORAL 2 TIMES DAILY
Qty: 20 TABLET | Refills: 0 | Status: SHIPPED | OUTPATIENT
Start: 2021-10-28 | End: 2021-11-07

## 2021-10-28 RX ORDER — PREDNISONE 20 MG/1
40 TABLET ORAL DAILY
Qty: 10 TABLET | Refills: 0 | Status: SHIPPED | OUTPATIENT
Start: 2021-10-28 | End: 2021-11-02

## 2021-10-28 RX ORDER — ALBUTEROL SULFATE 90 UG/1
AEROSOL, METERED RESPIRATORY (INHALATION)
Qty: 8.5 G | OUTPATIENT
Start: 2021-10-28

## 2021-10-28 ASSESSMENT — MIFFLIN-ST. JEOR: SCORE: 1337.87

## 2021-10-28 NOTE — TELEPHONE ENCOUNTER
Call received from patient:  1. Dr. Graf treating patient for acid reflux and cough  2. Right now horrible cough  3. Wants Dr. Graf to prescribe a Zpak    Patient informed in-person evaluation recommended and reviewed Urgent Care as an option as no appts available in clinic today.      The [facility] is open today from 10:00 AM-8:00 PM.  No appointment necessary.    Patient verbalized understanding and stated she would also like to pursue COVID-19 testing.  [place] informed [place] airport [place] and does not require an appt.    Patient verbalized understanding, stated she would consider her options for evaluation/testing.    KORIN DobbsN, RN  [facility]

## 2021-10-28 NOTE — TELEPHONE ENCOUNTER
Writer responded via [employer].    KORIN DobbsN, RN  [facility]

## 2021-10-28 NOTE — PROGRESS NOTES
Patient presents with:  Urgent Care  Cough: h/o acid reflux, but when she gets a cold she gets a bad cough that started Friday night. rapid covid test negative today.        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   Tried rest/fluids albuterol/otc meds no improvement,     Rapid test today at  labs site by ABISAI     Quit smoking 36 years ago, smoked 10 years         Past Medical History:   Diagnosis Date     Allergic rhinitis due to dogs      CARDIOVASCULAR SCREENING; LDL GOAL LESS THAN 130      Contraceptive management      Diabetes mellitus, type 2 (H) 10/31/2019     Hypertension goal BP (blood pressure) < 140/90      Other chronic pain     Left knee     Recurrent herpes labialis      Rosacea      Social History     Tobacco Use     Smoking status: Former Smoker     Packs/day: 0.00     Years: 10.00     Pack years: 0.00     Types: Cigarettes     Start date: 1975     Quit date: 1984     Years since quittin.2     Smokeless tobacco: Never Used     Tobacco comment: I never was a heavy smoker never woke up and had a cigarette maybe 3 a day   Substance Use Topics     Alcohol use: Yes     Alcohol/week: 1.7 standard drinks     Comment: ocasionally       Current Outpatient Medications   Medication Sig Dispense Refill     albuterol (PROAIR HFA/PROVENTIL HFA/VENTOLIN HFA) 108 (90 Base) MCG/ACT inhaler Inhale 2 puffs into the lungs every 4 hours as needed for shortness of breath / dyspnea or wheezing 18 g 0     albuterol  (PROAIR HFA/PROVENTIL HFA/VENTOLIN HFA) 108 (90 Base) MCG/ACT inhaler Inhale 2 puffs into the lungs every 4 hours as needed for shortness of breath / dyspnea or wheezing 18 g 0     benzonatate (TESSALON) 200 MG capsule Take 1 capsule (200 mg) by mouth 3 times daily as needed for cough 30 capsule 0     famotidine (PEPCID) 40 MG tablet Take 1 tablet (40 mg) by mouth daily 90 tablet 3     acetaminophen (TYLENOL) 325 MG tablet Take 2 tablets (650 mg) by mouth every 6 hours as needed for mild pain 120 tablet 0     acyclovir (ZOVIRAX) 400 MG tablet TAKE 1 TABLET BY MOUTH THREE TIMES DAILY 30 tablet 1     alcohol swab prep pads Use to swab area of injection/alon as directed. 100 each 6     aspirin (ASA) 81 MG tablet Take 2 tablets (162 mg) by mouth daily 56 tablet 0     atorvastatin (LIPITOR) 20 MG tablet Take 1 tablet (20 mg) by mouth daily 90 tablet 0     blood glucose (NO BRAND SPECIFIED) lancets standard Use to test blood sugar 1 times daily or as directed. 100 each 1     blood glucose (NO BRAND SPECIFIED) test strip Use to test blood sugar 1 times daily or as directed. 100 strip 3     blood glucose (NO BRAND SPECIFIED) test strip Use to test blood sugar 1 times daily or as directed. 50 each 11     blood glucose (ONE TOUCH DELICA) lancing device USE WITH LANCETS       cetirizine (ZYRTEC) 10 MG tablet Take 10 mg by mouth daily       fluticasone (FLONASE) 50 MCG/ACT nasal spray SHAKE LIQUID AND USE 2 SPRAYS IN EACH NOSTRIL TWICE DAILY 48 g 1     ipratropium (ATROVENT HFA) 17 MCG/ACT inhaler Inhale 2 puffs into the lungs 4 times daily 12.9 g 3     Lancets (ONETOUCH DELICA PLUS [id number]) MISC 1 each daily 100 each 11     metoprolol succinate ER (TOPROL-XL) 25 MG 24 hr tablet Take 1 tablet (25 mg) by mouth daily 90 tablet 0     montelukast (SINGULAIR) 10 MG tablet Take 1 tablet (10 mg) by mouth At Bedtime 30 tablet 2     omeprazole (PRILOSEC) 20 MG DR capsule TAKE 1 CAPSULE(20 MG) BY MOUTH DAILY 30 TO 60 MINUTES BEFORE A  "MEAL 90 capsule 3     umeclidinium (INCRUSE ELLIPTA) 62.5 MCG/INH inhaler Inhale 1 puff into the lungs daily 30 each 1     Allergies   Allergen Reactions     Dogs      Dust Mites              ROS are negative, except as otherwise noted HPI      Objective    BP (!) 142/82   Pulse 70   Temp 98.4  F (36.9  C) (Oral)   Resp 14   Ht 1.575 m (5' 2\")   Wt 83.5 kg (184 lb)   LMP 07/17/2012   SpO2 98%   BMI 33.65 kg/m    Body mass index is 33.65 kg/m .  Physical Exam     BP (!) 142/82   Pulse 70   Temp 98.4  F (36.9  C) (Oral)   Resp 14   Ht 1.575 m (5' 2\")   Wt 83.5 kg (184 lb)   LMP 07/17/2012   SpO2 98%   BMI 33.65 kg/m      GENERAL: Alert and oriented x 3.  Minimal distress.  HEENT: Normocephalic, atraumatic. PEERRLA, EOMI.  Scleras, lids and conjunctivae normal. Pinnas, canals and TM's clear.  Nose and oropharynx moist and clear.  NECK: supple and free of adenopathy or masses,  CHEST: Mild diffuse wheezing, no rhonchi or rales. Normal symmetric air entry throughout both lung fields.   HEART:  S1 and S2 normal, no murmurs, clicks, gallops or rubs. Regular rate and rhythm.    SKIN: no rash        Diagnostic Test Results:  Labs reviewed in Epic  Results for orders placed or performed in visit on 10/28/21   XR Chest 2 Views     Status: None    Narrative    EXAM: XR CHEST 2 VW  LOCATION: [facility]  DATE/TIME: 10/28/2021 6:45 PM    INDICATION: cough, reflux, ? aspiration pneumonia  COMPARISON: None.      Impression    IMPRESSION: Negative chest.   Results for orders placed or performed in visit on 10/28/21           ASSESSMENT/PLAN:      ICD-10-CM    1. Bronchopneumonia  J18.0 doxycycline hyclate (VIBRA-TABS) 100 MG tablet     benzonatate (TESSALON) 200 MG capsule   2. Mild intermittent reactive airway disease with wheezing without complication  J45.20 predniSONE (DELTASONE) 20 MG tablet     albuterol (PROAIR HFA/PROVENTIL HFA/VENTOLIN HFA) 108 (90 Base) MCG/ACT inhaler   3. " Gastroesophageal reflux disease without esophagitis  K21.9 XR Chest 2 Views   4. Suspected 2019 novel coronavirus infection  Z20.822 Symptomatic COVID-19 Virus (Coronavirus) by PCR Nose     CANCELED: Symptomatic COVID-19 Virus (Coronavirus) by PCR   5. Cough  R05.9 XR Chest 2 Views             Reviewed medication instructions and side effects. Follow up if experiences side effects.     I reviewed supportive care, otc meds to use if needed, expected course, and signs of concern.  Follow up as needed or if she does not improve within  1-2 days or if worsens in any way.  Reviewed red flag symptoms and is to go to the ER if experiences any of these.     The use of Dragon/Tantalineation services may have been used to construct the content in this note; any grammatical or spelling errors are non-intentional. Please contact the author of this note directly if you are in need of any clarification.        On the day of the encounter, time spend on chart review, patient visit, review of testing, documentation and discussion with other providers was 30  minutes        Patient Instructions   Start doxycycline 2 times a day for 10 days always take with food to prevent nausea vomiting even the pharmacist tells you not to take it with food    Start prednisone first dose in am for wheezing    Start tessalon perles for cough    Start 1200 mg  musinex -plain musinex for congestion-generic is fine    If fever, shortness of breath to ER        Please quarantine until your Covid results are back    Discharge Instructions for COVID-19 Patients  You have--or may have--COVID-19. Please follow the instructions listed below.   If you have a weakened immune system, discuss with your doctor any other actions you need to take.  How can I protect others?  If you have symptoms (fever, cough, body aches or trouble breathing):  1. Stay home and away from others (self-isolate) until:  ? Your other symptoms have resolved (gotten better).  "And   ? You've had no fever--and no medicine that reduces fever--for 1 full day (24 hours). And   ? At least 10 days have passed since your symptoms started. (You may need to wait 20 days. Follow the advice of your care team.)  If you don't show symptoms, but testing showed that you have COVID-19:    Stay home and away from others (self-isolate) until at least 10 days have passed since the date of your first positive COVID-19 test.  During this time  1. Stay in your own room, even for meals. Use your own bathroom if you can.  2. Stay away from others in your home. No hugging, kissing or shaking hands. No visitors.  3. Don't go to work, school or anywhere else.  4. Clean \"high touch\" surfaces often (doorknobs, counters, handles). Use household cleaning spray or wipes.  5. You'll find a full list of  on the EPA website: www.epa.gov/pesticide-registration/list-n-disinfectants-use-against-sars-cov-2.  6. Cover your mouth and nose with a mask or other face covering to avoid spreading germs.  7. Wash your hands and face often. Use soap and water.  8. Caregivers in these groups are at risk for severe illness due to COVID-19:  1. People 65 years and older  2. People who live in a nursing home or long-term care facility  3. People with chronic disease (lung, heart, cancer, diabetes, kidney, liver, immunologic)  4. People who have a weakened immune system, including those who:    Are in cancer treatment    Take medicine that weakens the immune system, such as corticosteroids    Had a bone marrow or organ transplant    Have an immune deficiency    Have poorly controlled HIV or AIDS    Are obese (body mass index of 40 or higher)    Smoke regularly  9. Caregivers should wear gloves while washing dishes, handling laundry and cleaning bedrooms and bathrooms.  10. Use caution when washing and drying laundry: Don't shake dirty laundry and use the warmest water setting that you can.  11. For more tips on managing your health " at home, go to www.cdc.gov/coronavirus/2019-ncov/downloads/10Things.pdf.  How can I take care of myself at home?  1. Get lots of rest. Drink extra fluids (unless a doctor has told you not to).  2. Take Tylenol (acetaminophen) for fever or pain. If you have liver or kidney problems, ask your family doctor if it's okay to take Tylenol.   Adults can take either:   ? 650 mg (two 325 mg pills) every 4 to 6 hours, or   ? 1,000 mg (two 500 mg pills) every 8 hours as needed.  ? Note: Don't take more than 3,000 mg in one day. Acetaminophen is found in many medicines (both prescribed and over-the-counter medicines). Read all labels to be sure you don't take too much.   For children, check the Tylenol bottle for the right dose. The dose is based on the child's age or weight.  3. If you have other health problems (like cancer, heart failure, an organ transplant or severe kidney disease): Call your specialty clinic if you don't feel better in the next 2 days.  4. Know when to call 911. Emergency warning signs include:  ? Trouble breathing or shortness of breath  ? Pain or pressure in the chest that doesn't go away  ? Feeling confused like you haven't felt before, or not being able to wake up  ? Bluish-colored lips or face  5. Your doctor may have prescribed a blood thinner medicine. Follow their instructions.  Where can I get more information?  1. [facility] - About COVID-19:   [email or web address]  2. [facility][note 3] - What to Do If You're Sick: www.cdc.gov/coronavirus/2019-ncov/about/steps-when-sick.html  3. CDC - Ending Home Isolation: www.cdc.gov/coronavirus/2019-ncov/hcp/disposition-in-home-patients.html  4. CDC - Caring for Someone: www.cdc.gov/coronavirus/2019-ncov/if-you-are-sick/care-for-someone.html  5. [facility] - Interim Guidance for Hospital Discharge to Home: www.[facility].[place]./diseases/coronavirus/hcp/hospdischarge.pdf  6. Below are the COVID-19 hotlines at the [place] Department of Health ([facility]).  Interpreters are available.  ? For health questions: Call 900-192-3485 or 1-583.929.4747 (7 a.m. to 7 p.m.)  ? For questions about schools and childcare: Call 715-167-3851 or 1-665.596.3211 (7 a.m. to 7 p.m.)    For informational purposes only. Not to replace the advice of your health care provider. Clinically reviewed by Dr. Steven Sood.   Copyright   2020 [employer]. All rights reserved. [employer] [id number] - REV 01/05/21.        Patient Education     Bronchitis, Antibiotic Treatment (Adult)    Bronchitis is an infection of the air passages (bronchial tubes) in your lungs. It often occurs when you have a cold. This illness is contagious during the first few days and is spread through the air by coughing and sneezing, or by direct contact (touching the sick person and then touching your own eyes, nose, or mouth).  Symptoms of bronchitis include cough with mucus (phlegm) and low-grade fever. Bronchitis usually lasts 7 to 14 days. Mild cases can be treated with simple home remedies. More severe infection is treated with an antibiotic.  Home care  Follow these guidelines when caring for yourself at home:    If your symptoms are severe, rest at home for the first 2 to 3 days. When you go back to your usual activities, don't let yourself get too tired.    Don't smoke. Also stay away from secondhand smoke.    You may use over-the-counter medicines to control fever or pain, unless another medicine was prescribed. If you have chronic liver or kidney disease or have ever had a stomach ulcer or gastrointestinal bleeding, talk with your healthcare provider before using these medicines. Also talk to your provider if you are taking medicine to prevent blood clots. Aspirin should never be given to anyone younger than 18 who is ill with a viral infection or fever. It may cause severe liver or brain damage.    Your appetite may be low, so a light diet is fine. Stay well hydrated by drinking 6 to 8 glasses of fluids  per day. This includes water, soft drinks, sports drinks, juices, tea, or soup. Extra fluids will help loosen mucus in your nose and lungs.    Over-the-counter cough, cold, and sore-throat medicines will not shorten the length of the illness, but they may be helpful to reduce your symptoms. Don't use decongestants if you have high blood pressure.    Finish all antibiotic medicine. Do this even if you are feeling better after only a few days.  Follow-up care  Follow up with your healthcare provider, or as advised. If you had an X-ray or ECG (electrocardiogram), a specialist will review it. You will be told of any new test results that may affect your care.  If you are age 65 or older, if you smoke, or if you have a chronic lung disease or condition that affects your immune system, ask your healthcare provider about getting a pneumococcal vaccine and a yearly flu shot (influenza vaccine).  When to seek medical advice  Call your healthcare provider right away if any of these occur:    Fever of 100.4 F (38 C) or higher, or as directed by your healthcare provider    Coughing up more sputum    Weakness, drowsiness, headache, facial pain, ear pain, or a stiff neck  Call 911  Call 911 if any of these occur.    Coughing up blood    Weakness, drowsiness, headache, or stiff neck that get worse    Trouble breathing, wheezing, or pain with breathing  Jeanie last reviewed this educational content on 6/1/2018 2000-2021 [employer]. All rights reserved. This information is not intended as a substitute for professional medical care. Always follow your healthcare professional's instructions.           Patient Education     Viral or Bacterial Bronchitis with Wheezing (Adult)    Bronchitis is an infection of the air passages. It often occurs during a cold and is usually caused by a virus. Symptoms include cough with mucus (phlegm) and low-grade fever. This illness is contagious during the first few days and is spread  through the air by coughing and sneezing, or by direct contact (touching the sick person and then touching your own eyes, nose, or mouth).  If there is a lot of inflammation, air flow is restricted. The air passages may also go into spasm, especially if you have asthma. This causes wheezing and difficulty breathing even in people who do not have asthma.  Bronchitis usually lasts 7 to 14 days. The wheezing should improve with treatment during the first week. An inhaler is often prescribed to relax the air passages and stop wheezing. Antibiotics will be prescribed if your doctor thinks there is also a secondary bacterial infection.  Home care    If symptoms are severe, rest at home for the first 2 to 3 days. When you go back to your usual activities, don't let yourself get too tired.    Dont s'moke. Also avoid being exposed to secondhand smoke.    You may use over-the-counter medicine to control fever or pain, unless another medicine was prescribed. Note: If you have chronic liver or kidney disease or have ever had a stomach ulcer or gastrointestinal bleeding, talk with your healthcare provider before using these medicines. Also talk to your provider if you are taking medicine to prevent blood clots.) Aspirin should never be given to anyone younger than 18 years of age who is ill with a viral infection or fever. It may cause severe liver or brain damage.    Your appetite may be poor, so a light diet is fine. Stay well hydrated by drinking 6 to 8 glasses of fluids per day (such as water, soft drinks, sports drinks, juices, tea, or soup). Extra fluids will help loosen secretions in the nose and lungs.    Over-the-counter cough, cold, and sore-throat medicines will not shorten the length of the illness, but they may be helpful to reduce symptoms. (Note: Don't use decongestants if you have high blood pressure.)    If you were given an inhaler, use it exactly as directed. If you need to use it more often than prescribed,  your condition may be worsening. If this happens, contact your healthcare provider.    If prescribed, finish all antibiotic medicine, even if you are feeling better after only a few days.  Follow-up care  Follow up with your healthcare provider, or as advised. If you had an X-ray or ECG (electrocardiogram), a specialist will review it. You will be notified of any new findings that may affect your care.  If you are age 65 or older, or if you have a chronic lung disease or condition that affects your immune system, or you smoke, ask your healthcare provider about getting a pneumococcal vaccine and a yearly flu shot (influenza vaccine).  When to seek medical advice  Call your healthcare provider right away if any of these occur:    Fever of 100.4 F (38 C) or higher, or as directed by your healthcare provider    Coughing up increasing amounts of colored sputum    Weakness, drowsiness, headache, facial pain, ear pain, or a stiff neck  Call 911  Call 911 if any of these occur.    Coughing up blood    Worsening weakness, drowsiness, headache, or stiff neck    Increased wheezing not helped with medication, shortness of breath, or pain with breathing  [employer] last reviewed this educational content on 6/1/2018 2000-2021 [employer]. All rights reserved. This information is not intended as a substitute for professional medical care. Always follow your healthcare professional's instructions.

## 2021-10-29 LAB — SARS-COV-2 RNA RESP QL NAA+PROBE: NEGATIVE

## 2021-10-29 NOTE — PATIENT INSTRUCTIONS
"Start doxycycline 2 times a day for 10 days always take with food to prevent nausea vomiting even the pharmacist tells you not to take it with food    Start prednisone first dose in am for wheezing    Start tessalon perles for cough    Start 1200 mg  musinex -plain musinex for congestion-generic is fine    If fever, shortness of breath to ER        Please quarantine until your Covid results are back    Discharge Instructions for COVID-19 Patients  You have--or may have--COVID-19. Please follow the instructions listed below.   If you have a weakened immune system, discuss with your doctor any other actions you need to take.  How can I protect others?  If you have symptoms (fever, cough, body aches or trouble breathing):  1. Stay home and away from others (self-isolate) until:  ? Your other symptoms have resolved (gotten better). And   ? You've had no fever--and no medicine that reduces fever--for 1 full day (24 hours). And   ? At least 10 days have passed since your symptoms started. (You may need to wait 20 days. Follow the advice of your care team.)  If you don't show symptoms, but testing showed that you have COVID-19:    Stay home and away from others (self-isolate) until at least 10 days have passed since the date of your first positive COVID-19 test.  During this time  1. Stay in your own room, even for meals. Use your own bathroom if you can.  2. Stay away from others in your home. No hugging, kissing or shaking hands. No visitors.  3. Don't go to work, school or anywhere else.  4. Clean \"high touch\" surfaces often (doorknobs, counters, handles). Use household cleaning spray or wipes.  5. You'll find a full list of  on the EPA website: www.epa.gov/pesticide-registration/list-n-disinfectants-use-against-sars-cov-2.  6. Cover your mouth and nose with a mask or other face covering to avoid spreading germs.  7. Wash your hands and face often. Use soap and water.  8. Caregivers in these groups are at risk " for severe illness due to COVID-19:  1. People 65 years and older  2. People who live in a nursing home or long-term care facility  3. People with chronic disease (lung, heart, cancer, diabetes, kidney, liver, immunologic)  4. People who have a weakened immune system, including those who:    Are in cancer treatment    Take medicine that weakens the immune system, such as corticosteroids    Had a bone marrow or organ transplant    Have an immune deficiency    Have poorly controlled HIV or AIDS    Are obese (body mass index of 40 or higher)    Smoke regularly  9. Caregivers should wear gloves while washing dishes, handling laundry and cleaning bedrooms and bathrooms.  10. Use caution when washing and drying laundry: Don't shake dirty laundry and use the warmest water setting that you can.  11. For more tips on managing your health at home, go to www.cdc.gov/coronavirus/2019-ncov/downloads/10Things.pdf.  How can I take care of myself at home?  1. Get lots of rest. Drink extra fluids (unless a doctor has told you not to).  2. Take Tylenol (acetaminophen) for fever or pain. If you have liver or kidney problems, ask your family doctor if it's okay to take Tylenol.   Adults can take either:   ? 650 mg (two 325 mg pills) every 4 to 6 hours, or   ? 1,000 mg (two 500 mg pills) every 8 hours as needed.  ? Note: Don't take more than 3,000 mg in one day. Acetaminophen is found in many medicines (both prescribed and over-the-counter medicines). Read all labels to be sure you don't take too much.   For children, check the Tylenol bottle for the right dose. The dose is based on the child's age or weight.  3. If you have other health problems (like cancer, heart failure, an organ transplant or severe kidney disease): Call your specialty clinic if you don't feel better in the next 2 days.  4. Know when to call 911. Emergency warning signs include:  ? Trouble breathing or shortness of breath  ? Pain or pressure in the chest that  doesn't go away  ? Feeling confused like you haven't felt before, or not being able to wake up  ? Bluish-colored lips or face  5. Your doctor may have prescribed a blood thinner medicine. Follow their instructions.  Where can I get more information?  1. [facility] - About COVID-19:   [email or web address]  2. [facility] - What to Do If You're Sick: www.cdc.gov/coronavirus/2019-ncov/about/steps-when-sick.html  3. [facility] - Ending Home Isolation: www.cdc.gov/coronavirus/2019-ncov/hcp/disposition-in-home-patients.html  4. [facility] - Caring for Someone: www.cdc.gov/coronavirus/2019-ncov/if-you-are-sick/care-for-someone.html  5. [facility] - Interim Guidance for Hospital Discharge to Home: www.health.[facility].[place]./diseases/coronavirus/hcp/hospdischarge.pdf  6. Below are the COVID-19 hotlines at the [place] Department of Health ([facility]). Interpreters are available.  ? For health questions: Call 505-935-7418 or 1-754.839.1632 (7 a.m. to 7 p.m.)  ? For questions about schools and childcare: Call 240-397-4008 or 1-964.236.9122 (7 a.m. to 7 p.m.)    For informational purposes only. Not to replace the advice of your health care provider. Clinically reviewed by Dr. Steven Sood.   Copyright   2020 [facility]. All rights reserved. [employer] [id number] - REV 01/05/21.        Patient Education     Bronchitis, Antibiotic Treatment (Adult)    Bronchitis is an infection of the air passages (bronchial tubes) in your lungs. It often occurs when you have a cold. This illness is contagious during the first few days and is spread through the air by coughing and sneezing, or by direct contact (touching the sick person and then touching your own eyes, nose, or mouth).  Symptoms of bronchitis include cough with mucus (phlegm) and low-grade fever. Bronchitis usually lasts 7 to 14 days. Mild cases can be treated with simple home remedies. More severe infection is treated with an antibiotic.  Home care  Follow these guidelines when  caring for yourself at home:    If your symptoms are severe, rest at home for the first 2 to 3 days. When you go back to your usual activities, don't let yourself get too tired.    Don't smoke. Also stay away from secondhand smoke.    You may use over-the-counter medicines to control fever or pain, unless another medicine was prescribed. If you have chronic liver or kidney disease or have ever had a stomach ulcer or gastrointestinal bleeding, talk with your healthcare provider before using these medicines. Also talk to your provider if you are taking medicine to prevent blood clots. Aspirin should never be given to anyone younger than 18 who is ill with a viral infection or fever. It may cause severe liver or brain damage.    Your appetite may be low, so a light diet is fine. Stay well hydrated by drinking 6 to 8 glasses of fluids per day. This includes water, soft drinks, sports drinks, juices, tea, or soup. Extra fluids will help loosen mucus in your nose and lungs.    Over-the-counter cough, cold, and sore-throat medicines will not shorten the length of the illness, but they may be helpful to reduce your symptoms. Don't use decongestants if you have high blood pressure.    Finish all antibiotic medicine. Do this even if you are feeling better after only a few days.  Follow-up care  Follow up with your healthcare provider, or as advised. If you had an X-ray or ECG (electrocardiogram), a specialist will review it. You will be told of any new test results that may affect your care.  If you are age 65 or older, if you smoke, or if you have a chronic lung disease or condition that affects your immune system, ask your healthcare provider about getting a pneumococcal vaccine and a yearly flu shot (influenza vaccine).  When to seek medical advice  Call your healthcare provider right away if any of these occur:    Fever of 100.4 F (38 C) or higher, or as directed by your healthcare provider    Coughing up more  sputum    Weakness, drowsiness, headache, facial pain, ear pain, or a stiff neck  Call 911  Call 911 if any of these occur.    Coughing up blood    Weakness, drowsiness, headache, or stiff neck that get worse    Trouble breathing, wheezing, or pain with breathing  Jeanie last reviewed this educational content on 6/1/2018 2000-2021 [employer]. All rights reserved. This information is not intended as a substitute for professional medical care. Always follow your healthcare professional's instructions.           Patient Education     Viral or Bacterial Bronchitis with Wheezing (Adult)    Bronchitis is an infection of the air passages. It often occurs during a cold and is usually caused by a virus. Symptoms include cough with mucus (phlegm) and low-grade fever. This illness is contagious during the first few days and is spread through the air by coughing and sneezing, or by direct contact (touching the sick person and then touching your own eyes, nose, or mouth).  If there is a lot of inflammation, air flow is restricted. The air passages may also go into spasm, especially if you have asthma. This causes wheezing and difficulty breathing even in people who do not have asthma.  Bronchitis usually lasts 7 to 14 days. The wheezing should improve with treatment during the first week. An inhaler is often prescribed to relax the air passages and stop wheezing. Antibiotics will be prescribed if your doctor thinks there is also a secondary bacterial infection.  Home care    If symptoms are severe, rest at home for the first 2 to 3 days. When you go back to your usual activities, don't let yourself get too tired.    Dont s'moke. Also avoid being exposed to secondhand smoke.    You may use over-the-counter medicine to control fever or pain, unless another medicine was prescribed. Note: If you have chronic liver or kidney disease or have ever had a stomach ulcer or gastrointestinal bleeding, talk with your  healthcare provider before using these medicines. Also talk to your provider if you are taking medicine to prevent blood clots.) Aspirin should never be given to anyone younger than 18 years of age who is ill with a viral infection or fever. It may cause severe liver or brain damage.    Your appetite may be poor, so a light diet is fine. Stay well hydrated by drinking 6 to 8 glasses of fluids per day (such as water, soft drinks, sports drinks, juices, tea, or soup). Extra fluids will help loosen secretions in the nose and lungs.    Over-the-counter cough, cold, and sore-throat medicines will not shorten the length of the illness, but they may be helpful to reduce symptoms. (Note: Don't use decongestants if you have high blood pressure.)    If you were given an inhaler, use it exactly as directed. If you need to use it more often than prescribed, your condition may be worsening. If this happens, contact your healthcare provider.    If prescribed, finish all antibiotic medicine, even if you are feeling better after only a few days.  Follow-up care  Follow up with your healthcare provider, or as advised. If you had an X-ray or ECG (electrocardiogram), a specialist will review it. You will be notified of any new findings that may affect your care.  If you are age 65 or older, or if you have a chronic lung disease or condition that affects your immune system, or you smoke, ask your healthcare provider about getting a pneumococcal vaccine and a yearly flu shot (influenza vaccine).  When to seek medical advice  Call your healthcare provider right away if any of these occur:    Fever of 100.4 F (38 C) or higher, or as directed by your healthcare provider    Coughing up increasing amounts of colored sputum    Weakness, drowsiness, headache, facial pain, ear pain, or a stiff neck  Call 911  Call 911 if any of these occur.    Coughing up blood    Worsening weakness, drowsiness, headache, or stiff neck    Increased wheezing not  helped with medication, shortness of breath, or pain with breathing  [employer] last reviewed this educational content on 6/1/2018 2000-2021 [employer]. All rights reserved. This information is not intended as a substitute for professional medical care. Always follow your healthcare professional's instructions.

## 2021-11-06 DIAGNOSIS — J30.81 ALLERGIC RHINITIS DUE TO DOGS: ICD-10-CM

## 2021-11-08 RX ORDER — FLUTICASONE PROPIONATE 50 MCG
SPRAY, SUSPENSION (ML) NASAL
Qty: 48 G | Refills: 1 | Status: SHIPPED | OUTPATIENT
Start: 2021-11-08 | End: 2022-07-25

## 2021-11-08 NOTE — TELEPHONE ENCOUNTER
Nasal Allergy Protocol Passed 11/06/2021 09:50 AM   Protocol Details  Patient is age 12 or older    Recent (12 mo) or future (30 days) visit within the authorizing provider's specialty    Medication is active on med list

## 2021-11-09 DIAGNOSIS — E78.5 HYPERLIPIDEMIA, UNSPECIFIED HYPERLIPIDEMIA TYPE: ICD-10-CM

## 2021-11-09 DIAGNOSIS — I10 HYPERTENSION GOAL BP (BLOOD PRESSURE) < 140/90: ICD-10-CM

## 2021-11-09 RX ORDER — METOPROLOL SUCCINATE 25 MG/1
25 TABLET, EXTENDED RELEASE ORAL DAILY
Qty: 90 TABLET | Refills: 0 | Status: SHIPPED | OUTPATIENT
Start: 2021-11-09 | End: 2022-02-04

## 2021-11-09 RX ORDER — ATORVASTATIN CALCIUM 20 MG/1
20 TABLET, FILM COATED ORAL DAILY
Qty: 90 TABLET | Refills: 0 | Status: SHIPPED | OUTPATIENT
Start: 2021-11-09 | End: 2022-02-04

## 2021-11-09 NOTE — TELEPHONE ENCOUNTER
Dr. Graf-Please review, sign if agree and may close encounter. Patient scheduled with you on 12/13/21  Routing refill request to provider for review/approval because:  BP elevated  BP Readings from Last 3 Encounters:   10/28/21 (!) 142/82   10/11/21 (!) 141/83   08/04/21 (!) 144/82       Last Written Prescription Date:  10/28/20  Last Fill Quantity: 90,  # refills: 3   Last office visit: 10/11/2021 with prescribing provider:  Dr. Graf     Future Office Visit:  12/13/21    Thank you!  KORIN DobbsN, RN  [facility]

## 2021-11-09 NOTE — TELEPHONE ENCOUNTER
Patient scheduled with Dr. Graf on 12/13/21.    Prescription approved per [facility] Refill Protocol.    KORIN DobbsN, RN  [facility]

## 2021-11-14 ENCOUNTER — HEALTH MAINTENANCE LETTER (OUTPATIENT)
Age: 66
End: 2021-11-14

## 2021-11-23 ENCOUNTER — IMMUNIZATION (OUTPATIENT)
Dept: NURSING | Facility: CLINIC | Age: 66
End: 2021-11-23
Payer: COMMERCIAL

## 2021-11-23 PROCEDURE — 91306 COVID-19,PF,MODERNA (18+ YRS BOOSTER .25ML): CPT

## 2021-11-23 PROCEDURE — 0064A COVID-19,PF,MODERNA (18+ YRS BOOSTER .25ML): CPT

## 2021-12-07 ENCOUNTER — LAB (OUTPATIENT)
Dept: LAB | Facility: CLINIC | Age: 66
End: 2021-12-07
Payer: COMMERCIAL

## 2021-12-07 DIAGNOSIS — E11.9 TYPE 2 DIABETES MELLITUS WITHOUT COMPLICATION, WITHOUT LONG-TERM CURRENT USE OF INSULIN (H): ICD-10-CM

## 2021-12-07 DIAGNOSIS — Z11.4 SCREENING FOR HIV (HUMAN IMMUNODEFICIENCY VIRUS): ICD-10-CM

## 2021-12-07 DIAGNOSIS — E78.5 HYPERLIPIDEMIA, UNSPECIFIED HYPERLIPIDEMIA TYPE: ICD-10-CM

## 2021-12-07 DIAGNOSIS — I10 HYPERTENSION GOAL BP (BLOOD PRESSURE) < 140/90: ICD-10-CM

## 2021-12-07 LAB
HBA1C MFR BLD: 6.9 % (ref 0–5.6)
HIV 1+2 AB+HIV1 P24 AG SERPL QL IA: NONREACTIVE

## 2021-12-07 PROCEDURE — 82043 UR ALBUMIN QUANTITATIVE: CPT

## 2021-12-07 PROCEDURE — 36415 COLL VENOUS BLD VENIPUNCTURE: CPT

## 2021-12-07 PROCEDURE — 83036 HEMOGLOBIN GLYCOSYLATED A1C: CPT

## 2021-12-07 PROCEDURE — 80061 LIPID PANEL: CPT

## 2021-12-07 PROCEDURE — 87389 HIV-1 AG W/HIV-1&-2 AB AG IA: CPT

## 2021-12-08 LAB
CREAT UR-MCNC: 395 MG/DL
MICROALBUMIN UR-MCNC: 26 MG/L
MICROALBUMIN/CREAT UR: 6.58 MG/G CR (ref 0–25)

## 2021-12-13 ENCOUNTER — VIRTUAL VISIT (OUTPATIENT)
Dept: FAMILY MEDICINE | Facility: CLINIC | Age: 66
End: 2021-12-13
Payer: COMMERCIAL

## 2021-12-13 DIAGNOSIS — K21.9 GASTROESOPHAGEAL REFLUX DISEASE, UNSPECIFIED WHETHER ESOPHAGITIS PRESENT: ICD-10-CM

## 2021-12-13 DIAGNOSIS — E11.9 TYPE 2 DIABETES MELLITUS WITHOUT COMPLICATION, WITHOUT LONG-TERM CURRENT USE OF INSULIN (H): Primary | ICD-10-CM

## 2021-12-13 DIAGNOSIS — M25.559 HIP PAIN: ICD-10-CM

## 2021-12-13 DIAGNOSIS — R05.8 POST-VIRAL COUGH SYNDROME: ICD-10-CM

## 2021-12-13 DIAGNOSIS — I10 HYPERTENSION GOAL BP (BLOOD PRESSURE) < 140/90: ICD-10-CM

## 2021-12-13 DIAGNOSIS — E78.5 HYPERLIPIDEMIA, UNSPECIFIED HYPERLIPIDEMIA TYPE: ICD-10-CM

## 2021-12-13 DIAGNOSIS — J30.81 ALLERGIC RHINITIS DUE TO DOGS: ICD-10-CM

## 2021-12-13 PROCEDURE — 99214 OFFICE O/P EST MOD 30 MIN: CPT | Mod: TEL | Performed by: FAMILY MEDICINE

## 2021-12-13 NOTE — PROGRESS NOTES
Adilia is a 66 year old who is being evaluated via a billable telephone visit.      What phone number would you like to be contacted at? [phone] (Mobile)   How would you like to obtain your AVS? MyChart    Assessment & Plan     Type 2 diabetes mellitus without complication, without long-term current use of insulin (H)  Controlled, but A1C is slowly increasing. She does not want to start metformin.   I reminded her to schedule an eye exam -- she is looking for a new eye doctor. I recommended visiting with the DM educator.     Hypertension goal BP (blood pressure) < 140/90  Home bp has been in normal range.   Continues metoprolol 25mg daily      Hyperlipidemia   Continues atorvastatin 20 mg daily.     Gastroesophageal reflux disease, esophagitis presence not specified  Doing well on famotidine 40mg daily.       Cough  Postviral cough  Improving since she started working with the speech therapist for vocal cord dysfunction and inflammation. She has stopped the singulair.      Recently started singulair, which seems to help and she is tolerating it well. Atrovent was sent, but not covered by insurance, so Incruse Ellipta was selected as an alternative to address possible post viral cough. 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  She had used Singulair and her cough improved significantly so she stopped the medication.  She also felt it was making her anxious.  She felt that Atrovent inhaler was helpful in the setting of her cough, but then stopped it when her cough resolved.Her cough is that likely secondary to combination of GERD and postviral cough syndrome.  Her PFTs and chest x-ray were negative.  No further pulmonary work-up was necessary.  If symptoms return, pulmonology recommended initiation of inhaled Atrovent and Singulair for treatment.  She would like to have refill available of the Atrovent and Singulair.        Hip pain, pain and sometimes gets  "the sense that it \"seizes up\" when she is walking.   Ortho referral given.     Still plans on scheduling colonoscopy. Work has been very busy.        Return in about 6 months (around 6/13/2022) for Follow up.    Sheree Graf MD   [facility]    Delmi Pat is a 66 year old who presents for the following health issues     History of Present Illness       Diabetes:   She presents for follow up of diabetes.  She is checking home blood glucose a few times a week. She checks blood glucose before meals.  Blood glucose is never over 200 and never under 70. She is aware of hypoglycemia symptoms including shakiness. She has no concerns regarding her diabetes at this time.  She is not experiencing numbness or burning in feet, excessive thirst, blurry vision, weight changes or redness, sores or blisters on feet. The patient has not had a diabetic eye exam in the last 12 months.         She eats 2-3 servings of fruits and vegetables daily.She consumes 1 sweetened beverage(s) daily.She exercises with enough effort to increase her heart rate 20 to 29 minutes per day.  She exercises with enough effort to increase her heart rate 3 or less days per week.   She is taking medications regularly.            Review of Systems          Objective           Vitals:  No vitals were obtained today due to virtual visit.    Physical Exam   healthy, alert and no distress  PSYCH: Alert and oriented times 3; coherent speech, normal   rate and volume, able to articulate logical thoughts, able   to abstract reason, no tangential thoughts, no hallucinations   or delusions  Her affect is normal  RESP: No cough, no audible wheezing, able to talk in full sentences  Remainder of exam unable to be completed due to telephone visits    Lab on 12/07/2021   Component Date Value Ref Range Status     Hemoglobin A1C 12/07/2021 6.9* 0.0 - 5.6 % Final    Normal <5.7%   Prediabetes 5.7-6.4%    Diabetes 6.5% or higher     Note: " Adopted from ADA consensus guidelines.     Creatinine Urine mg/dL 12/07/2021 395  mg/dL Final     Albumin Urine mg/L 12/07/2021 26  mg/L Final     Albumin Urine mg/g Cr 12/07/2021 6.58  0.00 - 25.00 mg/g Cr Final     HIV Antigen Antibody Combo 12/07/2021 Nonreactive  Nonreactive Final    HIV-1 p24 Ag & HIV-1/HIV-2 Ab Not Detected             Phone call duration: 15 minutes

## 2021-12-14 ENCOUNTER — TELEPHONE (OUTPATIENT)
Dept: FAMILY MEDICINE | Facility: CLINIC | Age: 66
End: 2021-12-14
Payer: COMMERCIAL

## 2021-12-14 NOTE — PATIENT INSTRUCTIONS
1) Schedule with the diabetes educator  2) Schedule a hearing test  3) Schedule an eye exam  4) Schedule with sports medicine for your hip  5) Schedule colonoscopy  6) See me in 6 months :)

## 2021-12-15 LAB
CHOLEST SERPL-MCNC: 158 MG/DL
FASTING STATUS PATIENT QL REPORTED: YES
HDLC SERPL-MCNC: 63 MG/DL
LDLC SERPL CALC-MCNC: 75 MG/DL
NONHDLC SERPL-MCNC: 95 MG/DL
TRIGL SERPL-MCNC: 101 MG/DL

## 2021-12-28 NOTE — TELEPHONE ENCOUNTER
Diabetes Education Scheduling Outreach #2:    [employer] message sent to patient requesting to call to schedule.    Keily Laws OnCall  Diabetes and Nutrition Scheduling

## 2022-02-04 DIAGNOSIS — E78.5 HYPERLIPIDEMIA, UNSPECIFIED HYPERLIPIDEMIA TYPE: ICD-10-CM

## 2022-02-04 DIAGNOSIS — I10 HYPERTENSION GOAL BP (BLOOD PRESSURE) < 140/90: ICD-10-CM

## 2022-02-04 RX ORDER — ATORVASTATIN CALCIUM 20 MG/1
TABLET, FILM COATED ORAL
Qty: 90 TABLET | Refills: 1 | Status: SHIPPED | OUTPATIENT
Start: 2022-02-04 | End: 2022-08-15

## 2022-02-04 NOTE — TELEPHONE ENCOUNTER
Routing refill request to provider for review/approval because:  Blood pressure out of range per [facility] protocol  BP Readings from Last 3 Encounters:   10/28/21 (!) 142/82   10/11/21 (!) 141/83   08/04/21 (!) 144/82     Atorvastatin refilled per protocol    KORIN FelixN RN  [facility]

## 2022-02-07 RX ORDER — METOPROLOL SUCCINATE 25 MG/1
TABLET, EXTENDED RELEASE ORAL
Qty: 90 TABLET | Refills: 1 | Status: SHIPPED | OUTPATIENT
Start: 2022-02-07 | End: 2022-07-31

## 2022-04-11 ENCOUNTER — VIRTUAL VISIT (OUTPATIENT)
Dept: FAMILY MEDICINE | Facility: CLINIC | Age: 67
End: 2022-04-11
Payer: COMMERCIAL

## 2022-04-11 ENCOUNTER — TELEPHONE (OUTPATIENT)
Dept: FAMILY MEDICINE | Facility: CLINIC | Age: 67
End: 2022-04-11
Payer: COMMERCIAL

## 2022-04-11 DIAGNOSIS — E11.9 TYPE 2 DIABETES MELLITUS WITHOUT COMPLICATION, WITHOUT LONG-TERM CURRENT USE OF INSULIN (H): ICD-10-CM

## 2022-04-11 DIAGNOSIS — H02.849 EYELID GLAND SWELLING, UNSPECIFIED LATERALITY: Primary | ICD-10-CM

## 2022-04-11 PROCEDURE — 99214 OFFICE O/P EST MOD 30 MIN: CPT | Mod: 95 | Performed by: FAMILY MEDICINE

## 2022-04-11 RX ORDER — POLYMYXIN B SULFATE AND TRIMETHOPRIM 1; 10000 MG/ML; [USP'U]/ML
1-2 SOLUTION OPHTHALMIC EVERY 4 HOURS
Qty: 5 ML | Refills: 0 | Status: SHIPPED | OUTPATIENT
Start: 2022-04-11 | End: 2022-04-18

## 2022-04-11 ASSESSMENT — ASTHMA QUESTIONNAIRES: ACT_TOTALSCORE: 25

## 2022-04-11 NOTE — PROGRESS NOTES
"Adilia is a 66 year old who is being evaluated via a billable video visit.      How would you like to obtain your AVS? MyChart  If the video visit is dropped, the invitation should be resent by: Text to cell phone: [phone]  Will anyone else be joining your video visit? No      Video Start Time: 4:52 PM     Assessment & Plan     Eyelid gland swelling, unspecified laterality  Does not appear to be preseptal cellulitis.  Does not appear to be stye.  We discussed he should see ophthalmologist for further evaluation but it would be reasonable to try antibiotic drops and see how she does.  Due to her diabetes she is overdue for her eye exam.  She agreed.  - trimethoprim-polymyxin b (POLYTRIM) 25617-7.1 UNIT/ML-% ophthalmic solution; Place 1-2 drops into both eyes every 4 hours for 7 days    Type 2 diabetes mellitus without complication, without long-term current use of insulin (H)  See above.                  BMI:   Estimated body mass index is 33.65 kg/m  as calculated from the following:    Height as of 10/28/21: 1.575 m (5' 2\").    Weight as of 10/28/21: 83.5 kg (184 lb).           No follow-ups on file.    Ortiz Angelo MD, MD  [facility]    Subjective   Adilia is a 66 year old who presents for the following health issues     HPI       Eye(s) Problem  Onset/Duration: last week  Description:   Location: YES- both eyes, strained, slightly pink in eye, no itching   Pain: no  Redness: YES- on eyelids  Accompanying Signs & Symptoms:  Discharge/mattering: YES  Swelling: YES  Visual changes: no  Fever: no  Nasal Congestion: no  Bothered by bright lights: YES-especially on computer too much  History:  Trauma: no  Foreign body exposure: no  Wearing contacts: no  Precipitating or alleviating factors: None  Therapies tried and outcome: None    Last week - eyes were more watery. Felt strained. On computer.   By Friday - felt more strained. Corners of eyes in the morning - felt dried " and has to remove some mucus.   Today eyelids were somewhat swollen. Both eye lid corners are somewhat red.   Does not feel like pink eye.   Eye exam - 2-3 yrs last eye exam.      Review of Systems         Objective             Physical Exam   GENERAL: Healthy, alert and no distress  EYES: Eyes grossly normal to inspection. Minimal puffiness right upper eyelid but no erythema and no conjunctival erythema.   RESP: No audible wheeze, cough, or visible cyanosis.  No visible retractions or increased work of breathing.    SKIN: Visible skin clear. No significant rash, abnormal pigmentation or lesions.  NEURO: Cranial nerves grossly intact.  Mentation and speech appropriate for age.  PSYCH: Mentation appears normal, affect normal/bright, judgement and insight intact, normal speech and appearance well-groomed.                Video-Visit Details    Type of service:  Video Visit    Video End Time:5:04 PM    Originating Location (pt. Location): Home    Distant Location (provider location):  [facility]     Platform used for Video Visit: [employer]

## 2022-04-11 NOTE — TELEPHONE ENCOUNTER
PT started having sx on 4/6/22.  Right eye.  Eyelid is puffy.  Eye is watering.  In morning, can have some dry crusts in corner of eye.  Worse today.  No cold sx.  Pink, reddish to eye.  No vision changes. May have eye strain from computer work.    Made virtual visit for today.  YOBANI Lovell

## 2022-05-01 ENCOUNTER — HEALTH MAINTENANCE LETTER (OUTPATIENT)
Age: 67
End: 2022-05-01

## 2022-05-09 ENCOUNTER — TELEPHONE (OUTPATIENT)
Dept: OPHTHALMOLOGY | Facility: CLINIC | Age: 67
End: 2022-05-09
Payer: COMMERCIAL

## 2022-05-09 ENCOUNTER — TELEPHONE (OUTPATIENT)
Dept: FAMILY MEDICINE | Facility: CLINIC | Age: 67
End: 2022-05-09
Payer: COMMERCIAL

## 2022-05-09 DIAGNOSIS — H02.849 EYELID GLAND SWELLING, UNSPECIFIED LATERALITY: Primary | ICD-10-CM

## 2022-05-09 NOTE — TELEPHONE ENCOUNTER
In Dr. Angelo's virtual visit note 4/11 he recommends seeing opthalmology    I see there is an eye referral from December in place    Called pt to advise seeing opthalmology, she thinks she would need a new referral since that one was just for a diabetic eye exam, not an acute issue.     Pended new referral, once signed- she would like the info sent on [id number].     ROCK Felix RN  [facility]

## 2022-05-09 NOTE — TELEPHONE ENCOUNTER
[facility] Call Center    Phone Message    May a detailed message be left on voicemail: yes     Reason for Call: Other: Patient has a referral, patient doesnt think the diagnosis is exactly what is going on, she stated it seemed more like conjuctivitis, patient has an appt. on 5/18/2022 with Dolores, but is not sure she can wait that long, please reach out to patient to discuss her options. Thank you       Action Taken: Message routed to:  [facility] EYE    Travel Screening: Not Applicable

## 2022-05-09 NOTE — TELEPHONE ENCOUNTER
Reason for Call:  Call back    Detailed comments: Per patient she was seen early April due to some eye issues. Pink eye was suspected and patient was rx'd meds. States it is returning and she is wondering if she should go to the  or what her next steps should be     Phone Number Patient can be reached at: Home number on file [phone] (home)    Best Time: Open    Can we leave a detailed message on this number? YES    Call taken on 5/9/2022 at 1:49 PM by Mikaela Glaser

## 2022-05-10 ENCOUNTER — OFFICE VISIT (OUTPATIENT)
Dept: URGENT CARE | Facility: URGENT CARE | Age: 67
End: 2022-05-10
Payer: COMMERCIAL

## 2022-05-10 VITALS
SYSTOLIC BLOOD PRESSURE: 153 MMHG | BODY MASS INDEX: 33.65 KG/M2 | OXYGEN SATURATION: 99 % | TEMPERATURE: 98.1 F | HEART RATE: 72 BPM | WEIGHT: 184 LBS | DIASTOLIC BLOOD PRESSURE: 81 MMHG

## 2022-05-10 DIAGNOSIS — H57.9 EYE PROBLEM: Primary | ICD-10-CM

## 2022-05-10 DIAGNOSIS — H10.9 CONJUNCTIVITIS, UNSPECIFIED CONJUNCTIVITIS TYPE, UNSPECIFIED LATERALITY: ICD-10-CM

## 2022-05-10 PROCEDURE — 99213 OFFICE O/P EST LOW 20 MIN: CPT | Performed by: FAMILY MEDICINE

## 2022-05-10 NOTE — PROGRESS NOTES
Chief Complaint   Patient presents with     Urgent Care     Eye Problem     C/O eye infection for 1 day       ASSESMENT AND PLAN     Adilia was seen today for urgent care and eye problem.    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   Tylenol and Rest      Initial differential diagnosis included but was not restricted to   Differential Diagnosis:  Eye Problem: Bacterial conjunctivitis  Viral conjunctivitis  Allergic conjunctivitis  Corneal abrasion  Iritis  Dry eyes   Medical Decision Making:    Reviewed with patient has symptoms of more pronounced in the right eye and she has associated allergic symptoms that could be related to allergic rhinitis, could be related to dry eyes could be allergic conjunctivitis advised patient to try Pataday or Zaditor for few days and if it does not get better then consider using the antibiotic eyedrop.  Patient understood and agreed with plan  Routine discharge counseling was given to the patient and the patient understands that worsening, changing or persistent symptoms should prompt an immediate call or follow up with their primary physician or the emergency department. The importance of appropriate follow up was also discussed with the patient.     I have reviewed the nursing notes.  Time  spent on the date of the encounter doing review of outside records, patient visit, documentation and discussion with family   see orders in Epic  Pt verbalized and agreed with the plan and is aware of the worsening symptoms for which would need to follow up .  Pt was stable during time of discharge from the clinic     SUBJECTIVE     Adilia Camacho is a 66 year old female presenting with a chief complaint of    Chief Complaint   Patient presents with     Urgent Care     Eye Problem     C/O eye infection for 1 day      Eye Problem    Onset of symptoms was 1 day(s) ago.   Location: right eye   Course of illness is worsening.    Severity moderate  Current and Associated symptoms: discharge, redness  Treatment measures tried include none  Context: recent conjunctivitis 3 weeks back treated with polytrim   She does have allergic rhinitis and does take antiallergic medication.  Her symptoms are more in the right eye and she denies any vision changes or any light sensitivity or      Past Medical History:   Diagnosis Date     Allergic rhinitis due to dogs      CARDIOVASCULAR SCREENING; LDL GOAL LESS THAN 130      Contraceptive management      Diabetes mellitus, type 2 (H) 10/31/2019     Hypertension goal BP (blood pressure) < 140/90      Other chronic pain     Left knee     Recurrent herpes labialis      Rosacea      Current Outpatient Medications   Medication Sig Dispense Refill     acetaminophen (TYLENOL) 325 MG tablet Take 2 tablets (650 mg) by mouth every 6 hours as needed for mild pain 120 tablet 0     acyclovir (ZOVIRAX) 400 MG tablet TAKE 1 TABLET BY MOUTH THREE TIMES DAILY 30 tablet 1     albuterol (PROAIR HFA/PROVENTIL HFA/VENTOLIN HFA) 108 (90 Base) MCG/ACT inhaler Inhale 2 puffs into the lungs every 4 hours as needed for shortness of breath / dyspnea or wheezing 18 g 0     albuterol (PROAIR HFA/PROVENTIL HFA/VENTOLIN HFA) 108 (90 Base) MCG/ACT inhaler Inhale 2 puffs into the lungs every 4 hours as needed for shortness of breath / dyspnea or wheezing 18 g 0     alcohol swab prep pads Use to swab area of injection/alon as directed. 100 each 6     atorvastatin (LIPITOR) 20 MG tablet TAKE 1 TABLET(20 MG) BY MOUTH DAILY 90 tablet 1     blood glucose (NO BRAND SPECIFIED) lancets standard Use to test blood sugar 1 times daily or as directed. 100 each 1     blood glucose (NO BRAND SPECIFIED) test strip Use to test blood sugar 1 times daily or as directed. 100 strip 3     blood glucose (NO BRAND SPECIFIED) test strip Use to test  blood sugar 1 times daily or as directed. 50 each 11     blood glucose (ONE TOUCH DELICA) lancing device USE WITH LANCETS       cetirizine (ZYRTEC) 10 MG tablet Take 10 mg by mouth daily       famotidine (PEPCID) 40 MG tablet Take 1 tablet (40 mg) by mouth daily 90 tablet 3     fluticasone (FLONASE) 50 MCG/ACT nasal spray SHAKE LIQUID AND USE 2 SPRAYS IN EACH NOSTRIL TWICE DAILY 48 g 1     ipratropium (ATROVENT HFA) 17 MCG/ACT inhaler Inhale 2 puffs into the lungs 4 times daily 12.9 g 3     Lancets (ONETOUCH DELICA PLUS [id number]) MISC 1 each daily 100 each 11     metoprolol succinate ER (TOPROL-XL) 25 MG 24 hr tablet TAKE 1 TABLET(25 MG) BY MOUTH DAILY 90 tablet 1     montelukast (SINGULAIR) 10 MG tablet Take 1 tablet (10 mg) by mouth At Bedtime 30 tablet 2     omeprazole (PRILOSEC) 20 MG DR capsule TAKE 1 CAPSULE(20 MG) BY MOUTH DAILY 30 TO 60 MINUTES BEFORE A MEAL 90 capsule 3     umeclidinium (INCRUSE ELLIPTA) 62.5 MCG/INH inhaler Inhale 1 puff into the lungs daily 30 each 1     aspirin (ASA) 81 MG tablet Take 2 tablets (162 mg) by mouth daily 56 tablet 0     benzonatate (TESSALON) 200 MG capsule Take 1 capsule (200 mg) by mouth 3 times daily as needed for cough 30 capsule 0     Social History     Tobacco Use     Smoking status: Former Smoker     Packs/day: 0.00     Years: 10.00     Pack years: 0.00     Types: Cigarettes     Start date: 1975     Quit date: 1984     Years since quittin.7     Smokeless tobacco: Never Used     Tobacco comment: I never was a heavy smoker never woke up and had a cigarette maybe 3 a day   Substance Use Topics     Alcohol use: Yes     Alcohol/week: 1.7 standard drinks     Comment: ocasionally     Family History   Problem Relation Age of Onset     Hypertension Father      Hypertension Brother      Hypertension Sister      Diabetes Sister         and brother     Kidney Disease Sister      Diabetes Brother      Diabetes Sister      Hypertension Sister      Anxiety Disorder  Sister      Diabetes Brother      Hypertension Brother      Cancer - colorectal No family hx of      Breast Cancer No family hx of      Thyroid Disease No family hx of          ROS:    10 point ROS of systems including Constitutional,  Respiratory, Cardiovascular, Gastroenterology, Genitourinary, Integumentary, Muscularskeletal, Psychiatric ,neurological were all negative except for pertinent positives noted in my HPI         OBJECTIVE:    BP (!) 153/81   Pulse 72   Temp 98.1  F (36.7  C) (Tympanic)   Wt 83.5 kg (184 lb)   LMP 07/17/2012   SpO2 99%   BMI 33.65 kg/m    GENERAL APPEARANCE: healthy, alert and no distress  EYES: EOMI,  PERRL, conjunctiva redness noted on the right eye, left conjunctiva clear  HENT: ear canals and TM's normal.  Nose and mouth without ulcers, erythema or lesions  NECK: supple, nontender, no lymphadenopathy  PSYCH: mentation appears normal  Physical Exam      (Note was completed, in part, with [employer] voice-recognition software. Documentation reviewed, but some grammatical, spelling, and word errors may remain.)  Vangie Flynn MD.

## 2022-05-11 NOTE — TELEPHONE ENCOUNTER
Called and spoke to Adilia     Per Call Center        Patient has a referral, patient doesnt think the diagnosis is exactly what is going on, she stated it seemed more like conjuctivitis, patient has an appt. on 5/18/2022 with Dolores, but is not sure she can wait that long, please reach out to patient to discuss her options.    When I spoke to Adilia she had an insurance question and did not request a sooner appointment or questioning her diagnosis.     Janelle Ybarra Communication Facilitator on 5/11/2022 at 2:19 PM

## 2022-05-17 NOTE — PROGRESS NOTES
HPI:  Patient presents for follow up from urgent care for conjunctivitis. Patient was prescribed polytrim which helped. Though symtpoms return. Patient did try pataday for the itchiness which improved symptoms. When she stopped using pataday, her eye symptoms returned when she was outside raking leaves. Patient complains of redness and itching. The corners of her eyes gets really dry and sore.       Pertinent Medical History:    Allergic rhinitis due to dogs    Obesity    Diabetes mellitus type 2    Hypertension    Hyperlipidemia    Venous insufficiency    Rosacea    Recurrent herpes labialis    Acute sinusitis    Dizziness    Ocular History:    Conjunctivitis, both eyes.     Eye Medications:    Pataday     Assessment and Plan:  1.   Allergic Conjunctivitis, both eyes.     Start pataday once daily both eyes.     2.   Dry Eye Syndrome, both eyes.     Start preservative free artificial tears 4 times daily both eyes.     3.   Diabetes    Patient defers dilation today - we discussed risks and benefits. Patient would like to reschedule a diabetic eye exam at a later date.     4.   Cataract, both eyes.     Not visually significant. Monitor.         Patient consented to a dilated eye exam:     Yes. Side effects discussed.    Medical History:  Past Medical History:   Diagnosis Date     Allergic rhinitis due to dogs      CARDIOVASCULAR SCREENING; LDL GOAL LESS THAN 130      Contraceptive management      Diabetes mellitus, type 2 (H) 10/31/2019     Hypertension goal BP (blood pressure) < 140/90      Other chronic pain     Left knee     Recurrent herpes labialis      Rosacea        Medications:  Current Outpatient Medications   Medication Sig Dispense Refill     acetaminophen (TYLENOL) 325 MG tablet Take 2 tablets (650 mg) by mouth every 6 hours as needed for mild pain 120 tablet 0     acyclovir (ZOVIRAX) 400 MG tablet TAKE 1 TABLET BY MOUTH THREE TIMES DAILY 30 tablet 1     albuterol (PROAIR HFA/PROVENTIL HFA/VENTOLIN HFA)  108 (90 Base) MCG/ACT inhaler Inhale 2 puffs into the lungs every 4 hours as needed for shortness of breath / dyspnea or wheezing 18 g 0     albuterol (PROAIR HFA/PROVENTIL HFA/VENTOLIN HFA) 108 (90 Base) MCG/ACT inhaler Inhale 2 puffs into the lungs every 4 hours as needed for shortness of breath / dyspnea or wheezing 18 g 0     alcohol swab prep pads Use to swab area of injection/alon as directed. 100 each 6     aspirin (ASA) 81 MG tablet Take 2 tablets (162 mg) by mouth daily 56 tablet 0     atorvastatin (LIPITOR) 20 MG tablet TAKE 1 TABLET(20 MG) BY MOUTH DAILY 90 tablet 1     benzonatate (TESSALON) 200 MG capsule Take 1 capsule (200 mg) by mouth 3 times daily as needed for cough 30 capsule 0     blood glucose (NO BRAND SPECIFIED) lancets standard Use to test blood sugar 1 times daily or as directed. 100 each 1     blood glucose (NO BRAND SPECIFIED) test strip Use to test blood sugar 1 times daily or as directed. 100 strip 3     blood glucose (NO BRAND SPECIFIED) test strip Use to test blood sugar 1 times daily or as directed. 50 each 11     blood glucose (ONE TOUCH DELICA) lancing device USE WITH LANCETS       cetirizine (ZYRTEC) 10 MG tablet Take 10 mg by mouth daily       famotidine (PEPCID) 40 MG tablet Take 1 tablet (40 mg) by mouth daily 90 tablet 3     fluticasone (FLONASE) 50 MCG/ACT nasal spray SHAKE LIQUID AND USE 2 SPRAYS IN EACH NOSTRIL TWICE DAILY 48 g 1     ipratropium (ATROVENT HFA) 17 MCG/ACT inhaler Inhale 2 puffs into the lungs 4 times daily 12.9 g 3     Lancets (ONETOUCH DELICA PLUS [id number]) MISC 1 each daily 100 each 11     metoprolol succinate ER (TOPROL-XL) 25 MG 24 hr tablet TAKE 1 TABLET(25 MG) BY MOUTH DAILY 90 tablet 1     montelukast (SINGULAIR) 10 MG tablet Take 1 tablet (10 mg) by mouth At Bedtime 30 tablet 2     omeprazole (PRILOSEC) 20 MG DR capsule TAKE 1 CAPSULE(20 MG) BY MOUTH DAILY 30 TO 60 MINUTES BEFORE A MEAL 90 capsule 3     umeclidinium (INCRUSE ELLIPTA) 62.5 MCG/INH  inhaler Inhale 1 puff into the lungs daily 30 each 1   Complete documentation of historical and exam elements from today's encounter can be found in the full encounter summary report (not reduplicated in this progress note). I personally obtained the chief complaint(s) and history of present illness.  I confirmed and edited as necessary the review of systems, past medical/surgical history, family history, social history, and examination findings as documented by others; and I examined the patient myself. I personally reviewed the relevant tests, images, and reports as documented above. I formulated and edited as necessary the assessment and plan and discussed the findings and management plan with the patient and family. - Fabian Stringer, BAR

## 2022-05-18 ENCOUNTER — OFFICE VISIT (OUTPATIENT)
Dept: OPHTHALMOLOGY | Facility: CLINIC | Age: 67
End: 2022-05-18
Attending: OPHTHALMOLOGY
Payer: COMMERCIAL

## 2022-05-18 DIAGNOSIS — H25.13 NUCLEAR SENILE CATARACT OF BOTH EYES: ICD-10-CM

## 2022-05-18 DIAGNOSIS — H04.123 DRY EYE SYNDROME OF BOTH EYES: ICD-10-CM

## 2022-05-18 DIAGNOSIS — H10.13 ALLERGIC CONJUNCTIVITIS OF BOTH EYES: Primary | ICD-10-CM

## 2022-05-18 PROCEDURE — 92002 INTRM OPH EXAM NEW PATIENT: CPT | Performed by: OPTOMETRIST

## 2022-05-18 PROCEDURE — G0463 HOSPITAL OUTPT CLINIC VISIT: HCPCS

## 2022-05-18 RX ORDER — OLOPATADINE HYDROCHLORIDE 2 MG/ML
1 SOLUTION/ DROPS OPHTHALMIC DAILY
Qty: 2.5 ML | Refills: 11 | Status: SHIPPED | OUTPATIENT
Start: 2022-05-18 | End: 2024-03-11

## 2022-05-18 RX ORDER — CARBOXYMETHYLCELLULOSE SODIUM 5 MG/ML
1 SOLUTION/ DROPS OPHTHALMIC 4 TIMES DAILY
Qty: 400 EACH | Refills: 11 | Status: SHIPPED | OUTPATIENT
Start: 2022-05-18

## 2022-05-18 ASSESSMENT — CONF VISUAL FIELD
OD_NORMAL: 1
OS_NORMAL: 1

## 2022-05-18 ASSESSMENT — SLIT LAMP EXAM - LIDS
COMMENTS: NORMAL
COMMENTS: NORMAL

## 2022-05-18 ASSESSMENT — TONOMETRY
OS_IOP_MMHG: 23
OD_IOP_MMHG: 23
IOP_METHOD: ICARE
OD_IOP_MMHG: 17
IOP_METHOD: APPLANATION
OS_IOP_MMHG: 19

## 2022-05-18 ASSESSMENT — VISUAL ACUITY
CORRECTION_TYPE: GLASSES
OS_CC: 20/20
OD_CC+: -1
METHOD: SNELLEN - LINEAR
OD_CC: 20/20

## 2022-05-18 ASSESSMENT — EXTERNAL EXAM - RIGHT EYE: OD_EXAM: NORMAL

## 2022-05-18 ASSESSMENT — EXTERNAL EXAM - LEFT EYE: OS_EXAM: NORMAL

## 2022-05-18 NOTE — PATIENT INSTRUCTIONS
Pataday once daily both eyes - for 4 months.  Preservative free artificial tears 4 times daily both eyes.      Keep all eyedrops 5 minutes apart.

## 2022-05-18 NOTE — NURSING NOTE
Chief Complaints and History of Present Illnesses   Patient presents with     Consult For     Pt here for second opinion on Allergic Conjunctivitis right eye>left eye.     Chief Complaint(s) and History of Present Illness(es)     Consult For     Laterality: both eyes    Associated symptoms: redness, itching, discharge and swelling    Comments: Pt here for second opinion on Allergic Conjunctivitis right eye>left eye.              Comments     Pt was had a virtual visit first 1 month ago. Used an antibiotic drops prescribed to her with minimal relief. Pt then went into UC and was given Pataday which helped. When she stopped using the redness came back. She also notes the outer corners of her eyes got sore and dry. Lids were also swollen upper and lower. Vision was/is fluctuating.   Pt using:  Antibiotic drop- stopped 3 weeks ago  Pataday- Stopped about 1 week ago.   ELIEL FABIAN 8:08 AM May 18, 2022

## 2022-07-18 DIAGNOSIS — B00.1 RECURRENT HERPES LABIALIS: ICD-10-CM

## 2022-07-21 RX ORDER — ACYCLOVIR 400 MG/1
TABLET ORAL
Qty: 30 TABLET | Refills: 1 | Status: SHIPPED | OUTPATIENT
Start: 2022-07-21 | End: 2023-04-26

## 2022-07-21 NOTE — TELEPHONE ENCOUNTER
Routing refill request to provider for review/approval because:  Labs not current:  NO SERUM CREATININE IN CHART  Last Written Prescription Date:  7/1/21  Last Fill Quantity: 30,  # refills: 1   Last office visit: 10/11/2021 with prescribing provider:       Future Office Visit:    Brynn Earl RN  [facility]

## 2022-07-22 DIAGNOSIS — J30.81 ALLERGIC RHINITIS DUE TO DOGS: ICD-10-CM

## 2022-07-25 RX ORDER — FLUTICASONE PROPIONATE 50 MCG
SPRAY, SUSPENSION (ML) NASAL
Qty: 48 G | Refills: 1 | Status: SHIPPED | OUTPATIENT
Start: 2022-07-25 | End: 2023-01-23

## 2022-07-27 DIAGNOSIS — I10 HYPERTENSION GOAL BP (BLOOD PRESSURE) < 140/90: ICD-10-CM

## 2022-07-31 NOTE — TELEPHONE ENCOUNTER
I sent the albuterol inhaler refill for her and agree with your plan. Sheree Graf M.D.         This RN assumed care of pt at 0300.  Pt received sleeping with 1:1  at bedside.  Pt rouses to stimuli.  Warm blankets and cold water provided for comfort.

## 2022-07-31 NOTE — TELEPHONE ENCOUNTER
Routing refill request to provider for review/approval because:  Over due for follow up  TC: please call and schedule visit follow up diabetes and BP  Last filled:   metoprolol succinate ER (TOPROL-XL) 25 MG 24 hr tablet 90 tablet 1 2/7/2022     Last visit: 4/11/2022 with SK for eye issues  Per 12/13/2021 visit:  Return in about 6 months (around 6/13/2022) for Follow up diabetes.    Upcoming visit: none      Beta-Blockers Protocol Failed 07/27/2022 07:30 AM   Protocol Details  Blood pressure under 140/90 in past 12 months    Patient is age 6 or older    Recent (12 mo) or future (30 days) visit within the authorizing provider's specialty    Medication is active on med list

## 2022-08-01 RX ORDER — METOPROLOL SUCCINATE 25 MG/1
TABLET, EXTENDED RELEASE ORAL
Qty: 90 TABLET | Refills: 0 | Status: SHIPPED | OUTPATIENT
Start: 2022-08-01 | End: 2022-10-31

## 2022-08-21 ENCOUNTER — HEALTH MAINTENANCE LETTER (OUTPATIENT)
Age: 67
End: 2022-08-21

## 2022-09-30 ENCOUNTER — NURSE TRIAGE (OUTPATIENT)
Dept: FAMILY MEDICINE | Facility: CLINIC | Age: 67
End: 2022-09-30

## 2022-09-30 ENCOUNTER — OFFICE VISIT (OUTPATIENT)
Dept: URGENT CARE | Facility: URGENT CARE | Age: 67
End: 2022-09-30
Payer: COMMERCIAL

## 2022-09-30 ENCOUNTER — ANCILLARY PROCEDURE (OUTPATIENT)
Dept: GENERAL RADIOLOGY | Facility: CLINIC | Age: 67
End: 2022-09-30
Attending: FAMILY MEDICINE
Payer: COMMERCIAL

## 2022-09-30 VITALS
DIASTOLIC BLOOD PRESSURE: 85 MMHG | HEART RATE: 88 BPM | TEMPERATURE: 98.3 F | SYSTOLIC BLOOD PRESSURE: 170 MMHG | OXYGEN SATURATION: 97 %

## 2022-09-30 DIAGNOSIS — M75.101 ROTATOR CUFF SYNDROME, RIGHT: ICD-10-CM

## 2022-09-30 DIAGNOSIS — M25.511 ACUTE PAIN OF RIGHT SHOULDER: Primary | ICD-10-CM

## 2022-09-30 PROCEDURE — 73030 X-RAY EXAM OF SHOULDER: CPT | Mod: TC | Performed by: RADIOLOGY

## 2022-09-30 PROCEDURE — 99213 OFFICE O/P EST LOW 20 MIN: CPT | Performed by: FAMILY MEDICINE

## 2022-09-30 RX ORDER — NAPROXEN 500 MG/1
500 TABLET ORAL 2 TIMES DAILY WITH MEALS
Qty: 30 TABLET | Refills: 1 | Status: SHIPPED | OUTPATIENT
Start: 2022-09-30 | End: 2024-03-11

## 2022-09-30 NOTE — PROGRESS NOTES
SUBJECTIVE:  Adilia Camacho, a 67 year old female scheduled an appointment to discuss the following issues:     Acute pain of right shoulder  Rotator cuff syndrome, right    Medical, social, surgical, and family histories reviewed.     Urgent Care   Shoulder (Right shoulder/ arm  pain)  Right shoulder pain since 2 days ago; no injury or fall.  Abduction and flexion above 90 degrees painful.  No paresthesia.  Pt works from home, desk job.  No recent heavy lifting.  No paresthesia or open wound.    ROS:  See HPI.  No nausea/vomiting.  No fever/chills.  No chest pain/SOB.  No BM/urine problems.  No dizziness or syncope.      OBJECTIVE:  BP (!) 170/85   Pulse 88   Temp 98.3  F (36.8  C) (Temporal)   LMP 07/17/2012   SpO2 97%   EXAM:  GENERAL APPEARANCE: alert and mild distress, hypertensive  HENT: normal, no adenopathy, no thyromegaly, neck supple  RESP: lungs clear  CV: regular rates and rhythm  LYMPHATICS: no cervical adenopathy  MS: extremities ---no gross deformities noted;  Right shoulder---no asymmetry, swelling or redness or bruising; some tenderness at the anterior glenoid fossa and the acromion; abduction and flexion above 90 degrees of right shoulder painful;  Neurovascularly intact bilateral upper extremities  SKIN: no suspicious lesions or rashes  NEURO: Normal strength and tone, mentation intact and speech normal      ASSESSMENT/PLAN:  (M25.511) Acute pain of right shoulder  (primary encounter diagnosis)  Comment: xray right shoulder showed:  1.  Mild right glenohumeral and acromioclavicular degenerative  arthrosis.  2.  Small focus of tendinous calcification projecting at the superior  margin of the humerus greater tuberosity. Although often asymptomatic  and incidental, this could be evidence of calcific tendinitis in  correct clinical setting.  3.  No fracture.  Plan: XR Shoulder Right G/E 3 Views, naproxen         (NAPROSYN) 500 MG tablet, CANCELED: XR Shoulder        Right 2 Views       (M75.101)  Rotator cuff syndrome, right  Comment: calcific tendonitis; shoulder range-of-motion exercises to prevent stiffness advised  Plan: naproxen (NAPROSYN) 500 MG tablet  Care instructions given.  Pt to f/up PCP within 3 weeks, sooner if no improvement or worsening.  Warning signs and symptoms explained.

## 2022-09-30 NOTE — TELEPHONE ENCOUNTER
Adilia calling with right shoulder/neck pain into her arm, she did say she thought she had slight swelling in the upper arm and shoulder/neck area but she wasn't sure. 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 She tried Tylenol and rest with slight relief.  She denied, chest pains, SOB, headache, nausea, sweating or lightheadedness.  She would like to be seen today due to going out of town today. She will come onto  today for assessment.    Izzy Roque RN    Reason for Disposition    Patient wants to be seen    Additional Information    Negative: Shock suspected (e.g., cold/pale/clammy skin, too weak to stand, low BP, rapid pulse)    Negative: Similar pain previously and it was from 'heart attack'    Negative: Similar pain previously from 'angina' and not relieved by nitroglycerin    Negative: Sounds like a life-threatening emergency to the triager    Negative: Followed an injury to arm    Negative: Chest pain    Negative: Wound looks infected    Negative: Elbow pain is main symptom    Negative: Hand or wrist pain is main symptom    Negative: Difficulty breathing or unusual sweating (e.g., sweating without exertion)    Negative: Chest pain lasting longer than 5 minutes    Negative: Age > 40 and no obvious cause for pain, pain still present even when not moving the arm    Negative: Fever and red area (or area very tender to touch)    Negative: Swollen joint and fever    Negative: Entire arm is swollen    Negative: Patient sounds very sick or weak to the triager    Negative: SEVERE pain (e.g., excruciating, unable to do any normal activities)    Negative: Red area or streak > 2 inches (or 5 cm)    Negative: Cast on wrist or arm and now increasing pain    Negative: Weakness (i.e., loss of strength) in hand or fingers    Negative: Arm pains with exertion (e.g., occurs with walking; goes away on resting)    Negative: Painful rash with multiple small blisters  "grouped together (i.e., dermatomal distribution or 'band' or 'stripe')    Negative: Looks like a boil, infected sore, deep ulcer, or other infected rash (spreading redness, pus)    Negative: Localized rash is very painful (no fever)    Answer Assessment - Initial Assessment Questions  1. ONSET: \"When did the pain start?\"      1-2 days ago  2. LOCATION: \"Where is the pain located?\"      Shoulder and neck on right side into arm  3. PAIN: \"How bad is the pain?\" (Scale 1-10; or mild, moderate, severe)    - MILD (1-3): doesn't interfere with normal activities    - MODERATE (4-7): interferes with normal activities (e.g., work or school) or awakens from sleep    - SEVERE (8-10): excruciating pain, unable to do any normal activities, unable to hold a cup of water      Mild  4. 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 CAUSE: \"What do you think is causing the arm pain?\"      Over use?   6. OTHER SYMPTOMS: \"Do you have any other symptoms?\" (e.g., neck pain, swelling, rash, fever, numbness, weakness)      Slight swelling in the upper arm, shoulder area  7. PREGNANCY: \"Is there any chance you are pregnant?\" \"When was your last menstrual period?\"      no    Protocols used: ARM PAIN-A-OH      "

## 2022-10-07 ENCOUNTER — LAB (OUTPATIENT)
Dept: LAB | Facility: CLINIC | Age: 67
End: 2022-10-07
Payer: COMMERCIAL

## 2022-10-07 DIAGNOSIS — E11.9 TYPE 2 DIABETES MELLITUS WITHOUT COMPLICATION, WITHOUT LONG-TERM CURRENT USE OF INSULIN (H): Primary | ICD-10-CM

## 2022-10-07 DIAGNOSIS — K21.00 GASTROESOPHAGEAL REFLUX DISEASE WITH ESOPHAGITIS WITHOUT HEMORRHAGE: ICD-10-CM

## 2022-10-07 LAB — HBA1C MFR BLD: 6.9 % (ref 0–5.6)

## 2022-10-07 PROCEDURE — 83036 HEMOGLOBIN GLYCOSYLATED A1C: CPT

## 2022-10-07 PROCEDURE — 36415 COLL VENOUS BLD VENIPUNCTURE: CPT

## 2022-10-07 RX ORDER — FAMOTIDINE 40 MG/1
TABLET, FILM COATED ORAL
Qty: 90 TABLET | Refills: 0 | Status: SHIPPED | OUTPATIENT
Start: 2022-10-07 | End: 2023-01-05

## 2022-10-07 NOTE — TELEPHONE ENCOUNTER
Prescription approved per [facility] Refill Protocol.  Anny refill  Future appt 1/4/23  KIKA Fang RN  [facility] [place]

## 2022-10-28 DIAGNOSIS — I10 HYPERTENSION GOAL BP (BLOOD PRESSURE) < 140/90: ICD-10-CM

## 2022-10-28 NOTE — TELEPHONE ENCOUNTER
Routing refill request to provider for review/approval because:  Blood pressure under 140/90 in past 12 months    BP Readings from Last 3 Encounters:   09/30/22 (!) 170/85   05/10/22 (!) 153/81   10/28/21 (!) 142/82       Last Written Prescription Date:  8/1/22  Last Fill Quantity: 90,  # refills: 0   Last office visit: 4/11/22 virtual with Gi   Future Office Visit:   Next 5 appointments (look out 90 days)    Jan 04, 2023  8:00 AM  (Arrive by 7:40 AM)  Provider Visit with Sheree Graf MD  [facility] ([facility] [address] [place] [address] [phone]         KIKA Fang RN  [facility]

## 2022-10-31 RX ORDER — METOPROLOL SUCCINATE 25 MG/1
TABLET, EXTENDED RELEASE ORAL
Qty: 90 TABLET | Refills: 0 | Status: SHIPPED | OUTPATIENT
Start: 2022-10-31 | End: 2023-01-23

## 2022-11-04 ENCOUNTER — IMMUNIZATION (OUTPATIENT)
Dept: FAMILY MEDICINE | Facility: CLINIC | Age: 67
End: 2022-11-04
Payer: COMMERCIAL

## 2022-11-04 DIAGNOSIS — Z23 NEED FOR VACCINATION: Primary | ICD-10-CM

## 2022-11-04 PROCEDURE — 91313 COVID-19,PF,MODERNA BIVALENT: CPT

## 2022-11-04 PROCEDURE — 90662 IIV NO PRSV INCREASED AG IM: CPT

## 2022-11-04 PROCEDURE — 90471 IMMUNIZATION ADMIN: CPT

## 2022-11-04 PROCEDURE — 0134A COVID-19,PF,MODERNA BIVALENT: CPT

## 2022-11-04 NOTE — PROGRESS NOTES
Prior to immunization administration, verified patients identity using patient s name and date of birth. Please see Immunization Activity for additional information.     Screening Questionnaire for Adult Immunization    Are you sick today?   No   Do you have allergies to medications, food, a vaccine component or latex?   No   Have you ever had a serious reaction after receiving a vaccination?   No   Do you have a long-term health problem with heart, lung, kidney, or metabolic disease (e.g., diabetes), asthma, a blood disorder, no spleen, complement component deficiency, a cochlear implant, or a spinal fluid leak?  Are you on long-term aspirin therapy?   No   Do you have cancer, leukemia, HIV/AIDS, or any other immune system problem?   No   Do you have a parent, brother, or sister with an immune system problem?   No   In the past 3 months, have you taken medications that affect  your immune system, such as prednisone, other steroids, or anticancer drugs; drugs for the treatment of rheumatoid arthritis, Crohn s disease, or psoriasis; or have you had radiation treatments?   No   Have you had a seizure, or a brain or other nervous system problem?   No   During the past year, have you received a transfusion of blood or blood    products, or been given immune (gamma) globulin or antiviral drug?   No   For women: Are you pregnant or is there a chance you could become       pregnant during the next month?   No   Have you received any vaccinations in the past 4 weeks?   No     Immunization questionnaire answers were all negative.        Per orders of Dr. Dunham, injection of Bivalent Moderna   given by Zelda Negro CMA. Patient instructed to remain in clinic for 15 minutes afterwards, and to report any adverse reaction to me immediately.       Screening performed by Zelda Negro CMA on 11/4/2022 at 9:28 AM.

## 2022-11-15 DIAGNOSIS — E78.5 HYPERLIPIDEMIA, UNSPECIFIED HYPERLIPIDEMIA TYPE: ICD-10-CM

## 2022-11-16 RX ORDER — ATORVASTATIN CALCIUM 20 MG/1
TABLET, FILM COATED ORAL
Qty: 90 TABLET | Refills: 0 | Status: SHIPPED | OUTPATIENT
Start: 2022-11-16 | End: 2023-02-27

## 2022-11-16 NOTE — TELEPHONE ENCOUNTER
4/11/22 was last virtual visit.  Has appt with PCP on 1/4/23.  Prescription approved per [facility] Refill Protocol.  YOBANI Lovell

## 2022-11-21 ENCOUNTER — HEALTH MAINTENANCE LETTER (OUTPATIENT)
Age: 67
End: 2022-11-21

## 2022-12-12 NOTE — ED AVS SNAPSHOT
[facility], [place], Emergency Department  [address] [place] [id number]  Phone:  [phone]                                    Adilia Camacho   MRN: [id number]    Department:  [facility], Emergency Department   Date of Visit:  12/16/2019           After Visit Summary Signature Page    I have received my discharge instructions, and my questions have been answered. I have discussed any challenges I see with this plan with the nurse or doctor.    ..........................................................................................................................................  Patient/Patient Representative Signature      ..........................................................................................................................................  Patient Representative Print Name and Relationship to Patient    ..................................................               ................................................  Date                                   Time    ..........................................................................................................................................  Reviewed by Signature/Title    ...................................................              ..............................................  Date                                               Time          22EPIC Rev 08/18       
[id number]

## 2023-01-05 DIAGNOSIS — K21.00 GASTROESOPHAGEAL REFLUX DISEASE WITH ESOPHAGITIS WITHOUT HEMORRHAGE: ICD-10-CM

## 2023-01-06 RX ORDER — FAMOTIDINE 40 MG/1
TABLET, FILM COATED ORAL
Qty: 90 TABLET | OUTPATIENT
Start: 2023-01-06

## 2023-01-23 ENCOUNTER — OFFICE VISIT (OUTPATIENT)
Dept: FAMILY MEDICINE | Facility: CLINIC | Age: 68
End: 2023-01-23
Payer: COMMERCIAL

## 2023-01-23 VITALS
TEMPERATURE: 97 F | HEART RATE: 67 BPM | OXYGEN SATURATION: 99 % | RESPIRATION RATE: 16 BRPM | DIASTOLIC BLOOD PRESSURE: 80 MMHG | SYSTOLIC BLOOD PRESSURE: 153 MMHG | HEIGHT: 62 IN | BODY MASS INDEX: 35.33 KG/M2 | WEIGHT: 192 LBS

## 2023-01-23 DIAGNOSIS — I10 HYPERTENSION GOAL BP (BLOOD PRESSURE) < 140/90: ICD-10-CM

## 2023-01-23 DIAGNOSIS — R05.3 CHRONIC COUGH: Primary | ICD-10-CM

## 2023-01-23 DIAGNOSIS — K21.9 GASTROESOPHAGEAL REFLUX DISEASE, UNSPECIFIED WHETHER ESOPHAGITIS PRESENT: ICD-10-CM

## 2023-01-23 DIAGNOSIS — K21.00 GASTROESOPHAGEAL REFLUX DISEASE WITH ESOPHAGITIS WITHOUT HEMORRHAGE: ICD-10-CM

## 2023-01-23 DIAGNOSIS — R05.3 CHRONIC COUGH: ICD-10-CM

## 2023-01-23 DIAGNOSIS — J30.81 ALLERGIC RHINITIS DUE TO DOGS: ICD-10-CM

## 2023-01-23 DIAGNOSIS — E11.9 TYPE 2 DIABETES MELLITUS WITHOUT COMPLICATION, WITHOUT LONG-TERM CURRENT USE OF INSULIN (H): ICD-10-CM

## 2023-01-23 DIAGNOSIS — R05.8 POST-VIRAL COUGH SYNDROME: ICD-10-CM

## 2023-01-23 PROCEDURE — 99214 OFFICE O/P EST MOD 30 MIN: CPT | Performed by: STUDENT IN AN ORGANIZED HEALTH CARE EDUCATION/TRAINING PROGRAM

## 2023-01-23 RX ORDER — FLUTICASONE PROPIONATE 50 MCG
1 SPRAY, SUSPENSION (ML) NASAL 2 TIMES DAILY
Qty: 48 G | Refills: 11 | Status: SHIPPED | OUTPATIENT
Start: 2023-01-23 | End: 2024-03-11

## 2023-01-23 RX ORDER — MONTELUKAST SODIUM 10 MG/1
10 TABLET ORAL AT BEDTIME
Qty: 30 TABLET | Refills: 1 | Status: SHIPPED | OUTPATIENT
Start: 2023-01-23 | End: 2023-03-06

## 2023-01-23 RX ORDER — FAMOTIDINE 40 MG/1
40 TABLET, FILM COATED ORAL DAILY
Qty: 90 TABLET | Refills: 1 | Status: SHIPPED | OUTPATIENT
Start: 2023-01-23 | End: 2023-07-28

## 2023-01-23 RX ORDER — METOPROLOL SUCCINATE 25 MG/1
25 TABLET, EXTENDED RELEASE ORAL DAILY
Qty: 90 TABLET | Refills: 1 | Status: SHIPPED | OUTPATIENT
Start: 2023-01-23 | End: 2023-08-02

## 2023-01-23 ASSESSMENT — ASTHMA QUESTIONNAIRES
QUESTION_4 LAST FOUR WEEKS HOW OFTEN HAVE YOU USED YOUR RESCUE INHALER OR NEBULIZER MEDICATION (SUCH AS ALBUTEROL): NOT AT ALL
QUESTION_3 LAST FOUR WEEKS HOW OFTEN DID YOUR ASTHMA SYMPTOMS (WHEEZING, COUGHING, SHORTNESS OF BREATH, CHEST TIGHTNESS OR PAIN) WAKE YOU UP AT NIGHT OR EARLIER THAN USUAL IN THE MORNING: ONCE A WEEK
ACT_TOTALSCORE: 18
QUESTION_1 LAST FOUR WEEKS HOW MUCH OF THE TIME DID YOUR ASTHMA KEEP YOU FROM GETTING AS MUCH DONE AT WORK, SCHOOL OR AT HOME: SOME OF THE TIME
QUESTION_5 LAST FOUR WEEKS HOW WOULD YOU RATE YOUR ASTHMA CONTROL: POORLY CONTROLLED
ACT_TOTALSCORE: 18
QUESTION_2 LAST FOUR WEEKS HOW OFTEN HAVE YOU HAD SHORTNESS OF BREATH: NOT AT ALL

## 2023-01-23 ASSESSMENT — PAIN SCALES - GENERAL: PAINLEVEL: NO PAIN (0)

## 2023-01-23 NOTE — PROGRESS NOTES
Assessment & Plan     Chronic cough  Post-viral cough syndrome  Gastroesophageal reflux disease with esophagitis without hemorrhage  Hx chronic cough, GERD, allergies for 9+ yrs. Intermittent. Dry cough worsens after viral illness. Has seen ENT, GI, pulm in the past with normal PFTs, CXR. Has done voice therapy exercises in the past.  Today, lung exam WNL. Hard to assess if symptoms are due to post-nasal drip or GERD. Had viral URI November 2022 which sparked this current cough episode. Not currently using any inhalers. Of note, pt did not like incruse ellipta inhaler.    Plan:  -continue flonase, zyrtec for postnasal drip  -continue pepcid & PPI for GERD  -re-start montelukast and ipratropium inhaler for post-viral cough; use for 1 month (this was previously recommended by pulm if post-viral cough returns)  -if no improvement in 1 month, follow up in clinic. Should consider endoscopy for GERD at that time.    - montelukast (SINGULAIR) 10 MG tablet; Take 1 tablet (10 mg) by mouth At Bedtime  - ipratropium (ATROVENT HFA) 17 MCG/ACT inhaler; Inhale 2 puffs into the lungs 4 times daily  - pepcid daily  - omprazole daily    Allergic rhinitis due to dogs  - fluticasone (FLONASE) 50 MCG/ACT nasal spray; Spray 1 spray into both nostrils 2 times daily    Hypertension goal BP (blood pressure) < 140/90  BP elevated today, above goal. Home readings vary from 130-149 systolic, 70's diastolic. Has high amount of stress currently due to work, retiring, and selling her house. Recommend daily BP monitoring for 2-4 weeks. Pt will send [employer] message with results. If above goal, recommend adding losartan or chlorthalidone. She is only on metoprolol 25mg daily, which is a poor antihypertensive.  - metoprolol 25mg daily    Type 2 diabetes mellitus without complication, without long-term current use of insulin (H)  Has follow up appt with PCP 4/2023. Refilled supplies today.   - blood glucose (NO BRAND SPECIFIED) lancets standard;  "Use to test blood sugar 1 times daily or as directed.  - blood glucose (NO BRAND SPECIFIED) test strip; Use to test blood sugar 1 times daily or as directed.        No follow-ups on file.    [facility]    Delmi Pat is a 67 year old presenting for the following health issues:  Cough (Acid reflux, Pt can't get rid of the cough and its been happing since November. )      History of Present Illness       Reason for visit:  Issues with Acid reflux    She eats 2-3 servings of fruits and vegetables daily.She consumes 0 sweetened beverage(s) daily.She exercises with enough effort to increase her heart rate 20 to 29 minutes per day.  She exercises with enough effort to increase her heart rate 3 or less days per week.   She is taking medications regularly.     Cough  -persistent since November  -thinks its GERD  -intermittent for a long time, usually can get rid of it  -Nov 2022 started again, got sick with URI and cough, never gone away  -has seen pulm in the past, has seen ENT & did vocal therapy  -initially started 9 yrs ago  -occasional wheezing at night  -dry cough  -acid reflux & chest heaviness  -using:   -cough drops   -currently not using inhalers   -has albuterol, but hasn't used it    -flonase, zyrtec nightly  -saw pulm may 2021 could restart inhaled atrovent and singulair?    GERD  -prilosec 20mg daily  -pepcid 40mg daily    HTN  -due for labs  -metoprolol succinate 25mg daily  -home readings: 145/78 before meds  -130-149/70s    Diabetes:  -no medications        Review of Systems   Constitutional, HEENT, cardiovascular, pulmonary, gi and gu systems are negative, except as otherwise noted.      Objective    BP (!) 153/80 (BP Location: Left arm, Patient Position: Sitting, Cuff Size: Adult Regular)   Pulse 67   Temp 97  F (36.1  C) (Tympanic)   Resp 16   Ht 1.575 m (5' 2\")   Wt 87.1 kg (192 lb)   LMP 07/17/2012   SpO2 99%   BMI 35.12 kg/m    Body " mass index is 35.12 kg/m .     Physical Exam  Constitutional:       Appearance: Normal appearance. She is not ill-appearing.   HENT:      Right Ear: Tympanic membrane, ear canal and external ear normal. There is no impacted cerumen.      Left Ear: Tympanic membrane, ear canal and external ear normal. There is no impacted cerumen.   Eyes:      Extraocular Movements: Extraocular movements intact.      Conjunctiva/sclera: Conjunctivae normal.      Pupils: Pupils are equal, round, and reactive to light.   Cardiovascular:      Rate and Rhythm: Normal rate and regular rhythm.      Heart sounds: Normal heart sounds.   Pulmonary:      Effort: Pulmonary effort is normal. No respiratory distress.      Breath sounds: Normal breath sounds. No wheezing or rales.   Abdominal:      General: Abdomen is flat.   Skin:     General: Skin is warm and dry.      Findings: No rash.   Neurological:      General: No focal deficit present.      Mental Status: She is alert and oriented to person, place, and time.      Cranial Nerves: No cranial nerve deficit.      Motor: No weakness.      Gait: Gait normal.

## 2023-01-23 NOTE — PATIENT INSTRUCTIONS
Cough:  -take montelukast tablet daily for 1 month  -use atrovent inhaler 2-4x/day for 1 month    If no improvement, we should consider an endoscopy.    Blood pressure:  -check daily a few hours after taking your medication; check when you are relaxed  -record these values   -send a [employer] message with your results  -if your numbers are 140/90, we should consider adding a new medication          Check your blood pressure once daily at different times of the day and write down your results. Send your results via [employer] or bring to your next visit. Goal blood pressure is less than 140/90. If your blood pressure is higher than this, we will need to discuss a change in treatment.       Home Blood Pressure Monitoring:    To prepare to take your blood pressure:  Do not consume any caffeinated beverages (tea, coffee, soda), do not smoke, do not exercise for 30 minutes before measuring your blood pressure.  Sit quietly for at least 5 minutes before starting to measure your blood pressure.     To measure your blood pressure:  Sit with both feet flat on the floor. Do not stand, do not lie down.  Place the cuff on your arm above your elbow so that your elbow can bend comfortably.  Pull the cuff tight enough to stay in place and allow for your fingers to fit between your arm and the cuff.  Position the cuff so that the cord lies down the inside of your arm.  Do not talk while you are using the machine.  Press the START button. It will squeeze your arm and then release slowly.   When you see the numbers on the screen, you are done.   Write the numbers down and the time of day so that you can track what they are.      Hypertension is the major risk factor for chronic kidney disease and stroke.  A systolic blood pressure (the upper number) of 150 is associated with an 8-fold increased risk of stroke compared to a systolic blood pressure of 110.      Hypertension can be treated with diet, exercise, and medication.  Some patients  need medication to lower their blood pressure.    The DASH diet can help lower blood pressure.  It is a plant-based diet that focuses on fruits, vegetables, whole grains, low-fat dairy products, and very little meat.  It includes one serving of nuts, seeds, or legumes per day.  Sodiums is limited to 2400 mg per day.          Diet Change    To help reduce blood pressure, it is recommended that individuals reduce their sodium content to 2,300 mg or 1,500 mg. Below are alternatives to high-sodium and high-fat foods.  Reducing Salt Content    Foods high in salt (sodium) Low-salt alternatives   smoked, cured, salted, and canned meat, fish, poultry unsalted fresh or frozen beef, lamb, pork, fish, poultry   regular hard and processed cheese  regular peanut butter low-sodium cheese  low-sodium peanut butter   crackers with salted tops unsalted crackers   regular canned and dehydrated soups  low-sodium soups, broths, bouillons   regular canned vegetables fresh and frozen vegetables    salted snack foods unsalted snack foods       Reducing Fat Content    Food category Foods high in fat Lower fat alternatives   Dairy  whole milk  ice cream    cheese  skim, 1%, 2% milk  sorbet, sherbert, frozen yogurt  low- or reduced-fat cheese   Pasta ramen noodles  pasta with cream sauce  granola rice  pasta with tomato sauce  reduced fat granola   Meat, fish, poultry ground beef  chicken or turkey with skin  hot dogs  adkins sausage   oil packed tuna   whole eggs low-fat, extra-lean meats,  skinless chicken or turkey    low-fat hot dog    turkey adkins  water-packed tuna  egg whites, egg substitute   Baked goods croissants   donuts  muffins,   party crackers  cake, cookies hard rolls, English muffins  bagels  reduced fat muffins  low-fat crackers  princess food cake   Snacks and sweets nuts  ice cream popcorn, fruits, vegetables  frozen yogurt, pudding bars   Fats, oils, and salad dressings butter, margarine  mayonnaise  salad dressings  oils,  shortening, lard light margarine  light mayonnaise, mustard  fat free salad dressing  nonstick cooking spray     What are the changes that you plan to make in your diet?    Reduce salt by:_________________________________________________________    Reduce saturated fat by:__________________________________________    Reducing salt content data from  Alternatives to High-Sodium Foods,  by the U.S. Food and Drug Administration, n.d. Retrieved January 9, 2007, from http://www.fda.gov/fdac/foodlabel/sodtabl.html. Reducing fat content data from  The Practical Guide: Identification, Evaluation and Treatment of Overweight and Obesity in Adults  (NIH Publication No. ), by the National Institutes of Health, 2000. Retrieved January 9, 2007, from http://www.nhlbi.nih.gov/ guidelines/obesity/prctgd_c.pdf.

## 2023-01-26 RX ORDER — MONTELUKAST SODIUM 10 MG/1
TABLET ORAL
Qty: 90 TABLET | OUTPATIENT
Start: 2023-01-26

## 2023-02-23 DIAGNOSIS — E78.5 HYPERLIPIDEMIA, UNSPECIFIED HYPERLIPIDEMIA TYPE: ICD-10-CM

## 2023-02-27 RX ORDER — ATORVASTATIN CALCIUM 20 MG/1
TABLET, FILM COATED ORAL
Qty: 90 TABLET | Refills: 0 | Status: SHIPPED | OUTPATIENT
Start: 2023-02-27 | End: 2023-05-25

## 2023-02-27 NOTE — TELEPHONE ENCOUNTER
,  --Please contact patient and ask to schedule lab-only for annual cholesterol check.      --Last visit:  1/23/2023 [place].    --Future Visit:   Next 5 appointments (look out 90 days)    Apr 26, 2023  8:00 AM  (Arrive by 7:40 AM)  Provider Visit with Sheree Graf MD  [facility] ([facility] ) [address] [place] [address]  [phone]          ---Prescription approved per [facility] Refill Protocol.       Sulema Serrato RN BSN     [facility]

## 2023-03-02 DIAGNOSIS — R05.3 CHRONIC COUGH: ICD-10-CM

## 2023-03-02 DIAGNOSIS — R05.8 POST-VIRAL COUGH SYNDROME: ICD-10-CM

## 2023-03-06 RX ORDER — MONTELUKAST SODIUM 10 MG/1
TABLET ORAL
Qty: 30 TABLET | Refills: 1 | Status: SHIPPED | OUTPATIENT
Start: 2023-03-06 | End: 2023-04-26

## 2023-03-08 DIAGNOSIS — R05.8 POST-VIRAL COUGH SYNDROME: ICD-10-CM

## 2023-03-08 DIAGNOSIS — R05.3 CHRONIC COUGH: ICD-10-CM

## 2023-03-10 RX ORDER — MONTELUKAST SODIUM 10 MG/1
TABLET ORAL
Qty: 90 TABLET | OUTPATIENT
Start: 2023-03-10

## 2023-04-16 ENCOUNTER — HEALTH MAINTENANCE LETTER (OUTPATIENT)
Age: 68
End: 2023-04-16

## 2023-04-25 NOTE — PROGRESS NOTES
Assessment & Plan        Type 2 diabetes mellitus without complication, without long-term current use of insulin (H)  A1C is increasing. 7.2 today. thusfar has been managed with diet. Previously discussed starting metformin, but she had declined. Today, discussed metformin again including risks/side effects/benefits. Will start metformin 500mg daily and increase in 1 week to 1000mg daily. Follow up with me in 3 months.   Last eye exam 5/18/22   I recommended visiting with the DM educator. referral placed today   a1c today  Foot exam today normal.  She will schedule an eye exam.  Referral placed.     Hypertension goal BP (blood pressure) < 140/90  Elevated today.  Several home measurements also above goal Continues metoprolol 25mg daily. Initially discussed adding lisinopril, but after noting the potential for a dry cough as a side effect she declined.  Could also consider adding hydrochlorothiazide.  For now, she would like to start with the metformin and see how she tolerates it.  She also hopes to work on some weight loss.  We will plan on adding medication at follow-up for blood pressure if still uncontrolled.     Hyperlipidemia   Continues atorvastatin 20 mg daily. Checking lipids today      Gastroesophageal reflux disease, esophagitis presence not specified  Stable on famotidine 40mg daily.       Cough  Postviral cough  Persistent.  Stopped Singulair due to side effect of dizziness.  She was somewhat unclear if it was related, but stopped nevertheless.  We will plan on following up on the cough at her next visit as well.    Improving since she started working with the speech therapist for vocal cord dysfunction and inflammation. She has stopped the singulair.      Recently started singulair, which seems to help and she is tolerating it well. Atrovent was sent, but not covered by insurance, so Incruse Ellipta was selected as an alternative to address possible post viral cough. She thinks this is helping  "too      She saw pulmonology last May for postviral cough syndrome.  She had used Singulair and her cough improved significantly so she stopped the medication.  She also felt it was making her anxious.  She felt that Atrovent inhaler was helpful in the setting of her cough, but then stopped it when her cough resolved.Her cough is that likely secondary to combination of GERD and postviral cough syndrome.  Her PFTs and chest x-ray were negative.  No further pulmonary work-up was necessary.  If symptoms return, pulmonology recommended initiation of inhaled Atrovent and Singulair for treatment.  She would like to have refill available of the Atrovent and Singulair.    Obesity BMI>35 with comorbidities DMII, HTN, HLD  Encouraged weight loss efforts. Referral to DM ed.      Colon cancer screening   still plans on scheduling colonoscopy. Work has been very busy.   Order placed again    Urinary urgency and frequency, mixed incontinence  Not discussed further today due to lack of time  urogyn referral placed today.      Recurrent herpes labialis  Acyclovir refilled today.      Schedule wellness visit in 3-6 months         BMI:   Estimated body mass index is 35.84 kg/m  as calculated from the following:    Height as of this encounter: 1.575 m (5' 2.01\").    Weight as of this encounter: 88.9 kg (196 lb).       Patient Instructions   1. Start metformin 500mg daily for one week, then increase to 1000mg daily.   2. Aim for 8 hours of sleep. This can help with weight loss as well!   3. Schedule with the diabetes educator  4. Continue to monitor your blood pressure and follow up with me in 3 months   5. Schedule colonoscopy  6. Schedule visit with urogynecology for urinary issues.       Sheree Graf MD   [facility]    Delmi Pat is a 67 year old, presenting for the following health issues:  Diabetes (Foot exam), Gastrophageal Reflux, and Bladder Problems (Urgency, incontinence potential if " "not able to void right away, pelvic floor concerns)         View : No data to display.              History of Present Illness       Diabetes:   She presents for follow up of diabetes.  She is not checking blood glucose. She is concerned about other. She is having numbness in feet and weight gain. The patient has not had a diabetic eye exam in the last 12 months.         Hypertension: She presents for follow up of hypertension.  She does check blood pressure  regularly outside of the clinic. Outside blood pressures have been over 140/90. She follows a low salt diet.     She eats 0-1 servings of fruits and vegetables daily.She consumes 0 sweetened beverage(s) daily.She exercises with enough effort to increase her heart rate 30 to 60 minutes per day.  She exercises with enough effort to increase her heart rate 3 or less days per week.   She is taking medications regularly.     Does not have trouble falling asleep, but wakes up and cannot get back to sleep. Sometimes has to urinate. She thinks she gets about 6 hours of sleep. Does not go to bed early enough and wakes up at 6:30am. Goes to bed after 11:00pm.     Bladder issues has urinary urgency and frequency and sometimes urge incontinence for years. Denies dysuria.     Still has cough. singulair she thinks caused dizziness.     Has been trying to lose weight, but unsuccessful.  Blood sugars have increased.  She is interested in visiting with the diabetes educator.    Review of Systems          Objective    BP (!) 144/86 (BP Location: Right arm, Patient Position: Sitting, Cuff Size: Adult Regular)   Pulse 71   Temp 97.4  F (36.3  C) (Temporal)   Resp 16   Ht 1.575 m (5' 2.01\")   Wt 88.9 kg (196 lb)   LMP 02/07/2005   SpO2 99%   BMI 35.84 kg/m    Body mass index is 35.84 kg/m .  Physical Exam   GENERAL: healthy, alert and no distress  Diabetic foot exam: normal DP and PT pulses, no trophic changes or ulcerative lesions and normal sensory exam    Lab on " 10/07/2022   Component Date Value Ref Range Status     Hemoglobin A1C 10/07/2022 6.9 (H)  0.0 - 5.6 % Final    Normal <5.7%   Prediabetes 5.7-6.4%    Diabetes 6.5% or higher     Note: Adopted from ADA consensus guidelines.     Results for orders placed or performed in visit on 04/26/23 (from the past 24 hour(s))   Hemoglobin A1c   Result Value Ref Range    Hemoglobin A1C 7.2 (H) 0.0 - 5.6 %

## 2023-04-26 ENCOUNTER — OFFICE VISIT (OUTPATIENT)
Dept: FAMILY MEDICINE | Facility: CLINIC | Age: 68
End: 2023-04-26
Payer: COMMERCIAL

## 2023-04-26 VITALS
DIASTOLIC BLOOD PRESSURE: 86 MMHG | BODY MASS INDEX: 36.07 KG/M2 | HEIGHT: 62 IN | SYSTOLIC BLOOD PRESSURE: 144 MMHG | TEMPERATURE: 97.4 F | RESPIRATION RATE: 16 BRPM | OXYGEN SATURATION: 99 % | WEIGHT: 196 LBS | HEART RATE: 71 BPM

## 2023-04-26 DIAGNOSIS — E66.812 CLASS 2 SEVERE OBESITY DUE TO EXCESS CALORIES WITH SERIOUS COMORBIDITY IN ADULT, UNSPECIFIED BMI (H): ICD-10-CM

## 2023-04-26 DIAGNOSIS — E11.9 TYPE 2 DIABETES MELLITUS WITHOUT COMPLICATION, WITHOUT LONG-TERM CURRENT USE OF INSULIN (H): Primary | ICD-10-CM

## 2023-04-26 DIAGNOSIS — Z23 NEED FOR SHINGLES VACCINE: ICD-10-CM

## 2023-04-26 DIAGNOSIS — Z12.11 SCREEN FOR COLON CANCER: ICD-10-CM

## 2023-04-26 DIAGNOSIS — Z23 NEED FOR DIPHTHERIA-TETANUS-PERTUSSIS (TDAP) VACCINE: ICD-10-CM

## 2023-04-26 DIAGNOSIS — E78.5 HYPERLIPIDEMIA, UNSPECIFIED HYPERLIPIDEMIA TYPE: ICD-10-CM

## 2023-04-26 DIAGNOSIS — Z12.11 SCREENING FOR COLON CANCER: ICD-10-CM

## 2023-04-26 DIAGNOSIS — B00.1 RECURRENT HERPES LABIALIS: ICD-10-CM

## 2023-04-26 DIAGNOSIS — R05.8 POST-VIRAL COUGH SYNDROME: ICD-10-CM

## 2023-04-26 DIAGNOSIS — I10 HYPERTENSION GOAL BP (BLOOD PRESSURE) < 140/90: ICD-10-CM

## 2023-04-26 DIAGNOSIS — E66.01 CLASS 2 SEVERE OBESITY DUE TO EXCESS CALORIES WITH SERIOUS COMORBIDITY IN ADULT, UNSPECIFIED BMI (H): ICD-10-CM

## 2023-04-26 DIAGNOSIS — N39.46 MIXED INCONTINENCE: ICD-10-CM

## 2023-04-26 LAB
ANION GAP SERPL CALCULATED.3IONS-SCNC: 11 MMOL/L (ref 7–15)
BUN SERPL-MCNC: 12.8 MG/DL (ref 8–23)
CALCIUM SERPL-MCNC: 9.2 MG/DL (ref 8.8–10.2)
CHLORIDE SERPL-SCNC: 106 MMOL/L (ref 98–107)
CHOLEST SERPL-MCNC: 133 MG/DL
CREAT SERPL-MCNC: 0.87 MG/DL (ref 0.51–0.95)
CREAT UR-MCNC: 206 MG/DL
DEPRECATED HCO3 PLAS-SCNC: 25 MMOL/L (ref 22–29)
GFR SERPL CREATININE-BSD FRML MDRD: 74 ML/MIN/1.73M2
GLUCOSE SERPL-MCNC: 163 MG/DL (ref 70–99)
HBA1C MFR BLD: 7.2 % (ref 0–5.6)
HDLC SERPL-MCNC: 57 MG/DL
LDLC SERPL CALC-MCNC: 57 MG/DL
MICROALBUMIN UR-MCNC: 14.9 MG/L
MICROALBUMIN/CREAT UR: 7.23 MG/G CR (ref 0–25)
NONHDLC SERPL-MCNC: 76 MG/DL
POTASSIUM SERPL-SCNC: 4.5 MMOL/L (ref 3.4–5.3)
SODIUM SERPL-SCNC: 142 MMOL/L (ref 136–145)
TRIGL SERPL-MCNC: 94 MG/DL

## 2023-04-26 PROCEDURE — 99207 PR FOOT EXAM NO CHARGE: CPT | Performed by: FAMILY MEDICINE

## 2023-04-26 PROCEDURE — 82043 UR ALBUMIN QUANTITATIVE: CPT | Performed by: FAMILY MEDICINE

## 2023-04-26 PROCEDURE — 90471 IMMUNIZATION ADMIN: CPT | Performed by: FAMILY MEDICINE

## 2023-04-26 PROCEDURE — 82570 ASSAY OF URINE CREATININE: CPT | Performed by: FAMILY MEDICINE

## 2023-04-26 PROCEDURE — 99214 OFFICE O/P EST MOD 30 MIN: CPT | Mod: 25 | Performed by: FAMILY MEDICINE

## 2023-04-26 PROCEDURE — 36415 COLL VENOUS BLD VENIPUNCTURE: CPT | Performed by: FAMILY MEDICINE

## 2023-04-26 PROCEDURE — 80048 BASIC METABOLIC PNL TOTAL CA: CPT | Performed by: FAMILY MEDICINE

## 2023-04-26 PROCEDURE — 90677 PCV20 VACCINE IM: CPT | Performed by: FAMILY MEDICINE

## 2023-04-26 PROCEDURE — 80061 LIPID PANEL: CPT | Performed by: FAMILY MEDICINE

## 2023-04-26 PROCEDURE — 83036 HEMOGLOBIN GLYCOSYLATED A1C: CPT | Performed by: FAMILY MEDICINE

## 2023-04-26 RX ORDER — ACYCLOVIR 400 MG/1
400 TABLET ORAL 3 TIMES DAILY
Qty: 30 TABLET | Refills: 1 | Status: CANCELLED | OUTPATIENT
Start: 2023-04-26

## 2023-04-26 RX ORDER — METFORMIN HCL 500 MG
1000 TABLET, EXTENDED RELEASE 24 HR ORAL
Qty: 180 TABLET | Refills: 0 | Status: SHIPPED | OUTPATIENT
Start: 2023-04-26 | End: 2023-08-04

## 2023-04-26 RX ORDER — ACYCLOVIR 400 MG/1
400 TABLET ORAL EVERY 8 HOURS
Qty: 15 TABLET | Refills: 1 | Status: SHIPPED | OUTPATIENT
Start: 2023-04-26 | End: 2024-03-11

## 2023-04-26 ASSESSMENT — ASTHMA QUESTIONNAIRES
QUESTION_1 LAST FOUR WEEKS HOW MUCH OF THE TIME DID YOUR ASTHMA KEEP YOU FROM GETTING AS MUCH DONE AT WORK, SCHOOL OR AT HOME: NONE OF THE TIME
ACT_TOTALSCORE: 22
QUESTION_5 LAST FOUR WEEKS HOW WOULD YOU RATE YOUR ASTHMA CONTROL: WELL CONTROLLED
QUESTION_2 LAST FOUR WEEKS HOW OFTEN HAVE YOU HAD SHORTNESS OF BREATH: NOT AT ALL
QUESTION_3 LAST FOUR WEEKS HOW OFTEN DID YOUR ASTHMA SYMPTOMS (WHEEZING, COUGHING, SHORTNESS OF BREATH, CHEST TIGHTNESS OR PAIN) WAKE YOU UP AT NIGHT OR EARLIER THAN USUAL IN THE MORNING: NOT AT ALL
QUESTION_4 LAST FOUR WEEKS HOW OFTEN HAVE YOU USED YOUR RESCUE INHALER OR NEBULIZER MEDICATION (SUCH AS ALBUTEROL): TWO OR THREE TIMES PER WEEK
ACT_TOTALSCORE: 22

## 2023-04-26 ASSESSMENT — PAIN SCALES - GENERAL: PAINLEVEL: NO PAIN (0)

## 2023-04-26 NOTE — PATIENT INSTRUCTIONS
Start metformin 500mg daily for one week, then increase to 1000mg daily.   Aim for 8 hours of sleep. This can help with weight loss as well!   Schedule with the diabetes educator  Continue to monitor your blood pressure and follow up with me in 3 months   Schedule colonoscopy  Schedule visit with urogynecology for urinary issues.

## 2023-04-27 NOTE — RESULT ENCOUNTER NOTE
Tani Pat,     Aside from a high blood sugar, your lab results are stable. Please let us know if you have any questions.      Sheree Graf M.D.

## 2023-05-11 ENCOUNTER — TRANSFERRED RECORDS (OUTPATIENT)
Dept: MULTI SPECIALTY CLINIC | Facility: CLINIC | Age: 68
End: 2023-05-11

## 2023-05-24 DIAGNOSIS — E78.5 HYPERLIPIDEMIA, UNSPECIFIED HYPERLIPIDEMIA TYPE: ICD-10-CM

## 2023-05-25 ENCOUNTER — VIRTUAL VISIT (OUTPATIENT)
Dept: UROLOGY | Facility: CLINIC | Age: 68
End: 2023-05-25
Attending: OBSTETRICS & GYNECOLOGY
Payer: COMMERCIAL

## 2023-05-25 VITALS — HEIGHT: 62 IN | BODY MASS INDEX: 35.51 KG/M2 | WEIGHT: 193 LBS

## 2023-05-25 DIAGNOSIS — N39.46 MIXED INCONTINENCE: ICD-10-CM

## 2023-05-25 DIAGNOSIS — N39.41 URGE INCONTINENCE: Primary | ICD-10-CM

## 2023-05-25 DIAGNOSIS — N95.2 VAGINAL ATROPHY: ICD-10-CM

## 2023-05-25 PROCEDURE — 99204 OFFICE O/P NEW MOD 45 MIN: CPT | Mod: VID | Performed by: OBSTETRICS & GYNECOLOGY

## 2023-05-25 RX ORDER — ATORVASTATIN CALCIUM 20 MG/1
TABLET, FILM COATED ORAL
Qty: 90 TABLET | Refills: 0 | Status: SHIPPED | OUTPATIENT
Start: 2023-05-25 | End: 2023-08-23

## 2023-05-25 RX ORDER — SOLIFENACIN SUCCINATE 5 MG/1
5 TABLET, FILM COATED ORAL DAILY
Qty: 90 TABLET | Refills: 3 | Status: SHIPPED | OUTPATIENT
Start: 2023-05-25

## 2023-05-25 RX ORDER — ESTRADIOL 0.1 MG/G
1 CREAM VAGINAL
Qty: 42.5 G | Refills: 11 | Status: SHIPPED | OUTPATIENT
Start: 2023-05-25

## 2023-05-25 ASSESSMENT — PAIN SCALES - GENERAL: PAINLEVEL: NO PAIN (0)

## 2023-05-25 NOTE — LETTER
5/25/2023       RE: Adilia Camacho  [address]  [facility] [address]     Dear Colleague,    Thank you for referring your patient, Adilia Camacho, to the [facility]. Please see a copy of my visit note below.    Adilia is a 67 year old who is being evaluated via a billable video visit.      How would you like to obtain your AVS? MyChart  If the video visit is dropped, the invitation should be resent by: Text to cell phone: [phone]  Will anyone else be joining your video visit? No        Video-Visit Details    Type of service:  Video Visit     May 25, 2023    Referring Provider: Sheree Graf MD  [address] [place] [address]    Primary Care Provider: Sheree Graf    CC: urinary urgency    HPI:  Adilia Camacho is a 67 year old female who presents for evaluation of her pelvic floor symptoms.  She has urinary urgency for many years that has worsened in the last 2-3 years. No meds or PFPT to date.     Prolapse:  Do you feel a vaginal bulge? no                                      Pressure? no   Do you have to place your fingers in the vagina or in the rectum to have a bowel movement? -  Impact to quality of life? -     Stress Incontinence:  Do you leak urine with cough, sneeze, exercise? yes  How often do you leak with cough, sneeze, exercise?  occ  How much do you usually leak? drops   Do you wear a pad? no If so; -  Impact to quality of life? minimal    Urge Incontinence:  Do you often get sudden urges to urinate? yes  How often do have urges? 3-4/week  If so, do you leak with these urges? yes  How much do you usually leak? More than drops  Impact to quality of life? moderate    Voids/day:7  Nocturia: occ  Fluid intake: 3 bottles of water  Caffeine: 1 cup coffee    Urinating:  Difficulty starting urination or strain to void? no  Weak or intermittent stream? no  Incomplete emptying  or dribbling? occ  Pain or burning with urination? no  Any blood in your urine? no    GI:  Constipation? no  Frequency stools 2/day    Straining for stools no  Stool consistency loose     Ever leak stool (Accidental Bowel Leakage)? no      If so, how often?               -      If so, do you leak?                   -      Soiling without sensation? -  History of irritable bowel or Crohn's? -    Sexual/Pain:  Are you currently having sex?. no  Pain with sex?   -   Sexual Partner: -  Do any of these symptoms interfere with sex? -  Impact to quality of life? -    Prior therapy:  Ever done pelvic floor physical therapy? -  Trial of medication? -  Have you ever tried a pessary? -    Medical History:  Do you have?   High Cholesterol? no    Diabetes? yes  High Blood pressure? yes     Recurrent UTIs? no  Sleep Apnea? no  Other medical problems: no    Surgical History:    Hysterectomy? no   Bladder Surgery? no   Other? knee     OB/Gyn History:  Pregnancies? 1  Deliveries? 1  Vaginal 1  Section 0  Current birth control? -  Periods? -  When was the first day of your last period? -  Last Pap smear? - Any abnormal? -  Last mammogram? -  Last colonoscopy? -    Medications/Vitamins/Supplements: reviewed    Drug Allergies: NKDA    Latex Allergy: no  Iodine Allergy no    Family History: (list relationship and age at diagnosis)  Breast cancer? no   Ovarian cancer? no   Colon cancer? no  Other? no    Social History:  Marital status:   Do you/ have you ever smoke(d)  cigarettes? no  Drink more than 1 alcoholic beverage a day?  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 no                   Unusual headaches? no  Leg pain w/ walking/exercise? no                       Easy bruising? no  Difficulty breathing w/ walking/exercise? no  Problems with vision? no  Dizziness, falls, or fainting? no  Excessive bleeding from cuts, gums, surgery? no  Other:  no    Past Medical History:   Diagnosis Date    Allergic rhinitis due to dogs     CARDIOVASCULAR SCREENING; LDL GOAL LESS THAN 130     Contraceptive management     Diabetes mellitus, type 2 (H) 10/31/2019    Hypertension goal BP (blood pressure) < 140/90     Other chronic pain     Left knee    Recurrent herpes labialis     Rosacea        Past Surgical History:   Procedure Laterality Date    ARTHROSCOPY KNEE WITH MENISCAL REPAIR Left 2020    Procedure: Examination under anesthesia Left knee, Left knee arthroscopy, Left meniscus root repair;  Surgeon: Nacho Robles MD;  Location:  OR    NO HISTORY OF SURGERY         Social History     Socioeconomic History    Marital status:      Spouse name: Harry    Number of children: 1    Years of education: Not on file    Highest education level: Not on file   Occupational History    Occupation: Business Banking      Employer: [employer]     Comment: Works in [employer]   Tobacco Use    Smoking status: Former     Packs/day: 0.00     Years: 10.00     Pack years: 0.00     Types: Cigarettes     Start date: 1975     Quit date: 1984     Years since quittin.7     Passive exposure: Never    Smokeless tobacco: Never    Tobacco comments:     I never was a heavy smoker never woke up and had a cigarette maybe 3 a day   Vaping Use    Vaping status: Never Used   Substance and Sexual Activity    Alcohol use: Yes     Alcohol/week: 1.7 standard drinks of alcohol     Comment: ocasionally    Drug use: No    Sexual activity: Yes     Partners: Male   Other Topics Concern    Parent/sibling w/ CABG, MI or angioplasty before 65F 55M? No   Social History Narrative    Not on file     Social Determinants of Health     Financial Resource Strain: Not on file   Food Insecurity: Not on file   Transportation Needs: Not on file   Physical Activity: Not on file   Stress: Not on file   Social Connections: Not on file   Intimate Partner Violence: Not on file   Housing  "Stability: Not on file       Family History   Problem Relation Age of Onset    Hypertension Father     Hypertension Brother     Hypertension Sister     Diabetes Sister         and brother    Kidney Disease Sister     Diabetes Brother     Diabetes Sister     Hypertension Sister     Anxiety Disorder Sister     Diabetes Brother     Hypertension Brother     Cancer - colorectal No family hx of     Breast Cancer No family hx of     Thyroid Disease No family hx of        ROS    Allergies   Allergen Reactions    Dogs     Dust Mites        Current Outpatient Medications   Medication    alcohol swab prep pads    atorvastatin (LIPITOR) 20 MG tablet    blood glucose (NO BRAND SPECIFIED) lancets standard    blood glucose (NO BRAND SPECIFIED) test strip    blood glucose (ONE TOUCH DELICA) lancing device    cetirizine (ZYRTEC) 10 MG tablet    famotidine (PEPCID) 40 MG tablet    fluticasone (FLONASE) 50 MCG/ACT nasal spray    ipratropium (ATROVENT HFA) 17 MCG/ACT inhaler    metFORMIN (GLUCOPHAGE XR) 500 MG 24 hr tablet    metoprolol succinate ER (TOPROL XL) 25 MG 24 hr tablet    acetaminophen (TYLENOL) 325 MG tablet    acyclovir (ZOVIRAX) 400 MG tablet    carboxymethylcellulose PF (CARBOXYMETHYLCELLULOSE SODIUM) 0.5 % ophthalmic solution    naproxen (NAPROSYN) 500 MG tablet    olopatadine (PATADAY) 0.2 % ophthalmic solution    omeprazole (PRILOSEC) 20 MG DR capsule     Current Facility-Administered Medications   Medication    lidocaine (PF) 0.5 % injection SOLN 8 mL    triamcinolone (KENALOG-40) injection 40 mg       Ht 1.562 m (5' 1.5\")   Wt 87.5 kg (193 lb)   LMP 02/07/2005   BMI 35.88 kg/m   Patient's last menstrual period was 02/07/2005. Body mass index is 35.88 kg/m .  Ms. Camacho is alert, comfortable in no acute distress, non-labored breathing.     A/P: Adilia Camacho is a 67 year old F with urinary urgency    Will start Vesicare 5mg every day, referral to PFPT and estrace cream    I spent a total of 30 minutes with  Ms. " Doug  on the date of the encounter in chart review, face to face patient visit, review of tests, documentation and/or discussion with other providers about the issues documented above.    Jared Mills MD  Professor, OB/GYN  Urogynecologist  CC  Patient Care Team:  Sheree Sandoval MD as PCP - General (Family Practice)  Dwain Flannery MD as MD (Family Medicine - Sports Medicine)  Tracie Obrien MD as MD (Cardiology)  Maryellen Faith RPH as Pharmacist (Pharmacist)  Sheree Sandoval MD as Assigned PCP  Fabian Bernal Chi, OD as MD (Optometry)  Fabian Bernal Chi, OD as Assigned Surgical Provider  Jared Mills MD as MD (OB/Gyn)  SHEREE SANDOVAL                Originating Location (pt. Location): Home    Distant Location (provider location):  On-site  Platform used for Video Visit: Apptive      Virtual Visit Details    Type of service:  Video Visit     Originating Location (pt. Location): Home    Distant Location (provider location):  On-site  Platform used for Video Visit: Apptive

## 2023-05-25 NOTE — PROGRESS NOTES
Adilia is a 67 year old who is being evaluated via a billable video visit.      How would you like to obtain your AVS? MyChart  If the video visit is dropped, the invitation should be resent by: Text to cell phone: [phone]  Will anyone else be joining your video visit? No        Video-Visit Details    Type of service:  Video Visit     May 25, 2023    Referring Provider: Sheree Graf MD  [address] [place] [address]    Primary Care Provider: Sheree Graf    CC: urinary urgency    HPI:  Adilia Camacho is a 67 year old female who presents for evaluation of her pelvic floor symptoms.  She has urinary urgency for many years that has worsened in the last 2-3 years. No meds or PFPT to date.     Prolapse:  Do you feel a vaginal bulge? no                                      Pressure? no   Do you have to place your fingers in the vagina or in the rectum to have a bowel movement? -  Impact to quality of life? -     Stress Incontinence:  Do you leak urine with cough, sneeze, exercise? yes  How often do you leak with cough, sneeze, exercise?  occ  How much do you usually leak? drops   Do you wear a pad? no If so; -  Impact to quality of life? minimal    Urge Incontinence:  Do you often get sudden urges to urinate? yes  How often do have urges? 3-4/week  If so, do you leak with these urges? yes  How much do you usually leak? More than drops  Impact to quality of life? moderate    Voids/day:7  Nocturia: occ  Fluid intake: 3 bottles of water  Caffeine: 1 cup coffee    Urinating:  Difficulty starting urination or strain to void? no  Weak or intermittent stream? no  Incomplete emptying or dribbling? occ  Pain or burning with urination? no  Any blood in your urine? no    GI:  Constipation? no  Frequency stools 2/day    Straining for stools no  Stool consistency loose     Ever leak stool (Accidental Bowel Leakage)? no      If so, how often?               -      If so, do you leak?                    -      Soiling without sensation? -  History of irritable bowel or Crohn's? -    Sexual/Pain:  Are you currently having sex?. no  Pain with sex?   -   Sexual Partner: -  Do any of these symptoms interfere with sex? -  Impact to quality of life? -    Prior therapy:  Ever done pelvic floor physical therapy? -  Trial of medication? -  Have you ever tried a pessary? -    Medical History:  Do you have?   High Cholesterol? no    Diabetes? yes  High Blood pressure? yes     Recurrent UTIs? no  Sleep Apnea? no  Other medical problems: no    Surgical History:    Hysterectomy? no   Bladder Surgery? no   Other? knee     OB/Gyn History:  Pregnancies? 1  Deliveries? 1  Vaginal 1  Section 0  Current birth control? -  Periods? -  When was the first day of your last period? -  Last Pap smear? - Any abnormal? -  Last mammogram? -  Last colonoscopy? -    Medications/Vitamins/Supplements: reviewed    Drug Allergies: NKDA    Latex Allergy: no  Iodine Allergy no    Family History: (list relationship and age at diagnosis)  Breast cancer? no   Ovarian cancer? no   Colon cancer? no  Other? no    Social History:  Marital status:   Do you/ have you ever smoke(d)  cigarettes? no  Drink more than 1 alcoholic beverage a day?  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 no                   Unusual headaches? no  Leg pain w/ walking/exercise? no                       Easy bruising? no  Difficulty breathing w/ walking/exercise? no  Problems with vision? no  Dizziness, falls, or fainting? no  Excessive bleeding from cuts, gums, surgery? no  Other: no    Past Medical History:   Diagnosis Date     Allergic rhinitis due to dogs      CARDIOVASCULAR SCREENING; LDL GOAL LESS THAN 130      Contraceptive management      Diabetes mellitus, type 2 (H) 10/31/2019     Hypertension goal BP (blood pressure) < 140/90      Other chronic pain     Left knee     Recurrent herpes  labialis      Rosacea        Past Surgical History:   Procedure Laterality Date     ARTHROSCOPY KNEE WITH MENISCAL REPAIR Left 2020    Procedure: Examination under anesthesia Left knee, Left knee arthroscopy, Left meniscus root repair;  Surgeon: Nacho Robles MD;  Location:  OR     NO HISTORY OF SURGERY         Social History     Socioeconomic History     Marital status:      Spouse name: Harry     Number of children: 1     Years of education: Not on file     Highest education level: Not on file   Occupational History     Occupation: Business Banking      Employer: [employer]     Comment: Works in [employer]   Tobacco Use     Smoking status: Former     Packs/day: 0.00     Years: 10.00     Pack years: 0.00     Types: Cigarettes     Start date: 1975     Quit date: 1984     Years since quittin.7     Passive exposure: Never     Smokeless tobacco: Never     Tobacco comments:     I never was a heavy smoker never woke up and had a cigarette maybe 3 a day   Vaping Use     Vaping status: Never Used   Substance and Sexual Activity     Alcohol use: Yes     Alcohol/week: 1.7 standard drinks of alcohol     Comment: ocasionally     Drug use: No     Sexual activity: Yes     Partners: Male   Other Topics Concern     Parent/sibling w/ CABG, MI or angioplasty before 65F 55M? No   Social History Narrative     Not on file     Social Determinants of Health     Financial Resource Strain: Not on file   Food Insecurity: Not on file   Transportation Needs: Not on file   Physical Activity: Not on file   Stress: Not on file   Social Connections: Not on file   Intimate Partner Violence: Not on file   Housing Stability: Not on file       Family History   Problem Relation Age of Onset     Hypertension Father      Hypertension Brother      Hypertension Sister      Diabetes Sister         and brother     Kidney Disease Sister      Diabetes Brother      Diabetes Sister      Hypertension Sister       "Anxiety Disorder Sister      Diabetes Brother      Hypertension Brother      Cancer - colorectal No family hx of      Breast Cancer No family hx of      Thyroid Disease No family hx of        ROS    Allergies   Allergen Reactions     Dogs      Dust Mites        Current Outpatient Medications   Medication     alcohol swab prep pads     atorvastatin (LIPITOR) 20 MG tablet     blood glucose (NO BRAND SPECIFIED) lancets standard     blood glucose (NO BRAND SPECIFIED) test strip     blood glucose (ONE TOUCH DELICA) lancing device     cetirizine (ZYRTEC) 10 MG tablet     famotidine (PEPCID) 40 MG tablet     fluticasone (FLONASE) 50 MCG/ACT nasal spray     ipratropium (ATROVENT HFA) 17 MCG/ACT inhaler     metFORMIN (GLUCOPHAGE XR) 500 MG 24 hr tablet     metoprolol succinate ER (TOPROL XL) 25 MG 24 hr tablet     acetaminophen (TYLENOL) 325 MG tablet     acyclovir (ZOVIRAX) 400 MG tablet     carboxymethylcellulose PF (CARBOXYMETHYLCELLULOSE SODIUM) 0.5 % ophthalmic solution     naproxen (NAPROSYN) 500 MG tablet     olopatadine (PATADAY) 0.2 % ophthalmic solution     omeprazole (PRILOSEC) 20 MG DR capsule     Current Facility-Administered Medications   Medication     lidocaine (PF) 0.5 % injection SOLN 8 mL     triamcinolone (KENALOG-40) injection 40 mg       Ht 1.562 m (5' 1.5\")   Wt 87.5 kg (193 lb)   LMP 02/07/2005   BMI 35.88 kg/m   Patient's last menstrual period was 02/07/2005. Body mass index is 35.88 kg/m .  Ms. Camacho is alert, comfortable in no acute distress, non-labored breathing.     A/P: Adilia Camacho is a 67 year old F with urinary urgency    Will start Vesicare 5mg every day, referral to PFPT and estrace cream    I spent a total of 30 minutes with  Ms. Camacho  on the date of the encounter in chart review, face to face patient visit, review of tests, documentation and/or discussion with other providers about the issues documented above.    Jared Mills MD  Professor, " OB/GYN  Urogynecologist  CC  Patient Care Team:  Sheree Sandoval MD as PCP - General (Family Practice)  Dwain Flannery MD as MD (Family Medicine - Sports Medicine)  Tracie Obrien MD as MD (Cardiology)  Maryellen Faith RPH as Pharmacist (Pharmacist)  Sheree Sandoval MD as Assigned PCP  Fabian Bernal Chi, OD as MD (Optometry)  Fabian Bernal Chi, OD as Assigned Surgical Provider  Jared Mills MD as MD (OB/Gyn)  SHEREE SANDOVAL                Originating Location (pt. Location): Home    Distant Location (provider location):  On-site  Platform used for Video Visit: James

## 2023-05-25 NOTE — PROGRESS NOTES
Virtual Visit Details    Type of service:  Video Visit     Originating Location (pt. Location): Home    Distant Location (provider location):  On-site  Platform used for Video Visit: James

## 2023-05-25 NOTE — NURSING NOTE
Patient is also taking-Hair, Teeth and Nails Gummy      Is the patient currently in the state of [place]? YES    Visit mode:VIDEO    If the visit is dropped, the patient can be reconnected by: VIDEO VISIT: Send to e-mail at: [email or web address]    Will anyone else be joining the visit? NO      How would you like to obtain your AVS? MyChart    Are changes needed to the allergy or medication list? YES: ADD- -Hair, Teeth and Nails Gummy    Reason for visit: Consult (Patient said, spoke to PCP regarding urgent urges to go to the bathroom and suggest to see a specialist. )      Vivi Brown, VF

## 2023-05-25 NOTE — TELEPHONE ENCOUNTER
Prescription approved per [facility] Refill Protocol.    Nely Tang, BSN RN  [facility]

## 2023-05-26 ENCOUNTER — APPOINTMENT (OUTPATIENT)
Dept: LAB | Facility: CLINIC | Age: 68
End: 2023-05-26
Payer: COMMERCIAL

## 2023-05-26 ENCOUNTER — TELEPHONE (OUTPATIENT)
Dept: FAMILY MEDICINE | Facility: CLINIC | Age: 68
End: 2023-05-26
Payer: COMMERCIAL

## 2023-05-26 ENCOUNTER — VIRTUAL VISIT (OUTPATIENT)
Dept: PEDIATRICS | Facility: CLINIC | Age: 68
End: 2023-05-26
Payer: COMMERCIAL

## 2023-05-26 DIAGNOSIS — J02.0 STREP PHARYNGITIS: Primary | ICD-10-CM

## 2023-05-26 LAB — DEPRECATED S PYO AG THROAT QL EIA: POSITIVE

## 2023-05-26 PROCEDURE — 87880 STREP A ASSAY W/OPTIC: CPT | Performed by: NURSE PRACTITIONER

## 2023-05-26 PROCEDURE — 99213 OFFICE O/P EST LOW 20 MIN: CPT | Mod: 95 | Performed by: NURSE PRACTITIONER

## 2023-05-26 RX ORDER — AMOXICILLIN 500 MG/1
1000 CAPSULE ORAL DAILY
Qty: 10 CAPSULE | Refills: 0 | Status: SHIPPED | OUTPATIENT
Start: 2023-05-26 | End: 2024-03-11

## 2023-05-26 NOTE — TELEPHONE ENCOUNTER
"Received additional call from pt seeking treatment for pos strep results.      - Jayjay \"Diaz\" Mojgan (he/him/his), RN - Patient Advocate Liason (PAL)  [facility]    "

## 2023-05-26 NOTE — PROGRESS NOTES
Adilia is a 67 year old who is being evaluated via a billable telephone visit.      What phone number would you like to be contacted at?   How would you like to obtain your AVS?     Distant Location (provider location):  On-site    Assessment & Plan     Strep pharyngitis    - Streptococcus A Rapid Screen w/Reflex to PCR - Clinic Collect  - amoxicillin (AMOXIL) 500 MG capsule; Take 2 capsules (1,000 mg) by mouth daily             VIKAS Bowles [facility]  [facility]    Subjective   Adilia is a 67 year old, presenting for the following health issues:  No chief complaint on file.        4/26/2023     8:19 AM   Additional Questions   Roomed by Aman BRASHER RN     HPI     Sore throat x 2 days, exposure to grandson who was pos 1-2 wks ago. No fevers, cough. Last strep throat was 30 yrs ago.       Review of Systems   Constitutional, HEENT, cardiovascular, pulmonary, gi and gu systems are negative, except as otherwise noted.      Objective             Physical Exam   healthy, alert and no distress  PSYCH: Alert and oriented times 3; coherent speech, normal   rate and volume, able to articulate logical thoughts, able   to abstract reason, no tangential thoughts, no hallucinations   or delusions  Her affect is normal  RESP: No cough, no audible wheezing, able to talk in full sentences  Remainder of exam unable to be completed due to telephone visits          Phone call duration: 5 minutes

## 2023-05-26 NOTE — TELEPHONE ENCOUNTER
S-(situation): patient calls for appointment for possible strep throat    B-(background): patient was around grandchildren last weekend and all have had strep in the last 1-2 weeks. Symptoms started yesterday and asking for appointment today.     A-(assessment): Patient with sore throat and states there is a different colored coating on the back of her throat.     R-(recommendations): Agree with scheduling appointment. Attempted to assist patient with scheduling, but no appointments near where she is located. Offered to send message to PCP or [facility] E-visit. If she chooses the E-visit, they can order and arrange appointment for strep swab if responding provider thought it was necessary. Patient states she will use the [facility] Evisit for evaluation.     Antonette Cobb RN  [facility]

## 2023-05-26 NOTE — TELEPHONE ENCOUNTER
Patient called due to not hearing back from anyone regarding lab. Patient did have a virtual visit.   Labs were placed for a strep test.   Writer was able to schedule a lab only appointment for patient.     ROCK Alcaraz RN  [facility]

## 2023-05-26 NOTE — TELEPHONE ENCOUNTER
Patient called stating strep test was positive.     Patient would like to get antibiotic today.   Pharmacy is [facility] [address]     Thank you.   Nely Tang, BSN RN  [facility]

## 2023-07-18 ENCOUNTER — THERAPY VISIT (OUTPATIENT)
Dept: PHYSICAL THERAPY | Facility: CLINIC | Age: 68
End: 2023-07-18
Attending: OBSTETRICS & GYNECOLOGY
Payer: COMMERCIAL

## 2023-07-18 DIAGNOSIS — N95.2 VAGINAL ATROPHY: ICD-10-CM

## 2023-07-18 DIAGNOSIS — N39.41 URGE INCONTINENCE: ICD-10-CM

## 2023-07-18 PROCEDURE — 97530 THERAPEUTIC ACTIVITIES: CPT | Mod: GP | Performed by: PHYSICAL THERAPIST

## 2023-07-18 PROCEDURE — 97161 PT EVAL LOW COMPLEX 20 MIN: CPT | Mod: GP | Performed by: PHYSICAL THERAPIST

## 2023-07-18 PROCEDURE — 97535 SELF CARE MNGMENT TRAINING: CPT | Mod: GP | Performed by: PHYSICAL THERAPIST

## 2023-07-18 NOTE — PROGRESS NOTES
PHYSICAL THERAPY EVALUATION  Type of Visit: Evaluation    See electronic medical record for Abuse and Falls Screening details.    Subjective       Presenting condition or subjective complaint: Leakage of urine at times needing to hurry to go.  Feel uncomfortable  going certain places.  When I have to go I have to rush to the bathroom.   Pt notes symptoms ongoing for over 10 years, with last 2 years increasing to not be able to tolerate 90 min drive, key in door UUI.  Also being in the water during summer.  Date of onset: 05/25/23    Relevant medical history: Arthritis; Bladder or bowel problems   Dates & types of surgery: Knee surgery February 2020    Prior diagnostic imaging/testing results:     None  Prior therapy history for the same diagnosis, illness or injury: No      Living Environment  Social support: Alone   Type of home: House   Stairs to enter the home: Yes 12 Is there a railing: Yes   Ramp: No   Stairs inside the home: Yes 12 Is there a railing: Yes   Help at home: None  Equipment owned:       Employment: Yes Banking  Hobbies/Interests: Fishing, boating, gardening, spending time with granddaughters.    Patient goals for therapy: Worry about when you will get that urge to go.  Going out in the pont[place] with the family and swimming and getting back in boat when you are wet triggers that urge to go.    Pain assessment: See objective evaluation for additional pain details     Objective      PELVIC EVALUATION  ADDITIONAL HISTORY:  Sex assigned at birth: Female  Gender identity: Female    Pronouns: Other Don't use an pronouns    Bladder History:  Feels bladder filling: Yes  Triggers for feeling of inability to wait to go to the bathroom: Yes Being in water, coughing, laughing  How long can you wait to urinate: Not long, especially if I am coming home and trying to get the door open it triggers it  Gets up at night to urinate: Yes once typically  Can stop the flow of urine when urinating: Sometimes  Volume of  urine usually released: Medium   Other issues: Trouble emptying bladder completely; Dribbling after urinating  Number of bladder infections in last 12 months:    Fluid intake per day: 2-3 bottles of water 2 cups a day    Medications taken for bladder: Yes Estradiol 0.01% VAG cream 2 times a week   Activities causing urine leak: Cough; Sneeze; Hurrying to the bathroom due to a strong urge to urinate (pee)    Amount of urine typically leaked: Not a large amount usually  Pads used to help with leaking: Yes I use this many pads per day: not every day maybe 3 or 4 times a week I use this type/brand: try different ones    Bowel History:  Frequency of bowel movement: Every day  Consistency of stool: Soft-formed    Ignores the urge to defecate: No  Other bowel issues: Loss of gas  Length of time spent trying to have a bowel movement:  Sometimes will get fecal urgency after starting A1C medication    Sexual Function History:  Sexual orientation: Straight    Sexually active: No  Lubrication used: Yes Yes  Pelvic pain: Standing; Pelvic exams; Use of tampon    Pain or difficulty with orgasms/erection/ejaculation: No    State of menopause: Post-menopause (I am done with menopause)  Hormone medications: Yes Estradiol 0.01%    Are you currently pregnant: No, Number of previous pregnancies: one, Number of deliveries: one, Have you been diagnosed with pelvic prolapse or abdominal separation: No, Do you get regular exercise: Yes, I do this type of exercise: Walking, yard work, weed whipping, mowing, Have you tried pelvic floor strengthening exercises for 4 weeks: No, Do you have any history of trauma that is relevant to your care that you d like to share: No    Discussed reason for referral regarding pelvic health needs and external/internal pelvic floor muscle examination with patient/guardian.  Opportunity provided to ask questions and verbal consent for assessment and intervention was given.    PAIN: Pain Level at Rest: 0/10  Pain  Level with Use: 0/10  POSTURE: Sitting Posture: Rounded shoulders, Forward head, Lordosis decreased, Thoracic kyphosis increased  LUMBAR SCREEN:   HIP SCREEN:  Strength:    Functional Strength Testing:     PELVIC/SI SCREEN:  WNL   PAIN PROVOCATION TEST:   PELVIS/SI SPECIAL TESTS:   BREATHING SYMMETRY: Decreased rib cage mobility, belly bulge breathing    PELVIC EXAM  External Visual Inspection:  next    Integumentary:       External Digital Palpation per Perineum:       Internal Digital Palpation:  Per Vagina:  next    Per Rectum:        Pelvic Organ Prolapse:       ABDOMINAL ASSESSMENT  Diastasis Rectus Abdominis (LEISA):      Abdominal Activation/Strength:     Fascial Tension/Restriction/Tone: next    BIOFEEDBACK:  Next  Assessment & Plan   CLINICAL IMPRESSIONS  Medical Diagnosis: UUI    Treatment Diagnosis: UUI   Impression/Assessment: Patient is a 67 year old female with UUI complaints.  The following significant findings have been identified: Impaired muscle performance and Decreased activity tolerance. These impairments interfere with their ability to perform self care tasks, work tasks, recreational activities, household chores and community mobility as compared to previous level of function.     Clinical Decision Making (Complexity):  Clinical Presentation: Stable/Uncomplicated  Clinical Presentation Rationale: based on medical and personal factors listed in PT evaluation  Clinical Decision Making (Complexity): Low complexity    PLAN OF CARE  Treatment Interventions:  Modalities: Biofeedback, Ultrasound  Interventions: Manual Therapy, Neuromuscular Re-education, Therapeutic Activity, Therapeutic Exercise, Self-Care/Home Management    Long Term Goals     PT Goal 1  Goal Description: .  PT Goal 2  Goal Identifier: Urinary Incontinence  Goal Description: In 12 weeks pt will demonstrate improvement in pelvic floor muscle strength and coordination to reduce leakage events to <1x/week during functional activities  and reduce pad usage to none.  Rationale: to maximize safety and independence with self cares;to maximize safety and independence with performance of ADLs and functional tasks;to maximize safety and independence within the community  Target Date: 10/10/23  PT Goal 4  Goal Identifier: Voiding Dysfunction  Goal Description: In 6 weeks the patient will improve urinary voiding function by increasing their ability to fully empty their bladder during each void,using void techniques if needed, measured by decrease in post-void dribbling and frequency.  Rationale: to maximize safety and independence with performance of ADLs and functional tasks;to maximize safety and independence with self cares  Target Date: 08/29/23      Frequency of Treatment: 1x/week for 3 weeks then 1x every 3 weeks  Duration of Treatment: 12 weeks    Recommended Referrals to Other Professionals: Physical Therapy  Education Assessment:   Learner/Method: Patient;Pictures/Video    Risks and benefits of evaluation/treatment have been explained.   Patient/Family/caregiver agrees with Plan of Care.     Evaluation Time:     PT Eval, Low Complexity Minutes (04923): 20       Signing Clinician: Sherri Phipps PT

## 2023-07-28 DIAGNOSIS — I10 HYPERTENSION GOAL BP (BLOOD PRESSURE) < 140/90: ICD-10-CM

## 2023-07-28 DIAGNOSIS — K21.00 GASTROESOPHAGEAL REFLUX DISEASE WITH ESOPHAGITIS WITHOUT HEMORRHAGE: ICD-10-CM

## 2023-07-28 RX ORDER — FAMOTIDINE 40 MG/1
TABLET, FILM COATED ORAL
Qty: 90 TABLET | Refills: 2 | Status: SHIPPED | OUTPATIENT
Start: 2023-07-28 | End: 2024-05-16

## 2023-07-29 NOTE — TELEPHONE ENCOUNTER
"Routing refill request to provider for review/approval because:  Bp out of range    Last Written Prescription Date:  1/23/23  Last Fill Quantity: 90,  # refills: 1   Last office visit provider:  4/26/23     Requested Prescriptions   Pending Prescriptions Disp Refills    metoprolol succinate ER (TOPROL XL) 25 MG 24 hr tablet [Pharmacy Med Name: METOPROLOL ER SUCCINATE 25MG TABS] 90 tablet 1     Sig: TAKE 1 TABLET(25 MG) BY MOUTH DAILY       Beta-Blockers Protocol Failed - 7/28/2023  8:22 AM        Failed - Blood pressure under 140/90 in past 12 months     BP Readings from Last 3 Encounters:   04/26/23 (!) 144/86   01/23/23 (!) 153/80   09/30/22 (!) 170/85                 Passed - Patient is age 6 or older        Passed - Recent (12 mo) or future (30 days) visit within the authorizing provider's specialty     Patient has had an office visit with the authorizing provider or a provider within the authorizing providers department within the previous 12 mos or has a future within next 30 days. See \"Patient Info\" tab in inbasket, or \"Choose Columns\" in Meds & Orders section of the refill encounter.              Passed - Medication is active on med list          famotidine (PEPCID) 40 MG tablet [Pharmacy Med Name: FAMOTIDINE 40MG TABLETS] 90 tablet 1     Sig: TAKE 1 TABLET(40 MG) BY MOUTH DAILY       H2 Blockers Protocol Passed - 7/28/2023  8:22 AM        Passed - Patient is age 12 or older        Passed - Recent (12 mo) or future (30 days) visit within the authorizing provider's specialty     Patient has had an office visit with the authorizing provider or a provider within the authorizing providers department within the previous 12 mos or has a future within next 30 days. See \"Patient Info\" tab in inbasket, or \"Choose Columns\" in Meds & Orders section of the refill encounter.              Passed - Medication is active on med list             Nely Greenberg RN 07/28/23 7:32 PM  "

## 2023-08-02 RX ORDER — METOPROLOL SUCCINATE 25 MG/1
TABLET, EXTENDED RELEASE ORAL
Qty: 90 TABLET | Refills: 1 | Status: SHIPPED | OUTPATIENT
Start: 2023-08-02 | End: 2024-02-05

## 2023-08-04 DIAGNOSIS — E11.9 TYPE 2 DIABETES MELLITUS WITHOUT COMPLICATION, WITHOUT LONG-TERM CURRENT USE OF INSULIN (H): ICD-10-CM

## 2023-08-04 RX ORDER — METFORMIN HCL 500 MG
1000 TABLET, EXTENDED RELEASE 24 HR ORAL
Qty: 180 TABLET | Refills: 0 | Status: SHIPPED | OUTPATIENT
Start: 2023-08-04 | End: 2024-03-11

## 2023-08-04 NOTE — TELEPHONE ENCOUNTER
"Last Written Prescription Date:  4/26/23  Last Fill Quantity: 180,  # refills: 0   Last office visit provider:  4/26/23     Requested Prescriptions   Pending Prescriptions Disp Refills    metFORMIN (GLUCOPHAGE XR) 500 MG 24 hr tablet [Pharmacy Med Name: METFORMIN ER 500MG 24HR TABS] 180 tablet 0     Sig: TAKE 2 TABLETS(1000 MG) BY MOUTH DAILY WITH DINNER       Biguanide Agents Passed - 8/4/2023  1:36 PM        Passed - Patient is age 10 or older        Passed - Patient has documented A1c within the specified period of time.     If HgbA1C is 8 or greater, it needs to be on file within the past 3 months.  If less than 8, must be on file within the past 6 months.     Recent Labs   Lab Test 04/26/23  0757   A1C 7.2*             Passed - Patient's CR is NOT>1.4 OR Patient's EGFR is NOT<45 within past 12 mos.     Recent Labs   Lab Test 04/26/23  0757 03/12/21  1111   GFRESTIMATED 74 83   GFRESTBLACK  --  >90       Recent Labs   Lab Test 04/26/23 0757   CR 0.87             Passed - Patient does NOT have a diagnosis of CHF.        Passed - Medication is active on med list        Passed - Patient is not pregnant        Passed - Patient has not had a positive pregnancy test within the past 12 mos.         Passed - Recent (6 mo) or future (30 days) visit within the authorizing provider's specialty     Patient had office visit in the last 6 months or has a visit in the next 30 days with authorizing provider or within the authorizing provider's specialty.  See \"Patient Info\" tab in inbasket, or \"Choose Columns\" in Meds & Orders section of the refill encounter.                 Nely Greenberg RN 08/04/23 5:37 PM  "

## 2023-08-21 ENCOUNTER — THERAPY VISIT (OUTPATIENT)
Dept: PHYSICAL THERAPY | Facility: CLINIC | Age: 68
End: 2023-08-21
Payer: COMMERCIAL

## 2023-08-21 DIAGNOSIS — N95.2 VAGINAL ATROPHY: ICD-10-CM

## 2023-08-21 DIAGNOSIS — N39.41 URGE INCONTINENCE: Primary | ICD-10-CM

## 2023-08-21 PROCEDURE — 97535 SELF CARE MNGMENT TRAINING: CPT | Mod: GP | Performed by: PHYSICAL THERAPIST

## 2023-08-21 PROCEDURE — 97530 THERAPEUTIC ACTIVITIES: CPT | Mod: GP | Performed by: PHYSICAL THERAPIST

## 2023-08-21 PROCEDURE — 97110 THERAPEUTIC EXERCISES: CPT | Mod: GP | Performed by: PHYSICAL THERAPIST

## 2023-08-22 DIAGNOSIS — E78.5 HYPERLIPIDEMIA, UNSPECIFIED HYPERLIPIDEMIA TYPE: ICD-10-CM

## 2023-08-23 RX ORDER — ATORVASTATIN CALCIUM 20 MG/1
TABLET, FILM COATED ORAL
Qty: 90 TABLET | Refills: 1 | Status: SHIPPED | OUTPATIENT
Start: 2023-08-23 | End: 2024-02-29

## 2023-08-23 NOTE — TELEPHONE ENCOUNTER
Prescription approved per [facility] Refill Protocol.  Charo NICHOLS RN  [employer] [facility]

## 2023-09-16 ENCOUNTER — HEALTH MAINTENANCE LETTER (OUTPATIENT)
Age: 68
End: 2023-09-16

## 2023-09-19 ENCOUNTER — THERAPY VISIT (OUTPATIENT)
Dept: PHYSICAL THERAPY | Facility: CLINIC | Age: 68
End: 2023-09-19
Payer: COMMERCIAL

## 2023-09-19 DIAGNOSIS — N39.41 URGE INCONTINENCE: Primary | ICD-10-CM

## 2023-09-19 DIAGNOSIS — N95.2 VAGINAL ATROPHY: ICD-10-CM

## 2023-09-19 PROCEDURE — 97110 THERAPEUTIC EXERCISES: CPT | Mod: GP | Performed by: PHYSICAL THERAPIST

## 2023-09-20 NOTE — PROGRESS NOTES
09/19/23 0500   Appointment Info   Signing clinician's name / credentials Nely Phpips, PT   Total/Authorized Visits E+T   Visits Used 3   Medical Diagnosis UUI   PT Tx Diagnosis UUI   Progress Note/Certification   Onset of illness/injury or Date of Surgery 05/25/23   Therapy Frequency 1x every 4 weeks   Predicted Duration 12 weeks   Progress Note Due Date 09/15/23   Progress Note Completed Date 07/18/23   GOALS   PT Goals 2;4   PT Goal 1   Goal Description .   PT Goal 2   Goal Identifier Urinary Incontinence   Goal Description In 12 weeks pt will demonstrate improvement in pelvic floor muscle strength and coordination to reduce leakage events to <1x/week during functional activities and reduce pad usage to none.   Rationale to maximize safety and independence with self cares;to maximize safety and independence with performance of ADLs and functional tasks;to maximize safety and independence within the community   Goal Progress 1-2/week   Target Date 10/10/23   PT Goal 4   Goal Identifier Voiding Dysfunction   Goal Description In 6 weeks the patient will improve urinary voiding function by increasing their ability to fully empty their bladder during each void,using void techniques if needed, measured by decrease in post-void dribbling and frequency.   Rationale to maximize safety and independence with performance of ADLs and functional tasks;to maximize safety and independence with self cares   Goal Progress met   Target Date 08/29/23   Date Met 09/19/23   Subjective Report   Subjective Report Had urge while doing dishes; able to stop and not rush and made it to the bathroom without leakage.  One episode light leakage with sneeze.   Objective Measures   Objective Measures Objective Measure 1   Objective Measure 1   Objective Measure Urge tech   Details successful in controlled environment   Treatment Interventions (PT)   Interventions Therapeutic Activity;Self Care/Home Management;Therapeutic  Procedure/Exercise   Therapeutic Procedure/Exercise   Therapeutic Procedures: strength, endurance, ROM, flexibillity minutes (15757) 25   Therapeutic Procedures Ther Proc 2;Ther Proc 3   PTRx Ther Proc 1 Pelvic Floor Muscle Strengthening Basic   PTRx Ther Proc 1 - Details Seated on BUTTSKIS: hold back gas/hold back urine with light squeeze- you should not feel movement of your glut muscles or hold breath.  Hold 5 sec then relax and drop muscles between buttskis.     Skilled Intervention Assessment of strength and functional deficits to determine exercise prescription; tactile and verbal cuing to assist with proper performance; education in loading principles and expected response   Patient Response/Progress able to perf knack seated w cuing   PTRx Ther Proc 2 Pelvic Floor Muscle Strengthening Knack   PTRx Ther Proc 2 - Details Knack training in sitting and standing   Therapeutic Activity   Therapeutic Activities: dynamic activities to improve functional performance minutes (48879) 5   Therapeutic Activities Ther Act 2;Ther Act 3;Ther Act 4;Ther Act 5   Ther Act 2 Urge Suppression Activities: patient education in techniques and strategies to manage and control  urges or impulses in order to prevent or reduce unwanted movements or behaviors that cause leakage.   Ther Act 2 - Details rev   Ther Act 4 Elimination techniques   Ther Act 4 - Details pt completed successfully   Skilled Intervention Identification of causes of urge, education and cuing   Patient Response/Progress verb understanding   Self Care/home Management   Self Care Self Care 2;Self Care 3;Self Care 4   Self Care 2 Bladder Diary   Self Care 2 - Details did not complete but will bring in next   Skilled Intervention Education in bladder function, muscle function, patient consent, and self care techniques   Patient Response/Progress agreed to tracking, PFM next visit   Education   Learner/Method Patient;Pictures/Video   Plan   Home program ptrx   Plan for  next session review bladder diary, internal PFM and urinary fascial; sEMG   Total Session Time   Timed Code Treatment Minutes 30   Total Treatment Time (sum of timed and untimed services) 30         PLAN  Continue therapy per current plan of care.    Beginning/End Dates of Progress Note Reporting Period:  07/18/23 to 09/19/2023    Referring Provider:  Jared Mills

## 2023-11-25 ENCOUNTER — HEALTH MAINTENANCE LETTER (OUTPATIENT)
Age: 68
End: 2023-11-25

## 2024-02-03 ENCOUNTER — HEALTH MAINTENANCE LETTER (OUTPATIENT)
Age: 69
End: 2024-02-03

## 2024-02-04 DIAGNOSIS — I10 HYPERTENSION GOAL BP (BLOOD PRESSURE) < 140/90: ICD-10-CM

## 2024-02-05 RX ORDER — METOPROLOL SUCCINATE 25 MG/1
TABLET, EXTENDED RELEASE ORAL
Qty: 90 TABLET | Refills: 0 | Status: SHIPPED | OUTPATIENT
Start: 2024-02-05 | End: 2024-03-11

## 2024-02-28 ENCOUNTER — MYC MEDICAL ADVICE (OUTPATIENT)
Dept: FAMILY MEDICINE | Facility: CLINIC | Age: 69
End: 2024-02-28
Payer: COMMERCIAL

## 2024-02-28 DIAGNOSIS — E78.5 HYPERLIPIDEMIA, UNSPECIFIED HYPERLIPIDEMIA TYPE: ICD-10-CM

## 2024-02-29 RX ORDER — ATORVASTATIN CALCIUM 20 MG/1
TABLET, FILM COATED ORAL
Qty: 90 TABLET | Refills: 1 | Status: SHIPPED | OUTPATIENT
Start: 2024-02-29 | End: 2024-10-04

## 2024-03-01 NOTE — TELEPHONE ENCOUNTER
" -- Unsure if you would like to diagnostic mammo before you see patient or ok to discuss at 3/11/24 appt with you?    \"yesterday I started to have some pain in the left side underneath my breast. I read that Acid reflux can cause that or hormonal changes and multiple other things. A bit better today until I cough.     Any other breast changes like discharge, drainage lumps, focal pain?     I have not had those symptoms. It was more as if it was swollen or bigger and very uncomfortable when I coughed. It's a bit better now. I had been going to physical therapy for bladder issues and they had me talk to a doctor regarding using Estradiol (cream) last year. Supposed to do twice a week I probably did not use more than once a week. I don't think that has anything to do with this although I am not an expert. I did read that Estrogen can cause breast pain.\"    Natali Mccray RN  [facility]    "

## 2024-03-04 NOTE — TELEPHONE ENCOUNTER
It is okay for her to wait until 3/11 unless the pain is getting worse, then would recommend being seen in clinic right away. Vaginal estrogen is not absorbed into the system to the extent (little to zero absorption) that it would cause side effects, so would not be the cause. Sheree Graf M.D.

## 2024-03-10 ASSESSMENT — ASTHMA QUESTIONNAIRES
ACT_TOTALSCORE: 18
QUESTION_1 LAST FOUR WEEKS HOW MUCH OF THE TIME DID YOUR ASTHMA KEEP YOU FROM GETTING AS MUCH DONE AT WORK, SCHOOL OR AT HOME: A LITTLE OF THE TIME
QUESTION_4 LAST FOUR WEEKS HOW OFTEN HAVE YOU USED YOUR RESCUE INHALER OR NEBULIZER MEDICATION (SUCH AS ALBUTEROL): ONCE A WEEK OR LESS
QUESTION_2 LAST FOUR WEEKS HOW OFTEN HAVE YOU HAD SHORTNESS OF BREATH: ONCE OR TWICE A WEEK
QUESTION_5 LAST FOUR WEEKS HOW WOULD YOU RATE YOUR ASTHMA CONTROL: POORLY CONTROLLED
QUESTION_3 LAST FOUR WEEKS HOW OFTEN DID YOUR ASTHMA SYMPTOMS (WHEEZING, COUGHING, SHORTNESS OF BREATH, CHEST TIGHTNESS OR PAIN) WAKE YOU UP AT NIGHT OR EARLIER THAN USUAL IN THE MORNING: ONCE OR TWICE
ACT_TOTALSCORE: 18

## 2024-03-11 ENCOUNTER — OFFICE VISIT (OUTPATIENT)
Dept: FAMILY MEDICINE | Facility: CLINIC | Age: 69
End: 2024-03-11
Payer: COMMERCIAL

## 2024-03-11 VITALS
BODY MASS INDEX: 37.38 KG/M2 | RESPIRATION RATE: 18 BRPM | HEIGHT: 61 IN | SYSTOLIC BLOOD PRESSURE: 138 MMHG | HEART RATE: 69 BPM | OXYGEN SATURATION: 100 % | TEMPERATURE: 98.6 F | DIASTOLIC BLOOD PRESSURE: 82 MMHG | WEIGHT: 198 LBS

## 2024-03-11 DIAGNOSIS — Z13.220 LIPID SCREENING: ICD-10-CM

## 2024-03-11 DIAGNOSIS — E66.01 MORBID OBESITY (H): ICD-10-CM

## 2024-03-11 DIAGNOSIS — Z78.0 POST-MENOPAUSAL: ICD-10-CM

## 2024-03-11 DIAGNOSIS — R05.8 POST-VIRAL COUGH SYNDROME: ICD-10-CM

## 2024-03-11 DIAGNOSIS — J30.81 ALLERGIC RHINITIS DUE TO DOGS: ICD-10-CM

## 2024-03-11 DIAGNOSIS — B00.1 RECURRENT HERPES LABIALIS: ICD-10-CM

## 2024-03-11 DIAGNOSIS — H91.93 DECREASED HEARING, BILATERAL: ICD-10-CM

## 2024-03-11 DIAGNOSIS — R05.3 CHRONIC COUGH: ICD-10-CM

## 2024-03-11 DIAGNOSIS — L98.9 SKIN LESION: ICD-10-CM

## 2024-03-11 DIAGNOSIS — I10 HYPERTENSION GOAL BP (BLOOD PRESSURE) < 140/90: ICD-10-CM

## 2024-03-11 DIAGNOSIS — K21.9 GASTROESOPHAGEAL REFLUX DISEASE, UNSPECIFIED WHETHER ESOPHAGITIS PRESENT: ICD-10-CM

## 2024-03-11 DIAGNOSIS — E11.9 TYPE 2 DIABETES MELLITUS WITHOUT COMPLICATION, WITHOUT LONG-TERM CURRENT USE OF INSULIN (H): Primary | ICD-10-CM

## 2024-03-11 LAB — HBA1C MFR BLD: 8 % (ref 0–5.6)

## 2024-03-11 PROCEDURE — 99215 OFFICE O/P EST HI 40 MIN: CPT | Performed by: FAMILY MEDICINE

## 2024-03-11 PROCEDURE — 82043 UR ALBUMIN QUANTITATIVE: CPT | Performed by: FAMILY MEDICINE

## 2024-03-11 PROCEDURE — 82570 ASSAY OF URINE CREATININE: CPT | Performed by: FAMILY MEDICINE

## 2024-03-11 PROCEDURE — 83036 HEMOGLOBIN GLYCOSYLATED A1C: CPT | Performed by: FAMILY MEDICINE

## 2024-03-11 PROCEDURE — 36415 COLL VENOUS BLD VENIPUNCTURE: CPT | Performed by: FAMILY MEDICINE

## 2024-03-11 PROCEDURE — 80061 LIPID PANEL: CPT | Performed by: FAMILY MEDICINE

## 2024-03-11 PROCEDURE — 99417 PROLNG OP E/M EACH 15 MIN: CPT | Performed by: FAMILY MEDICINE

## 2024-03-11 PROCEDURE — 80053 COMPREHEN METABOLIC PANEL: CPT | Performed by: FAMILY MEDICINE

## 2024-03-11 RX ORDER — ACYCLOVIR 400 MG/1
400 TABLET ORAL EVERY 8 HOURS
Qty: 15 TABLET | Refills: 1 | Status: SHIPPED | OUTPATIENT
Start: 2024-03-11

## 2024-03-11 RX ORDER — METOPROLOL SUCCINATE 25 MG/1
25 TABLET, EXTENDED RELEASE ORAL DAILY
Qty: 90 TABLET | Refills: 3 | Status: SHIPPED | OUTPATIENT
Start: 2024-03-11

## 2024-03-11 RX ORDER — LANCING DEVICE/LANCETS
KIT MISCELLANEOUS
Qty: 100 EACH | Refills: 3 | Status: SHIPPED | OUTPATIENT
Start: 2024-03-11

## 2024-03-11 RX ORDER — METFORMIN HCL 500 MG
1000 TABLET, EXTENDED RELEASE 24 HR ORAL
Qty: 180 TABLET | Refills: 3 | Status: SHIPPED | OUTPATIENT
Start: 2024-03-11

## 2024-03-11 RX ORDER — FLUTICASONE PROPIONATE 50 MCG
1 SPRAY, SUSPENSION (ML) NASAL 2 TIMES DAILY
Qty: 48 G | Refills: 11 | Status: SHIPPED | OUTPATIENT
Start: 2024-03-11

## 2024-03-11 RX ORDER — HYDROCHLOROTHIAZIDE 12.5 MG/1
12.5 TABLET ORAL DAILY
Qty: 90 TABLET | Refills: 3 | Status: SHIPPED | OUTPATIENT
Start: 2024-03-11

## 2024-03-11 RX ORDER — MONTELUKAST SODIUM 10 MG/1
10 TABLET ORAL AT BEDTIME
Qty: 90 TABLET | Refills: 1 | Status: SHIPPED | OUTPATIENT
Start: 2024-03-11

## 2024-03-11 ASSESSMENT — PAIN SCALES - GENERAL: PAINLEVEL: MILD PAIN (2)

## 2024-03-11 NOTE — PATIENT INSTRUCTIONS
Consider pilates or yoga or other core strengthening exercises  Let me know if the side pain increases in frequency, duration, intensity. We may consider a CT scan at that point.   I recommend starting the singulair 10mg daily and the atrovent inhaler for cough as well as starting omeprazole 20mg daily and continuing pepcid daily for the next month or two.   Restart metformin for diabetes, one tablet daily with evening meal, then increase to two pills once daily (or split into one pill twice daily).   Schedule a bone density test  Schedule a visit with audiology for your hearing.   Schedule diabetic eye exam   Restart hydrochlorothiazide for your blood pressure. Continue metoprolol.   Please call to schedule with dermatology for the one spot on your face. Then schedule a separate skin cancer screening visit.   Restart vaginal estrogen. Insert 1g vaginally and apply small amount to the vaginal opening and vulva once nightly for two weeks, then twice weekly for maintenance.    Schedule follow up with me in 3 months - preventive visit

## 2024-03-11 NOTE — PROGRESS NOTES
Assessment & Plan      Type 2 diabetes mellitus without complication, without long-term current use of insulin (H)  Overdue for follow up  Uncontrolled.   Restart metformin 500mg daily and increase to 1000mg daily in two weeks.   Last eye exam: she is not sure. Referred for eye exam today   Foot exam today normal  Schedule with DM ed.     A1C is increasing. 7.2 today. thusfar has been managed with diet. Previously discussed starting metformin, but she had declined. Today, discussed metformin again including risks/side effects/benefits. Will start metformin 500mg daily and increase in 1 week to 1000mg daily. Follow up with me in 3 months.   Last eye exam 5/18/22   I recommended visiting with the DM educator. referral placed today   a1c today  Foot exam today normal.  She will schedule an eye exam.  Referral placed.     Hypertension goal BP (blood pressure)    Elevated today Continues metoprolol 25mg daily. Start hydrochlorothiazide 12.5mg fidelia. Schedule lab visit in 2 weeks.     Several home measurements also above goal Continues metoprolol 25mg daily. Initially discussed adding lisinopril, but after noting the potential for a dry cough as a side effect she declined.  Could also consider adding hydrochlorothiazide.  For now, she would like to start with the metformin and see how she tolerates it.  She also hopes to work on some weight loss.  We will plan on adding medication at follow-up for blood pressure if still uncontrolled.     Hyperlipidemia   Continues atorvastatin 20 mg daily. last LDL 57. Checking lipids today      Gastroesophageal reflux disease, esophagitis presence not specified  Stable on famotidine 40mg daily.       Cough  Postviral cough  Restart singulair and atrovent.   Improving since she started working with the speech therapist for vocal cord dysfunction and inflammation. She has stopped the singulair.      Recently started singulair, which seems to help and she is tolerating it well. Atrovent  was sent, but not covered by insurance, so Incruse Ellipta was selected as an alternative to address possible post viral cough. 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  She had used Singulair and her cough improved significantly so she stopped the medication.  She also felt it was making her anxious.  She felt that Atrovent inhaler was helpful in the setting of her cough, but then stopped it when her cough resolved.Her cough is that likely secondary to combination of GERD and postviral cough syndrome.  Her PFTs and chest x-ray were negative.  No further pulmonary work-up was necessary.  If symptoms return, pulmonology recommended initiation of inhaled Atrovent and Singulair for treatment.  She would like to have refill available of the Atrovent and Singulair.       GERD    Restart omeprazole 20mg daily.           Obesity BMI>35 with comorbidities DMII, HTN, HLD  Encouraged weight loss efforts. Referral to DM ed.       Colon cancer screening    still plans on scheduling colonoscopy. Work has been very busy.   Order placed again     Urinary urgency and frequency, mixed incontinence  Saw Urology. Was referred for pelvic floor therapy and started on vaginal estrogen, uses about once a week. Discussed role of vaginal estrogen and encouraged her to use it regularly -may restart 2 weeks of daily use and then begin twice weekly maintenance.      Recurrent herpes labialis  Acyclovir refilled today.       Allergic rhinitis due to dogs     - fluticasone (FLONASE) 50 MCG/ACT nasal spray; Spray 1 spray into both nostrils 2 times daily  Dispense: 48 g; Refill: 11       Skin lesion     - Adult Dermatology [facility] Referral; Future     Morbid obesity (H)  Discussed working on weight loss. Schedule with DM ed.     Decreased hearing bilateral  Schedule with audiology     Schedule wellness visit in 3 months        57 minutes spent on the date of the encounter doing chart review,  "history and exam, documentation and further activities per the note       BMI  Estimated body mass index is 36.81 kg/m  as calculated from the following:    Height as of this encounter: 1.562 m (5' 1.5\").    Weight as of this encounter: 89.8 kg (198 lb).           Delmi Pat is a 68 year old, presenting for the following health issues:  Diabetes and Cough (/)    History of Present Illness       Reason for visit:  Chronic cough, Recent pain in left breast due to coughing. Pain in right side and knee pain.She consumes 0 sweetened beverage(s) daily.She exercises with enough effort to increase her heart rate 20 to 29 minutes per day.  She exercises with enough effort to increase her heart rate 3 or less days per week.   She is taking medications regularly.     Pain under left breast. Was at work and felt like her bra was gith. When she would cough it would hurt under her ribs on the left and into her breast.  The next day it was better and then totally resolved.     Acid reflux. Taking pepcid. Not omeprazole.     Has cough again. Always happens after she has been around her grandkids and some are sick.   Sometimes feels phlegmy.     Gets other pain sometimes on the right side flank. When she is lying in bed will lie on her stomach or may turn to the left and then get shooting pain on the right flank. Has happened when she is on the toilet and having bowel movement. Not sure if because she is pushing at the time. It will feel sharp when it happens. It has been happening for about 6 months on and off. Lasts for a few minutes and then goes away. Triggers are having BM and turning in bed. Normal otherwise. Happens maybe every couple of weeks.     Hearing is bad, getting worse.     Feels like her memory is not always great. Notices the same things her friends notice. Forgets words sometimes. Wonders if normal or worrisome.     Occasionally gets a mild headache left side of her head. No severe symptoms. No " "accompanying symptoms. Does not alter function.          Objective    /82 (BP Location: Right arm, Patient Position: Sitting, Cuff Size: Adult Large)   Pulse 69   Temp 98.6  F (37  C) (Temporal)   Resp 18   Ht 1.562 m (5' 1.5\")   Wt 89.8 kg (198 lb)   LMP 02/07/2005   SpO2 100%   Breastfeeding No   BMI 36.81 kg/m    Body mass index is 36.81 kg/m .    Wt Readings from Last 5 Encounters:   03/11/24 89.8 kg (198 lb)   05/25/23 87.5 kg (193 lb)   04/26/23 88.9 kg (196 lb)   01/23/23 87.1 kg (192 lb)   05/10/22 83.5 kg (184 lb)      Physical Exam   GENERAL: alert and no distress  Diabetic foot exam: normal DP and PT pulses, no trophic changes or ulcerative lesions, and normal sensory exam    Lab Results   Component Value Date    A1C 8.0 03/11/2024    A1C 7.2 04/26/2023    A1C 6.9 10/07/2022    A1C 6.9 12/07/2021    A1C 6.9 06/21/2021    A1C 6.9 10/28/2020    A1C 6.8 01/28/2020    A1C 6.5 10/17/2019    A1C 6.4 04/12/2019        Signed Electronically by: Sheree Graf MD, MD    "

## 2024-03-12 LAB
ALBUMIN SERPL BCG-MCNC: 4.4 G/DL (ref 3.5–5.2)
ALP SERPL-CCNC: 80 U/L (ref 40–150)
ALT SERPL W P-5'-P-CCNC: 12 U/L (ref 0–50)
ANION GAP SERPL CALCULATED.3IONS-SCNC: 8 MMOL/L (ref 7–15)
AST SERPL W P-5'-P-CCNC: 20 U/L (ref 0–45)
BILIRUB SERPL-MCNC: 0.5 MG/DL
BUN SERPL-MCNC: 13.8 MG/DL (ref 8–23)
CALCIUM SERPL-MCNC: 9.4 MG/DL (ref 8.8–10.2)
CHLORIDE SERPL-SCNC: 103 MMOL/L (ref 98–107)
CHOLEST SERPL-MCNC: 132 MG/DL
CREAT SERPL-MCNC: 0.85 MG/DL (ref 0.51–0.95)
CREAT UR-MCNC: 88.1 MG/DL
DEPRECATED HCO3 PLAS-SCNC: 28 MMOL/L (ref 22–29)
EGFRCR SERPLBLD CKD-EPI 2021: 75 ML/MIN/1.73M2
FASTING STATUS PATIENT QL REPORTED: NO
GLUCOSE SERPL-MCNC: 115 MG/DL (ref 70–99)
HDLC SERPL-MCNC: 55 MG/DL
LDLC SERPL CALC-MCNC: 52 MG/DL
MICROALBUMIN UR-MCNC: <12 MG/L
MICROALBUMIN/CREAT UR: NORMAL MG/G{CREAT}
NONHDLC SERPL-MCNC: 77 MG/DL
POTASSIUM SERPL-SCNC: 4.5 MMOL/L (ref 3.4–5.3)
PROT SERPL-MCNC: 7 G/DL (ref 6.4–8.3)
SODIUM SERPL-SCNC: 139 MMOL/L (ref 135–145)
TRIGL SERPL-MCNC: 124 MG/DL

## 2024-03-12 NOTE — RESULT ENCOUNTER NOTE
Other than the increase in your hemoglobin A1C, your lab results are stable. Please let us know if you have any questions.

## 2024-03-21 ENCOUNTER — OFFICE VISIT (OUTPATIENT)
Dept: FAMILY MEDICINE | Facility: CLINIC | Age: 69
End: 2024-03-21
Payer: COMMERCIAL

## 2024-03-21 DIAGNOSIS — D49.2 NEOPLASM OF SKIN: Primary | ICD-10-CM

## 2024-03-21 DIAGNOSIS — L98.9 SKIN LESION: ICD-10-CM

## 2024-03-21 DIAGNOSIS — L65.9 HAIR LOSS: ICD-10-CM

## 2024-03-21 PROCEDURE — 99203 OFFICE O/P NEW LOW 30 MIN: CPT | Mod: 25 | Performed by: PHYSICIAN ASSISTANT

## 2024-03-21 PROCEDURE — 88305 TISSUE EXAM BY PATHOLOGIST: CPT | Performed by: DERMATOLOGY

## 2024-03-21 PROCEDURE — 11104 PUNCH BX SKIN SINGLE LESION: CPT | Performed by: PHYSICIAN ASSISTANT

## 2024-03-21 PROCEDURE — 11103 TANGNTL BX SKIN EA SEP/ADDL: CPT | Performed by: PHYSICIAN ASSISTANT

## 2024-03-21 ASSESSMENT — PAIN SCALES - GENERAL: PAINLEVEL: NO PAIN (0)

## 2024-03-21 NOTE — LETTER
3/21/2024         RE: Adilia Camacho  [address]  [facility] [address]        Dear Colleague,    Thank you for referring your patient, Adilia Camacho, to the [facility] [place]. Please see a copy of my visit note below.    [facility] Dermatology Note  Encounter Date: Mar 21, 2024  Office Visit      Dermatology Problem List:    # NUB, x2 S/p biopsy performed on 3/21/24: Pending results  Hair loss  - Tx: OTC Men's Rogaine.      ____________________________________________    Assessment & Plan:  # Neoplasm of unspecified behavior of the skin (D49.2) on the L medial canthus. The differential diagnosis includes benign fibrous papule vs skin tags vs other   - Shave biopsy performed today, see procedure note below.  - Photographed today    # Neoplasm of unspecified behavior of the skin (D49.2) on the Mid frontal scalp. The differential diagnosis includes NMSC vs HAK vs other.   - Punch biopsy performed today, see procedure note below.  - Photographed today    # Hair loss - familial/androgenetic  - Recommended use of Men's Rogaine.   - we did not discuss this in depth, if patient would like a full assessment will plan to set up a separate appointment    Procedures Performed:   After verbal consent, discussion of risks and beneftis including but not limited to bleeding, infection, scar and recurrence, lesion was prepped with alcohol, 0.5mL of 1% lidocaine with epinephrine was injected to obtain adequate anesthesia of the lesion on the L medial canthus, the lesions was snip clipped with scissors and submitted to pathology.The patient tolerated the procedure and no complications were noted.    Punch biopsy:  After discussion of benefits and risks including but not limited to bleeding/bruising, pain/swelling, infection, scar, incomplete removal, nerve damage/numbness, recurrence, and non-diagnostic biopsy, written consent, verbal consent and photographs were obtained.  Time-out was performed. The area was cleaned with isopropyl alcohol. 0.5mL of 1% lidocaine with epinephrine was injected to obtain adequate anesthesia of the lesion. A 3mm punch biopsy was performed.  4-0 prolene sutures were utilized to approximate the epidermal edges.  White petroleum jelly/VaselineTM and a bandage was applied to the wound.  Explicit verbal and written wound care instructions were provided.  The patient left the Dermatology Clinic in good condition. The patient was counseled to follow up for suture removal in approximately 10 days.    Follow-up: pending path results    Staff and scribe:    Jessica JOHNSON, am serving as a scribe to document services personally performed by Nadege Luna PA-C based on data collection and the provider's statements to me.      All risks, benefits and alternatives were discussed with patient.  Patient is in agreement and understands the assessment and plan.  All questions were answered.    Nadege Luna PA-C, [facility]  [facility]: Phone: [phone], Fax: [phone]  [facility]: Phone: [phone],  Fax: [phone]  [facility]: Phone: [phone], Fax: [phone]  ____________________________________________    CC: Derm Problem (Lesion by left eye x 3 months)      Reviewed patients past medical history and pertinent chart review prior to patient's visit today.     HPI:  Ms. Adilia Camacho is a 68 year old female who presents today as a new patient for spot check.     Today patient reported a spot of concern on her L eye, Has been present x 3 months.    Denied any family or personal hx of skin cancer.     Patient is otherwise feeling well, without additional concerns.    Labs:  N/A    Physical Exam:  Vitals: LMP 02/07/2005   SKIN: Focused examination of L eye and scalp was performed.   - L medial canthus there is a 2 mm pink papule   - 5 mm  hyperkeratotic papule on the mid frontal scalp  - widened part noted  - No other lesions of concern on areas examined.               Medications:  Current Outpatient Medications   Medication     acyclovir (ZOVIRAX) 400 MG tablet     atorvastatin (LIPITOR) 20 MG tablet     cetirizine (ZYRTEC) 10 MG tablet     estradiol (ESTRACE) 0.1 MG/GM vaginal cream     fluticasone (FLONASE) 50 MCG/ACT nasal spray     hydroCHLOROthiazide 12.5 MG tablet     ipratropium (ATROVENT HFA) 17 MCG/ACT inhaler     metFORMIN (GLUCOPHAGE XR) 500 MG 24 hr tablet     metoprolol succinate ER (TOPROL XL) 25 MG 24 hr tablet     omeprazole (PRILOSEC) 20 MG DR capsule     acetaminophen (TYLENOL) 325 MG tablet     alcohol swab prep pads     blood glucose (NO BRAND SPECIFIED) lancets standard     blood glucose (NO BRAND SPECIFIED) test strip     blood glucose (ONE TOUCH DELICA) lancing device     carboxymethylcellulose PF (CARBOXYMETHYLCELLULOSE SODIUM) 0.5 % ophthalmic solution     famotidine (PEPCID) 40 MG tablet     montelukast (SINGULAIR) 10 MG tablet     solifenacin (VESICARE) 5 MG tablet     Current Facility-Administered Medications   Medication     lidocaine (PF) 0.5 % injection SOLN 8 mL     triamcinolone (KENALOG-40) injection 40 mg      Past Medical/Surgical History:   Patient Active Problem List   Diagnosis     Hypertension goal BP (blood pressure) < 140/90     CARDIOVASCULAR SCREENING; LDL GOAL LESS THAN 130     Allergic rhinitis due to dogs     Contraceptive management     Recurrent herpes labialis     Rosacea     Female stress incontinence     GERD (gastroesophageal reflux disease)     Advanced directives, counseling/discussion     Venous (peripheral) insufficiency     Obesity (BMI 35.0-39.9) with comorbidity (H)     Diabetes mellitus, type 2 (H)     Chronic pain of left knee     Cervical cancer screening     Hyperlipidemia, unspecified hyperlipidemia type     Post-viral cough syndrome     Urge incontinence     Vaginal atrophy     Past  Medical History:   Diagnosis Date     Allergic rhinitis due to dogs      CARDIOVASCULAR SCREENING; LDL GOAL LESS THAN 130      Contraceptive management      Diabetes mellitus, type 2 (H) 10/31/2019     Hypertension goal BP (blood pressure) < 140/90      Other chronic pain     Left knee     Recurrent herpes labialis      Rosacea                         Again, thank you for allowing me to participate in the care of your patient.        Sincerely,        Nadege Luna PA-C

## 2024-03-21 NOTE — PROGRESS NOTES
[facility] Dermatology Note  Encounter Date: Mar 21, 2024  Office Visit      Dermatology Problem List:    # NUB, x2 S/p biopsy performed on 3/21/24: Pending results  Hair loss  - Tx: OTC Men's Rogaine.      ____________________________________________    Assessment & Plan:  # Neoplasm of unspecified behavior of the skin (D49.2) on the L medial canthus. The differential diagnosis includes benign fibrous papule vs skin tags vs other   - Shave biopsy performed today, see procedure note below.  - Photographed today    # Neoplasm of unspecified behavior of the skin (D49.2) on the Mid frontal scalp. The differential diagnosis includes NMSC vs HAK vs other.   - Punch biopsy performed today, see procedure note below.  - Photographed today    # Hair loss - familial/androgenetic  - Recommended use of Men's Rogaine.   - we did not discuss this in depth, if patient would like a full assessment will plan to set up a separate appointment    Procedures Performed:   After verbal consent, discussion of risks and beneftis including but not limited to bleeding, infection, scar and recurrence, lesion was prepped with alcohol, 0.5mL of 1% lidocaine with epinephrine was injected to obtain adequate anesthesia of the lesion on the L medial canthus, the lesions was snip clipped with scissors and submitted to pathology.The patient tolerated the procedure and no complications were noted.    Punch biopsy:  After discussion of benefits and risks including but not limited to bleeding/bruising, pain/swelling, infection, scar, incomplete removal, nerve damage/numbness, recurrence, and non-diagnostic biopsy, written consent, verbal consent and photographs were obtained. Time-out was performed. The area was cleaned with isopropyl alcohol. 0.5mL of 1% lidocaine with epinephrine was injected to obtain adequate anesthesia of the lesion. A 3mm punch biopsy was performed.  4-0 prolene sutures were utilized to approximate the epidermal  edges.  White petroleum jelly/VaselineTM and a bandage was applied to the wound.  Explicit verbal and written wound care instructions were provided.  The patient left the Dermatology Clinic in good condition. The patient was counseled to follow up for suture removal in approximately 10 days.    Follow-up: pending path results    Staff and scribe:    ELIZABETH, Jessica Mccallum, am serving as a scribe to document services personally performed by Nadege Luna PA-C based on data collection and the provider's statements to me.      All risks, benefits and alternatives were discussed with patient.  Patient is in agreement and understands the assessment and plan.  All questions were answered.    Nadege Luna PA-C, [facility]  [facility]: Phone: [phone], Fax: [phone]  [facility]: Phone: [phone],  Fax: [phone]  [facility]: Phone: [phone], Fax: [phone]  ____________________________________________    CC: Derm Problem (Lesion by left eye x 3 months)      Reviewed patients past medical history and pertinent chart review prior to patient's visit today.     HPI:  Ms. Adilia Camacho is a 68 year old female who presents today as a new patient for spot check.     Today patient reported a spot of concern on her L eye, Has been present x 3 months.    Denied any family or personal hx of skin cancer.     Patient is otherwise feeling well, without additional concerns.    Labs:  N/A    Physical Exam:  Vitals: LMP 02/07/2005   SKIN: Focused examination of L eye and scalp was performed.   - L medial canthus there is a 2 mm pink papule   - 5 mm hyperkeratotic papule on the mid frontal scalp  - widened part noted  - No other lesions of concern on areas examined.               Medications:  Current Outpatient Medications   Medication    acyclovir (ZOVIRAX) 400 MG tablet    atorvastatin (LIPITOR) 20 MG tablet     cetirizine (ZYRTEC) 10 MG tablet    estradiol (ESTRACE) 0.1 MG/GM vaginal cream    fluticasone (FLONASE) 50 MCG/ACT nasal spray    hydroCHLOROthiazide 12.5 MG tablet    ipratropium (ATROVENT HFA) 17 MCG/ACT inhaler    metFORMIN (GLUCOPHAGE XR) 500 MG 24 hr tablet    metoprolol succinate ER (TOPROL XL) 25 MG 24 hr tablet    omeprazole (PRILOSEC) 20 MG DR capsule    acetaminophen (TYLENOL) 325 MG tablet    alcohol swab prep pads    blood glucose (NO BRAND SPECIFIED) lancets standard    blood glucose (NO BRAND SPECIFIED) test strip    blood glucose (ONE TOUCH DELICA) lancing device    carboxymethylcellulose PF (CARBOXYMETHYLCELLULOSE SODIUM) 0.5 % ophthalmic solution    famotidine (PEPCID) 40 MG tablet    montelukast (SINGULAIR) 10 MG tablet    solifenacin (VESICARE) 5 MG tablet     Current Facility-Administered Medications   Medication    lidocaine (PF) 0.5 % injection SOLN 8 mL    triamcinolone (KENALOG-40) injection 40 mg      Past Medical/Surgical History:   Patient Active Problem List   Diagnosis    Hypertension goal BP (blood pressure) < 140/90    CARDIOVASCULAR SCREENING; LDL GOAL LESS THAN 130    Allergic rhinitis due to dogs    Contraceptive management    Recurrent herpes labialis    Rosacea    Female stress incontinence    GERD (gastroesophageal reflux disease)    Advanced directives, counseling/discussion    Venous (peripheral) insufficiency    Obesity (BMI 35.0-39.9) with comorbidity (H)    Diabetes mellitus, type 2 (H)    Chronic pain of left knee    Cervical cancer screening    Hyperlipidemia, unspecified hyperlipidemia type    Post-viral cough syndrome    Urge incontinence    Vaginal atrophy     Past Medical History:   Diagnosis Date    Allergic rhinitis due to dogs     CARDIOVASCULAR SCREENING; LDL GOAL LESS THAN 130     Contraceptive management     Diabetes mellitus, type 2 (H) 10/31/2019    Hypertension goal BP (blood pressure) < 140/90     Other chronic pain     Left knee    Recurrent herpes  labtommie Taylor

## 2024-03-21 NOTE — PATIENT INSTRUCTIONS
Patient Education       Proper skin care from [facility]:    -Eliminate harsh soaps as they strip the natural oils from the skin, often resulting in dry itchy skin ( i.e. Dial, Zest, Turkish Spring)  -Use mild soaps such as Cetaphil or Dove Sensitive Skin in the shower. You do not need to use soap on arms, legs, and trunk every time you shower unless visibly soiled.   -Avoid hot or cold showers.  -After showering, lightly dry off and apply moisturizing within 2-3 minutes. This will help trap moisture in the skin.   -Aggressive use of a moisturizer at least 1-2 times a day to the entire body (including -Vanicream, Cetaphil, Aquaphor or Cerave) and moisturize hands after every washing.  -We recommend using moisturizers that come in a tub that needs to be scooped out, not a pump. This has more of an oil base. It will hold moisture in your skin much better than a water base moisturizer. The above recommended are non-pore clogging.      Wear a sunscreen with at least SPF 30 on your face, ears, neck and V of the chest daily. Wear sunscreen on other areas of the body if those areas are exposed to the sun throughout the day. Sunscreens can contain physical and/or chemical blockers. Physical blockers are less likely to clog pores, these include zinc oxide and titanium dioxide. Reapply every two hour and after swimming.     Sunscreen examples: https://www.ewg.org/sunscreen/    UV radiation  UVA radiation remains constant throughout the day and throughout the year. It is a longer wavelength than UVB and therefore penetrates deeper into the skin leading to immediate and delayed tanning, photoaging, and skin cancer. 70-80% of UVA and UVB radiation occurs between the hours of 10am-2pm.  UVB radiation  UVB radiation causes the most harmful effects and is more significant during the summer months. However, snow and ice can reflect UVB radiation leading to skin damage during the winter months as well. UVB radiation is  responsible for tanning, burning, inflammation, delayed erythema (pinkness), pigmentation (brown spots), and skin cancer.     I recommend self monthly full body exams and yearly full body exams with a dermatology provider. If you develop a new or changing lesion please follow up for examination. Most skin cancers are pink and scaly or pink and pearly. However, we do see blue/brown/black skin cancers.  Consider the ABCDEs of melanoma when giving yourself your monthly full body exam ( don't forget the groin, buttocks, feet, toes, etc). A-asymmetry, B-borders, C-color, D-diameter, E-elevation or evolving. If you see any of these changes please follow up in clinic. If you cannot see your back I recommend purchasing a hand held mirror to use with a larger wall mirror.       Checking for Skin Cancer  You can find cancer early by checking your skin each month. There are 3 kinds of skin cancer. They are melanoma, basal cell carcinoma, and squamous cell carcinoma. Doing monthly skin checks is the best way to find new marks or skin changes. Follow the instructions below for checking your skin.   The ABCDEs of checking moles for melanoma   Check your moles or growths for signs of melanoma using ABCDE:   Asymmetry: the sides of the mole or growth don t match  Border: the edges are ragged, notched, or blurred  Color: the color within the mole or growth varies  Diameter: the mole or growth is larger than 6 mm (size of a pencil eraser)  Evolving: the size, shape, or color of the mole or growth is changing (evolving is not shown in the images below)    Checking for other types of skin cancer  Basal cell carcinoma or squamous cell carcinoma have symptoms such as:     A spot or mole that looks different from all other marks on your skin  Changes in how an area feels, such as itching, tenderness, or pain  Changes in the skin's surface, such as oozing, bleeding, or scaliness  A sore that does not heal  New swelling or redness beyond  the border of a mole    Who s at risk?  Anyone can get skin cancer. But you are at greater risk if you have:   Fair skin, light-colored hair, or light-colored eyes  Many moles or abnormal moles on your skin  A history of sunburns from sunlight or tanning beds  A family history of skin cancer  A history of exposure to radiation or chemicals  A weakened immune system  If you have had skin cancer in the past, you are at risk for recurring skin cancer.   How to check your skin  Do your monthly skin checkups in front of a full-length mirror. Check all parts of your body, including your:   Head (ears, face, neck, and scalp)  Torso (front, back, and sides)  Arms (tops, undersides, upper, and lower armpits)  Hands (palms, backs, and fingers, including under the nails)  Buttocks and genitals  Legs (front, back, and sides)  Feet (tops, soles, toes, including under the nails, and between toes)  If you have a lot of moles, take digital photos of them each month. Make sure to take photos both up close and from a distance. These can help you see if any moles change over time.   Most skin changes are not cancer. But if you see any changes in your skin, call your doctor right away. Only he or she can diagnose a problem. If you have skin cancer, seeing your doctor can be the first step toward getting the treatment that could save your life.   [employer] last reviewed this educational content on 4/1/2019 2000-2020 [employer]. [address] [place] [address], [place] [address]. All rights reserved. This information is not intended as a substitute for professional medical care. Always follow your healthcare professional's instructions.         Wound Care After a Biopsy    What is a skin biopsy?  A skin biopsy allows the doctor to examine a very small piece of tissue under the microscope to determine the diagnosis and the best treatment for the skin condition. A local anesthetic (numbing medicine)  is injected with a very small  needle into the skin area to be tested. A small piece of skin is taken from the area. Sometimes a suture (stitch) is used.     What are the risks of a skin biopsy?  I will experience scar, bleeding, swelling, pain, crusting and redness. I may experience incomplete removal or recurrence. Risks of this procedure are excessive bleeding, bruising, infection, nerve damage, numbness, thick (hypertrophic or keloidal) scar and non-diagnostic biopsy.    How should I care for my wound for the first 24 hours?  Keep the wound dry and covered for 24 hours  If it bleeds, hold direct pressure on the area for 15 minutes. If bleeding does not stop then go to the emergency room  Avoid strenuous exercise the first 1-2 days or as your doctor instructs you    How should I care for the wound after 24 hours?  After 24 hours, remove the bandage  You may bathe or shower as normal  If you had a scalp biopsy, you can shampoo as usual and can use shower water to clean the biopsy site daily  Clean the wound twice a day with gentle soap and water  Do not scrub, be gentle  Apply white petroleum/Vaseline after cleaning the wound with a cotton swab or a clean finger, and keep the site covered with a Bandaid /bandage. Bandages are not necessary with a scalp biopsy  If you are unable to cover the site with a Bandaid /bandage, re-apply ointment 2-3 times a day to keep the site moist. Moisture will help with healing  Avoid strenuous activity for first 1-2 days  Avoid lakes, rivers, pools, and oceans until the stitches are removed or the site is healed    How do I clean my wound?  Wash hands thoroughly with soap or use hand  before all wound care  Clean the wound with gentle soap and water  Apply white petroleum/Vaseline  to wound after it is clean  Replace the Bandaid /bandage to keep the wound covered for the first few days or as instructed by your doctor  If you had a scalp biopsy, warm shower water to the area on a daily basis should  suffice    What should I use to clean my wound?   Cotton-tipped applicators (Qtips )  White petroleum jelly (Vaseline ). Use a clean new container and use Q-tips to apply.  Bandaids   as needed  Gentle soap     How should I care for my wound long term?  Do not get your wound dirty  Keep up with wound care for one week or until the area is healed.  A small scab will form and fall off by itself when the area is completely healed. The area will be red and will become pink in color as it heals. Sun protection is very important for how your scar will turn out. Sunscreen with an SPF 30 or greater is recommended once the area is healed.  If you have stitches, stitches need to be removed in 10 days. You may return to our clinic for this or you may have it done locally at your doctor s office.  You should have some soreness but it should be mild and slowly go away over several days. Talk to your doctor about using tylenol for pain,    When should I call my doctor?  If you have increased:   Pain or swelling  Pus or drainage (clear or slightly yellow drainage is ok)  Temperature over 100F  Spreading redness or warmth around wound    When will I hear about my results?  The biopsy results can take 2-3 weeks to come back. The clinic will call you with the results, send you a BooknGot message, or have you schedule a follow-up clinic or phone time to discuss the results. Contact our clinics if you do not hear from us in 3 weeks.     Who should I call with questions?  [facility]: [phone]   [facility]: [phone]  For urgent needs outside of business hours call the [facility] at [phone] and ask for the dermatology resident on call

## 2024-03-25 LAB
PATH REPORT.COMMENTS IMP SPEC: NORMAL
PATH REPORT.COMMENTS IMP SPEC: NORMAL
PATH REPORT.FINAL DX SPEC: NORMAL
PATH REPORT.GROSS SPEC: NORMAL
PATH REPORT.MICROSCOPIC SPEC OTHER STN: NORMAL
PATH REPORT.RELEVANT HX SPEC: NORMAL

## 2024-03-26 ENCOUNTER — TELEPHONE (OUTPATIENT)
Dept: FAMILY MEDICINE | Facility: CLINIC | Age: 69
End: 2024-03-26
Payer: COMMERCIAL

## 2024-03-26 NOTE — TELEPHONE ENCOUNTER
Pt called back and was given message about biopsy results below.  Scheduled for LN2 on Thursday April 18th at 1:15 pm.    Evelyn SILVA RN  [facility] [place]  [phone]

## 2024-03-26 NOTE — TELEPHONE ENCOUNTER
----- Message from Nadege Luna PA-C sent at 3/25/2024  6:47 PM CDT -----  A) benign SK  B) HAK of mid frontal scalp - will need follow up for LN2    A. Left medial canthus:  - Inverted follicular keratosis (irritated seborrheic keratosis) - (see description)       B. Mid frontal scalp:  - Hypertrophic actinic keratosis - (see descriptio

## 2024-03-28 ENCOUNTER — OFFICE VISIT (OUTPATIENT)
Dept: FAMILY MEDICINE | Facility: CLINIC | Age: 69
End: 2024-03-28
Payer: COMMERCIAL

## 2024-03-28 DIAGNOSIS — L98.9 SKIN LESION: Primary | ICD-10-CM

## 2024-03-28 PROCEDURE — 99207 PR NO CHARGE LOS: CPT | Performed by: PHYSICIAN ASSISTANT

## 2024-03-28 NOTE — PROGRESS NOTES
Adilia Camacho comes into clinic today at the request of suture removal Ordering Provider for Suture Removal:  Incision was dry, clean and intact, incision cleansed with wound cleanser and sutures were removed. Pt tolerated the procedure. Benzoin and steristrips were not placed per protocol and pt was instructed to leave them in place for 7-10 days. It is okay to shower with these in place, no need to cover. After this time the strerstrips will start loosen and should be removed.    .        This service provided today was under the supervising provider of the rosa Luna, who was available if needed.    Alyssa Ambriz, CMA

## 2024-04-18 ENCOUNTER — OFFICE VISIT (OUTPATIENT)
Dept: FAMILY MEDICINE | Facility: CLINIC | Age: 69
End: 2024-04-18
Payer: COMMERCIAL

## 2024-04-18 DIAGNOSIS — L57.0 HYPERTROPHIC ACTINIC KERATOSIS: ICD-10-CM

## 2024-04-18 DIAGNOSIS — L82.0 SEBORRHEIC KERATOSES, INFLAMED: Primary | ICD-10-CM

## 2024-04-18 PROCEDURE — 17000 DESTRUCT PREMALG LESION: CPT | Performed by: PHYSICIAN ASSISTANT

## 2024-04-18 PROCEDURE — 99213 OFFICE O/P EST LOW 20 MIN: CPT | Mod: 25 | Performed by: PHYSICIAN ASSISTANT

## 2024-04-18 NOTE — PATIENT INSTRUCTIONS
Patient Education       Proper skin care from [facility]:    -Eliminate harsh soaps as they strip the natural oils from the skin, often resulting in dry itchy skin ( i.e. Dial, Zest, Turkish Spring)  -Use mild soaps such as Cetaphil or Dove Sensitive Skin in the shower. You do not need to use soap on arms, legs, and trunk every time you shower unless visibly soiled.   -Avoid hot or cold showers.  -After showering, lightly dry off and apply moisturizing within 2-3 minutes. This will help trap moisture in the skin.   -Aggressive use of a moisturizer at least 1-2 times a day to the entire body (including -Vanicream, Cetaphil, Aquaphor or Cerave) and moisturize hands after every washing.  -We recommend using moisturizers that come in a tub that needs to be scooped out, not a pump. This has more of an oil base. It will hold moisture in your skin much better than a water base moisturizer. The above recommended are non-pore clogging.      Wear a sunscreen with at least SPF 30 on your face, ears, neck and V of the chest daily. Wear sunscreen on other areas of the body if those areas are exposed to the sun throughout the day. Sunscreens can contain physical and/or chemical blockers. Physical blockers are less likely to clog pores, these include zinc oxide and titanium dioxide. Reapply every two hour and after swimming.     Sunscreen examples: https://www.ewg.org/sunscreen/    UV radiation  UVA radiation remains constant throughout the day and throughout the year. It is a longer wavelength than UVB and therefore penetrates deeper into the skin leading to immediate and delayed tanning, photoaging, and skin cancer. 70-80% of UVA and UVB radiation occurs between the hours of 10am-2pm.  UVB radiation  UVB radiation causes the most harmful effects and is more significant during the summer months. However, snow and ice can reflect UVB radiation leading to skin damage during the winter months as well. UVB radiation is  responsible for tanning, burning, inflammation, delayed erythema (pinkness), pigmentation (brown spots), and skin cancer.     I recommend self monthly full body exams and yearly full body exams with a dermatology provider. If you develop a new or changing lesion please follow up for examination. Most skin cancers are pink and scaly or pink and pearly. However, we do see blue/brown/black skin cancers.  Consider the ABCDEs of melanoma when giving yourself your monthly full body exam ( don't forget the groin, buttocks, feet, toes, etc). A-asymmetry, B-borders, C-color, D-diameter, E-elevation or evolving. If you see any of these changes please follow up in clinic. If you cannot see your back I recommend purchasing a hand held mirror to use with a larger wall mirror.       Checking for Skin Cancer  You can find cancer early by checking your skin each month. There are 3 kinds of skin cancer. They are melanoma, basal cell carcinoma, and squamous cell carcinoma. Doing monthly skin checks is the best way to find new marks or skin changes. Follow the instructions below for checking your skin.   The ABCDEs of checking moles for melanoma   Check your moles or growths for signs of melanoma using ABCDE:   Asymmetry: the sides of the mole or growth don t match  Border: the edges are ragged, notched, or blurred  Color: the color within the mole or growth varies  Diameter: the mole or growth is larger than 6 mm (size of a pencil eraser)  Evolving: the size, shape, or color of the mole or growth is changing (evolving is not shown in the images below)    Checking for other types of skin cancer  Basal cell carcinoma or squamous cell carcinoma have symptoms such as:     A spot or mole that looks different from all other marks on your skin  Changes in how an area feels, such as itching, tenderness, or pain  Changes in the skin's surface, such as oozing, bleeding, or scaliness  A sore that does not heal  New swelling or redness beyond  the border of a mole    Who s at risk?  Anyone can get skin cancer. But you are at greater risk if you have:   Fair skin, light-colored hair, or light-colored eyes  Many moles or abnormal moles on your skin  A history of sunburns from sunlight or tanning beds  A family history of skin cancer  A history of exposure to radiation or chemicals  A weakened immune system  If you have had skin cancer in the past, you are at risk for recurring skin cancer.   How to check your skin  Do your monthly skin checkups in front of a full-length mirror. Check all parts of your body, including your:   Head (ears, face, neck, and scalp)  Torso (front, back, and sides)  Arms (tops, undersides, upper, and lower armpits)  Hands (palms, backs, and fingers, including under the nails)  Buttocks and genitals  Legs (front, back, and sides)  Feet (tops, soles, toes, including under the nails, and between toes)  If you have a lot of moles, take digital photos of them each month. Make sure to take photos both up close and from a distance. These can help you see if any moles change over time.   Most skin changes are not cancer. But if you see any changes in your skin, call your doctor right away. Only he or she can diagnose a problem. If you have skin cancer, seeing your doctor can be the first step toward getting the treatment that could save your life.   [employer] last reviewed this educational content on 4/1/2019 2000-2020 [employer]. [address] [place] [address], [place] [address]. All rights reserved. This information is not intended as a substitute for professional medical care. Always follow your healthcare professional's instructions.       When should I call my doctor?  If you are worsening or not improving, please, contact us or seek urgent care as noted below.     Who should I call with questions (adults)?  [facility] (adult and pediatric): [phone]  [facility]  [place] (adult): [phone]  [facility] ([place], [place], [place] and [place]) [phone]  For urgent needs outside of business hours call the [facility] at [phone] and ask for the dermatology resident on call to be paged  If this is a medical emergency and you are unable to reach an ER, Call 911      If you need a prescription refill, please contact your pharmacy. Refills are approved or denied by our Physicians during normal business hours, Monday through Fridays  Per office policy, refills will not be granted if you have not been seen within the past year (or sooner depending on your child's condition)    Cryotherapy    What is it?  Use of a very cold liquid, such as liquid nitrogen, to freeze and destroy abnormal skin cells that need to be removed    What should I expect?  Tenderness and redness  A small blister that might grow and fill with dark purple blood. There may be crusting.  More than one treatment may be needed if the lesions do not go away.    How do I care for the treated area?  Gently wash the area with your hands when bathing.  Use a thin layer of Vaseline to help with healing. You may use a Band-Aid.   The area should heal within 7-10 days and may leave behind a pink or lighter color.   Do not use an antibiotic or Neosporin ointment.   You may take acetaminophen (Tylenol) for pain.     Call your Doctor if you have:  Severe pain  Signs of infection (warmth, redness, cloudy yellow drainage, and or a bad smell)  Questions or concerns    Who should I call with questions?      [facility]: [phone]      [facility]: [phone]      For urgent needs outside of business hours call the [facility] at [phone]        and ask for the dermatology resident on call

## 2024-04-18 NOTE — PROGRESS NOTES
[facility] Dermatology Note  Encounter Date: Apr 18, 2024  Office Visit      Dermatology Problem List:  Hair loss  - Tx: OTC MenLexiis Rogaine.   2. iSK  - L medial canthus, Inverted follicular keratosis (irritated seborrheic keratosis), Bx proven on 3/21/24  - HAK  - Mid frontal scalp, HAK, Bx proven on 3/21/24  ____________________________________________    Assessment & Plan:  # Seborrheic keratosis, inflamed. X 1 L medial canthus  - Discussed the natural history and benign nature of this lesion. Reassurance provided that no additional treatment is necessary.     - Bx proven on 3/21/24  - May do cryotherapy in the future if needed.     # HAK, Mid frontal scalp.   - Cryotherapy performed today, see procedure note below.    Procedures Performed:   - Cryotherapy procedure note,. After verbal consent and discussion of risks and benefits including, but not limited to, dyspigmentation/scar, blister, and pain, 1 lesion(s) was(were) treated with 1-2 mm freeze border for 1-2 cycles with liquid nitrogen. Post cryotherapy instructions were provided.     Follow-up: 1 year(s) in-person, or earlier for new or changing lesions    Staff and scribe:    Scribe Disclosure:   I, GERA WARD, am serving as a scribe; to document services personally performed by Nadege Luna PA-C -based on data collection and the provider's statements to me.     Provider Disclosure:  I agree with above History, Review of Systems, Physical exam and Plan.  I have reviewed the content of the documentation and have edited it as needed. I have personally performed the services documented here and the documentation accurately represents those services and the decisions I have made.      Electronically signed by:      All risks, benefits and alternatives were discussed with patient.  Patient is in agreement and understands the assessment and plan.  All questions were answered.    Nadege Luna PA-C, MPAS  [facility]  [facility]: Phone: [phone], Fax: [phone]  [facility]: Phone: [phone],  Fax: [phone]  [facility] [place]: Phone: [phone], Fax: [phone]  ____________________________________________    CC: Derm Problem (Cryo to AK- scalp)      Reviewed patients past medical history and pertinent chart review prior to patient's visit today.     HPI:  Ms. Adilia Camacho is a 68 year old female who presents today as a return patient for cryotherapy on a bx proven HAK on her mid frontal scalp.     Patient is otherwise feeling well, without additional concerns.    Labs:  Pathology report 3/21/24  Final Diagnosis   A. Left medial canthus:  - Inverted follicular keratosis (irritated seborrheic keratosis) - (see description)       B. Mid frontal scalp:  - Hypertrophic actinic keratosis        Physical Exam:  Vitals: LMP 02/07/2005   SKIN: Focused examination of Mid frontal scalp and face was performed.   - erythematous healing biopsy site on the mid frontal scalp  - No other lesions of concern on areas examined.     Medications:  Current Outpatient Medications   Medication Sig Dispense Refill    acetaminophen (TYLENOL) 325 MG tablet Take 2 tablets (650 mg) by mouth every 6 hours as needed for mild pain (Patient not taking: Reported on 5/25/2023) 120 tablet 0    acyclovir (ZOVIRAX) 400 MG tablet Take 1 tablet (400 mg) by mouth every 8 hours 15 tablet 1    alcohol swab prep pads Use to swab area of injection/alon as directed. 100 each 6    atorvastatin (LIPITOR) 20 MG tablet TAKE 1 TABLET(20 MG) BY MOUTH DAILY 90 tablet 1    blood glucose (NO BRAND SPECIFIED) lancets standard Use to test blood sugar 1 times daily or as directed. 100 each 1    blood glucose (NO BRAND SPECIFIED) test strip Use to test blood sugar 1 times daily or as directed. 100 strip 3    blood glucose (ONE TOUCH DELICA) lancing device USE WITH LANCETS 100 each 3     carboxymethylcellulose PF (CARBOXYMETHYLCELLULOSE SODIUM) 0.5 % ophthalmic solution Place 1 drop into both eyes 4 times daily (Patient not taking: Reported on 5/25/2023) 400 each 11    cetirizine (ZYRTEC) 10 MG tablet Take 10 mg by mouth daily      estradiol (ESTRACE) 0.1 MG/GM vaginal cream Place 1 g vaginally twice a week 42.5 g 11    famotidine (PEPCID) 40 MG tablet TAKE 1 TABLET(40 MG) BY MOUTH DAILY (Patient not taking: Reported on 3/21/2024) 90 tablet 2    fluticasone (FLONASE) 50 MCG/ACT nasal spray Spray 1 spray into both nostrils 2 times daily 48 g 11    hydroCHLOROthiazide 12.5 MG tablet Take 1 tablet (12.5 mg) by mouth daily 90 tablet 3    ipratropium (ATROVENT HFA) 17 MCG/ACT inhaler Inhale 2 puffs into the lungs 4 times daily 12.9 g 1    metFORMIN (GLUCOPHAGE XR) 500 MG 24 hr tablet Take 2 tablets (1,000 mg) by mouth daily (with dinner) 180 tablet 3    metoprolol succinate ER (TOPROL XL) 25 MG 24 hr tablet Take 1 tablet (25 mg) by mouth daily 90 tablet 3    montelukast (SINGULAIR) 10 MG tablet Take 1 tablet (10 mg) by mouth at bedtime (Patient not taking: Reported on 3/21/2024) 90 tablet 1    omeprazole (PRILOSEC) 20 MG DR capsule TAKE 1 CAPSULE(20 MG) BY MOUTH DAILY 30 TO 60 MINUTES BEFORE A MEAL 90 capsule 1    solifenacin (VESICARE) 5 MG tablet Take 1 tablet (5 mg) by mouth daily (Patient not taking: Reported on 3/21/2024) 90 tablet 3     Current Facility-Administered Medications   Medication Dose Route Frequency Provider Last Rate Last Admin    lidocaine (PF) 0.5 % injection SOLN 8 mL  8 mL   Nacho Robles MD   8 mL at 05/27/20 1146    triamcinolone (KENALOG-40) injection 40 mg  40 mg   Nacho Robles MD   40 mg at 05/27/20 1146      Past Medical/Surgical History:   Patient Active Problem List   Diagnosis    Hypertension goal BP (blood pressure) < 140/90    CARDIOVASCULAR SCREENING; LDL GOAL LESS THAN 130    Allergic rhinitis due to dogs    Contraceptive management     Recurrent herpes labialis    Rosacea    Female stress incontinence    GERD (gastroesophageal reflux disease)    Advanced directives, counseling/discussion    Venous (peripheral) insufficiency    Obesity (BMI 35.0-39.9) with comorbidity (H)    Diabetes mellitus, type 2 (H)    Chronic pain of left knee    Cervical cancer screening    Hyperlipidemia, unspecified hyperlipidemia type    Post-viral cough syndrome    Urge incontinence    Vaginal atrophy     Past Medical History:   Diagnosis Date    Allergic rhinitis due to dogs     CARDIOVASCULAR SCREENING; LDL GOAL LESS THAN 130     Contraceptive management     Diabetes mellitus, type 2 (H) 10/31/2019    Hypertension goal BP (blood pressure) < 140/90     Other chronic pain     Left knee    Recurrent herpes labialis     Rosacea

## 2024-04-18 NOTE — LETTER
4/18/2024         RE: Adilia Camacho  [address]  [facility] [address]        Dear Colleague,    Thank you for referring your patient, Adilia Camacho, to the [facility] [place]. Please see a copy of my visit note below.    [facility] Dermatology Note  Encounter Date: Apr 18, 2024  Office Visit      Dermatology Problem List:  Hair loss  - Tx: OTC Men's Rogaine.   2. iSK  - L medial canthus, Inverted follicular keratosis (irritated seborrheic keratosis), Bx proven on 3/21/24  - HAK  - Mid frontal scalp, HAK, Bx proven on 3/21/24  ____________________________________________    Assessment & Plan:  # Seborrheic keratosis, inflamed. X 1 L medial canthus  - Discussed the natural history and benign nature of this lesion. Reassurance provided that no additional treatment is necessary.     - Bx proven on 3/21/24  - May do cryotherapy in the future if needed.     # HAK, Mid frontal scalp.   - Cryotherapy performed today, see procedure note below.    Procedures Performed:   - Cryotherapy procedure note,. After verbal consent and discussion of risks and benefits including, but not limited to, dyspigmentation/scar, blister, and pain, 1 lesion(s) was(were) treated with 1-2 mm freeze border for 1-2 cycles with liquid nitrogen. Post cryotherapy instructions were provided.     Follow-up: 1 year(s) in-person, or earlier for new or changing lesions    Staff and scribe:    Scribe Disclosure:   I, GERA WARD, am serving as a scribe; to document services personally performed by Nadege Luna PA-C -based on data collection and the provider's statements to me.     Provider Disclosure:  I agree with above History, Review of Systems, Physical exam and Plan.  I have reviewed the content of the documentation and have edited it as needed. I have personally performed the services documented here and the documentation accurately represents those services and the decisions I have  made.      Electronically signed by:      All risks, benefits and alternatives were discussed with patient.  Patient is in agreement and understands the assessment and plan.  All questions were answered.    Nadege Luna PA-C, MPAS  [facility] [place]: Phone: [phone], Fax: [phone]  [facility]: Phone: [phone],  Fax: [phone]  [facility]: Phone: [phone], Fax: [phone]  ____________________________________________    CC: Derm Problem (Cryo to AK- scalp)      Reviewed patients past medical history and pertinent chart review prior to patient's visit today.     HPI:  Ms. Adilia Camacho is a 68 year old female who presents today as a return patient for cryotherapy on a bx proven HAK on her mid frontal scalp.     Patient is otherwise feeling well, without additional concerns.    Labs:  Pathology report 3/21/24  Final Diagnosis   A. Left medial canthus:  - Inverted follicular keratosis (irritated seborrheic keratosis) - (see description)       B. Mid frontal scalp:  - Hypertrophic actinic keratosis        Physical Exam:  Vitals: LMP 02/07/2005   SKIN: Focused examination of Mid frontal scalp and face was performed.   - erythematous healing biopsy site on the mid frontal scalp  - No other lesions of concern on areas examined.     Medications:  Current Outpatient Medications   Medication Sig Dispense Refill     acetaminophen (TYLENOL) 325 MG tablet Take 2 tablets (650 mg) by mouth every 6 hours as needed for mild pain (Patient not taking: Reported on 5/25/2023) 120 tablet 0     acyclovir (ZOVIRAX) 400 MG tablet Take 1 tablet (400 mg) by mouth every 8 hours 15 tablet 1     alcohol swab prep pads Use to swab area of injection/alon as directed. 100 each 6     atorvastatin (LIPITOR) 20 MG tablet TAKE 1 TABLET(20 MG) BY MOUTH DAILY 90 tablet 1     blood glucose (NO BRAND SPECIFIED) lancets  standard Use to test blood sugar 1 times daily or as directed. 100 each 1     blood glucose (NO BRAND SPECIFIED) test strip Use to test blood sugar 1 times daily or as directed. 100 strip 3     blood glucose (ONE TOUCH DELICA) lancing device USE WITH LANCETS 100 each 3     carboxymethylcellulose PF (CARBOXYMETHYLCELLULOSE SODIUM) 0.5 % ophthalmic solution Place 1 drop into both eyes 4 times daily (Patient not taking: Reported on 5/25/2023) 400 each 11     cetirizine (ZYRTEC) 10 MG tablet Take 10 mg by mouth daily       estradiol (ESTRACE) 0.1 MG/GM vaginal cream Place 1 g vaginally twice a week 42.5 g 11     famotidine (PEPCID) 40 MG tablet TAKE 1 TABLET(40 MG) BY MOUTH DAILY (Patient not taking: Reported on 3/21/2024) 90 tablet 2     fluticasone (FLONASE) 50 MCG/ACT nasal spray Spray 1 spray into both nostrils 2 times daily 48 g 11     hydroCHLOROthiazide 12.5 MG tablet Take 1 tablet (12.5 mg) by mouth daily 90 tablet 3     ipratropium (ATROVENT HFA) 17 MCG/ACT inhaler Inhale 2 puffs into the lungs 4 times daily 12.9 g 1     metFORMIN (GLUCOPHAGE XR) 500 MG 24 hr tablet Take 2 tablets (1,000 mg) by mouth daily (with dinner) 180 tablet 3     metoprolol succinate ER (TOPROL XL) 25 MG 24 hr tablet Take 1 tablet (25 mg) by mouth daily 90 tablet 3     montelukast (SINGULAIR) 10 MG tablet Take 1 tablet (10 mg) by mouth at bedtime (Patient not taking: Reported on 3/21/2024) 90 tablet 1     omeprazole (PRILOSEC) 20 MG DR capsule TAKE 1 CAPSULE(20 MG) BY MOUTH DAILY 30 TO 60 MINUTES BEFORE A MEAL 90 capsule 1     solifenacin (VESICARE) 5 MG tablet Take 1 tablet (5 mg) by mouth daily (Patient not taking: Reported on 3/21/2024) 90 tablet 3     Current Facility-Administered Medications   Medication Dose Route Frequency Provider Last Rate Last Admin     lidocaine (PF) 0.5 % injection SOLN 8 mL  8 mL   Nacho Robles MD   8 mL at 05/27/20 1146     triamcinolone (KENALOG-40) injection 40 mg  40 mg   Margaret  Nacho France MD   40 mg at 05/27/20 1146      Past Medical/Surgical History:   Patient Active Problem List   Diagnosis     Hypertension goal BP (blood pressure) < 140/90     CARDIOVASCULAR SCREENING; LDL GOAL LESS THAN 130     Allergic rhinitis due to dogs     Contraceptive management     Recurrent herpes labialis     Rosacea     Female stress incontinence     GERD (gastroesophageal reflux disease)     Advanced directives, counseling/discussion     Venous (peripheral) insufficiency     Obesity (BMI 35.0-39.9) with comorbidity (H)     Diabetes mellitus, type 2 (H)     Chronic pain of left knee     Cervical cancer screening     Hyperlipidemia, unspecified hyperlipidemia type     Post-viral cough syndrome     Urge incontinence     Vaginal atrophy     Past Medical History:   Diagnosis Date     Allergic rhinitis due to dogs      CARDIOVASCULAR SCREENING; LDL GOAL LESS THAN 130      Contraceptive management      Diabetes mellitus, type 2 (H) 10/31/2019     Hypertension goal BP (blood pressure) < 140/90      Other chronic pain     Left knee     Recurrent herpes labialis      Rosacea                         Again, thank you for allowing me to participate in the care of your patient.        Sincerely,        Nadege Luna PA-C

## 2024-05-08 ENCOUNTER — VIRTUAL VISIT (OUTPATIENT)
Dept: URGENT CARE | Facility: CLINIC | Age: 69
End: 2024-05-08
Payer: COMMERCIAL

## 2024-05-08 DIAGNOSIS — J02.9 SORE THROAT: Primary | ICD-10-CM

## 2024-05-08 PROCEDURE — 99212 OFFICE O/P EST SF 10 MIN: CPT | Mod: 95

## 2024-05-08 NOTE — PROGRESS NOTES
"Adilia is a 68 year old female who presents for a billable video visit.    ASSESSMENT/PLAN:  Diagnoses and all orders for this visit:    Sore throat  -     Streptococcus A Rapid Screen w/Reflex to PCR - Clinic Collect        Patient Instructions   A rapid strep and a strep PCR test was ordered. Call scheduling to set up a lab only appointment. The results will be in mychart. If positive an antibiotic will be prescribed.   Drink plenty of fluids and rest.  May use salt water gargles- about 8 oz warm water with about 1 teaspoon salt  Clear soups.   Honey lemon tea helps to soothe the throat. \"Throat Coat\" tea is soothing as well.  Please follow up with primary care provider if not improving, worsening or new symptoms.  If you are unable to swallow or have difficulty breathing go to the ER.       SUBJECTIVE:  Patient reports that she has woken up the past two mornings with a throat that feels dry and uncomfortable. She can swallow, but it hurts to swallow. No difficulty breathing. No fever/chills/sweats. When she looks at her throat in a mirror it looks like white spots on her tonsils on both sides. Both her granddaughters recently had strep as well as a person at work. No head congestion.     OBJECTIVE:  Vitals not done due to this being a virtual visit    GENERAL: alert and no distress  EYES: Eyes grossly normal to inspection.  No discharge or erythema, or obvious scleral/conjunctival abnormalities.  RESP: No audible wheeze, cough, or visible cyanosis.    SKIN: Visible skin clear. No significant rash, abnormal pigmentation or lesions.  NEURO: Cranial nerves grossly intact.  Mentation and speech appropriate for age.  PSYCH: Appropriate affect, tone, and pace of words        Video-Visit Details    Type of service:  Video Visit  Video Start Time: 6:06 PM  Video End Time:6:13 PM    Originating Location (pt. Location): Home    Distant Location (provider location):  [facility]     Platform used " for Video Visit: James Ramirez, PHILLYP, CNP

## 2024-05-08 NOTE — PATIENT INSTRUCTIONS
"A rapid strep and a strep PCR test was ordered. Call scheduling to set up a lab only appointment. The results will be in mychart. If positive an antibiotic will be prescribed.   Drink plenty of fluids and rest.  May use salt water gargles- about 8 oz warm water with about 1 teaspoon salt  Clear soups.   Honey lemon tea helps to soothe the throat. \"Throat Coat\" tea is soothing as well.  Please follow up with primary care provider if not improving, worsening or new symptoms.  If you are unable to swallow or have difficulty breathing go to the ER.    "

## 2024-05-09 ENCOUNTER — APPOINTMENT (OUTPATIENT)
Dept: LAB | Facility: CLINIC | Age: 69
End: 2024-05-09
Payer: COMMERCIAL

## 2024-05-09 LAB
DEPRECATED S PYO AG THROAT QL EIA: NEGATIVE
GROUP A STREP BY PCR: NOT DETECTED

## 2024-05-09 PROCEDURE — 87651 STREP A DNA AMP PROBE: CPT

## 2024-05-16 DIAGNOSIS — K21.00 GASTROESOPHAGEAL REFLUX DISEASE WITH ESOPHAGITIS WITHOUT HEMORRHAGE: ICD-10-CM

## 2024-05-16 RX ORDER — FAMOTIDINE 40 MG/1
40 TABLET, FILM COATED ORAL DAILY
Qty: 90 TABLET | Refills: 0 | Status: SHIPPED | OUTPATIENT
Start: 2024-05-16 | End: 2024-08-15

## 2024-05-17 ENCOUNTER — OFFICE VISIT (OUTPATIENT)
Dept: URGENT CARE | Facility: URGENT CARE | Age: 69
End: 2024-05-17
Payer: COMMERCIAL

## 2024-05-17 ENCOUNTER — TELEPHONE (OUTPATIENT)
Dept: FAMILY MEDICINE | Facility: CLINIC | Age: 69
End: 2024-05-17
Payer: COMMERCIAL

## 2024-05-17 VITALS
DIASTOLIC BLOOD PRESSURE: 89 MMHG | WEIGHT: 198 LBS | TEMPERATURE: 98.2 F | BODY MASS INDEX: 36.81 KG/M2 | OXYGEN SATURATION: 98 % | SYSTOLIC BLOOD PRESSURE: 165 MMHG | HEART RATE: 86 BPM

## 2024-05-17 DIAGNOSIS — J01.90 ACUTE SINUSITIS WITH SYMPTOMS > 10 DAYS: Primary | ICD-10-CM

## 2024-05-17 PROCEDURE — 99213 OFFICE O/P EST LOW 20 MIN: CPT | Performed by: PHYSICIAN ASSISTANT

## 2024-05-17 RX ORDER — SACCHAROMYCES BOULARDII 250 MG
250 CAPSULE ORAL 2 TIMES DAILY
Qty: 28 CAPSULE | Refills: 0 | Status: SHIPPED | OUTPATIENT
Start: 2024-05-17 | End: 2024-05-31

## 2024-05-17 RX ORDER — GUAIFENESIN 600 MG/1
1200 TABLET, EXTENDED RELEASE ORAL 2 TIMES DAILY
Qty: 28 TABLET | Refills: 0 | Status: SHIPPED | OUTPATIENT
Start: 2024-05-17 | End: 2024-05-24

## 2024-05-17 RX ORDER — FLUTICASONE PROPIONATE 50 MCG
1 SPRAY, SUSPENSION (ML) NASAL DAILY
Qty: 15.8 ML | Refills: 0 | Status: SHIPPED | OUTPATIENT
Start: 2024-05-17

## 2024-05-17 NOTE — PROGRESS NOTES
SUBJECTIVE:  Adilia Camacho is a 68 year old female here with concerns about sinus infection.  She states onset of symptoms were 10 day(s) ago.  She has had maxillary, frontal pressure. Course of illness is worsening. Severity moderate  Current and Associated symptoms: facial pain/pressure  Predisposing factors include none. Recent treatment has included: None    Past Medical History:   Diagnosis Date    Allergic rhinitis due to dogs     CARDIOVASCULAR SCREENING; LDL GOAL LESS THAN 130     Contraceptive management     Diabetes mellitus, type 2 (H) 10/31/2019    Hypertension goal BP (blood pressure) < 140/90     Other chronic pain     Left knee    Recurrent herpes labialis     Rosacea      Social History     Tobacco Use    Smoking status: Former     Current packs/day: 0.00     Types: Cigarettes     Start date: 1975     Quit date: 1984     Years since quittin.7     Passive exposure: Never    Smokeless tobacco: Never    Tobacco comments:     I never was a heavy smoker never woke up and had a cigarette maybe 3 a day   Substance Use Topics    Alcohol use: Yes     Alcohol/week: 1.7 standard drinks of alcohol     Comment: ocasionally       ROS:  Review of systems negative except as stated above.    OBJECTIVE:  BP (!) 165/89   Pulse 86   Temp 98.2  F (36.8  C) (Temporal)   Wt 89.8 kg (198 lb)   LMP 2005   SpO2 98%   BMI 36.81 kg/m    Exam:GENERAL APPEARANCE: healthy, alert and no distress  EYES: EOMI,  PERRL, conjunctiva clear  HENT: ear canals and TM's normal.  Nose and mouth without ulcers, erythema or lesions  NECK: supple, nontender, no lymphadenopathy  RESP: lungs clear to auscultation - no rales, rhonchi or wheezes  CV: regular rates and rhythm, normal S1 S2, no murmur noted  NEURO: Normal strength and tone, sensory exam grossly normal,  normal speech and mentation  SKIN: no suspicious lesions or rashes      ASSESSMENT:  (J01.90) Acute sinusitis with symptoms > 10 days  (primary encounter  diagnosis)  Comment: will treat given duration  Plan: amoxicillin-clavulanate (AUGMENTIN) 875-125 MG         tablet, fluticasone (FLONASE) 50 MCG/ACT nasal         spray, guaiFENesin (MUCINEX) 600 MG 12 hr         tablet, saccharomyces boulardii (FLORASTOR) 250        MG capsule      Follow up with PCP if symptoms worsen or fail to improve

## 2024-05-17 NOTE — TELEPHONE ENCOUNTER
General Call    Contacts         Type Contact Phone/Fax    05/17/2024 10:40 AM CDT Phone (Incoming) Adilia Camacho (Self) [phone] (M)          Reason for Call: Symptoms/appointment    What are your questions or concerns:      Patient calling clinic, 1 week ago, started having throat problem. Patient had a virtual visit on 5/08/2024 and provider ordered Strep test. Was negative.    Per patient, she never got better, and symptoms are appears to be getting worst. Patient states her cheeks hurts now. Patient also developed her cough, eyes are also puffy. Patient is also getting headaches.    Patient declines nurse triage, but would like to schedule a visit if possible. [facility] schedule reviewed, no available openings today. Explained to patient, if there are no opening today, patient can go to the Urgent Care located inside [facility] to be seen in-person. Urgent care hours given to patient.     Patient states, she will either go to the [facility] today or tomorrow.     No further action needed.    Date of last appointment with provider: 3/11/2024    Could we send this information to you in [facility] or would you prefer to receive a phone call?:   Patient would prefer a phone call   Okay to leave a detailed message?: No at Cell number on file:    Telephone Information:   Mobile [phone]     ROCK aMi, RN   [facility]

## 2024-05-17 NOTE — PATIENT INSTRUCTIONS
1.  Plenty of fluids, rest, warm compresses on face  2.  Mucinex twice daily for at least 4 days  3.  Micki Pot 1x in the morning 1x at night (SALINE MIST SPRAY IS AN ACCEPTABLE, THOUGH NOT AS EFFECTIVE REPLACEMENT)  4.  Benadryl (diphenhydramine) at bedtime   5.  Either Claritin (Loratadine), Allegra (Fexofenadine), or Zyrtec (Cetirizine) in the day  6.  Flonase (Fluticasone) 2x each nostril twice a day for two weeks, then once each nostril once a day  7. Antibioitic  8. Probiotic 2 hours after each antibiotic dose       Please let us know if symptoms persist, or worsen.

## 2024-06-22 ENCOUNTER — HEALTH MAINTENANCE LETTER (OUTPATIENT)
Age: 69
End: 2024-06-22

## 2024-07-17 ENCOUNTER — E-VISIT (OUTPATIENT)
Dept: URGENT CARE | Facility: CLINIC | Age: 69
End: 2024-07-17
Payer: COMMERCIAL

## 2024-07-17 ENCOUNTER — TELEPHONE (OUTPATIENT)
Dept: FAMILY MEDICINE | Facility: CLINIC | Age: 69
End: 2024-07-17
Payer: COMMERCIAL

## 2024-07-17 DIAGNOSIS — J01.90 ACUTE SINUSITIS WITH SYMPTOMS > 10 DAYS: Primary | ICD-10-CM

## 2024-07-17 PROCEDURE — 99421 OL DIG E/M SVC 5-10 MIN: CPT | Performed by: PHYSICIAN ASSISTANT

## 2024-07-17 RX ORDER — DOXYCYCLINE HYCLATE 100 MG
100 TABLET ORAL 2 TIMES DAILY
Qty: 14 TABLET | Refills: 0 | Status: SHIPPED | OUTPATIENT
Start: 2024-07-17 | End: 2024-07-24

## 2024-07-17 NOTE — PATIENT INSTRUCTIONS
Dear Adilia Camacho    After reviewing your responses, I've been able to diagnose you with Acute sinusitis with symptoms > 10 days.      Based on your responses and diagnosis, I have prescribed   Orders Placed This Encounter   Medications     doxycycline hyclate (VIBRA-TABS) 100 MG tablet     Sig: Take 1 tablet (100 mg) by mouth 2 times daily for 7 days     Dispense:  14 tablet     Refill:  0     May substitute any formulation of 100mg doxycycline available and best covered by insurance      to treat your symptoms. I have sent this to your pharmacy.? This is a different medication than you were given in May. Unfortunately, all antibiotics can cause nausea and an upset stomach. I recommend taking this with food to help reduce this risk.     It is also important to stay well hydrated, get lots of rest and take over-the-counter decongestants,?tylenol?or ibuprofen if you?are able to?take those medications per your primary care provider to help relieve discomfort.?     It is important that you take?all of?your prescribed medication even if your symptoms are improving after a few doses.? Taking?all of?your medicine helps prevent the symptoms from returning.?     If your symptoms worsen, you develop severe headache, vomiting, high fever (>102), or are not improving in 7 days, please contact your primary care provider for an appointment or visit any of our convenient Walk-in Care or Urgent Care Centers to be seen which can be found on our website?here.?     Thanks again for choosing?us?as your health care partner,?   ?  Tyra Coleman PA-C?

## 2024-07-17 NOTE — TELEPHONE ENCOUNTER
S-(situation): cheeks on fire, feels she may have sinus infection again. Sx for 1 1/2 weeks.     B-(background): last sinus infection was in May    Advised e visit and she agrees    Alicia Carmichael RN, BSN  [facility]

## 2024-07-30 NOTE — TELEPHONE ENCOUNTER
Creatinine   Date Value Ref Range Status   03/24/2017 0.88 0.52 - 1.04 mg/dL Final   ]   Refilled per [facility] refill policy.    Alicia Carmichael RN     Left message on machine for patient to call back, patient is due for medication check & A1c blood check for October.

## 2024-08-14 DIAGNOSIS — K21.00 GASTROESOPHAGEAL REFLUX DISEASE WITH ESOPHAGITIS WITHOUT HEMORRHAGE: ICD-10-CM

## 2024-08-15 RX ORDER — FAMOTIDINE 40 MG/1
40 TABLET, FILM COATED ORAL DAILY
Qty: 90 TABLET | Refills: 1 | Status: SHIPPED | OUTPATIENT
Start: 2024-08-15

## 2024-10-04 DIAGNOSIS — E78.5 HYPERLIPIDEMIA, UNSPECIFIED HYPERLIPIDEMIA TYPE: ICD-10-CM

## 2024-10-04 RX ORDER — ATORVASTATIN CALCIUM 20 MG/1
TABLET, FILM COATED ORAL
Qty: 90 TABLET | Refills: 0 | Status: SHIPPED | OUTPATIENT
Start: 2024-10-04

## 2024-11-09 ENCOUNTER — HEALTH MAINTENANCE LETTER (OUTPATIENT)
Age: 69
End: 2024-11-09

## 2025-01-04 ENCOUNTER — HEALTH MAINTENANCE LETTER (OUTPATIENT)
Age: 70
End: 2025-01-04

## 2025-01-12 DIAGNOSIS — E78.5 HYPERLIPIDEMIA, UNSPECIFIED HYPERLIPIDEMIA TYPE: ICD-10-CM

## 2025-01-13 RX ORDER — ATORVASTATIN CALCIUM 20 MG/1
TABLET, FILM COATED ORAL
Qty: 90 TABLET | Refills: 0 | Status: SHIPPED | OUTPATIENT
Start: 2025-01-13

## 2025-01-24 DIAGNOSIS — R05.8 POST-VIRAL COUGH SYNDROME: ICD-10-CM

## 2025-01-24 DIAGNOSIS — R05.3 CHRONIC COUGH: ICD-10-CM

## 2025-01-27 RX ORDER — MONTELUKAST SODIUM 10 MG/1
1 TABLET ORAL AT BEDTIME
Qty: 90 TABLET | Refills: 1 | Status: SHIPPED | OUTPATIENT
Start: 2025-01-27

## 2025-02-24 DIAGNOSIS — K21.00 GASTROESOPHAGEAL REFLUX DISEASE WITH ESOPHAGITIS WITHOUT HEMORRHAGE: ICD-10-CM

## 2025-02-25 RX ORDER — FAMOTIDINE 40 MG/1
40 TABLET, FILM COATED ORAL DAILY
Qty: 90 TABLET | Refills: 0 | Status: SHIPPED | OUTPATIENT
Start: 2025-02-25

## 2025-03-02 ENCOUNTER — HEALTH MAINTENANCE LETTER (OUTPATIENT)
Age: 70
End: 2025-03-02

## 2025-03-20 ENCOUNTER — OFFICE VISIT (OUTPATIENT)
Dept: DERMATOLOGY | Facility: CLINIC | Age: 70
End: 2025-03-20
Payer: COMMERCIAL

## 2025-03-20 DIAGNOSIS — L57.0 AK (ACTINIC KERATOSIS): ICD-10-CM

## 2025-03-20 DIAGNOSIS — D18.01 CHERRY ANGIOMA: ICD-10-CM

## 2025-03-20 DIAGNOSIS — L82.1 SK (SEBORRHEIC KERATOSIS): ICD-10-CM

## 2025-03-20 DIAGNOSIS — L91.8 SKIN TAG: ICD-10-CM

## 2025-03-20 DIAGNOSIS — L82.0 INFLAMED SEBORRHEIC KERATOSIS: ICD-10-CM

## 2025-03-20 DIAGNOSIS — L81.4 LENTIGINES: ICD-10-CM

## 2025-03-20 DIAGNOSIS — D22.9 MULTIPLE BENIGN NEVI: Primary | ICD-10-CM

## 2025-03-20 NOTE — LETTER
3/20/2025      Adilia Camacho  [address]  [facility] [address]      Dear Colleague,    Thank you for referring your patient, Adilia Camacho, to the [facility]. Please see a copy of my visit note below.    [facility] Dermatology Note  Encounter Date: Mar 20, 2025  Office Visit      Dermatology Problem List:  Hair loss  - current: daily multi vitamin, Nutrafol?  - Previous Tx: OTC Men's Rogaine.   2. iSK  - L medial canthus, Inverted follicular keratosis (irritated seborrheic keratosis), Bx proven on 3/21/24  3. AKs - cryo  - HAK- Mid frontal scalp, Bx 3/21/24 - cryo 4/18/24  ____________________________________________    Assessment & Plan:    # iSK x 2 L axilla  - Cryotherapy performed today, see procedure note below.    # Skin tags x 5 L axilla x 5 R axilla  - Cryotherapy performed today, see procedure note below.    # Ak x 1 L frontal scalp along the part line  - Cryotherapy performed today, see procedure note below.    # Benign findings: multiple benign nevi, lentigines, cherry angiomas, SKs  - edu on benign etiology  - Signs and Symptoms of non-melanoma skin cancer and ABCDEs of melanoma reviewed with patient. Patient encouraged to perform monthly self skin exams and educated on how to perform them. UV precautions reviewed with patient. Patient was asked about new or changing moles/lesions on body.   - Sunscreen: Apply 20 minutes prior to going outdoors and reapply every two hours, when wet or sweating. We recommend using an SPF 30 or higher, and to use one that is water resistant.     - RTC for changes     Procedures Performed:   CRYOTHERAPY PROCEDURE NOTE:13 lesions in the above locations were treated with liquid nitrogen utilizing a 5-10sec thaw time. Patient was advised that the treated areas will become red, swollen, may develop a blister and then should crust and peal off in the next 1-2 weeks. Post-procedure instructions were  provided.    Follow-up: 1 year(s) in-person, or earlier for new or changing lesions    Staff and scribe:    Scribe Disclosure:   I, GERA WARD, am serving as a scribe; to document services personally performed by Nadege Luna PA-C -based on data collection and the provider's statements to me.     Provider Disclosure:  I agree with above History, Review of Systems, Physical exam and Plan.  I have reviewed the content of the documentation and have edited it as needed. I have personally performed the services documented here and the documentation accurately represents those services and the decisions I have made.      Electronically signed by:      All risks, benefits and alternatives were discussed with patient.  Patient is in agreement and understands the assessment and plan.  All questions were answered.    Nadege Luna PA-C, [facility]  [facility] [place]: Phone: [phone], Fax: [phone]  [facility]: Phone: [phone],  Fax: [phone]  [facility]: Phone: [phone], Fax: [phone]  ____________________________________________    CC: Skin Check (FBSC, )      Reviewed patients past medical history and pertinent chart review prior to patient's visit today.     HPI:  Ms. Adilia Camacho is a 69 year old female who presents today as a return patient for FBSE. Today patient reported a few possible SK on her back that are bothersome.     No personal or family hx of skin cancer.     Patient is otherwise feeling well, without additional concerns.    Labs:  N/A      Physical Exam:  Vitals: LMP 02/07/2005   SKIN: Total skin excluding the undergarment areas was performed. The exam included the head/face, neck, both arms, chest, back, abdomen, both legs, digits and/or nails.   - Graves's skin type II, has <100 nevi  - There are dome shaped bright red papules on the trunk.   - Multiple regular brown  pigmented macules and papules are identified on the trunk and extremities.   - Scattered brown macules on sun exposed areas.  - There are waxy stuck on tan to brown papules on the trunk.   - L axilla  x R axilla there is a x 10 axillas   -There is a tan to brown waxy stuck on papule with surrounding erythema on the x 2 .    - There is an erythematous macule with overyling adherent scale on the x 1 on the part line of her scalp.  - No other lesions of concern on areas examined.     Medications:  Current Outpatient Medications   Medication Sig Dispense Refill     acetaminophen (TYLENOL) 325 MG tablet Take 2 tablets (650 mg) by mouth every 6 hours as needed for mild pain 120 tablet 0     acyclovir (ZOVIRAX) 400 MG tablet Take 1 tablet (400 mg) by mouth every 8 hours. 15 tablet 1     alcohol swab prep pads Use to swab area of injection/alon as directed. 100 each 6     atorvastatin (LIPITOR) 20 MG tablet TAKE 1 TABLET(20 MG) BY MOUTH DAILY 90 tablet 0     blood glucose (NO BRAND SPECIFIED) lancets standard Use to test blood sugar 1 times daily or as directed. 100 each 1     blood glucose (NO BRAND SPECIFIED) test strip Use to test blood sugar 1 times daily or as directed. 100 strip 3     blood glucose (ONE TOUCH DELICA) lancing device USE WITH LANCETS 100 each 3     carboxymethylcellulose PF (CARBOXYMETHYLCELLULOSE SODIUM) 0.5 % ophthalmic solution Place 1 drop into both eyes 4 times daily 400 each 11     estradiol (ESTRACE) 0.1 MG/GM vaginal cream Place 1 g vaginally twice a week 42.5 g 11     famotidine (PEPCID) 40 MG tablet TAKE 1 TABLET(40 MG) BY MOUTH DAILY 90 tablet 0     fluticasone (FLONASE) 50 MCG/ACT nasal spray Spray 1 spray into both nostrils daily 15.8 mL 0     hydroCHLOROthiazide 12.5 MG tablet Take 1 tablet (12.5 mg) by mouth daily 90 tablet 3     ipratropium (ATROVENT HFA) 17 MCG/ACT inhaler Inhale 2 puffs into the lungs 4 times daily 12.9 g 1     metFORMIN (GLUCOPHAGE XR) 500 MG 24 hr tablet Take 2  tablets (1,000 mg) by mouth daily (with dinner) 180 tablet 3     metoprolol succinate ER (TOPROL XL) 25 MG 24 hr tablet Take 1 tablet (25 mg) by mouth daily 90 tablet 3     montelukast (SINGULAIR) 10 MG tablet TAKE 1 TABLET(10 MG) BY MOUTH AT BEDTIME 90 tablet 1     omeprazole (PRILOSEC) 20 MG DR capsule TAKE 1 CAPSULE(20 MG) BY MOUTH DAILY 30 TO 60 MINUTES BEFORE A MEAL 90 capsule 1     solifenacin (VESICARE) 5 MG tablet Take 1 tablet (5 mg) by mouth daily 90 tablet 3     cetirizine (ZYRTEC) 10 MG tablet Take 10 mg by mouth daily       fluticasone (FLONASE) 50 MCG/ACT nasal spray Spray 1 spray into both nostrils 2 times daily 48 g 11     No current facility-administered medications for this visit.      Past Medical/Surgical History:   Patient Active Problem List   Diagnosis     Hypertension goal BP (blood pressure) < 140/90     CARDIOVASCULAR SCREENING; LDL GOAL LESS THAN 130     Allergic rhinitis due to dogs     Contraceptive management     Recurrent herpes labialis     Rosacea     Female stress incontinence     GERD (gastroesophageal reflux disease)     Venous (peripheral) insufficiency     Obesity (BMI 35.0-39.9) with comorbidity (H)     Diabetes mellitus, type 2 (H)     Chronic pain of left knee     Cervical cancer screening     Hyperlipidemia, unspecified hyperlipidemia type     Post-viral cough syndrome     Urge incontinence     Vaginal atrophy     AK (actinic keratosis)     Past Medical History:   Diagnosis Date     Allergic rhinitis due to dogs      CARDIOVASCULAR SCREENING; LDL GOAL LESS THAN 130      Contraceptive management      Diabetes mellitus, type 2 (H) 10/31/2019     Hypertension goal BP (blood pressure) < 140/90      Other chronic pain     Left knee     Recurrent herpes labialis      Rosacea                         Again, thank you for allowing me to participate in the care of your patient.        Sincerely,        Nadege Luna PA-C    Electronically signed

## 2025-03-20 NOTE — PATIENT INSTRUCTIONS
Cryotherapy    What is it?  Use of a very cold liquid, such as liquid nitrogen, to freeze and destroy abnormal skin cells that need to be removed    What should I expect?  Tenderness and redness  A small blister that might grow and fill with dark purple blood. There may be crusting.  More than one treatment may be needed if the lesions do not go away.    How do I care for the treated area?  Gently wash the area with your hands when bathing.  Use a thin layer of Vaseline to help with healing. You may use a Band-Aid.   The area should heal within 7-10 days and may leave behind a pink or lighter color.   Do not use an antibiotic or Neosporin ointment.   You may take acetaminophen (Tylenol) for pain.     Call your Doctor if you have:  Severe pain  Signs of infection (warmth, redness, cloudy yellow drainage, and or a bad smell)  Questions or concerns    Who should I call with questions?      [facility]: [phone]      [facility]: [phone]      For urgent needs outside of business hours call the [facility] at [phone]        and ask for the dermatology resident on call      Patient Education       Proper skin care from [facility]:    -Eliminate harsh soaps as they strip the natural oils from the skin, often resulting in dry itchy skin ( i.e. Dial, Zest, [place])  -Use mild soaps such as Cetaphil or Dove Sensitive Skin in the shower. You do not need to use soap on arms, legs, and trunk every time you shower unless visibly soiled.   -Avoid hot or cold showers.  -After showering, lightly dry off and apply moisturizing within 2-3 minutes. This will help trap moisture in the skin.   -Aggressive use of a moisturizer at least 1-2 times a day to the entire body (including -Vanicream, Cetaphil, Aquaphor or Cerave) and moisturize hands after every washing.  -We recommend using moisturizers that come in a tub that needs to be scooped  out, not a pump. This has more of an oil base. It will hold moisture in your skin much better than a water base moisturizer. The above recommended are non-pore clogging.      Wear a sunscreen with at least SPF 30 on your face, ears, neck and V of the chest daily. Wear sunscreen on other areas of the body if those areas are exposed to the sun throughout the day. Sunscreens can contain physical and/or chemical blockers. Physical blockers are less likely to clog pores, these include zinc oxide and titanium dioxide. Reapply every two hour and after swimming.     Sunscreen examples: https://www.ewg.org/sunscreen/    UV radiation  UVA radiation remains constant throughout the day and throughout the year. It is a longer wavelength than UVB and therefore penetrates deeper into the skin leading to immediate and delayed tanning, photoaging, and skin cancer. 70-80% of UVA and UVB radiation occurs between the hours of 10am-2pm.  UVB radiation  UVB radiation causes the most harmful effects and is more significant during the summer months. However, snow and ice can reflect UVB radiation leading to skin damage during the winter months as well. UVB radiation is responsible for tanning, burning, inflammation, delayed erythema (pinkness), pigmentation (brown spots), and skin cancer.     I recommend self monthly full body exams and yearly full body exams with a dermatology provider. If you develop a new or changing lesion please follow up for examination. Most skin cancers are pink and scaly or pink and pearly. However, we do see blue/brown/black skin cancers.  Consider the ABCDEs of melanoma when giving yourself your monthly full body exam ( don't forget the groin, buttocks, feet, toes, etc). A-asymmetry, B-borders, C-color, D-diameter, E-elevation or evolving. If you see any of these changes please follow up in clinic. If you cannot see your back I recommend purchasing a hand held mirror to use with a larger wall mirror.        Checking for Skin Cancer  You can find cancer early by checking your skin each month. There are 3 kinds of skin cancer. They are melanoma, basal cell carcinoma, and squamous cell carcinoma. Doing monthly skin checks is the best way to find new marks or skin changes. Follow the instructions below for checking your skin.   The ABCDEs of checking moles for melanoma   Check your moles or growths for signs of melanoma using ABCDE:   Asymmetry: the sides of the mole or growth don t match  Border: the edges are ragged, notched, or blurred  Color: the color within the mole or growth varies  Diameter: the mole or growth is larger than 6 mm (size of a pencil eraser)  Evolving: the size, shape, or color of the mole or growth is changing (evolving is not shown in the images below)    Checking for other types of skin cancer  Basal cell carcinoma or squamous cell carcinoma have symptoms such as:     A spot or mole that looks different from all other marks on your skin  Changes in how an area feels, such as itching, tenderness, or pain  Changes in the skin's surface, such as oozing, bleeding, or scaliness  A sore that does not heal  New swelling or redness beyond the border of a mole    Who s at risk?  Anyone can get skin cancer. But you are at greater risk if you have:   Fair skin, light-colored hair, or light-colored eyes  Many moles or abnormal moles on your skin  A history of sunburns from sunlight or tanning beds  A family history of skin cancer  A history of exposure to radiation or chemicals  A weakened immune system  If you have had skin cancer in the past, you are at risk for recurring skin cancer.   How to check your skin  Do your monthly skin checkups in front of a full-length mirror. Check all parts of your body, including your:   Head (ears, face, neck, and scalp)  Torso (front, back, and sides)  Arms (tops, undersides, upper, and lower armpits)  Hands (palms, backs, and fingers, including under the nails)  Buttocks  and genitals  Legs (front, back, and sides)  Feet (tops, soles, toes, including under the nails, and between toes)  If you have a lot of moles, take digital photos of them each month. Make sure to take photos both up close and from a distance. These can help you see if any moles change over time.   Most skin changes are not cancer. But if you see any changes in your skin, call your doctor right away. Only he or she can diagnose a problem. If you have skin cancer, seeing your doctor can be the first step toward getting the treatment that could save your life.   simfy last reviewed this educational content on 4/1/2019 2000-2020 [employer]. [address] [place] [address], [place] [address]. All rights reserved. This information is not intended as a substitute for professional medical care. Always follow your healthcare professional's instructions.

## 2025-03-20 NOTE — PROGRESS NOTES
[facility] Dermatology Note  Encounter Date: Mar 20, 2025  Office Visit      Dermatology Problem List:  Hair loss  - current: daily multi vitamin, Nutrafol?  - Previous Tx: OTC Men's Rogaine.   2. iSK  - L medial canthus, Inverted follicular keratosis (irritated seborrheic keratosis), Bx proven on 3/21/24  3. AKs - cryo  - HAK- Mid frontal scalp, Bx 3/21/24 - cryo 4/18/24  ____________________________________________    Assessment & Plan:    # iSK x 2 L axilla  - Cryotherapy performed today, see procedure note below.    # Skin tags x 5 L axilla x 5 R axilla  - Cryotherapy performed today, see procedure note below.    # Ak x 1 L frontal scalp along the part line  - Cryotherapy performed today, see procedure note below.    # Benign findings: multiple benign nevi, lentigines, cherry angiomas, SKs  - edu on benign etiology  - Signs and Symptoms of non-melanoma skin cancer and ABCDEs of melanoma reviewed with patient. Patient encouraged to perform monthly self skin exams and educated on how to perform them. UV precautions reviewed with patient. Patient was asked about new or changing moles/lesions on body.   - Sunscreen: Apply 20 minutes prior to going outdoors and reapply every two hours, when wet or sweating. We recommend using an SPF 30 or higher, and to use one that is water resistant.     - RTC for changes     Procedures Performed:   CRYOTHERAPY PROCEDURE NOTE:13 lesions in the above locations were treated with liquid nitrogen utilizing a 5-10sec thaw time. Patient was advised that the treated areas will become red, swollen, may develop a blister and then should crust and peal off in the next 1-2 weeks. Post-procedure instructions were provided.    Follow-up: 1 year(s) in-person, or earlier for new or changing lesions    Staff and scribe:    Scribe Disclosure:   GERA JOHNSON, am serving as a scribe; to document services personally performed by Nadege Luna PA-C -based on data collection  and the provider's statements to me.     Provider Disclosure:  I agree with above History, Review of Systems, Physical exam and Plan.  I have reviewed the content of the documentation and have edited it as needed. I have personally performed the services documented here and the documentation accurately represents those services and the decisions I have made.      Electronically signed by:      All risks, benefits and alternatives were discussed with patient.  Patient is in agreement and understands the assessment and plan.  All questions were answered.    Nadege Luna PA-C, MPAS  [facility] [place]: Phone: [phone], Fax: [phone]  [facility]: Phone: [phone],  Fax: [phone]  [facility]: Phone: [phone], Fax: [phone]  ____________________________________________    CC: Skin Check (FBS, )      Reviewed patients past medical history and pertinent chart review prior to patient's visit today.     HPI:  Ms. Adiila Camacho is a 69 year old female who presents today as a return patient for FBSE. Today patient reported a few possible SK on her back that are bothersome.     No personal or family hx of skin cancer.     Patient is otherwise feeling well, without additional concerns.    Labs:  N/A      Physical Exam:  Vitals: LMP 02/07/2005   SKIN: Total skin excluding the undergarment areas was performed. The exam included the head/face, neck, both arms, chest, back, abdomen, both legs, digits and/or nails.   - Graves's skin type II, has <100 nevi  - There are dome shaped bright red papules on the trunk.   - Multiple regular brown pigmented macules and papules are identified on the trunk and extremities.   - Scattered brown macules on sun exposed areas.  - There are waxy stuck on tan to brown papules on the trunk.   - L axilla  x R axilla there is a x 10 axillas   -There is a tan to brown  waxy stuck on papule with surrounding erythema on the x 2 .    - There is an erythematous macule with overyling adherent scale on the x 1 on the part line of her scalp.  - No other lesions of concern on areas examined.     Medications:  Current Outpatient Medications   Medication Sig Dispense Refill    acetaminophen (TYLENOL) 325 MG tablet Take 2 tablets (650 mg) by mouth every 6 hours as needed for mild pain 120 tablet 0    acyclovir (ZOVIRAX) 400 MG tablet Take 1 tablet (400 mg) by mouth every 8 hours. 15 tablet 1    alcohol swab prep pads Use to swab area of injection/alon as directed. 100 each 6    atorvastatin (LIPITOR) 20 MG tablet TAKE 1 TABLET(20 MG) BY MOUTH DAILY 90 tablet 0    blood glucose (NO BRAND SPECIFIED) lancets standard Use to test blood sugar 1 times daily or as directed. 100 each 1    blood glucose (NO BRAND SPECIFIED) test strip Use to test blood sugar 1 times daily or as directed. 100 strip 3    blood glucose (ONE TOUCH DELICA) lancing device USE WITH LANCETS 100 each 3    carboxymethylcellulose PF (CARBOXYMETHYLCELLULOSE SODIUM) 0.5 % ophthalmic solution Place 1 drop into both eyes 4 times daily 400 each 11    estradiol (ESTRACE) 0.1 MG/GM vaginal cream Place 1 g vaginally twice a week 42.5 g 11    famotidine (PEPCID) 40 MG tablet TAKE 1 TABLET(40 MG) BY MOUTH DAILY 90 tablet 0    fluticasone (FLONASE) 50 MCG/ACT nasal spray Spray 1 spray into both nostrils daily 15.8 mL 0    hydroCHLOROthiazide 12.5 MG tablet Take 1 tablet (12.5 mg) by mouth daily 90 tablet 3    ipratropium (ATROVENT HFA) 17 MCG/ACT inhaler Inhale 2 puffs into the lungs 4 times daily 12.9 g 1    metFORMIN (GLUCOPHAGE XR) 500 MG 24 hr tablet Take 2 tablets (1,000 mg) by mouth daily (with dinner) 180 tablet 3    metoprolol succinate ER (TOPROL XL) 25 MG 24 hr tablet Take 1 tablet (25 mg) by mouth daily 90 tablet 3    montelukast (SINGULAIR) 10 MG tablet TAKE 1 TABLET(10 MG) BY MOUTH AT BEDTIME 90 tablet 1    omeprazole  (PRILOSEC) 20 MG DR capsule TAKE 1 CAPSULE(20 MG) BY MOUTH DAILY 30 TO 60 MINUTES BEFORE A MEAL 90 capsule 1    solifenacin (VESICARE) 5 MG tablet Take 1 tablet (5 mg) by mouth daily 90 tablet 3    cetirizine (ZYRTEC) 10 MG tablet Take 10 mg by mouth daily      fluticasone (FLONASE) 50 MCG/ACT nasal spray Spray 1 spray into both nostrils 2 times daily 48 g 11     No current facility-administered medications for this visit.      Past Medical/Surgical History:   Patient Active Problem List   Diagnosis    Hypertension goal BP (blood pressure) < 140/90    CARDIOVASCULAR SCREENING; LDL GOAL LESS THAN 130    Allergic rhinitis due to dogs    Contraceptive management    Recurrent herpes labialis    Rosacea    Female stress incontinence    GERD (gastroesophageal reflux disease)    Venous (peripheral) insufficiency    Obesity (BMI 35.0-39.9) with comorbidity (H)    Diabetes mellitus, type 2 (H)    Chronic pain of left knee    Cervical cancer screening    Hyperlipidemia, unspecified hyperlipidemia type    Post-viral cough syndrome    Urge incontinence    Vaginal atrophy    AK (actinic keratosis)     Past Medical History:   Diagnosis Date    Allergic rhinitis due to dogs     CARDIOVASCULAR SCREENING; LDL GOAL LESS THAN 130     Contraceptive management     Diabetes mellitus, type 2 (H) 10/31/2019    Hypertension goal BP (blood pressure) < 140/90     Other chronic pain     Left knee    Recurrent herpes labialis     Rosacea

## 2025-04-29 DIAGNOSIS — I10 HYPERTENSION GOAL BP (BLOOD PRESSURE) < 140/90: ICD-10-CM

## 2025-04-29 DIAGNOSIS — E78.5 HYPERLIPIDEMIA, UNSPECIFIED HYPERLIPIDEMIA TYPE: ICD-10-CM

## 2025-04-29 RX ORDER — ATORVASTATIN CALCIUM 20 MG/1
20 TABLET, FILM COATED ORAL DAILY
Qty: 90 TABLET | Refills: 0 | Status: SHIPPED | OUTPATIENT
Start: 2025-04-29 | End: 2025-05-20

## 2025-04-30 RX ORDER — HYDROCHLOROTHIAZIDE 12.5 MG/1
12.5 TABLET ORAL DAILY
Qty: 90 TABLET | Refills: 0 | Status: SHIPPED | OUTPATIENT
Start: 2025-04-30 | End: 2025-05-20

## 2025-04-30 NOTE — TELEPHONE ENCOUNTER
Patient is due for an appointment.   I sent a miladys refill for 90 days.   Reception: Please call to schedule patient for an appointment.   Thank you!!   AH

## 2025-04-30 NOTE — TELEPHONE ENCOUNTER
Called patient and relayed provider's message. Scheduled patient however patient stated that she will send a MyChart message to PCP because she wants to discuss/ask more about an issue/topic.

## 2025-05-01 NOTE — TELEPHONE ENCOUNTER
Please find out what the issue is and encourage her to schedule with me to discuss. Thanks, Sheree Graf M.D.

## 2025-05-15 ENCOUNTER — E-VISIT (OUTPATIENT)
Dept: URGENT CARE | Facility: CLINIC | Age: 70
End: 2025-05-15
Payer: COMMERCIAL

## 2025-05-15 DIAGNOSIS — J01.00 ACUTE NON-RECURRENT MAXILLARY SINUSITIS: Primary | ICD-10-CM

## 2025-05-15 NOTE — PATIENT INSTRUCTIONS
Dear Adilia Camacho,    After reviewing your responses, I've been able to diagnose you with?a sinus infection caused by a virus.?     The symptoms you describe suggest a viral cause, which is much more common than a bacterial cause. Antibiotics will treat bacterial infections, but have no effect on viral infections.     Symptomatic care will help you feel better while your body is fighting the virus. It is important to stay well hydrated and get lots of rest. Unless you have been told not to take these medications by your provider, I recommend taking these to help ease your symptoms:    - Flonase (fluticasone) nasal spray, 2 sprays in each nostril daily, to reduce swelling in your nasal passages  - A decongestant like Sudafed (pseudoephedrine) which is available behind the pharmacist counter and helps to relieve congestion  - Tylenol or an NSAID such as ibuprofen or naproxen as needed for pain/fever  - A nasal rinse such as the Netti pot with bottled or distilled water and saline packets helps to flush sinuses  - Mucinex (guiafenesin) which thins mucus and may help it to loosen more quickly    You can also sit in the bathroom with the door closed and hot shower running to loosen mucus.    If your symptoms significantly worsen, you develop a severe headache, vomiting, high fever (>102F) or have not improved by day 10, please contact your primary care provider for an appointment or visit any of our convenient Walk-in Care or Urgent Care Centers to be seen which can be found on our website?here.?     Thanks again for choosing?us?as your health care partner,?   ?  Tyra Coleman PA-C?

## 2025-05-20 ENCOUNTER — RESULTS FOLLOW-UP (OUTPATIENT)
Dept: FAMILY MEDICINE | Facility: CLINIC | Age: 70
End: 2025-05-20

## 2025-05-20 ENCOUNTER — OFFICE VISIT (OUTPATIENT)
Dept: FAMILY MEDICINE | Facility: CLINIC | Age: 70
End: 2025-05-20
Payer: COMMERCIAL

## 2025-05-20 VITALS
TEMPERATURE: 97.3 F | WEIGHT: 189.3 LBS | RESPIRATION RATE: 18 BRPM | OXYGEN SATURATION: 98 % | HEART RATE: 74 BPM | HEIGHT: 62 IN | BODY MASS INDEX: 34.83 KG/M2 | SYSTOLIC BLOOD PRESSURE: 140 MMHG | DIASTOLIC BLOOD PRESSURE: 80 MMHG

## 2025-05-20 DIAGNOSIS — R05.3 CHRONIC COUGH: ICD-10-CM

## 2025-05-20 DIAGNOSIS — K21.00 GASTROESOPHAGEAL REFLUX DISEASE WITH ESOPHAGITIS WITHOUT HEMORRHAGE: ICD-10-CM

## 2025-05-20 DIAGNOSIS — K21.9 GASTROESOPHAGEAL REFLUX DISEASE, UNSPECIFIED WHETHER ESOPHAGITIS PRESENT: ICD-10-CM

## 2025-05-20 DIAGNOSIS — R05.8 POST-VIRAL COUGH SYNDROME: ICD-10-CM

## 2025-05-20 DIAGNOSIS — J01.90 ACUTE SINUSITIS WITH SYMPTOMS > 10 DAYS: ICD-10-CM

## 2025-05-20 DIAGNOSIS — E11.9 TYPE 2 DIABETES MELLITUS WITHOUT COMPLICATION, WITHOUT LONG-TERM CURRENT USE OF INSULIN (H): ICD-10-CM

## 2025-05-20 DIAGNOSIS — E66.01 MORBID OBESITY (H): ICD-10-CM

## 2025-05-20 DIAGNOSIS — E78.5 HYPERLIPIDEMIA, UNSPECIFIED HYPERLIPIDEMIA TYPE: ICD-10-CM

## 2025-05-20 DIAGNOSIS — N95.2 VAGINAL ATROPHY: ICD-10-CM

## 2025-05-20 DIAGNOSIS — I10 HYPERTENSION GOAL BP (BLOOD PRESSURE) < 140/90: Primary | ICD-10-CM

## 2025-05-20 LAB
ALBUMIN SERPL BCG-MCNC: 4.1 G/DL (ref 3.5–5.2)
ALP SERPL-CCNC: 84 U/L (ref 40–150)
ALT SERPL W P-5'-P-CCNC: 14 U/L (ref 0–50)
ANION GAP SERPL CALCULATED.3IONS-SCNC: 12 MMOL/L (ref 7–15)
AST SERPL W P-5'-P-CCNC: 20 U/L (ref 0–45)
BILIRUB SERPL-MCNC: 0.7 MG/DL
BUN SERPL-MCNC: 10 MG/DL (ref 8–23)
CALCIUM SERPL-MCNC: 9.4 MG/DL (ref 8.8–10.4)
CHLORIDE SERPL-SCNC: 100 MMOL/L (ref 98–107)
CHOLEST SERPL-MCNC: 125 MG/DL
CREAT SERPL-MCNC: 0.85 MG/DL (ref 0.51–0.95)
CREAT UR-MCNC: 279 MG/DL
EGFRCR SERPLBLD CKD-EPI 2021: 75 ML/MIN/1.73M2
EST. AVERAGE GLUCOSE BLD GHB EST-MCNC: 232 MG/DL
FASTING STATUS PATIENT QL REPORTED: YES
FASTING STATUS PATIENT QL REPORTED: YES
GLUCOSE SERPL-MCNC: 241 MG/DL (ref 70–99)
HBA1C MFR BLD: 9.7 % (ref 0–5.6)
HCO3 SERPL-SCNC: 26 MMOL/L (ref 22–29)
HDLC SERPL-MCNC: 44 MG/DL
LDLC SERPL CALC-MCNC: 62 MG/DL
MICROALBUMIN UR-MCNC: 32.5 MG/L
MICROALBUMIN/CREAT UR: 11.65 MG/G CR (ref 0–25)
NONHDLC SERPL-MCNC: 81 MG/DL
POTASSIUM SERPL-SCNC: 4.2 MMOL/L (ref 3.4–5.3)
PROT SERPL-MCNC: 7 G/DL (ref 6.4–8.3)
SODIUM SERPL-SCNC: 138 MMOL/L (ref 135–145)
TRIGL SERPL-MCNC: 97 MG/DL

## 2025-05-20 PROCEDURE — 80053 COMPREHEN METABOLIC PANEL: CPT | Performed by: FAMILY MEDICINE

## 2025-05-20 PROCEDURE — G2211 COMPLEX E/M VISIT ADD ON: HCPCS | Performed by: FAMILY MEDICINE

## 2025-05-20 PROCEDURE — 1126F AMNT PAIN NOTED NONE PRSNT: CPT | Performed by: FAMILY MEDICINE

## 2025-05-20 PROCEDURE — 3079F DIAST BP 80-89 MM HG: CPT | Performed by: FAMILY MEDICINE

## 2025-05-20 PROCEDURE — 99214 OFFICE O/P EST MOD 30 MIN: CPT | Mod: 25 | Performed by: FAMILY MEDICINE

## 2025-05-20 PROCEDURE — 80061 LIPID PANEL: CPT | Performed by: FAMILY MEDICINE

## 2025-05-20 PROCEDURE — 36415 COLL VENOUS BLD VENIPUNCTURE: CPT | Performed by: FAMILY MEDICINE

## 2025-05-20 PROCEDURE — 90471 IMMUNIZATION ADMIN: CPT | Performed by: FAMILY MEDICINE

## 2025-05-20 PROCEDURE — 3077F SYST BP >= 140 MM HG: CPT | Performed by: FAMILY MEDICINE

## 2025-05-20 PROCEDURE — 90715 TDAP VACCINE 7 YRS/> IM: CPT | Performed by: FAMILY MEDICINE

## 2025-05-20 PROCEDURE — 82043 UR ALBUMIN QUANTITATIVE: CPT | Performed by: FAMILY MEDICINE

## 2025-05-20 PROCEDURE — 82570 ASSAY OF URINE CREATININE: CPT | Performed by: FAMILY MEDICINE

## 2025-05-20 PROCEDURE — 83036 HEMOGLOBIN GLYCOSYLATED A1C: CPT | Performed by: FAMILY MEDICINE

## 2025-05-20 RX ORDER — METFORMIN HYDROCHLORIDE 500 MG/1
1000 TABLET, EXTENDED RELEASE ORAL
Qty: 180 TABLET | Refills: 3 | Status: SHIPPED | OUTPATIENT
Start: 2025-05-20

## 2025-05-20 RX ORDER — ESTRADIOL 0.1 MG/G
1 CREAM VAGINAL
Qty: 42.5 G | Refills: 11 | Status: SHIPPED | OUTPATIENT
Start: 2025-05-22

## 2025-05-20 RX ORDER — METOPROLOL SUCCINATE 25 MG/1
25 TABLET, EXTENDED RELEASE ORAL DAILY
Qty: 90 TABLET | Refills: 3 | Status: SHIPPED | OUTPATIENT
Start: 2025-05-20

## 2025-05-20 RX ORDER — FAMOTIDINE 40 MG/1
40 TABLET, FILM COATED ORAL DAILY
Qty: 90 TABLET | Refills: 3 | Status: SHIPPED | OUTPATIENT
Start: 2025-05-20

## 2025-05-20 RX ORDER — DOXYCYCLINE HYCLATE 100 MG
100 TABLET ORAL 2 TIMES DAILY
Qty: 14 TABLET | Refills: 0 | Status: SHIPPED | OUTPATIENT
Start: 2025-05-20

## 2025-05-20 RX ORDER — MONTELUKAST SODIUM 10 MG/1
1 TABLET ORAL AT BEDTIME
Qty: 90 TABLET | Refills: 1 | Status: SHIPPED | OUTPATIENT
Start: 2025-05-20

## 2025-05-20 RX ORDER — OMEPRAZOLE 20 MG/1
CAPSULE, DELAYED RELEASE ORAL
Qty: 90 CAPSULE | Refills: 1 | Status: SHIPPED | OUTPATIENT
Start: 2025-05-20

## 2025-05-20 RX ORDER — ATORVASTATIN CALCIUM 20 MG/1
20 TABLET, FILM COATED ORAL DAILY
Qty: 90 TABLET | Refills: 3 | Status: SHIPPED | OUTPATIENT
Start: 2025-05-20

## 2025-05-20 RX ORDER — HYDROCHLOROTHIAZIDE 12.5 MG/1
12.5 TABLET ORAL DAILY
Qty: 90 TABLET | Refills: 3 | Status: SHIPPED | OUTPATIENT
Start: 2025-05-20

## 2025-05-20 ASSESSMENT — PAIN SCALES - GENERAL: PAINLEVEL_OUTOF10: NO PAIN (0)

## 2025-05-20 NOTE — PROGRESS NOTES
Assessment & Plan      Acute sinusitis  Symptoms present x 8 days. If sx not improving int he next couple of days, she will start doxycycline. Had stomach upset with augmentin in the past.   Continue neti pot saline rinses    Type 2 diabetes mellitus without complication, without long-term current use of insulin (H)  Overdue for follow up  Uncontrolled. A1c 9.7 today    Restart metformin 500mg daily and increase to 1000mg daily in two weeks.   Last eye exam: not sure -will follow up and discuss in two weeks  Foot exam - discuss in two weeks     A1C is increasing. 7.2 today. thusfar has been managed with diet. Previously discussed starting metformin, but she had declined. Today, discussed metformin again including risks/side effects/benefits. Will start metformin 500mg daily and increase in 1 week to 1000mg daily. Follow up with me in 3 months.   Last eye exam 5/18/22   I recommended visiting with the DM educator. referral placed today   a1c today  Foot exam today normal.  She will schedule an eye exam.  Referral placed.     Hypertension goal BP (blood pressure)    Elevated today slightly  Continues metoprolol 25mg daily, hydrochlorothiazide 12.5mg daily. CMP, urine albumin today     Several home measurements also above goal Continues metoprolol 25mg daily. Initially discussed adding lisinopril, but after noting the potential for a dry cough as a side effect she declined.  Could also consider adding hydrochlorothiazide.  For now, she would like to start with the metformin and see how she tolerates it.  She also hopes to work on some weight loss.  We will plan on adding medication at follow-up for blood pressure if still uncontrolled.     Hyperlipidemia   Continues atorvastatin 20 mg daily.  Checking lipids today       Gastroesophageal reflux disease, esophagitis presence not specified  Stable on famotidine 40mg daily. And omeprazole 20mg daily      Cough   continues singulair    Improving since she started working  with the speech therapist for vocal cord dysfunction and inflammation. She has stopped the singulair.      Recently started singulair, which seems to help and she is tolerating it well. Atrovent was sent, but not covered by insurance, so Incruse Ellipta was selected as an alternative to address possible post viral cough. 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  She had used Singulair and her cough improved significantly so she stopped the medication.  She also felt it was making her anxious.  She felt that Atrovent inhaler was helpful in the setting of her cough, but then stopped it when her cough resolved.Her cough is that likely secondary to combination of GERD and postviral cough syndrome.  Her PFTs and chest x-ray were negative.  No further pulmonary work-up was necessary.  If symptoms return, pulmonology recommended initiation of inhaled Atrovent and Singulair for treatment.  She would like to have refill available of the Atrovent and Singulair.       GERD    Continues pepcid and omeprazole    Obesity BMI>35 with comorbidities DMII, HTN, HLD   Did not discuss today - will discuss in two weeks      Colon cancer screening    Will discuss next visit      Urinary urgency and frequency, mixed incontinence  Saw Urology. Was referred for pelvic floor therapy and started on vaginal estrogen, uses about once a week. She is finding vaginal estrogen helpful.      Recurrent herpes labialis  Acyclovir - has refills        Allergic rhinitis due to dogs      - fluticasone (FLONASE) 50 MCG/ACT nasal spray; Spray 1 spray into both nostrils 2 times daily  Dispense: 48 g; Refill: 11       Skin lesion    Did not discuss today, but will follow up next visit   Had referred to derm last year        Decreased hearing bilateral  Schedule with audiology  - she prefers to go to [facility].          Patient Instructions   You can start the doxycycline 100mg twice daily for 7 days. I  recommend monitoring symptoms for improvement in the next couple of days and starting the antibiotic if not improving.    By the time you see me next month, you will have scheduled a social security/medicare seminar to go to and you will have a date scheduled with one of your friends to fill out the paperwork, which you will submit by mail.   Restart metformin for diabetes, one tablet daily with evening meal, then increase to two pills once daily (or split into one pill twice daily).  Bring your bag of medicines to your next visit.   Buy a weekly pill box and put your pills in the box each week.          Delmi Pat is a 69 year old, presenting for the following health issues:  Follow Up (Cough about a week )    History of Present Illness       Reason for visit:  Talk about stress/anxiety  get back on track Diabetes/ current issue with cough    She eats 0-1 servings of fruits and vegetables daily.She consumes 1 sweetened beverage(s) daily.She exercises with enough effort to increase her heart rate 20 to 29 minutes per day.  She exercises with enough effort to increase her heart rate 3 or less days per week. She is missing 1 dose(s) of medications per week.  She is not taking prescribed medications regularly due to cost of medication and other.         Top on her mind is sinuses. About a week ago they were all at the cabin. Granddaughter had cough on Sunday. Went home Monday and felt fine. Tuesday as the day progressed she went downhill. Sinus congestion and pain occur in her cheeks. Now day 8 of symptoms.     Also notes stress at work and whenever stress at work her cheeks feel like they are on fire.     Notes she has also been having awful sinus issues, which she usually gets this time of year. Usually neti pot helps. But not this time.     She says that the last time she took augmentin it really upset her stomach. She did tolerate the doxycycline.     She says she also has a lot of congestion in her upper  "chest.     No fever.     Very stressed at work. They keep loading more things on her. It used to be fine, but she has just gotten to the point that she has needed to talk with her siblings who are all on medication for their mood. She says she really wants to retire and she was going to do it last year, but it became too complicated. She is going to retired this year. Has to figure out social security. Her plan is to sell her house and move to her cabin eventually. Loves to work on her planters at home. Does not feel stressed at home.         Objective    BP (!) 140/80 (BP Location: Right arm, Patient Position: Sitting, Cuff Size: Adult Large)   Pulse 74   Temp 97.3  F (36.3  C) (Temporal)   Resp 18   Ht 1.57 m (5' 1.8\")   Wt 85.9 kg (189 lb 4.8 oz)   LMP 02/07/2005   SpO2 98%   BMI 34.85 kg/m    Body mass index is 34.85 kg/m .  Physical Exam   GENERAL: alert and no distress    Virtual Visit on 05/08/2024   Component Date Value Ref Range Status    Group A Strep antigen 05/09/2024 Negative  Negative Final    Group A strep by PCR 05/09/2024 Not Detected  Not Detected Final           Signed Electronically by: Sheree Graf MD     "

## 2025-05-20 NOTE — PATIENT INSTRUCTIONS
You can start the doxycycline 100mg twice daily for 7 days. I recommend monitoring symptoms for improvement in the next couple of days and starting the antibiotic if not improving.    By the time you see me next month, you will have scheduled a social security/medicare seminar to go to and you will have a date scheduled with one of your friends to fill out the paperwork, which you will submit by mail.   Restart metformin for diabetes, one tablet daily with evening meal, then increase to two pills once daily (or split into one pill twice daily).  Bring your bag of medicines to your next visit.   Buy a weekly pill box and put your pills in the box each week.

## 2025-05-20 NOTE — NURSING NOTE
Prior to immunization administration, verified patients identity using patient s name and date of birth. Please see Immunization Activity for additional information.     Screening Questionnaire for Adult Immunization    Are you sick today?   No   Do you have allergies to medications, food, a vaccine component or latex?   No   Have you ever had a serious reaction after receiving a vaccination?   No   Do you have a long-term health problem with heart, lung, kidney, or metabolic disease (e.g., diabetes), asthma, a blood disorder, no spleen, complement component deficiency, a cochlear implant, or a spinal fluid leak?  Are you on long-term aspirin therapy?   Yes   Do you have cancer, leukemia, HIV/AIDS, or any other immune system problem?   No   Do you have a parent, brother, or sister with an immune system problem?   No   In the past 3 months, have you taken medications that affect  your immune system, such as prednisone, other steroids, or anticancer drugs; drugs for the treatment of rheumatoid arthritis, Crohn s disease, or psoriasis; or have you had radiation treatments?   No   Have you had a seizure, or a brain or other nervous system problem?   No   During the past year, have you received a transfusion of blood or blood    products, or been given immune (gamma) globulin or antiviral drug?   No   For women: Are you pregnant or is there a chance you could become       pregnant during the next month?   No   Have you received any vaccinations in the past 4 weeks?   No     Immunization questionnaire was positive for at least one answer.  Notified Dr. Homar MD.      Patient instructed to remain in clinic for 15 minutes afterwards, and to report any adverse reactions.     Screening performed by Nisa Toscano MA on 5/20/2025 at 9:34 AM.

## 2025-05-21 NOTE — RESULT ENCOUNTER NOTE
The results of your recent lipid (cholesterol) profile were abnormal.    Here are the results:  Lab Results       Component                Value               Date                       CHOL                     125                 05/20/2025                 CHOL                     213                 10/28/2020            Lab Results       Component                Value               Date                       HDL                      44                  05/20/2025                 HDL                      57                  10/28/2020            Lab Results       Component                Value               Date                       LDL                      62                  05/20/2025                 LDL                      119                 10/28/2020            Lab Results       Component                Value               Date                       TRIG                     97                  05/20/2025                 TRIG                     183                 10/28/2020            Lab Results       Component                Value               Date                       CHOLHDLRATIO             3.8                 02/15/2013              Desired or goal levels are:  CHOLESTEROL: Desirable is less than 200.   HDL (Good Cholesterol): Desirable is greater than 40 (for men) greater than 50 (for women).  LDL (Bad Cholesterol): Desirable is less than 130 (or less than 100 if you have heart disease or diabetes). Borderline 130-160.  TRIGLYCERIDES: Desirable is less than 150.  Borderline is 150-200.    Your HDL (the good cholesterol) level is low, no medication is required at this point. Reducing your total fat intake, increasing exercise level, reducing weight, adding olive oil to your diet, etc, may help to increase this level..    As you may know, an elevated cholesterol is one factor that increases your risk for heart disease and stroke. You can improve your cholesterol by controlling the amount and type  of fat you eat and by increasing your daily activity level.    Here are some ways to improve your nutrition:  Eat less fat (especially butter, Crisco and other saturated fats)  Buy lean cuts of meat, reduce your portions of red meat or substitute poultry or fish  Use skim milk and low-fat dairy products  Eat no more than 4 egg yolks per week  Avoid fried or fast foods that are high in fat  Eat more fruits and vegetables      Also consider starting or increasing your aerobic activity. Aerobic activity is the best way to improve HDL (good) cholesterol. If this would be new to you, please talk with me first about what activities are safe for you.      Other lab results:    The testing of your  kidney function, liver function and electrolytes was normal. Your blood sugar was high.     Your urine protein test was normal.        Please feel free to contact us with any questions or if you would like more information.    Sheree Graf M.D.

## 2025-06-05 ENCOUNTER — OFFICE VISIT (OUTPATIENT)
Dept: FAMILY MEDICINE | Facility: CLINIC | Age: 70
End: 2025-06-05
Payer: COMMERCIAL

## 2025-06-05 VITALS
DIASTOLIC BLOOD PRESSURE: 80 MMHG | RESPIRATION RATE: 20 BRPM | TEMPERATURE: 98.2 F | SYSTOLIC BLOOD PRESSURE: 142 MMHG | HEART RATE: 77 BPM | HEIGHT: 62 IN | BODY MASS INDEX: 33.86 KG/M2 | WEIGHT: 184 LBS

## 2025-06-05 DIAGNOSIS — M79.672 LEFT FOOT PAIN: ICD-10-CM

## 2025-06-05 DIAGNOSIS — E78.5 HYPERLIPIDEMIA, UNSPECIFIED HYPERLIPIDEMIA TYPE: ICD-10-CM

## 2025-06-05 DIAGNOSIS — I10 HYPERTENSION GOAL BP (BLOOD PRESSURE) < 140/90: ICD-10-CM

## 2025-06-05 DIAGNOSIS — R05.3 CHRONIC COUGH: ICD-10-CM

## 2025-06-05 DIAGNOSIS — Z12.31 SCREENING MAMMOGRAM, ENCOUNTER FOR: ICD-10-CM

## 2025-06-05 DIAGNOSIS — E66.09 CLASS 1 OBESITY DUE TO EXCESS CALORIES WITH SERIOUS COMORBIDITY AND BODY MASS INDEX (BMI) OF 33.0 TO 33.9 IN ADULT: ICD-10-CM

## 2025-06-05 DIAGNOSIS — E66.811 CLASS 1 OBESITY DUE TO EXCESS CALORIES WITH SERIOUS COMORBIDITY AND BODY MASS INDEX (BMI) OF 33.0 TO 33.9 IN ADULT: ICD-10-CM

## 2025-06-05 DIAGNOSIS — R05.8 POST-VIRAL COUGH SYNDROME: ICD-10-CM

## 2025-06-05 DIAGNOSIS — K21.9 GASTROESOPHAGEAL REFLUX DISEASE, UNSPECIFIED WHETHER ESOPHAGITIS PRESENT: ICD-10-CM

## 2025-06-05 DIAGNOSIS — Z78.0 ASYMPTOMATIC POSTMENOPAUSAL STATUS: ICD-10-CM

## 2025-06-05 DIAGNOSIS — Z78.0 POST-MENOPAUSE: ICD-10-CM

## 2025-06-05 DIAGNOSIS — Z12.11 SCREEN FOR COLON CANCER: ICD-10-CM

## 2025-06-05 DIAGNOSIS — E11.9 TYPE 2 DIABETES MELLITUS WITHOUT COMPLICATION, WITHOUT LONG-TERM CURRENT USE OF INSULIN (H): Primary | ICD-10-CM

## 2025-06-05 RX ORDER — METFORMIN HYDROCHLORIDE 500 MG/1
1000 TABLET, EXTENDED RELEASE ORAL 2 TIMES DAILY WITH MEALS
Qty: 360 TABLET | Refills: 3 | Status: SHIPPED | OUTPATIENT
Start: 2025-06-05

## 2025-06-05 ASSESSMENT — PATIENT HEALTH QUESTIONNAIRE - PHQ9
SUM OF ALL RESPONSES TO PHQ QUESTIONS 1-9: 1
SUM OF ALL RESPONSES TO PHQ QUESTIONS 1-9: 1
10. IF YOU CHECKED OFF ANY PROBLEMS, HOW DIFFICULT HAVE THESE PROBLEMS MADE IT FOR YOU TO DO YOUR WORK, TAKE CARE OF THINGS AT HOME, OR GET ALONG WITH OTHER PEOPLE: NOT DIFFICULT AT ALL

## 2025-06-05 ASSESSMENT — ANXIETY QUESTIONNAIRES
GAD7 TOTAL SCORE: 3
7. FEELING AFRAID AS IF SOMETHING AWFUL MIGHT HAPPEN: NOT AT ALL
6. BECOMING EASILY ANNOYED OR IRRITABLE: NOT AT ALL
GAD7 TOTAL SCORE: 3
IF YOU CHECKED OFF ANY PROBLEMS ON THIS QUESTIONNAIRE, HOW DIFFICULT HAVE THESE PROBLEMS MADE IT FOR YOU TO DO YOUR WORK, TAKE CARE OF THINGS AT HOME, OR GET ALONG WITH OTHER PEOPLE: NOT DIFFICULT AT ALL
2. NOT BEING ABLE TO STOP OR CONTROL WORRYING: SEVERAL DAYS
GAD7 TOTAL SCORE: 3
3. WORRYING TOO MUCH ABOUT DIFFERENT THINGS: SEVERAL DAYS
1. FEELING NERVOUS, ANXIOUS, OR ON EDGE: SEVERAL DAYS
5. BEING SO RESTLESS THAT IT IS HARD TO SIT STILL: NOT AT ALL
8. IF YOU CHECKED OFF ANY PROBLEMS, HOW DIFFICULT HAVE THESE MADE IT FOR YOU TO DO YOUR WORK, TAKE CARE OF THINGS AT HOME, OR GET ALONG WITH OTHER PEOPLE?: NOT DIFFICULT AT ALL
4. TROUBLE RELAXING: NOT AT ALL
7. FEELING AFRAID AS IF SOMETHING AWFUL MIGHT HAPPEN: NOT AT ALL

## 2025-06-05 ASSESSMENT — PAIN SCALES - GENERAL: PAINLEVEL_OUTOF10: MILD PAIN (2)

## 2025-06-05 NOTE — PROGRESS NOTES
Assessment & Plan       ICD-10-CM    1. Type 2 diabetes mellitus without complication, without long-term current use of insulin (H)  E11.9 Adult Eye  Referral     metFORMIN (GLUCOPHAGE XR) 500 MG 24 hr tablet      2. Asymptomatic postmenopausal status  Z78.0       3. Screen for colon cancer  Z12.11 Colonoscopy Screening  Referral      4. Hypertension goal BP (blood pressure) < 140/90  I10       5. Hyperlipidemia, unspecified hyperlipidemia type  E78.5       6. Gastroesophageal reflux disease, unspecified whether esophagitis present  K21.9       7. Chronic cough  R05.3 ipratropium (ATROVENT HFA) 17 MCG/ACT inhaler      8. Class 1 obesity due to excess calories with serious comorbidity and body mass index (BMI) of 33.0 to 33.9 in adult  E66.811     E66.09     Z68.33       9. Screening mammogram, encounter for  Z12.31 MA Screen Bilateral w/Edgardo      10. Left foot pain  M79.672 Orthopedic  Referral      11. Post-menopause  Z78.0 DX Bone Density      12. Post-viral cough syndrome  R05.8 ipratropium (ATROVENT HFA) 17 MCG/ACT inhaler             Type 2 diabetes mellitus without complication, without long-term current use of insulin (H)  Overdue for follow up  Uncontrolled. A1c 9.7 today    Restart metformin 500mg daily and increase to 1000mg daily in two weeks.   Last eye exam: not sure -will follow up and discuss in two weeks  Foot exam - discuss in two weeks     A1C is increasing. 7.2 today. thusfar has been managed with diet. Previously discussed starting metformin, but she had declined. Today, discussed metformin again including risks/side effects/benefits. Will start metformin 500mg daily and increase in 1 week to 1000mg daily. Follow up with me in 3 months.   Last eye exam 5/18/22   I recommended visiting with the DM educator. referral placed today   a1c today  Foot exam today normal.  She will schedule an eye exam.  Referral placed.     Hypertension goal BP (blood pressure)    Elevated  today slightly - she will check at home and will let me know if continues to be elevated.   Continues metoprolol 25mg daily, hydrochlorothiazide 12.5mg daily. CMP, urine albumin today     Several home measurements also above goal Continues metoprolol 25mg daily. Initially discussed adding lisinopril, but after noting the potential for a dry cough as a side effect she declined.  Could also consider adding hydrochlorothiazide.  For now, she would like to start with the metformin and see how she tolerates it.  She also hopes to work on some weight loss.  We will plan on adding medication at follow-up for blood pressure if still uncontrolled.     Hyperlipidemia   Continues atorvastatin 20 mg daily.  Checking lipids today       Gastroesophageal reflux disease, esophagitis presence not specified  Stable on famotidine 40mg daily. And omeprazole 20mg daily      Cough   continues singulair. Restart atrovent inhaler.     Improving since she started working with the speech therapist for vocal cord dysfunction and inflammation. She has stopped the singulair.      Recently started singulair, which seems to help and she is tolerating it well. Atrovent was sent, but not covered by insurance, so Incruse Ellipta was selected as an alternative to address possible post viral cough. 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  She had used Singulair and her cough improved significantly so she stopped the medication.  She also felt it was making her anxious.  She felt that Atrovent inhaler was helpful in the setting of her cough, but then stopped it when her cough resolved.Her cough is that likely secondary to combination of GERD and postviral cough syndrome. Her PFTs and chest x-ray were negative.  No further pulmonary work-up was necessary.  If symptoms return, pulmonology recommended initiation of inhaled Atrovent and Singulair for treatment.  She would like to have refill available  "of the Atrovent and Singulair.       GERD    Continues pepcid and omeprazole    Obesity BMI 33 with comorbidities DMII, HTN, HLD   Did not discuss today - will discuss in two weeks      Colon cancer screening    She would like to schedule colonoscopy      Urinary urgency and frequency, mixed incontinence  Saw Urology. Was referred for pelvic floor therapy and started on vaginal estrogen, uses about once a week. She is finding vaginal estrogen helpful.      Recurrent herpes labialis  Acyclovir - has refills        Allergic rhinitis due to dogs    Uses flonase        Skin lesion  Had appt with derm in 3/25       Decreased hearing bilateral  Schedule with audiology  - she prefers to go to [facility].       Acute sinusitis - resolved with doxycycline.        Patient Instructions   If you are interested, there is a new continuous blood sugar monitor available over-the-counter from [employer] called [email or web address]. You can check out their website [email or web address].[email or web address] for more information. [employer] will also have an over-the-counter continuous blood sugar monitor available soon.    Increase metformin to 2 pills twice daily.   Schedule a visit with me end of August in person to check on diabetes.   You are aiming to have your [facility] paperwork done by the time you see me next.   Check your blood pressure and record readings at least once weekly and bring them to your follow up visit.   Schedule a colonoscopy.   Schedule your diabetic eye exam.   Check out a neck light to use at work.   Schedule with podiatry (foot doctor)  Schedule a bone density test (DEXA scan), preferably at [facility] or [facility].    Schedule a mammogram.       BMI  Estimated body mass index is 33.87 kg/m  as calculated from the following:    Height as of this encounter: 1.57 m (5' 1.8\").    Weight as of this encounter: 83.5 kg (184 lb).           Delmi Pat is a 69 year old, presenting for the following health issues:  Cough (Lingering from sinus infection, " phlegm)    History of Present Illness       Diabetes:   She presents for follow up of diabetes.    She is not checking blood glucose.        She is concerned about other.   She is having numbness in feet, blurry vision and weight gain.  The patient has not had a diabetic eye exam in the last 12 months.          Hypertension: She presents for follow up of hypertension.  She does not check blood pressure  regularly outside of the clinic. Outside blood pressures have been over 140/90. She does not follow a low salt diet.     She eats 0-1 servings of fruits and vegetables daily.She consumes 0 sweetened beverage(s) daily.She exercises with enough effort to increase her heart rate 20 to 29 minutes per day.  She exercises with enough effort to increase her heart rate 3 or less days per week.   She is taking medications regularly.   Adilia Camacho, 69 years    Diabetes management  - Currently taking metformin, recently restarted.  - Not consistently checking blood sugar levels at home.    Cough  - Has experienced a persistent cough. Previously used Atrovent inhaler for the cough but not currently using it.    Foot sensation  - Reports sensation of having a sock bunched up under the foot and discomfort in the middle of the ball of the foot    Falls  - Experienced a fall during the winter while snow blowing, slipped on ice but did not hit head. No significant injuries from the fall, only temporary hand pain.    Skin concerns  - Has a brown spot on the back of the leg. Visited a dermatologist in March, primarily for scalp examination.    senior living planning  - Actively working on senior living plans. Attending a seminar for senior living planning and has set a personal goal to have senior living paperwork done by August.    Eye health  - Acknowledges the need for an eye examination. Has not had a recent eye check-up and plans to schedule an appointment.    Social and work stress  - Experiences stress related to work and senior living  "planning. Desires to retire and focus on personal health and activities.          Objective    BP (!) 142/80 (BP Location: Right arm, Patient Position: Sitting, Cuff Size: Adult Regular)   Pulse 77   Temp 98.2  F (36.8  C) (Temporal)   Resp 20   Ht 1.57 m (5' 1.8\")   Wt 83.5 kg (184 lb)   LMP 02/07/2005   BMI 33.87 kg/m    Body mass index is 33.87 kg/m .  Physical Exam   GENERAL: alert and no distress  Diabetic foot exam: normal DP and PT pulses, no trophic changes or ulcerative lesions, and normal sensory exam    Office Visit on 05/20/2025   Component Date Value Ref Range Status    Estimated Average Glucose 05/20/2025 232 (H)  <117 mg/dL Final    Hemoglobin A1C 05/20/2025 9.7 (H)  0.0 - 5.6 % Final    Normal <5.7%   Prediabetes 5.7-6.4%    Diabetes 6.5% or higher     Note: Adopted from ADA consensus guidelines.    Sodium 05/20/2025 138  135 - 145 mmol/L Final    Potassium 05/20/2025 4.2  3.4 - 5.3 mmol/L Final    Carbon Dioxide (CO2) 05/20/2025 26  22 - 29 mmol/L Final    Anion Gap 05/20/2025 12  7 - 15 mmol/L Final    Urea Nitrogen 05/20/2025 10.0  8.0 - 23.0 mg/dL Final    Creatinine 05/20/2025 0.85  0.51 - 0.95 mg/dL Final    GFR Estimate 05/20/2025 75  >60 mL/min/1.73m2 Final    eGFR calculated using 2021 CKD-EPI equation.    Calcium 05/20/2025 9.4  8.8 - 10.4 mg/dL Final    Chloride 05/20/2025 100  98 - 107 mmol/L Final    Glucose 05/20/2025 241 (H)  70 - 99 mg/dL Final    Alkaline Phosphatase 05/20/2025 84  40 - 150 U/L Final    AST 05/20/2025 20  0 - 45 U/L Final    ALT 05/20/2025 14  0 - 50 U/L Final    Protein Total 05/20/2025 7.0  6.4 - 8.3 g/dL Final    Albumin 05/20/2025 4.1  3.5 - 5.2 g/dL Final    Bilirubin Total 05/20/2025 0.7  <=1.2 mg/dL Final    Patient Fasting > 8hrs? 05/20/2025 Yes   Final    Cholesterol 05/20/2025 125  <200 mg/dL Final    Triglycerides 05/20/2025 97  <150 mg/dL Final    Direct Measure HDL 05/20/2025 44 (L)  >=50 mg/dL Final    LDL Cholesterol Calculated 05/20/2025 62  " <100 mg/dL Final    Non HDL Cholesterol 05/20/2025 81  <130 mg/dL Final    Patient Fasting > 8hrs? 05/20/2025 Yes   Final    Creatinine Urine mg/dL 05/20/2025 279.0  mg/dL Final    The reference ranges have not been established in urine creatinine. The results should be integrated into the clinical context for interpretation.    Albumin Urine mg/L 05/20/2025 32.5  mg/L Final    The reference ranges have not been established in urine albumin. The results should be integrated into the clinical context for interpretation.    Albumin Urine mg/g Cr 05/20/2025 11.65  0.00 - 25.00 mg/g Cr Final    Microalbuminuria is defined as an albumin:creatinine ratio of 17 to 299 for males and 25 to 299 for females. A ratio of albumin:creatinine of 300 or higher is indicative of overt proteinuria.  Due to biologic variability, positive results should be confirmed by a second, first-morning random or 24-hour timed urine specimen. If there is discrepancy, a third specimen is recommended. When 2 out of 3 results are in the microalbuminuria range, this is evidence for incipient nephropathy and warrants increased efforts at glucose control, blood pressure control, and institution of therapy with an angiotensin-converting-enzyme (ACE) inhibitor (if the patient can tolerate it).             Signed Electronically by: Sheree Graf MD

## 2025-06-05 NOTE — PATIENT INSTRUCTIONS
If you are interested, there is a new continuous blood sugar monitor available over-the-counter from [employer] called iXpert. You can check out their website [email or web address] for more information. [employer] will also have an over-the-counter continuous blood sugar monitor available soon.    Increase metformin to 2 pills twice daily.   Schedule a visit with me end of August in person to check on diabetes.   You are aiming to have your [facility] paperwork done by the time you see me next.   Check your blood pressure and record readings at least once weekly and bring them to your follow up visit.   Schedule a colonoscopy.   Schedule your diabetic eye exam.   Check out a neck light to use at work.   Schedule with podiatry (foot doctor)  Schedule a bone density test (DEXA scan), preferably at [facility] or [facility].    Schedule a mammogram.

## 2025-06-09 ENCOUNTER — PATIENT OUTREACH (OUTPATIENT)
Dept: CARE COORDINATION | Facility: CLINIC | Age: 70
End: 2025-06-09
Payer: COMMERCIAL

## 2025-07-12 ENCOUNTER — HEALTH MAINTENANCE LETTER (OUTPATIENT)
Age: 70
End: 2025-07-12

## 2025-08-23 ENCOUNTER — HEALTH MAINTENANCE LETTER (OUTPATIENT)
Age: 70
End: 2025-08-23

## (undated) DEVICE — LINEN ORTHO PACK 5446

## (undated) DEVICE — PREP DURAPREP REMOVER 4OZ 8611

## (undated) DEVICE — BUR ARTHREX COOLCUT SABRE 4.0MMX13CM AR-8400SR

## (undated) DEVICE — Device

## (undated) DEVICE — SOL NACL 0.9% IRRIG 3000ML BAG 2B7477

## (undated) DEVICE — PAD ARMBOARD FOAM EGGCRATE COVIDEN 3114367

## (undated) DEVICE — TUBING SYSTEM ARTHREX PATIENT REDEUCE AR-6421

## (undated) DEVICE — SU FIBERLINK 0 BLUE W/CLOSED LOOP ON ONE END AR-7258

## (undated) DEVICE — NDL ARTHREX SCORPION KNEE 2-0 AR-12990N

## (undated) DEVICE — PACK ARTHROSCOPY CUSTOM ASC

## (undated) DEVICE — PREP DURAPREP 26ML APL 8630

## (undated) DEVICE — ESU PENCIL SMOKE EVAC W/ROCKER SWITCH 0703-047-000

## (undated) DEVICE — SUCTION MANIFOLD NEPTUNE 2 SYS 4 PORT 0702-020-000

## (undated) DEVICE — GLOVE PROTEXIS BLUE W/NEU-THERA 8.0  2D73EB80

## (undated) DEVICE — GLOVE PROTEXIS POWDER FREE SMT 8.0  2D72PT80X

## (undated) DEVICE — LINEN GOWN XLG 5407

## (undated) DEVICE — ESU GROUND PAD ADULT W/CORD E7507

## (undated) RX ORDER — PROPOFOL 10 MG/ML
INJECTION, EMULSION INTRAVENOUS
Status: DISPENSED
Start: 2020-02-07

## (undated) RX ORDER — FENTANYL CITRATE 50 UG/ML
INJECTION, SOLUTION INTRAMUSCULAR; INTRAVENOUS
Status: DISPENSED
Start: 2020-02-07

## (undated) RX ORDER — CEFAZOLIN SODIUM 1 G/3ML
INJECTION, POWDER, FOR SOLUTION INTRAMUSCULAR; INTRAVENOUS
Status: DISPENSED
Start: 2020-02-07

## (undated) RX ORDER — ACETAMINOPHEN 325 MG/1
TABLET ORAL
Status: DISPENSED
Start: 2020-02-07

## (undated) RX ORDER — KETOROLAC TROMETHAMINE 30 MG/ML
INJECTION, SOLUTION INTRAMUSCULAR; INTRAVENOUS
Status: DISPENSED
Start: 2020-02-07

## (undated) RX ORDER — DEXAMETHASONE SODIUM PHOSPHATE 4 MG/ML
INJECTION, SOLUTION INTRA-ARTICULAR; INTRALESIONAL; INTRAMUSCULAR; INTRAVENOUS; SOFT TISSUE
Status: DISPENSED
Start: 2020-02-07

## (undated) RX ORDER — LIDOCAINE HYDROCHLORIDE 5 MG/ML
INJECTION, SOLUTION INFILTRATION; INTRAVENOUS
Status: DISPENSED
Start: 2020-05-27

## (undated) RX ORDER — TRIAMCINOLONE ACETONIDE 40 MG/ML
INJECTION, SUSPENSION INTRA-ARTICULAR; INTRAMUSCULAR
Status: DISPENSED
Start: 2020-05-27

## (undated) RX ORDER — LIDOCAINE HYDROCHLORIDE 10 MG/ML
INJECTION, SOLUTION EPIDURAL; INFILTRATION; INTRACAUDAL; PERINEURAL
Status: DISPENSED
Start: 2020-02-07

## (undated) RX ORDER — ONDANSETRON 2 MG/ML
INJECTION INTRAMUSCULAR; INTRAVENOUS
Status: DISPENSED
Start: 2020-02-07

## (undated) RX ORDER — LIDOCAINE HYDROCHLORIDE 20 MG/ML
INJECTION, SOLUTION EPIDURAL; INFILTRATION; INTRACAUDAL; PERINEURAL
Status: DISPENSED
Start: 2020-02-07

## (undated) RX ORDER — OXYCODONE HYDROCHLORIDE 5 MG/1
TABLET ORAL
Status: DISPENSED
Start: 2020-02-07